# Patient Record
Sex: MALE | Race: WHITE | Employment: UNEMPLOYED | URBAN - METROPOLITAN AREA
[De-identification: names, ages, dates, MRNs, and addresses within clinical notes are randomized per-mention and may not be internally consistent; named-entity substitution may affect disease eponyms.]

---

## 2017-04-26 ENCOUNTER — APPOINTMENT (OUTPATIENT)
Dept: GENERAL RADIOLOGY | Age: 42
End: 2017-04-26
Payer: COMMERCIAL

## 2017-04-26 ENCOUNTER — APPOINTMENT (OUTPATIENT)
Dept: ULTRASOUND IMAGING | Age: 42
End: 2017-04-26
Payer: COMMERCIAL

## 2017-04-26 ENCOUNTER — ANESTHESIA (OUTPATIENT)
Dept: OPERATING ROOM | Age: 42
End: 2017-04-26
Payer: COMMERCIAL

## 2017-04-26 ENCOUNTER — HOSPITAL ENCOUNTER (OUTPATIENT)
Age: 42
Setting detail: OBSERVATION
Discharge: HOME | End: 2017-04-27
Attending: EMERGENCY MEDICINE | Admitting: SURGERY
Payer: COMMERCIAL

## 2017-04-26 ENCOUNTER — APPOINTMENT (OUTPATIENT)
Dept: CT IMAGING | Age: 42
End: 2017-04-26
Payer: COMMERCIAL

## 2017-04-26 ENCOUNTER — ANESTHESIA EVENT (OUTPATIENT)
Dept: OPERATING ROOM | Age: 42
End: 2017-04-26
Payer: COMMERCIAL

## 2017-04-26 VITALS — OXYGEN SATURATION: 97 % | TEMPERATURE: 98.2 F | DIASTOLIC BLOOD PRESSURE: 77 MMHG | SYSTOLIC BLOOD PRESSURE: 130 MMHG

## 2017-04-26 DIAGNOSIS — K35.80 ACUTE APPENDICITIS, UNSPECIFIED ACUTE APPENDICITIS TYPE: Primary | ICD-10-CM

## 2017-04-26 LAB
ABSOLUTE EOS #: 0.1 K/UL (ref 0–0.4)
ABSOLUTE LYMPH #: 2.3 K/UL (ref 1–4.8)
ABSOLUTE MONO #: 0.7 K/UL (ref 0.2–0.8)
ALBUMIN SERPL-MCNC: 4.3 G/DL (ref 3.5–5.2)
ALBUMIN/GLOBULIN RATIO: ABNORMAL (ref 1–2.5)
ALP BLD-CCNC: 36 U/L (ref 40–129)
ALT SERPL-CCNC: 24 U/L (ref 5–41)
AMYLASE: 68 U/L (ref 28–100)
ANION GAP SERPL CALCULATED.3IONS-SCNC: 14 MMOL/L (ref 9–17)
AST SERPL-CCNC: 18 U/L
BASOPHILS # BLD: 1 %
BASOPHILS ABSOLUTE: 0.1 K/UL (ref 0–0.2)
BILIRUB SERPL-MCNC: 0.45 MG/DL (ref 0.3–1.2)
BILIRUBIN DIRECT: 0.09 MG/DL
BILIRUBIN, INDIRECT: 0.36 MG/DL (ref 0–1)
BUN BLDV-MCNC: 15 MG/DL (ref 6–20)
BUN/CREAT BLD: 19 (ref 9–20)
CALCIUM SERPL-MCNC: 9.3 MG/DL (ref 8.6–10.4)
CHLORIDE BLD-SCNC: 99 MMOL/L (ref 98–107)
CO2: 23 MMOL/L (ref 20–31)
CREAT SERPL-MCNC: 0.79 MG/DL (ref 0.7–1.2)
DIFFERENTIAL TYPE: ABNORMAL
EOSINOPHILS RELATIVE PERCENT: 1 %
GFR AFRICAN AMERICAN: >60 ML/MIN
GFR NON-AFRICAN AMERICAN: >60 ML/MIN
GFR SERPL CREATININE-BSD FRML MDRD: NORMAL ML/MIN/{1.73_M2}
GFR SERPL CREATININE-BSD FRML MDRD: NORMAL ML/MIN/{1.73_M2}
GLOBULIN: ABNORMAL G/DL (ref 1.5–3.8)
GLUCOSE BLD-MCNC: 93 MG/DL (ref 70–99)
HCT VFR BLD CALC: 48.3 % (ref 41–53)
HEMOGLOBIN: 16.3 G/DL (ref 13.5–17.5)
LIPASE: 34 U/L (ref 13–60)
LYMPHOCYTES # BLD: 20 %
MCH RBC QN AUTO: 29 PG (ref 26–34)
MCHC RBC AUTO-ENTMCNC: 33.9 G/DL (ref 31–37)
MCV RBC AUTO: 85.7 FL (ref 80–100)
MONOCYTES # BLD: 6 %
PDW BLD-RTO: 14.1 % (ref 11.5–14.5)
PLATELET # BLD: 344 K/UL (ref 130–400)
PLATELET ESTIMATE: ABNORMAL
PMV BLD AUTO: 7.3 FL (ref 6–12)
POTASSIUM SERPL-SCNC: 4.3 MMOL/L (ref 3.7–5.3)
RBC # BLD: 5.63 M/UL (ref 4.5–5.9)
RBC # BLD: ABNORMAL 10*6/UL
SEG NEUTROPHILS: 72 %
SEGMENTED NEUTROPHILS ABSOLUTE COUNT: 8.1 K/UL (ref 1.8–7.7)
SODIUM BLD-SCNC: 136 MMOL/L (ref 135–144)
TOTAL PROTEIN: 7.5 G/DL (ref 6.4–8.3)
WBC # BLD: 11.3 K/UL (ref 3.5–11)
WBC # BLD: ABNORMAL 10*3/UL

## 2017-04-26 PROCEDURE — 71020 XR CHEST STANDARD TWO VW: CPT

## 2017-04-26 PROCEDURE — 2500000003 HC RX 250 WO HCPCS: Performed by: SURGERY

## 2017-04-26 PROCEDURE — 80048 BASIC METABOLIC PNL TOTAL CA: CPT

## 2017-04-26 PROCEDURE — 96366 THER/PROPH/DIAG IV INF ADDON: CPT

## 2017-04-26 PROCEDURE — 2580000003 HC RX 258: Performed by: ANESTHESIOLOGY

## 2017-04-26 PROCEDURE — 6360000002 HC RX W HCPCS: Performed by: ANESTHESIOLOGY

## 2017-04-26 PROCEDURE — 2580000003 HC RX 258: Performed by: EMERGENCY MEDICINE

## 2017-04-26 PROCEDURE — 3600000003 HC SURGERY LEVEL 3 BASE: Performed by: SURGERY

## 2017-04-26 PROCEDURE — 82150 ASSAY OF AMYLASE: CPT

## 2017-04-26 PROCEDURE — 85025 COMPLETE CBC W/AUTO DIFF WBC: CPT

## 2017-04-26 PROCEDURE — 6360000004 HC RX CONTRAST MEDICATION: Performed by: EMERGENCY MEDICINE

## 2017-04-26 PROCEDURE — 99285 EMERGENCY DEPT VISIT HI MDM: CPT

## 2017-04-26 PROCEDURE — 2500000003 HC RX 250 WO HCPCS: Performed by: ANESTHESIOLOGY

## 2017-04-26 PROCEDURE — 96376 TX/PRO/DX INJ SAME DRUG ADON: CPT

## 2017-04-26 PROCEDURE — 74177 CT ABD & PELVIS W/CONTRAST: CPT

## 2017-04-26 PROCEDURE — 96368 THER/DIAG CONCURRENT INF: CPT

## 2017-04-26 PROCEDURE — 81003 URINALYSIS AUTO W/O SCOPE: CPT

## 2017-04-26 PROCEDURE — 3600000013 HC SURGERY LEVEL 3 ADDTL 15MIN: Performed by: SURGERY

## 2017-04-26 PROCEDURE — 80076 HEPATIC FUNCTION PANEL: CPT

## 2017-04-26 PROCEDURE — 96365 THER/PROPH/DIAG IV INF INIT: CPT

## 2017-04-26 PROCEDURE — 6360000002 HC RX W HCPCS: Performed by: SURGERY

## 2017-04-26 PROCEDURE — 7100000001 HC PACU RECOVERY - ADDTL 15 MIN: Performed by: SURGERY

## 2017-04-26 PROCEDURE — G0378 HOSPITAL OBSERVATION PER HR: HCPCS

## 2017-04-26 PROCEDURE — 88304 TISSUE EXAM BY PATHOLOGIST: CPT

## 2017-04-26 PROCEDURE — 83690 ASSAY OF LIPASE: CPT

## 2017-04-26 PROCEDURE — S0028 INJECTION, FAMOTIDINE, 20 MG: HCPCS | Performed by: SURGERY

## 2017-04-26 PROCEDURE — 76705 ECHO EXAM OF ABDOMEN: CPT

## 2017-04-26 PROCEDURE — 3700000000 HC ANESTHESIA ATTENDED CARE: Performed by: SURGERY

## 2017-04-26 PROCEDURE — 3700000001 HC ADD 15 MINUTES (ANESTHESIA): Performed by: SURGERY

## 2017-04-26 PROCEDURE — 6360000002 HC RX W HCPCS: Performed by: EMERGENCY MEDICINE

## 2017-04-26 PROCEDURE — 2580000003 HC RX 258: Performed by: SURGERY

## 2017-04-26 PROCEDURE — 7100000000 HC PACU RECOVERY - FIRST 15 MIN: Performed by: SURGERY

## 2017-04-26 RX ORDER — CEFAZOLIN SODIUM 1 G/3ML
INJECTION, POWDER, FOR SOLUTION INTRAMUSCULAR; INTRAVENOUS PRN
Status: DISCONTINUED | OUTPATIENT
Start: 2017-04-26 | End: 2017-04-26 | Stop reason: SDUPTHER

## 2017-04-26 RX ORDER — ROCURONIUM BROMIDE 10 MG/ML
INJECTION, SOLUTION INTRAVENOUS PRN
Status: DISCONTINUED | OUTPATIENT
Start: 2017-04-26 | End: 2017-04-26 | Stop reason: SDUPTHER

## 2017-04-26 RX ORDER — MORPHINE SULFATE 4 MG/ML
4 INJECTION, SOLUTION INTRAMUSCULAR; INTRAVENOUS ONCE
Status: COMPLETED | OUTPATIENT
Start: 2017-04-26 | End: 2017-04-26

## 2017-04-26 RX ORDER — ONDANSETRON 2 MG/ML
4 INJECTION INTRAMUSCULAR; INTRAVENOUS
Status: COMPLETED | OUTPATIENT
Start: 2017-04-26 | End: 2017-04-26

## 2017-04-26 RX ORDER — HYDROMORPHONE HCL 110MG/55ML
0.5 PATIENT CONTROLLED ANALGESIA SYRINGE INTRAVENOUS EVERY 5 MIN PRN
Status: DISCONTINUED | OUTPATIENT
Start: 2017-04-26 | End: 2017-04-26 | Stop reason: HOSPADM

## 2017-04-26 RX ORDER — BUPIVACAINE HYDROCHLORIDE AND EPINEPHRINE 5; 5 MG/ML; UG/ML
INJECTION, SOLUTION EPIDURAL; INTRACAUDAL; PERINEURAL PRN
Status: DISCONTINUED | OUTPATIENT
Start: 2017-04-26 | End: 2017-04-26 | Stop reason: HOSPADM

## 2017-04-26 RX ORDER — ESCITALOPRAM OXALATE 10 MG/1
10 TABLET ORAL DAILY
COMMUNITY
End: 2018-04-10

## 2017-04-26 RX ORDER — LABETALOL HYDROCHLORIDE 5 MG/ML
5 INJECTION, SOLUTION INTRAVENOUS EVERY 10 MIN PRN
Status: DISCONTINUED | OUTPATIENT
Start: 2017-04-26 | End: 2017-04-26 | Stop reason: HOSPADM

## 2017-04-26 RX ORDER — HYDROMORPHONE HCL 110MG/55ML
1.5 PATIENT CONTROLLED ANALGESIA SYRINGE INTRAVENOUS
Status: DISCONTINUED | OUTPATIENT
Start: 2017-04-26 | End: 2017-04-27

## 2017-04-26 RX ORDER — ONDANSETRON 2 MG/ML
INJECTION INTRAMUSCULAR; INTRAVENOUS PRN
Status: DISCONTINUED | OUTPATIENT
Start: 2017-04-26 | End: 2017-04-26 | Stop reason: SDUPTHER

## 2017-04-26 RX ORDER — FENTANYL CITRATE 50 UG/ML
25 INJECTION, SOLUTION INTRAMUSCULAR; INTRAVENOUS EVERY 5 MIN PRN
Status: COMPLETED | OUTPATIENT
Start: 2017-04-26 | End: 2017-04-26

## 2017-04-26 RX ORDER — SODIUM CHLORIDE 0.9 % (FLUSH) 0.9 %
10 SYRINGE (ML) INJECTION PRN
Status: DISCONTINUED | OUTPATIENT
Start: 2017-04-26 | End: 2017-04-27 | Stop reason: HOSPADM

## 2017-04-26 RX ORDER — DEXTROSE, SODIUM CHLORIDE, AND POTASSIUM CHLORIDE 5; .45; .15 G/100ML; G/100ML; G/100ML
INJECTION INTRAVENOUS CONTINUOUS
Status: DISCONTINUED | OUTPATIENT
Start: 2017-04-26 | End: 2017-04-27 | Stop reason: HOSPADM

## 2017-04-26 RX ORDER — FENTANYL CITRATE 50 UG/ML
25 INJECTION, SOLUTION INTRAMUSCULAR; INTRAVENOUS EVERY 5 MIN PRN
Status: DISCONTINUED | OUTPATIENT
Start: 2017-04-26 | End: 2017-04-26 | Stop reason: HOSPADM

## 2017-04-26 RX ORDER — LIDOCAINE HYDROCHLORIDE 20 MG/ML
INJECTION, SOLUTION INFILTRATION; PERINEURAL PRN
Status: DISCONTINUED | OUTPATIENT
Start: 2017-04-26 | End: 2017-04-26 | Stop reason: SDUPTHER

## 2017-04-26 RX ORDER — DEXAMETHASONE SODIUM PHOSPHATE 10 MG/ML
INJECTION INTRAMUSCULAR; INTRAVENOUS PRN
Status: DISCONTINUED | OUTPATIENT
Start: 2017-04-26 | End: 2017-04-26 | Stop reason: SDUPTHER

## 2017-04-26 RX ORDER — ESCITALOPRAM OXALATE 10 MG/1
10 TABLET ORAL DAILY
Status: DISCONTINUED | OUTPATIENT
Start: 2017-04-27 | End: 2017-04-27 | Stop reason: HOSPADM

## 2017-04-26 RX ORDER — SODIUM CHLORIDE 0.9 % (FLUSH) 0.9 %
10 SYRINGE (ML) INJECTION EVERY 12 HOURS SCHEDULED
Status: DISCONTINUED | OUTPATIENT
Start: 2017-04-26 | End: 2017-04-27 | Stop reason: HOSPADM

## 2017-04-26 RX ORDER — FENTANYL CITRATE 50 UG/ML
INJECTION, SOLUTION INTRAMUSCULAR; INTRAVENOUS PRN
Status: DISCONTINUED | OUTPATIENT
Start: 2017-04-26 | End: 2017-04-26 | Stop reason: SDUPTHER

## 2017-04-26 RX ORDER — HYDRALAZINE HYDROCHLORIDE 20 MG/ML
5 INJECTION INTRAMUSCULAR; INTRAVENOUS EVERY 10 MIN PRN
Status: DISCONTINUED | OUTPATIENT
Start: 2017-04-26 | End: 2017-04-26 | Stop reason: HOSPADM

## 2017-04-26 RX ORDER — ONDANSETRON 2 MG/ML
4 INJECTION INTRAMUSCULAR; INTRAVENOUS EVERY 6 HOURS PRN
Status: DISCONTINUED | OUTPATIENT
Start: 2017-04-26 | End: 2017-04-27

## 2017-04-26 RX ORDER — PROPOFOL 10 MG/ML
INJECTION, EMULSION INTRAVENOUS PRN
Status: DISCONTINUED | OUTPATIENT
Start: 2017-04-26 | End: 2017-04-26 | Stop reason: SDUPTHER

## 2017-04-26 RX ORDER — PROMETHAZINE HYDROCHLORIDE 25 MG/ML
6.25 INJECTION, SOLUTION INTRAMUSCULAR; INTRAVENOUS
Status: DISCONTINUED | OUTPATIENT
Start: 2017-04-26 | End: 2017-04-26 | Stop reason: HOSPADM

## 2017-04-26 RX ORDER — ETODOLAC 400 MG/1
400 TABLET, FILM COATED ORAL 2 TIMES DAILY
Status: ON HOLD | COMMUNITY
End: 2017-04-27 | Stop reason: HOSPADM

## 2017-04-26 RX ORDER — SODIUM CHLORIDE, SODIUM LACTATE, POTASSIUM CHLORIDE, CALCIUM CHLORIDE 600; 310; 30; 20 MG/100ML; MG/100ML; MG/100ML; MG/100ML
INJECTION, SOLUTION INTRAVENOUS CONTINUOUS PRN
Status: DISCONTINUED | OUTPATIENT
Start: 2017-04-26 | End: 2017-04-26 | Stop reason: SDUPTHER

## 2017-04-26 RX ADMIN — SODIUM CHLORIDE 1000 MG: 900 INJECTION INTRAVENOUS at 15:22

## 2017-04-26 RX ADMIN — FENTANYL CITRATE 25 MCG: 50 INJECTION INTRAMUSCULAR; INTRAVENOUS at 20:34

## 2017-04-26 RX ADMIN — DEXTROSE MONOHYDRATE, SODIUM CHLORIDE, AND POTASSIUM CHLORIDE: 50; 4.5; 1.49 INJECTION, SOLUTION INTRAVENOUS at 21:16

## 2017-04-26 RX ADMIN — FENTANYL CITRATE 25 MCG: 50 INJECTION INTRAMUSCULAR; INTRAVENOUS at 20:14

## 2017-04-26 RX ADMIN — FENTANYL CITRATE 100 MCG: 50 INJECTION, SOLUTION INTRAMUSCULAR; INTRAVENOUS at 18:46

## 2017-04-26 RX ADMIN — DEXTROSE MONOHYDRATE, SODIUM CHLORIDE, AND POTASSIUM CHLORIDE 100 ML/HR: 50; 4.5; 1.49 INJECTION, SOLUTION INTRAVENOUS at 15:22

## 2017-04-26 RX ADMIN — ROCURONIUM BROMIDE 50 MG: 10 INJECTION, SOLUTION INTRAVENOUS at 18:21

## 2017-04-26 RX ADMIN — IOVERSOL 125 ML: 741 INJECTION INTRA-ARTERIAL; INTRAVENOUS at 12:24

## 2017-04-26 RX ADMIN — ONDANSETRON 4 MG: 2 INJECTION INTRAMUSCULAR; INTRAVENOUS at 20:27

## 2017-04-26 RX ADMIN — DEXAMETHASONE SODIUM PHOSPHATE 10 MG: 10 INJECTION INTRAMUSCULAR; INTRAVENOUS at 18:39

## 2017-04-26 RX ADMIN — FENTANYL CITRATE 25 MCG: 50 INJECTION INTRAMUSCULAR; INTRAVENOUS at 19:57

## 2017-04-26 RX ADMIN — ONDANSETRON 4 MG: 2 INJECTION, SOLUTION INTRAMUSCULAR; INTRAVENOUS at 18:47

## 2017-04-26 RX ADMIN — FAMOTIDINE 20 MG: 10 INJECTION, SOLUTION INTRAVENOUS at 21:28

## 2017-04-26 RX ADMIN — MORPHINE SULFATE 4 MG: 4 INJECTION, SOLUTION INTRAMUSCULAR; INTRAVENOUS at 16:04

## 2017-04-26 RX ADMIN — FENTANYL CITRATE 150 MCG: 50 INJECTION, SOLUTION INTRAMUSCULAR; INTRAVENOUS at 18:21

## 2017-04-26 RX ADMIN — SODIUM CHLORIDE, POTASSIUM CHLORIDE, SODIUM LACTATE AND CALCIUM CHLORIDE: 600; 310; 30; 20 INJECTION, SOLUTION INTRAVENOUS at 18:14

## 2017-04-26 RX ADMIN — MORPHINE SULFATE 4 MG: 4 INJECTION, SOLUTION INTRAMUSCULAR; INTRAVENOUS at 13:21

## 2017-04-26 RX ADMIN — HYDROMORPHONE HYDROCHLORIDE 1 MG: 1 INJECTION, SOLUTION INTRAMUSCULAR; INTRAVENOUS; SUBCUTANEOUS at 23:55

## 2017-04-26 RX ADMIN — HYDROMORPHONE HYDROCHLORIDE 1 MG: 1 INJECTION, SOLUTION INTRAMUSCULAR; INTRAVENOUS; SUBCUTANEOUS at 21:29

## 2017-04-26 RX ADMIN — SODIUM CHLORIDE, POTASSIUM CHLORIDE, SODIUM LACTATE AND CALCIUM CHLORIDE: 600; 310; 30; 20 INJECTION, SOLUTION INTRAVENOUS at 19:13

## 2017-04-26 RX ADMIN — CEFAZOLIN 2000 MG: 1 INJECTION, POWDER, FOR SOLUTION INTRAMUSCULAR; INTRAVENOUS at 18:34

## 2017-04-26 RX ADMIN — SUGAMMADEX 200 MG: 100 INJECTION, SOLUTION INTRAVENOUS at 19:35

## 2017-04-26 RX ADMIN — PROPOFOL 200 MG: 10 INJECTION, EMULSION INTRAVENOUS at 18:21

## 2017-04-26 RX ADMIN — LIDOCAINE HYDROCHLORIDE 80 MG: 20 INJECTION, SOLUTION INFILTRATION; PERINEURAL at 18:21

## 2017-04-26 RX ADMIN — Medication 10 ML: at 23:55

## 2017-04-26 RX ADMIN — FENTANYL CITRATE 25 MCG: 50 INJECTION INTRAMUSCULAR; INTRAVENOUS at 20:05

## 2017-04-26 ASSESSMENT — ENCOUNTER SYMPTOMS
COLOR CHANGE: 0
DIARRHEA: 0
EYE REDNESS: 0
ABDOMINAL PAIN: 1
CONSTIPATION: 0
FACIAL SWELLING: 0
EYE DISCHARGE: 0
VOMITING: 0
COUGH: 0
SHORTNESS OF BREATH: 1

## 2017-04-26 ASSESSMENT — PAIN DESCRIPTION - PAIN TYPE
TYPE: ACUTE PAIN
TYPE: ACUTE PAIN;SURGICAL PAIN
TYPE: ACUTE PAIN

## 2017-04-26 ASSESSMENT — PAIN SCALES - GENERAL
PAINLEVEL_OUTOF10: 4
PAINLEVEL_OUTOF10: 6
PAINLEVEL_OUTOF10: 6
PAINLEVEL_OUTOF10: 5
PAINLEVEL_OUTOF10: 5
PAINLEVEL_OUTOF10: 4
PAINLEVEL_OUTOF10: 6
PAINLEVEL_OUTOF10: 5
PAINLEVEL_OUTOF10: 6
PAINLEVEL_OUTOF10: 8
PAINLEVEL_OUTOF10: 5
PAINLEVEL_OUTOF10: 4
PAINLEVEL_OUTOF10: 5
PAINLEVEL_OUTOF10: 6

## 2017-04-26 ASSESSMENT — PAIN DESCRIPTION - LOCATION
LOCATION: ABDOMEN

## 2017-04-26 ASSESSMENT — PAIN DESCRIPTION - DESCRIPTORS
DESCRIPTORS: BURNING
DESCRIPTORS: ACHING

## 2017-04-27 VITALS
WEIGHT: 260 LBS | HEIGHT: 72 IN | RESPIRATION RATE: 20 BRPM | SYSTOLIC BLOOD PRESSURE: 139 MMHG | HEART RATE: 94 BPM | DIASTOLIC BLOOD PRESSURE: 79 MMHG | BODY MASS INDEX: 35.21 KG/M2 | TEMPERATURE: 98.4 F | OXYGEN SATURATION: 95 %

## 2017-04-27 LAB
ABSOLUTE EOS #: 0 K/UL (ref 0–0.4)
ABSOLUTE LYMPH #: 1.2 K/UL (ref 1–4.8)
ABSOLUTE MONO #: 0.4 K/UL (ref 0.2–0.8)
ANION GAP SERPL CALCULATED.3IONS-SCNC: 14 MMOL/L (ref 9–17)
BASOPHILS # BLD: 1 %
BASOPHILS ABSOLUTE: 0.1 K/UL (ref 0–0.2)
BUN BLDV-MCNC: 12 MG/DL (ref 6–20)
BUN/CREAT BLD: 15 (ref 9–20)
CALCIUM SERPL-MCNC: 9.3 MG/DL (ref 8.6–10.4)
CHLORIDE BLD-SCNC: 99 MMOL/L (ref 98–107)
CO2: 24 MMOL/L (ref 20–31)
CREAT SERPL-MCNC: 0.82 MG/DL (ref 0.7–1.2)
DIFFERENTIAL TYPE: ABNORMAL
EOSINOPHILS RELATIVE PERCENT: 0 %
GFR AFRICAN AMERICAN: >60 ML/MIN
GFR NON-AFRICAN AMERICAN: >60 ML/MIN
GFR SERPL CREATININE-BSD FRML MDRD: ABNORMAL ML/MIN/{1.73_M2}
GFR SERPL CREATININE-BSD FRML MDRD: ABNORMAL ML/MIN/{1.73_M2}
GLUCOSE BLD-MCNC: 160 MG/DL (ref 70–99)
HCT VFR BLD CALC: 45.5 % (ref 41–53)
HEMOGLOBIN: 15.4 G/DL (ref 13.5–17.5)
LYMPHOCYTES # BLD: 10 %
MAGNESIUM: 2.2 MG/DL (ref 1.6–2.6)
MCH RBC QN AUTO: 28.8 PG (ref 26–34)
MCHC RBC AUTO-ENTMCNC: 33.8 G/DL (ref 31–37)
MCV RBC AUTO: 85.2 FL (ref 80–100)
MONOCYTES # BLD: 4 %
PDW BLD-RTO: 14.3 % (ref 11.5–14.5)
PLATELET # BLD: 336 K/UL (ref 130–400)
PLATELET ESTIMATE: ABNORMAL
PMV BLD AUTO: 7.7 FL (ref 6–12)
POTASSIUM SERPL-SCNC: 5.1 MMOL/L (ref 3.7–5.3)
RBC # BLD: 5.34 M/UL (ref 4.5–5.9)
RBC # BLD: ABNORMAL 10*6/UL
SEG NEUTROPHILS: 85 %
SEGMENTED NEUTROPHILS ABSOLUTE COUNT: 10 K/UL (ref 1.8–7.7)
SODIUM BLD-SCNC: 137 MMOL/L (ref 135–144)
WBC # BLD: 11.8 K/UL (ref 3.5–11)
WBC # BLD: ABNORMAL 10*3/UL

## 2017-04-27 PROCEDURE — 2580000003 HC RX 258: Performed by: SURGERY

## 2017-04-27 PROCEDURE — 6370000000 HC RX 637 (ALT 250 FOR IP): Performed by: SURGERY

## 2017-04-27 PROCEDURE — 36415 COLL VENOUS BLD VENIPUNCTURE: CPT

## 2017-04-27 PROCEDURE — 85025 COMPLETE CBC W/AUTO DIFF WBC: CPT

## 2017-04-27 PROCEDURE — 2500000003 HC RX 250 WO HCPCS: Performed by: SURGERY

## 2017-04-27 PROCEDURE — 2580000003 HC RX 258: Performed by: EMERGENCY MEDICINE

## 2017-04-27 PROCEDURE — 83735 ASSAY OF MAGNESIUM: CPT

## 2017-04-27 PROCEDURE — S0028 INJECTION, FAMOTIDINE, 20 MG: HCPCS | Performed by: SURGERY

## 2017-04-27 PROCEDURE — 80048 BASIC METABOLIC PNL TOTAL CA: CPT

## 2017-04-27 PROCEDURE — G0378 HOSPITAL OBSERVATION PER HR: HCPCS

## 2017-04-27 PROCEDURE — 6360000002 HC RX W HCPCS: Performed by: SURGERY

## 2017-04-27 RX ORDER — OXYCODONE HYDROCHLORIDE AND ACETAMINOPHEN 5; 325 MG/1; MG/1
TABLET ORAL
Qty: 12 TABLET | Refills: 0 | Status: ON HOLD | OUTPATIENT
Start: 2017-04-27 | End: 2017-05-25 | Stop reason: HOSPADM

## 2017-04-27 RX ORDER — OXYCODONE HYDROCHLORIDE AND ACETAMINOPHEN 5; 325 MG/1; MG/1
1 TABLET ORAL EVERY 6 HOURS PRN
Status: DISCONTINUED | OUTPATIENT
Start: 2017-04-27 | End: 2017-04-27 | Stop reason: HOSPADM

## 2017-04-27 RX ORDER — OXYCODONE HYDROCHLORIDE AND ACETAMINOPHEN 5; 325 MG/1; MG/1
2 TABLET ORAL EVERY 6 HOURS PRN
Status: DISCONTINUED | OUTPATIENT
Start: 2017-04-27 | End: 2017-04-27 | Stop reason: HOSPADM

## 2017-04-27 RX ADMIN — ENOXAPARIN SODIUM 40 MG: 40 INJECTION SUBCUTANEOUS at 09:10

## 2017-04-27 RX ADMIN — DEXTROSE MONOHYDRATE, SODIUM CHLORIDE, AND POTASSIUM CHLORIDE: 50; 4.5; 1.49 INJECTION, SOLUTION INTRAVENOUS at 07:06

## 2017-04-27 RX ADMIN — HYDROMORPHONE HYDROCHLORIDE 0.5 MG: 1 INJECTION, SOLUTION INTRAMUSCULAR; INTRAVENOUS; SUBCUTANEOUS at 09:11

## 2017-04-27 RX ADMIN — OXYCODONE HYDROCHLORIDE AND ACETAMINOPHEN 1 TABLET: 5; 325 TABLET ORAL at 12:43

## 2017-04-27 RX ADMIN — ESCITALOPRAM OXALATE 10 MG: 10 TABLET ORAL at 09:23

## 2017-04-27 RX ADMIN — HYDROMORPHONE HYDROCHLORIDE 1 MG: 1 INJECTION, SOLUTION INTRAMUSCULAR; INTRAVENOUS; SUBCUTANEOUS at 05:42

## 2017-04-27 RX ADMIN — CEFOXITIN SODIUM 1 G: 1 POWDER, FOR SOLUTION INTRAVENOUS at 15:04

## 2017-04-27 RX ADMIN — Medication 10 ML: at 09:18

## 2017-04-27 RX ADMIN — FAMOTIDINE 20 MG: 10 INJECTION, SOLUTION INTRAVENOUS at 09:10

## 2017-04-27 ASSESSMENT — PAIN SCALES - GENERAL
PAINLEVEL_OUTOF10: 7
PAINLEVEL_OUTOF10: 5
PAINLEVEL_OUTOF10: 7

## 2017-04-28 LAB — SURGICAL PATHOLOGY REPORT: NORMAL

## 2017-05-19 ENCOUNTER — HOSPITAL ENCOUNTER (INPATIENT)
Age: 42
LOS: 6 days | Discharge: HOME | DRG: 392 | End: 2017-05-26
Attending: EMERGENCY MEDICINE | Admitting: FAMILY MEDICINE
Payer: COMMERCIAL

## 2017-05-19 ENCOUNTER — APPOINTMENT (OUTPATIENT)
Dept: CT IMAGING | Age: 42
DRG: 392 | End: 2017-05-19
Payer: COMMERCIAL

## 2017-05-19 DIAGNOSIS — K57.32 SIGMOID DIVERTICULITIS: Primary | ICD-10-CM

## 2017-05-19 DIAGNOSIS — M54.5 BILATERAL LOW BACK PAIN, UNSPECIFIED CHRONICITY, WITH SCIATICA PRESENCE UNSPECIFIED: ICD-10-CM

## 2017-05-19 DIAGNOSIS — D72.828 GRANULOCYTOSIS: ICD-10-CM

## 2017-05-19 LAB
ANION GAP SERPL CALCULATED.3IONS-SCNC: 17 MMOL/L (ref 9–17)
BUN BLDV-MCNC: 19 MG/DL (ref 6–20)
BUN/CREAT BLD: 20 (ref 9–20)
CALCIUM SERPL-MCNC: 8.6 MG/DL (ref 8.6–10.4)
CHLORIDE BLD-SCNC: 98 MMOL/L (ref 98–107)
CO2: 20 MMOL/L (ref 20–31)
CREAT SERPL-MCNC: 0.96 MG/DL (ref 0.7–1.2)
GFR AFRICAN AMERICAN: >60 ML/MIN
GFR NON-AFRICAN AMERICAN: >60 ML/MIN
GFR SERPL CREATININE-BSD FRML MDRD: ABNORMAL ML/MIN/{1.73_M2}
GFR SERPL CREATININE-BSD FRML MDRD: ABNORMAL ML/MIN/{1.73_M2}
GLUCOSE BLD-MCNC: 104 MG/DL (ref 70–99)
POTASSIUM SERPL-SCNC: 4.3 MMOL/L (ref 3.7–5.3)
SODIUM BLD-SCNC: 135 MMOL/L (ref 135–144)

## 2017-05-19 PROCEDURE — 96365 THER/PROPH/DIAG IV INF INIT: CPT

## 2017-05-19 PROCEDURE — 80048 BASIC METABOLIC PNL TOTAL CA: CPT

## 2017-05-19 PROCEDURE — 6360000002 HC RX W HCPCS: Performed by: EMERGENCY MEDICINE

## 2017-05-19 PROCEDURE — 72131 CT LUMBAR SPINE W/O DYE: CPT

## 2017-05-19 PROCEDURE — 85025 COMPLETE CBC W/AUTO DIFF WBC: CPT

## 2017-05-19 PROCEDURE — 96368 THER/DIAG CONCURRENT INF: CPT

## 2017-05-19 PROCEDURE — 99284 EMERGENCY DEPT VISIT MOD MDM: CPT

## 2017-05-19 PROCEDURE — 85651 RBC SED RATE NONAUTOMATED: CPT

## 2017-05-19 PROCEDURE — 2580000003 HC RX 258: Performed by: EMERGENCY MEDICINE

## 2017-05-19 PROCEDURE — 86140 C-REACTIVE PROTEIN: CPT

## 2017-05-19 PROCEDURE — 96375 TX/PRO/DX INJ NEW DRUG ADDON: CPT

## 2017-05-19 PROCEDURE — 96376 TX/PRO/DX INJ SAME DRUG ADON: CPT

## 2017-05-19 RX ORDER — SODIUM CHLORIDE 9 MG/ML
INJECTION, SOLUTION INTRAVENOUS CONTINUOUS
Status: DISCONTINUED | OUTPATIENT
Start: 2017-05-19 | End: 2017-05-20 | Stop reason: SDUPTHER

## 2017-05-19 RX ORDER — HYDROMORPHONE HCL 110MG/55ML
2 PATIENT CONTROLLED ANALGESIA SYRINGE INTRAVENOUS ONCE
Status: COMPLETED | OUTPATIENT
Start: 2017-05-19 | End: 2017-05-19

## 2017-05-19 RX ORDER — DIAZEPAM 5 MG/ML
2.5 INJECTION, SOLUTION INTRAMUSCULAR; INTRAVENOUS ONCE
Status: COMPLETED | OUTPATIENT
Start: 2017-05-19 | End: 2017-05-19

## 2017-05-19 RX ORDER — ONDANSETRON 2 MG/ML
4 INJECTION INTRAMUSCULAR; INTRAVENOUS ONCE
Status: COMPLETED | OUTPATIENT
Start: 2017-05-19 | End: 2017-05-19

## 2017-05-19 RX ADMIN — ONDANSETRON 4 MG: 2 INJECTION INTRAMUSCULAR; INTRAVENOUS at 22:41

## 2017-05-19 RX ADMIN — SODIUM CHLORIDE: 9 INJECTION, SOLUTION INTRAVENOUS at 22:43

## 2017-05-19 RX ADMIN — DIAZEPAM 2.5 MG: 5 INJECTION, SOLUTION INTRAMUSCULAR; INTRAVENOUS at 22:42

## 2017-05-19 RX ADMIN — HYDROMORPHONE HYDROCHLORIDE 2 MG: 2 INJECTION, SOLUTION INTRAMUSCULAR; INTRAVENOUS; SUBCUTANEOUS at 22:41

## 2017-05-19 ASSESSMENT — PAIN SCALES - GENERAL
PAINLEVEL_OUTOF10: 9
PAINLEVEL_OUTOF10: 9

## 2017-05-20 ENCOUNTER — APPOINTMENT (OUTPATIENT)
Dept: CT IMAGING | Age: 42
DRG: 392 | End: 2017-05-20
Payer: COMMERCIAL

## 2017-05-20 PROBLEM — K57.92 ACUTE DIVERTICULITIS OF INTESTINE: Status: ACTIVE | Noted: 2017-05-20

## 2017-05-20 PROBLEM — F32.9 MAJOR DEPRESSIVE DISORDER WITH CURRENT ACTIVE EPISODE: Status: ACTIVE | Noted: 2017-05-20

## 2017-05-20 PROBLEM — M54.50 ACUTE MIDLINE LOW BACK PAIN WITHOUT SCIATICA: Status: ACTIVE | Noted: 2017-05-20

## 2017-05-20 LAB
-: NORMAL
ABSOLUTE EOS #: 0 K/UL (ref 0–0.4)
ABSOLUTE LYMPH #: 0.85 K/UL (ref 1–4.8)
ABSOLUTE MONO #: 0.85 K/UL (ref 0.2–0.8)
AMORPHOUS: NORMAL
BACTERIA: NORMAL
BASOPHILS # BLD: 0 %
BASOPHILS ABSOLUTE: 0 K/UL (ref 0–0.2)
BILIRUBIN URINE: NEGATIVE
C-REACTIVE PROTEIN: 100.4 MG/L (ref 0–5)
CASTS UA: NORMAL /LPF
COLOR: YELLOW
COMMENT UA: ABNORMAL
CRYSTALS, UA: NORMAL /HPF
DIFFERENTIAL TYPE: ABNORMAL
EOSINOPHILS RELATIVE PERCENT: 0 %
EPITHELIAL CELLS UA: NORMAL /HPF
GLUCOSE URINE: NEGATIVE
HCT VFR BLD CALC: 45.3 % (ref 41–53)
HEMOGLOBIN: 15.3 G/DL (ref 13.5–17.5)
KETONES, URINE: NEGATIVE
LACTIC ACID: 0.8 MMOL/L (ref 0.5–2.2)
LEUKOCYTE ESTERASE, URINE: NEGATIVE
LYMPHOCYTES # BLD: 4 %
MCH RBC QN AUTO: 28.5 PG (ref 26–34)
MCHC RBC AUTO-ENTMCNC: 33.7 G/DL (ref 31–37)
MCV RBC AUTO: 84.6 FL (ref 80–100)
MONOCYTES # BLD: 4 %
MUCUS: NORMAL
NITRITE, URINE: NEGATIVE
NUCLEATED RED BLOOD CELLS: 1 PER 100 WBC
OTHER OBSERVATIONS UA: NORMAL
PDW BLD-RTO: 13.5 % (ref 11.5–14.5)
PH UA: 6.5 (ref 5–8)
PLATELET # BLD: 349 K/UL (ref 130–400)
PLATELET ESTIMATE: ABNORMAL
PMV BLD AUTO: ABNORMAL FL (ref 6–12)
PROTEIN UA: ABNORMAL
RBC # BLD: 5.35 M/UL (ref 4.5–5.9)
RBC # BLD: ABNORMAL 10*6/UL
RBC UA: NORMAL /HPF (ref 0–2)
RENAL EPITHELIAL, UA: NORMAL /HPF
SEDIMENTATION RATE, ERYTHROCYTE: 10 MM (ref 0–15)
SEG NEUTROPHILS: 92 %
SEGMENTED NEUTROPHILS ABSOLUTE COUNT: 19.5 K/UL (ref 1.8–7.7)
SPECIFIC GRAVITY UA: 1.01 (ref 1–1.03)
TRICHOMONAS: NORMAL
TURBIDITY: CLEAR
URINE HGB: ABNORMAL
UROBILINOGEN, URINE: NORMAL
WBC # BLD: 21.2 K/UL (ref 3.5–11)
WBC # BLD: ABNORMAL 10*3/UL
WBC UA: NORMAL /HPF (ref 0–5)
YEAST: NORMAL

## 2017-05-20 PROCEDURE — 87040 BLOOD CULTURE FOR BACTERIA: CPT

## 2017-05-20 PROCEDURE — 6360000002 HC RX W HCPCS: Performed by: EMERGENCY MEDICINE

## 2017-05-20 PROCEDURE — 6370000000 HC RX 637 (ALT 250 FOR IP): Performed by: FAMILY MEDICINE

## 2017-05-20 PROCEDURE — 2580000003 HC RX 258: Performed by: NURSE PRACTITIONER

## 2017-05-20 PROCEDURE — 74177 CT ABD & PELVIS W/CONTRAST: CPT

## 2017-05-20 PROCEDURE — 87086 URINE CULTURE/COLONY COUNT: CPT

## 2017-05-20 PROCEDURE — 6360000002 HC RX W HCPCS: Performed by: FAMILY MEDICINE

## 2017-05-20 PROCEDURE — 6360000002 HC RX W HCPCS: Performed by: SURGERY

## 2017-05-20 PROCEDURE — 2500000003 HC RX 250 WO HCPCS: Performed by: EMERGENCY MEDICINE

## 2017-05-20 PROCEDURE — 2580000003 HC RX 258: Performed by: SURGERY

## 2017-05-20 PROCEDURE — 99222 1ST HOSP IP/OBS MODERATE 55: CPT | Performed by: FAMILY MEDICINE

## 2017-05-20 PROCEDURE — 6360000002 HC RX W HCPCS: Performed by: NURSE PRACTITIONER

## 2017-05-20 PROCEDURE — 6370000000 HC RX 637 (ALT 250 FOR IP): Performed by: NURSE PRACTITIONER

## 2017-05-20 PROCEDURE — 81001 URINALYSIS AUTO W/SCOPE: CPT

## 2017-05-20 PROCEDURE — 1200000000 HC SEMI PRIVATE

## 2017-05-20 PROCEDURE — 6360000004 HC RX CONTRAST MEDICATION: Performed by: EMERGENCY MEDICINE

## 2017-05-20 PROCEDURE — 83605 ASSAY OF LACTIC ACID: CPT

## 2017-05-20 RX ORDER — POTASSIUM CHLORIDE 20 MEQ/1
40 TABLET, EXTENDED RELEASE ORAL PRN
Status: DISCONTINUED | OUTPATIENT
Start: 2017-05-20 | End: 2017-05-26 | Stop reason: HOSPADM

## 2017-05-20 RX ORDER — DEXTROSE, SODIUM CHLORIDE, AND POTASSIUM CHLORIDE 5; .45; .15 G/100ML; G/100ML; G/100ML
INJECTION INTRAVENOUS CONTINUOUS
Status: DISCONTINUED | OUTPATIENT
Start: 2017-05-20 | End: 2017-05-25

## 2017-05-20 RX ORDER — SODIUM CHLORIDE 9 MG/ML
INJECTION, SOLUTION INTRAVENOUS CONTINUOUS
Status: DISCONTINUED | OUTPATIENT
Start: 2017-05-20 | End: 2017-05-20

## 2017-05-20 RX ORDER — ACETAMINOPHEN 325 MG/1
650 TABLET ORAL EVERY 4 HOURS PRN
Status: DISCONTINUED | OUTPATIENT
Start: 2017-05-20 | End: 2017-05-26 | Stop reason: HOSPADM

## 2017-05-20 RX ORDER — ONDANSETRON 2 MG/ML
4 INJECTION INTRAMUSCULAR; INTRAVENOUS EVERY 6 HOURS PRN
Status: DISCONTINUED | OUTPATIENT
Start: 2017-05-20 | End: 2017-05-26 | Stop reason: HOSPADM

## 2017-05-20 RX ORDER — OXYCODONE HYDROCHLORIDE AND ACETAMINOPHEN 5; 325 MG/1; MG/1
1 TABLET ORAL EVERY 6 HOURS PRN
Status: DISCONTINUED | OUTPATIENT
Start: 2017-05-20 | End: 2017-05-20

## 2017-05-20 RX ORDER — POTASSIUM CHLORIDE 7.45 MG/ML
10 INJECTION INTRAVENOUS PRN
Status: DISCONTINUED | OUTPATIENT
Start: 2017-05-20 | End: 2017-05-26 | Stop reason: HOSPADM

## 2017-05-20 RX ORDER — MAGNESIUM SULFATE 1 G/100ML
1 INJECTION INTRAVENOUS PRN
Status: DISCONTINUED | OUTPATIENT
Start: 2017-05-20 | End: 2017-05-26 | Stop reason: HOSPADM

## 2017-05-20 RX ORDER — SODIUM CHLORIDE 0.9 % (FLUSH) 0.9 %
10 SYRINGE (ML) INJECTION EVERY 12 HOURS SCHEDULED
Status: DISCONTINUED | OUTPATIENT
Start: 2017-05-20 | End: 2017-05-26 | Stop reason: HOSPADM

## 2017-05-20 RX ORDER — POTASSIUM CHLORIDE 20MEQ/15ML
40 LIQUID (ML) ORAL PRN
Status: DISCONTINUED | OUTPATIENT
Start: 2017-05-20 | End: 2017-05-26 | Stop reason: HOSPADM

## 2017-05-20 RX ORDER — CYCLOBENZAPRINE HCL 10 MG
10 TABLET ORAL 3 TIMES DAILY PRN
Status: DISCONTINUED | OUTPATIENT
Start: 2017-05-20 | End: 2017-05-26 | Stop reason: HOSPADM

## 2017-05-20 RX ORDER — MORPHINE SULFATE 4 MG/ML
4 INJECTION, SOLUTION INTRAMUSCULAR; INTRAVENOUS
Status: DISCONTINUED | OUTPATIENT
Start: 2017-05-20 | End: 2017-05-20

## 2017-05-20 RX ORDER — BISACODYL 10 MG
10 SUPPOSITORY, RECTAL RECTAL DAILY PRN
Status: DISCONTINUED | OUTPATIENT
Start: 2017-05-20 | End: 2017-05-26 | Stop reason: HOSPADM

## 2017-05-20 RX ORDER — NICOTINE 21 MG/24HR
1 PATCH, TRANSDERMAL 24 HOURS TRANSDERMAL DAILY PRN
Status: DISCONTINUED | OUTPATIENT
Start: 2017-05-20 | End: 2017-05-26 | Stop reason: HOSPADM

## 2017-05-20 RX ORDER — MORPHINE SULFATE 2 MG/ML
2 INJECTION, SOLUTION INTRAMUSCULAR; INTRAVENOUS
Status: DISCONTINUED | OUTPATIENT
Start: 2017-05-20 | End: 2017-05-20

## 2017-05-20 RX ORDER — SODIUM CHLORIDE 0.9 % (FLUSH) 0.9 %
10 SYRINGE (ML) INJECTION PRN
Status: DISCONTINUED | OUTPATIENT
Start: 2017-05-20 | End: 2017-05-26 | Stop reason: HOSPADM

## 2017-05-20 RX ORDER — ESCITALOPRAM OXALATE 10 MG/1
10 TABLET ORAL DAILY
Status: DISCONTINUED | OUTPATIENT
Start: 2017-05-20 | End: 2017-05-26 | Stop reason: HOSPADM

## 2017-05-20 RX ADMIN — IOVERSOL 125 ML: 741 INJECTION INTRA-ARTERIAL; INTRAVENOUS at 00:13

## 2017-05-20 RX ADMIN — HYDROMORPHONE HYDROCHLORIDE 1 MG: 1 INJECTION, SOLUTION INTRAMUSCULAR; INTRAVENOUS; SUBCUTANEOUS at 08:02

## 2017-05-20 RX ADMIN — PIPERACILLIN SODIUM,TAZOBACTAM SODIUM 3.38 G: 3; .375 INJECTION, POWDER, FOR SOLUTION INTRAVENOUS at 22:39

## 2017-05-20 RX ADMIN — HYDROMORPHONE HYDROCHLORIDE 1 MG: 1 INJECTION, SOLUTION INTRAMUSCULAR; INTRAVENOUS; SUBCUTANEOUS at 19:53

## 2017-05-20 RX ADMIN — METRONIDAZOLE 500 MG: 500 INJECTION, SOLUTION INTRAVENOUS at 00:32

## 2017-05-20 RX ADMIN — HYDROMORPHONE HYDROCHLORIDE 1 MG: 1 INJECTION, SOLUTION INTRAMUSCULAR; INTRAVENOUS; SUBCUTANEOUS at 00:32

## 2017-05-20 RX ADMIN — HYDROMORPHONE HYDROCHLORIDE 1 MG: 1 INJECTION, SOLUTION INTRAMUSCULAR; INTRAVENOUS; SUBCUTANEOUS at 12:24

## 2017-05-20 RX ADMIN — ENOXAPARIN SODIUM 40 MG: 40 INJECTION SUBCUTANEOUS at 09:21

## 2017-05-20 RX ADMIN — POTASSIUM CHLORIDE, DEXTROSE MONOHYDRATE AND SODIUM CHLORIDE: 150; 5; 450 INJECTION, SOLUTION INTRAVENOUS at 11:46

## 2017-05-20 RX ADMIN — PIPERACILLIN AND TAZOBACTAM 3.38 G: 3; .375 INJECTION, POWDER, FOR SOLUTION INTRAVENOUS at 09:14

## 2017-05-20 RX ADMIN — ESCITALOPRAM OXALATE 10 MG: 10 TABLET ORAL at 09:23

## 2017-05-20 RX ADMIN — SODIUM CHLORIDE: 9 INJECTION, SOLUTION INTRAVENOUS at 07:32

## 2017-05-20 RX ADMIN — CYCLOBENZAPRINE HYDROCHLORIDE 10 MG: 10 TABLET, FILM COATED ORAL at 23:30

## 2017-05-20 RX ADMIN — CYCLOBENZAPRINE HYDROCHLORIDE 10 MG: 10 TABLET, FILM COATED ORAL at 14:45

## 2017-05-20 RX ADMIN — HYDROMORPHONE HYDROCHLORIDE 1 MG: 1 INJECTION, SOLUTION INTRAMUSCULAR; INTRAVENOUS; SUBCUTANEOUS at 10:31

## 2017-05-20 RX ADMIN — MORPHINE SULFATE 4 MG: 4 INJECTION, SOLUTION INTRAMUSCULAR; INTRAVENOUS at 03:38

## 2017-05-20 RX ADMIN — HYDROMORPHONE HYDROCHLORIDE 1 MG: 1 INJECTION, SOLUTION INTRAMUSCULAR; INTRAVENOUS; SUBCUTANEOUS at 14:45

## 2017-05-20 RX ADMIN — TAZOBACTAM SODIUM AND PIPERACILLIN SODIUM 3.38 G: 375; 3 INJECTION, SOLUTION INTRAVENOUS at 00:32

## 2017-05-20 RX ADMIN — PIPERACILLIN SODIUM,TAZOBACTAM SODIUM 3.38 G: 3; .375 INJECTION, POWDER, FOR SOLUTION INTRAVENOUS at 15:31

## 2017-05-20 RX ADMIN — HYDROMORPHONE HYDROCHLORIDE 1 MG: 1 INJECTION, SOLUTION INTRAMUSCULAR; INTRAVENOUS; SUBCUTANEOUS at 05:43

## 2017-05-20 RX ADMIN — HYDROMORPHONE HYDROCHLORIDE 1 MG: 1 INJECTION, SOLUTION INTRAMUSCULAR; INTRAVENOUS; SUBCUTANEOUS at 22:39

## 2017-05-20 RX ADMIN — HYDROMORPHONE HYDROCHLORIDE 1 MG: 1 INJECTION, SOLUTION INTRAMUSCULAR; INTRAVENOUS; SUBCUTANEOUS at 17:09

## 2017-05-20 ASSESSMENT — PAIN DESCRIPTION - DESCRIPTORS
DESCRIPTORS: ACHING;SHARP
DESCRIPTORS: PRESSURE;SHARP
DESCRIPTORS: ACHING;SHARP
DESCRIPTORS: PRESSURE;SHARP

## 2017-05-20 ASSESSMENT — ENCOUNTER SYMPTOMS
SHORTNESS OF BREATH: 0
SORE THROAT: 0
BACK PAIN: 1
WHEEZING: 0
NAUSEA: 0
VOMITING: 0
COUGH: 0
DIARRHEA: 0
VOICE CHANGE: 0
SINUS PRESSURE: 0
ABDOMINAL PAIN: 1
BLOOD IN STOOL: 0
CONSTIPATION: 0

## 2017-05-20 ASSESSMENT — PAIN DESCRIPTION - ORIENTATION
ORIENTATION: LOWER
ORIENTATION: LOWER;MID
ORIENTATION: LOWER
ORIENTATION: LOWER

## 2017-05-20 ASSESSMENT — PAIN SCALES - GENERAL
PAINLEVEL_OUTOF10: 8
PAINLEVEL_OUTOF10: 4
PAINLEVEL_OUTOF10: 5
PAINLEVEL_OUTOF10: 4
PAINLEVEL_OUTOF10: 7
PAINLEVEL_OUTOF10: 0
PAINLEVEL_OUTOF10: 6
PAINLEVEL_OUTOF10: 6
PAINLEVEL_OUTOF10: 0
PAINLEVEL_OUTOF10: 8
PAINLEVEL_OUTOF10: 7
PAINLEVEL_OUTOF10: 6
PAINLEVEL_OUTOF10: 7
PAINLEVEL_OUTOF10: 7
PAINLEVEL_OUTOF10: 9

## 2017-05-20 ASSESSMENT — PAIN DESCRIPTION - LOCATION
LOCATION: BACK
LOCATION: BACK
LOCATION: BACK;ABDOMEN
LOCATION: BACK;ABDOMEN

## 2017-05-20 ASSESSMENT — PAIN DESCRIPTION - PAIN TYPE
TYPE: ACUTE PAIN

## 2017-05-21 ENCOUNTER — APPOINTMENT (OUTPATIENT)
Dept: GENERAL RADIOLOGY | Age: 42
DRG: 392 | End: 2017-05-21
Payer: COMMERCIAL

## 2017-05-21 LAB
ABSOLUTE EOS #: 0.2 K/UL (ref 0–0.4)
ABSOLUTE LYMPH #: 1 K/UL (ref 1–4.8)
ABSOLUTE MONO #: 0.4 K/UL (ref 0.2–0.8)
ALBUMIN SERPL-MCNC: 3.3 G/DL (ref 3.5–5.2)
ALBUMIN/GLOBULIN RATIO: ABNORMAL (ref 1–2.5)
ALP BLD-CCNC: 36 U/L (ref 40–129)
ALT SERPL-CCNC: 36 U/L (ref 5–41)
ANION GAP SERPL CALCULATED.3IONS-SCNC: 11 MMOL/L (ref 9–17)
AST SERPL-CCNC: 31 U/L
BASOPHILS # BLD: 0 %
BASOPHILS ABSOLUTE: 0 K/UL (ref 0–0.2)
BILIRUB SERPL-MCNC: 0.49 MG/DL (ref 0.3–1.2)
BUN BLDV-MCNC: 14 MG/DL (ref 6–20)
BUN/CREAT BLD: 15 (ref 9–20)
CALCIUM SERPL-MCNC: 8.4 MG/DL (ref 8.6–10.4)
CHLORIDE BLD-SCNC: 101 MMOL/L (ref 98–107)
CO2: 25 MMOL/L (ref 20–31)
CREAT SERPL-MCNC: 0.94 MG/DL (ref 0.7–1.2)
CULTURE: NO GROWTH
CULTURE: NORMAL
DIFFERENTIAL TYPE: NORMAL
EOSINOPHILS RELATIVE PERCENT: 4 %
GFR AFRICAN AMERICAN: >60 ML/MIN
GFR NON-AFRICAN AMERICAN: >60 ML/MIN
GFR SERPL CREATININE-BSD FRML MDRD: ABNORMAL ML/MIN/{1.73_M2}
GFR SERPL CREATININE-BSD FRML MDRD: ABNORMAL ML/MIN/{1.73_M2}
GLUCOSE BLD-MCNC: 108 MG/DL (ref 70–99)
HCT VFR BLD CALC: 42.5 % (ref 41–53)
HEMOGLOBIN: 14 G/DL (ref 13.5–17.5)
LYMPHOCYTES # BLD: 18 %
Lab: NORMAL
MAGNESIUM: 2.2 MG/DL (ref 1.6–2.6)
MCH RBC QN AUTO: 28.2 PG (ref 26–34)
MCHC RBC AUTO-ENTMCNC: 32.9 G/DL (ref 31–37)
MCV RBC AUTO: 85.8 FL (ref 80–100)
MONOCYTES # BLD: 7 %
PDW BLD-RTO: 13.5 % (ref 11.5–14.5)
PLATELET # BLD: 253 K/UL (ref 130–400)
PLATELET ESTIMATE: NORMAL
PMV BLD AUTO: NORMAL FL (ref 6–12)
POTASSIUM SERPL-SCNC: 4.2 MMOL/L (ref 3.7–5.3)
RBC # BLD: 4.96 M/UL (ref 4.5–5.9)
RBC # BLD: NORMAL 10*6/UL
SEG NEUTROPHILS: 71 %
SEGMENTED NEUTROPHILS ABSOLUTE COUNT: 3.8 K/UL (ref 1.8–7.7)
SODIUM BLD-SCNC: 137 MMOL/L (ref 135–144)
SPECIMEN DESCRIPTION: NORMAL
SPECIMEN DESCRIPTION: NORMAL
STATUS: NORMAL
TOTAL PROTEIN: 6.1 G/DL (ref 6.4–8.3)
WBC # BLD: 5.4 K/UL (ref 3.5–11)
WBC # BLD: NORMAL 10*3/UL

## 2017-05-21 PROCEDURE — 6370000000 HC RX 637 (ALT 250 FOR IP): Performed by: NURSE PRACTITIONER

## 2017-05-21 PROCEDURE — 2580000003 HC RX 258: Performed by: SURGERY

## 2017-05-21 PROCEDURE — 6360000002 HC RX W HCPCS: Performed by: NURSE PRACTITIONER

## 2017-05-21 PROCEDURE — 74020 XR ABDOMEN STANDARD: CPT

## 2017-05-21 PROCEDURE — 2580000003 HC RX 258: Performed by: NURSE PRACTITIONER

## 2017-05-21 PROCEDURE — 6370000000 HC RX 637 (ALT 250 FOR IP): Performed by: FAMILY MEDICINE

## 2017-05-21 PROCEDURE — 1200000000 HC SEMI PRIVATE

## 2017-05-21 PROCEDURE — 83735 ASSAY OF MAGNESIUM: CPT

## 2017-05-21 PROCEDURE — 99232 SBSQ HOSP IP/OBS MODERATE 35: CPT | Performed by: FAMILY MEDICINE

## 2017-05-21 PROCEDURE — 36415 COLL VENOUS BLD VENIPUNCTURE: CPT

## 2017-05-21 PROCEDURE — 80053 COMPREHEN METABOLIC PANEL: CPT

## 2017-05-21 PROCEDURE — 85025 COMPLETE CBC W/AUTO DIFF WBC: CPT

## 2017-05-21 PROCEDURE — 6360000002 HC RX W HCPCS: Performed by: SURGERY

## 2017-05-21 RX ADMIN — PIPERACILLIN SODIUM,TAZOBACTAM SODIUM 3.38 G: 3; .375 INJECTION, POWDER, FOR SOLUTION INTRAVENOUS at 08:40

## 2017-05-21 RX ADMIN — HYDROMORPHONE HYDROCHLORIDE 1 MG: 1 INJECTION, SOLUTION INTRAMUSCULAR; INTRAVENOUS; SUBCUTANEOUS at 19:05

## 2017-05-21 RX ADMIN — HYDROMORPHONE HYDROCHLORIDE 1 MG: 1 INJECTION, SOLUTION INTRAMUSCULAR; INTRAVENOUS; SUBCUTANEOUS at 16:00

## 2017-05-21 RX ADMIN — POTASSIUM CHLORIDE, DEXTROSE MONOHYDRATE AND SODIUM CHLORIDE: 150; 5; 450 INJECTION, SOLUTION INTRAVENOUS at 05:14

## 2017-05-21 RX ADMIN — PIPERACILLIN SODIUM,TAZOBACTAM SODIUM 3.38 G: 3; .375 INJECTION, POWDER, FOR SOLUTION INTRAVENOUS at 05:14

## 2017-05-21 RX ADMIN — HYDROMORPHONE HYDROCHLORIDE 1 MG: 1 INJECTION, SOLUTION INTRAMUSCULAR; INTRAVENOUS; SUBCUTANEOUS at 11:30

## 2017-05-21 RX ADMIN — PIPERACILLIN SODIUM,TAZOBACTAM SODIUM 3.38 G: 3; .375 INJECTION, POWDER, FOR SOLUTION INTRAVENOUS at 15:54

## 2017-05-21 RX ADMIN — CYCLOBENZAPRINE HYDROCHLORIDE 10 MG: 10 TABLET, FILM COATED ORAL at 06:23

## 2017-05-21 RX ADMIN — HYDROMORPHONE HYDROCHLORIDE 1 MG: 1 INJECTION, SOLUTION INTRAMUSCULAR; INTRAVENOUS; SUBCUTANEOUS at 22:03

## 2017-05-21 RX ADMIN — PIPERACILLIN SODIUM,TAZOBACTAM SODIUM 3.38 G: 3; .375 INJECTION, POWDER, FOR SOLUTION INTRAVENOUS at 22:03

## 2017-05-21 RX ADMIN — ESCITALOPRAM OXALATE 10 MG: 10 TABLET ORAL at 10:09

## 2017-05-21 RX ADMIN — HYDROMORPHONE HYDROCHLORIDE 1 MG: 1 INJECTION, SOLUTION INTRAMUSCULAR; INTRAVENOUS; SUBCUTANEOUS at 05:13

## 2017-05-21 RX ADMIN — ENOXAPARIN SODIUM 40 MG: 40 INJECTION SUBCUTANEOUS at 10:09

## 2017-05-21 RX ADMIN — POTASSIUM CHLORIDE, DEXTROSE MONOHYDRATE AND SODIUM CHLORIDE: 150; 5; 450 INJECTION, SOLUTION INTRAVENOUS at 13:57

## 2017-05-21 RX ADMIN — CYCLOBENZAPRINE HYDROCHLORIDE 10 MG: 10 TABLET, FILM COATED ORAL at 16:00

## 2017-05-21 RX ADMIN — HYDROMORPHONE HYDROCHLORIDE 1 MG: 1 INJECTION, SOLUTION INTRAMUSCULAR; INTRAVENOUS; SUBCUTANEOUS at 08:40

## 2017-05-21 RX ADMIN — Medication 10 ML: at 22:02

## 2017-05-21 RX ADMIN — Medication 10 ML: at 11:31

## 2017-05-21 ASSESSMENT — PAIN DESCRIPTION - ORIENTATION
ORIENTATION: MID;LOWER
ORIENTATION: LOWER;MID
ORIENTATION: LOWER
ORIENTATION: LOWER;MID

## 2017-05-21 ASSESSMENT — PAIN DESCRIPTION - LOCATION
LOCATION: ABDOMEN;BACK
LOCATION: BACK;ABDOMEN
LOCATION: ABDOMEN;BACK

## 2017-05-21 ASSESSMENT — PAIN DESCRIPTION - PAIN TYPE
TYPE: ACUTE PAIN

## 2017-05-21 ASSESSMENT — PAIN DESCRIPTION - ONSET
ONSET: ON-GOING

## 2017-05-21 ASSESSMENT — ENCOUNTER SYMPTOMS
VOMITING: 0
SHORTNESS OF BREATH: 0
BLOOD IN STOOL: 0
CONSTIPATION: 0
ABDOMINAL PAIN: 1
DIARRHEA: 0
NAUSEA: 0
WHEEZING: 0
SINUS PRESSURE: 0
VOICE CHANGE: 0
SORE THROAT: 0
COUGH: 0

## 2017-05-21 ASSESSMENT — PAIN SCALES - GENERAL
PAINLEVEL_OUTOF10: 7
PAINLEVEL_OUTOF10: 5
PAINLEVEL_OUTOF10: 8
PAINLEVEL_OUTOF10: 4
PAINLEVEL_OUTOF10: 5
PAINLEVEL_OUTOF10: 8
PAINLEVEL_OUTOF10: 6
PAINLEVEL_OUTOF10: 7
PAINLEVEL_OUTOF10: 5
PAINLEVEL_OUTOF10: 8
PAINLEVEL_OUTOF10: 9

## 2017-05-21 ASSESSMENT — PAIN DESCRIPTION - PROGRESSION
CLINICAL_PROGRESSION: NOT CHANGED

## 2017-05-21 ASSESSMENT — PAIN DESCRIPTION - FREQUENCY
FREQUENCY: CONTINUOUS

## 2017-05-21 ASSESSMENT — PAIN DESCRIPTION - DESCRIPTORS
DESCRIPTORS: SHOOTING;SHARP
DESCRIPTORS: SHOOTING;SHARP
DESCRIPTORS: SHARP;SHOOTING

## 2017-05-22 LAB
-: NORMAL
ABSOLUTE EOS #: 0.4 K/UL (ref 0–0.4)
ABSOLUTE LYMPH #: 1.4 K/UL (ref 1–4.8)
ABSOLUTE MONO #: 0.7 K/UL (ref 0.2–0.8)
AMORPHOUS: NORMAL
AMYLASE: 47 U/L (ref 28–100)
ANION GAP SERPL CALCULATED.3IONS-SCNC: 12 MMOL/L (ref 9–17)
BACTERIA: NORMAL
BASOPHILS # BLD: 1 %
BASOPHILS ABSOLUTE: 0 K/UL (ref 0–0.2)
BILIRUBIN URINE: NEGATIVE
BUN BLDV-MCNC: 10 MG/DL (ref 6–20)
BUN/CREAT BLD: 13 (ref 9–20)
CALCIUM SERPL-MCNC: 8.6 MG/DL (ref 8.6–10.4)
CASTS UA: NORMAL /LPF
CHLORIDE BLD-SCNC: 101 MMOL/L (ref 98–107)
CO2: 24 MMOL/L (ref 20–31)
COLOR: YELLOW
COMMENT UA: ABNORMAL
CREAT SERPL-MCNC: 0.8 MG/DL (ref 0.7–1.2)
CRYSTALS, UA: NORMAL /HPF
DIFFERENTIAL TYPE: NORMAL
EOSINOPHILS RELATIVE PERCENT: 7 %
EPITHELIAL CELLS UA: NORMAL /HPF
GFR AFRICAN AMERICAN: >60 ML/MIN
GFR NON-AFRICAN AMERICAN: >60 ML/MIN
GFR SERPL CREATININE-BSD FRML MDRD: NORMAL ML/MIN/{1.73_M2}
GFR SERPL CREATININE-BSD FRML MDRD: NORMAL ML/MIN/{1.73_M2}
GLUCOSE BLD-MCNC: 93 MG/DL (ref 70–99)
GLUCOSE URINE: NEGATIVE
HCT VFR BLD CALC: 43.2 % (ref 41–53)
HEMOGLOBIN: 14.5 G/DL (ref 13.5–17.5)
KETONES, URINE: ABNORMAL
LEUKOCYTE ESTERASE, URINE: NEGATIVE
LYMPHOCYTES # BLD: 28 %
MAGNESIUM: 2.1 MG/DL (ref 1.6–2.6)
MCH RBC QN AUTO: 28.7 PG (ref 26–34)
MCHC RBC AUTO-ENTMCNC: 33.5 G/DL (ref 31–37)
MCV RBC AUTO: 85.6 FL (ref 80–100)
MONOCYTES # BLD: 14 %
MUCUS: NORMAL
NITRITE, URINE: NEGATIVE
OTHER OBSERVATIONS UA: NORMAL
PDW BLD-RTO: 13.9 % (ref 11.5–14.5)
PH UA: 5.5 (ref 5–8)
PLATELET # BLD: 209 K/UL (ref 130–400)
PLATELET ESTIMATE: NORMAL
PMV BLD AUTO: 7.5 FL (ref 6–12)
POTASSIUM SERPL-SCNC: 4.5 MMOL/L (ref 3.7–5.3)
PROTEIN UA: NEGATIVE
RBC # BLD: 5.05 M/UL (ref 4.5–5.9)
RBC # BLD: NORMAL 10*6/UL
RBC UA: NORMAL /HPF (ref 0–2)
RENAL EPITHELIAL, UA: NORMAL /HPF
SEG NEUTROPHILS: 50 %
SEGMENTED NEUTROPHILS ABSOLUTE COUNT: 2.6 K/UL (ref 1.8–7.7)
SODIUM BLD-SCNC: 137 MMOL/L (ref 135–144)
SPECIFIC GRAVITY UA: 1.01 (ref 1–1.03)
TRICHOMONAS: NORMAL
TURBIDITY: CLEAR
URINE HGB: ABNORMAL
UROBILINOGEN, URINE: NORMAL
WBC # BLD: 5.1 K/UL (ref 3.5–11)
WBC # BLD: NORMAL 10*3/UL
WBC UA: NORMAL /HPF (ref 0–5)
YEAST: NORMAL

## 2017-05-22 PROCEDURE — 36415 COLL VENOUS BLD VENIPUNCTURE: CPT

## 2017-05-22 PROCEDURE — 83735 ASSAY OF MAGNESIUM: CPT

## 2017-05-22 PROCEDURE — 2580000003 HC RX 258: Performed by: SURGERY

## 2017-05-22 PROCEDURE — 1200000000 HC SEMI PRIVATE

## 2017-05-22 PROCEDURE — 80048 BASIC METABOLIC PNL TOTAL CA: CPT

## 2017-05-22 PROCEDURE — 85025 COMPLETE CBC W/AUTO DIFF WBC: CPT

## 2017-05-22 PROCEDURE — 6360000002 HC RX W HCPCS: Performed by: NURSE PRACTITIONER

## 2017-05-22 PROCEDURE — 6370000000 HC RX 637 (ALT 250 FOR IP): Performed by: NURSE PRACTITIONER

## 2017-05-22 PROCEDURE — 81001 URINALYSIS AUTO W/SCOPE: CPT

## 2017-05-22 PROCEDURE — 82150 ASSAY OF AMYLASE: CPT

## 2017-05-22 PROCEDURE — 6370000000 HC RX 637 (ALT 250 FOR IP): Performed by: FAMILY MEDICINE

## 2017-05-22 PROCEDURE — 6360000002 HC RX W HCPCS: Performed by: SURGERY

## 2017-05-22 PROCEDURE — 99232 SBSQ HOSP IP/OBS MODERATE 35: CPT | Performed by: FAMILY MEDICINE

## 2017-05-22 RX ORDER — TAMSULOSIN HYDROCHLORIDE 0.4 MG/1
0.4 CAPSULE ORAL DAILY
Status: DISCONTINUED | OUTPATIENT
Start: 2017-05-22 | End: 2017-05-26 | Stop reason: HOSPADM

## 2017-05-22 RX ADMIN — HYDROMORPHONE HYDROCHLORIDE 1 MG: 1 INJECTION, SOLUTION INTRAMUSCULAR; INTRAVENOUS; SUBCUTANEOUS at 21:41

## 2017-05-22 RX ADMIN — HYDROMORPHONE HYDROCHLORIDE 1 MG: 1 INJECTION, SOLUTION INTRAMUSCULAR; INTRAVENOUS; SUBCUTANEOUS at 05:43

## 2017-05-22 RX ADMIN — PIPERACILLIN SODIUM,TAZOBACTAM SODIUM 3.38 G: 3; .375 INJECTION, POWDER, FOR SOLUTION INTRAVENOUS at 14:51

## 2017-05-22 RX ADMIN — PIPERACILLIN SODIUM,TAZOBACTAM SODIUM 3.38 G: 3; .375 INJECTION, POWDER, FOR SOLUTION INTRAVENOUS at 22:55

## 2017-05-22 RX ADMIN — CYCLOBENZAPRINE HYDROCHLORIDE 10 MG: 10 TABLET, FILM COATED ORAL at 09:03

## 2017-05-22 RX ADMIN — HYDROMORPHONE HYDROCHLORIDE 1 MG: 1 INJECTION, SOLUTION INTRAMUSCULAR; INTRAVENOUS; SUBCUTANEOUS at 00:17

## 2017-05-22 RX ADMIN — POTASSIUM CHLORIDE, DEXTROSE MONOHYDRATE AND SODIUM CHLORIDE: 150; 5; 450 INJECTION, SOLUTION INTRAVENOUS at 02:37

## 2017-05-22 RX ADMIN — HYDROMORPHONE HYDROCHLORIDE 1 MG: 1 INJECTION, SOLUTION INTRAMUSCULAR; INTRAVENOUS; SUBCUTANEOUS at 17:34

## 2017-05-22 RX ADMIN — ENOXAPARIN SODIUM 40 MG: 40 INJECTION SUBCUTANEOUS at 09:03

## 2017-05-22 RX ADMIN — HYDROMORPHONE HYDROCHLORIDE 1 MG: 1 INJECTION, SOLUTION INTRAMUSCULAR; INTRAVENOUS; SUBCUTANEOUS at 14:52

## 2017-05-22 RX ADMIN — PIPERACILLIN SODIUM,TAZOBACTAM SODIUM 3.38 G: 3; .375 INJECTION, POWDER, FOR SOLUTION INTRAVENOUS at 09:04

## 2017-05-22 RX ADMIN — HYDROMORPHONE HYDROCHLORIDE 1 MG: 1 INJECTION, SOLUTION INTRAMUSCULAR; INTRAVENOUS; SUBCUTANEOUS at 09:02

## 2017-05-22 RX ADMIN — CYCLOBENZAPRINE HYDROCHLORIDE 10 MG: 10 TABLET, FILM COATED ORAL at 00:17

## 2017-05-22 RX ADMIN — CYCLOBENZAPRINE HYDROCHLORIDE 10 MG: 10 TABLET, FILM COATED ORAL at 16:18

## 2017-05-22 RX ADMIN — POTASSIUM CHLORIDE, DEXTROSE MONOHYDRATE AND SODIUM CHLORIDE: 150; 5; 450 INJECTION, SOLUTION INTRAVENOUS at 16:18

## 2017-05-22 RX ADMIN — ESCITALOPRAM OXALATE 10 MG: 10 TABLET ORAL at 09:03

## 2017-05-22 RX ADMIN — PIPERACILLIN SODIUM,TAZOBACTAM SODIUM 3.38 G: 3; .375 INJECTION, POWDER, FOR SOLUTION INTRAVENOUS at 02:37

## 2017-05-22 RX ADMIN — TAMSULOSIN HYDROCHLORIDE 0.4 MG: 0.4 CAPSULE ORAL at 13:24

## 2017-05-22 ASSESSMENT — PAIN DESCRIPTION - PROGRESSION
CLINICAL_PROGRESSION: NOT CHANGED

## 2017-05-22 ASSESSMENT — ENCOUNTER SYMPTOMS
BLOOD IN STOOL: 0
SHORTNESS OF BREATH: 0
DIARRHEA: 0
VOICE CHANGE: 0
VOMITING: 0
WHEEZING: 0
SINUS PRESSURE: 0
NAUSEA: 0
CONSTIPATION: 0
SORE THROAT: 0
ABDOMINAL PAIN: 1
COUGH: 0

## 2017-05-22 ASSESSMENT — PAIN DESCRIPTION - DESCRIPTORS
DESCRIPTORS: SHARP;SORE
DESCRIPTORS: SHARP;SHOOTING
DESCRIPTORS: SORE;SHARP
DESCRIPTORS: SORE;SHARP
DESCRIPTORS: SHARP;SHOOTING
DESCRIPTORS: SHARP;SHOOTING
DESCRIPTORS: SORE;SHARP

## 2017-05-22 ASSESSMENT — PAIN SCALES - GENERAL
PAINLEVEL_OUTOF10: 7
PAINLEVEL_OUTOF10: 8
PAINLEVEL_OUTOF10: 5
PAINLEVEL_OUTOF10: 5
PAINLEVEL_OUTOF10: 7
PAINLEVEL_OUTOF10: 7
PAINLEVEL_OUTOF10: 5
PAINLEVEL_OUTOF10: 7
PAINLEVEL_OUTOF10: 6
PAINLEVEL_OUTOF10: 6
PAINLEVEL_OUTOF10: 5
PAINLEVEL_OUTOF10: 7

## 2017-05-22 ASSESSMENT — PAIN DESCRIPTION - ORIENTATION
ORIENTATION: LOWER;MID

## 2017-05-22 ASSESSMENT — PAIN DESCRIPTION - ONSET
ONSET: ON-GOING

## 2017-05-22 ASSESSMENT — PAIN DESCRIPTION - PAIN TYPE
TYPE: ACUTE PAIN
TYPE: ACUTE PAIN;CHRONIC PAIN
TYPE: ACUTE PAIN
TYPE: ACUTE PAIN

## 2017-05-22 ASSESSMENT — PAIN DESCRIPTION - FREQUENCY
FREQUENCY: CONTINUOUS

## 2017-05-22 ASSESSMENT — PAIN DESCRIPTION - LOCATION
LOCATION: BACK;ABDOMEN
LOCATION: ABDOMEN;BACK

## 2017-05-23 PROBLEM — E66.812 OBESITY, CLASS II, BMI 35-39.9: Chronic | Status: ACTIVE | Noted: 2017-05-23

## 2017-05-23 PROBLEM — E66.9 OBESITY, CLASS II, BMI 35-39.9: Chronic | Status: ACTIVE | Noted: 2017-05-23

## 2017-05-23 LAB
ABSOLUTE EOS #: 0.4 K/UL (ref 0–0.4)
ABSOLUTE LYMPH #: 1.8 K/UL (ref 1–4.8)
ABSOLUTE MONO #: 0.6 K/UL (ref 0.2–0.8)
ANION GAP SERPL CALCULATED.3IONS-SCNC: 11 MMOL/L (ref 9–17)
BASOPHILS # BLD: 0 %
BASOPHILS ABSOLUTE: 0 K/UL (ref 0–0.2)
BUN BLDV-MCNC: 10 MG/DL (ref 6–20)
BUN/CREAT BLD: 11 (ref 9–20)
CALCIUM SERPL-MCNC: 9 MG/DL (ref 8.6–10.4)
CHLORIDE BLD-SCNC: 98 MMOL/L (ref 98–107)
CO2: 28 MMOL/L (ref 20–31)
CREAT SERPL-MCNC: 0.91 MG/DL (ref 0.7–1.2)
DIFFERENTIAL TYPE: NORMAL
EOSINOPHILS RELATIVE PERCENT: 7 %
GFR AFRICAN AMERICAN: >60 ML/MIN
GFR NON-AFRICAN AMERICAN: >60 ML/MIN
GFR SERPL CREATININE-BSD FRML MDRD: NORMAL ML/MIN/{1.73_M2}
GFR SERPL CREATININE-BSD FRML MDRD: NORMAL ML/MIN/{1.73_M2}
GLUCOSE BLD-MCNC: 98 MG/DL (ref 70–99)
HCT VFR BLD CALC: 44.6 % (ref 41–53)
HEMOGLOBIN: 15 G/DL (ref 13.5–17.5)
LYMPHOCYTES # BLD: 34 %
MAGNESIUM: 2.2 MG/DL (ref 1.6–2.6)
MCH RBC QN AUTO: 28.6 PG (ref 26–34)
MCHC RBC AUTO-ENTMCNC: 33.6 G/DL (ref 31–37)
MCV RBC AUTO: 85.2 FL (ref 80–100)
MONOCYTES # BLD: 11 %
PDW BLD-RTO: 13.9 % (ref 11.5–14.5)
PLATELET # BLD: 269 K/UL (ref 130–400)
PLATELET ESTIMATE: NORMAL
PMV BLD AUTO: 7.6 FL (ref 6–12)
POTASSIUM SERPL-SCNC: 4 MMOL/L (ref 3.7–5.3)
RBC # BLD: 5.24 M/UL (ref 4.5–5.9)
RBC # BLD: NORMAL 10*6/UL
SEG NEUTROPHILS: 48 %
SEGMENTED NEUTROPHILS ABSOLUTE COUNT: 2.6 K/UL (ref 1.8–7.7)
SODIUM BLD-SCNC: 137 MMOL/L (ref 135–144)
WBC # BLD: 5.4 K/UL (ref 3.5–11)
WBC # BLD: NORMAL 10*3/UL

## 2017-05-23 PROCEDURE — 1200000000 HC SEMI PRIVATE

## 2017-05-23 PROCEDURE — 6360000002 HC RX W HCPCS: Performed by: NURSE PRACTITIONER

## 2017-05-23 PROCEDURE — 2580000003 HC RX 258: Performed by: SURGERY

## 2017-05-23 PROCEDURE — 6370000000 HC RX 637 (ALT 250 FOR IP): Performed by: NURSE PRACTITIONER

## 2017-05-23 PROCEDURE — 6370000000 HC RX 637 (ALT 250 FOR IP): Performed by: FAMILY MEDICINE

## 2017-05-23 PROCEDURE — 80048 BASIC METABOLIC PNL TOTAL CA: CPT

## 2017-05-23 PROCEDURE — 83735 ASSAY OF MAGNESIUM: CPT

## 2017-05-23 PROCEDURE — 2580000003 HC RX 258: Performed by: NURSE PRACTITIONER

## 2017-05-23 PROCEDURE — 6360000002 HC RX W HCPCS: Performed by: SURGERY

## 2017-05-23 PROCEDURE — 36415 COLL VENOUS BLD VENIPUNCTURE: CPT

## 2017-05-23 PROCEDURE — 99232 SBSQ HOSP IP/OBS MODERATE 35: CPT | Performed by: INTERNAL MEDICINE

## 2017-05-23 PROCEDURE — 85025 COMPLETE CBC W/AUTO DIFF WBC: CPT

## 2017-05-23 RX ADMIN — Medication 10 ML: at 21:11

## 2017-05-23 RX ADMIN — PIPERACILLIN SODIUM,TAZOBACTAM SODIUM 3.38 G: 3; .375 INJECTION, POWDER, FOR SOLUTION INTRAVENOUS at 14:43

## 2017-05-23 RX ADMIN — ESCITALOPRAM OXALATE 10 MG: 10 TABLET ORAL at 09:00

## 2017-05-23 RX ADMIN — POTASSIUM CHLORIDE, DEXTROSE MONOHYDRATE AND SODIUM CHLORIDE: 150; 5; 450 INJECTION, SOLUTION INTRAVENOUS at 23:19

## 2017-05-23 RX ADMIN — CYCLOBENZAPRINE HYDROCHLORIDE 10 MG: 10 TABLET, FILM COATED ORAL at 07:35

## 2017-05-23 RX ADMIN — PIPERACILLIN SODIUM,TAZOBACTAM SODIUM 3.38 G: 3; .375 INJECTION, POWDER, FOR SOLUTION INTRAVENOUS at 02:53

## 2017-05-23 RX ADMIN — HYDROMORPHONE HYDROCHLORIDE 1 MG: 1 INJECTION, SOLUTION INTRAMUSCULAR; INTRAVENOUS; SUBCUTANEOUS at 23:22

## 2017-05-23 RX ADMIN — CYCLOBENZAPRINE HYDROCHLORIDE 10 MG: 10 TABLET, FILM COATED ORAL at 15:13

## 2017-05-23 RX ADMIN — HYDROMORPHONE HYDROCHLORIDE 1 MG: 1 INJECTION, SOLUTION INTRAMUSCULAR; INTRAVENOUS; SUBCUTANEOUS at 12:29

## 2017-05-23 RX ADMIN — HYDROMORPHONE HYDROCHLORIDE 1 MG: 1 INJECTION, SOLUTION INTRAMUSCULAR; INTRAVENOUS; SUBCUTANEOUS at 21:08

## 2017-05-23 RX ADMIN — PIPERACILLIN SODIUM,TAZOBACTAM SODIUM 3.38 G: 3; .375 INJECTION, POWDER, FOR SOLUTION INTRAVENOUS at 09:01

## 2017-05-23 RX ADMIN — POTASSIUM CHLORIDE, DEXTROSE MONOHYDRATE AND SODIUM CHLORIDE: 150; 5; 450 INJECTION, SOLUTION INTRAVENOUS at 01:59

## 2017-05-23 RX ADMIN — CYCLOBENZAPRINE HYDROCHLORIDE 10 MG: 10 TABLET, FILM COATED ORAL at 00:15

## 2017-05-23 RX ADMIN — HYDROMORPHONE HYDROCHLORIDE 1 MG: 1 INJECTION, SOLUTION INTRAMUSCULAR; INTRAVENOUS; SUBCUTANEOUS at 18:14

## 2017-05-23 RX ADMIN — HYDROMORPHONE HYDROCHLORIDE 1 MG: 1 INJECTION, SOLUTION INTRAMUSCULAR; INTRAVENOUS; SUBCUTANEOUS at 07:35

## 2017-05-23 RX ADMIN — POTASSIUM CHLORIDE, DEXTROSE MONOHYDRATE AND SODIUM CHLORIDE: 150; 5; 450 INJECTION, SOLUTION INTRAVENOUS at 12:21

## 2017-05-23 RX ADMIN — PIPERACILLIN SODIUM,TAZOBACTAM SODIUM 3.38 G: 3; .375 INJECTION, POWDER, FOR SOLUTION INTRAVENOUS at 21:09

## 2017-05-23 RX ADMIN — HYDROMORPHONE HYDROCHLORIDE 1 MG: 1 INJECTION, SOLUTION INTRAMUSCULAR; INTRAVENOUS; SUBCUTANEOUS at 00:15

## 2017-05-23 RX ADMIN — ENOXAPARIN SODIUM 40 MG: 40 INJECTION SUBCUTANEOUS at 09:00

## 2017-05-23 RX ADMIN — TAMSULOSIN HYDROCHLORIDE 0.4 MG: 0.4 CAPSULE ORAL at 09:00

## 2017-05-23 RX ADMIN — Medication 10 ML: at 09:03

## 2017-05-23 RX ADMIN — HYDROMORPHONE HYDROCHLORIDE 1 MG: 1 INJECTION, SOLUTION INTRAMUSCULAR; INTRAVENOUS; SUBCUTANEOUS at 15:13

## 2017-05-23 ASSESSMENT — PAIN SCALES - GENERAL
PAINLEVEL_OUTOF10: 3
PAINLEVEL_OUTOF10: 7
PAINLEVEL_OUTOF10: 5
PAINLEVEL_OUTOF10: 8
PAINLEVEL_OUTOF10: 7
PAINLEVEL_OUTOF10: 4
PAINLEVEL_OUTOF10: 7
PAINLEVEL_OUTOF10: 5
PAINLEVEL_OUTOF10: 8

## 2017-05-23 ASSESSMENT — PAIN DESCRIPTION - ORIENTATION
ORIENTATION: LOWER;MID
ORIENTATION: LOWER
ORIENTATION: LOWER;MID
ORIENTATION: LOWER;MID

## 2017-05-23 ASSESSMENT — PAIN DESCRIPTION - ONSET
ONSET: ON-GOING

## 2017-05-23 ASSESSMENT — PAIN DESCRIPTION - DESCRIPTORS
DESCRIPTORS: SHARP;SHOOTING
DESCRIPTORS: ACHING;CONSTANT
DESCRIPTORS: ACHING;CONSTANT
DESCRIPTORS: ACHING;SHARP
DESCRIPTORS: SORE;SHARP
DESCRIPTORS: ACHING

## 2017-05-23 ASSESSMENT — PAIN DESCRIPTION - PAIN TYPE
TYPE: ACUTE PAIN
TYPE: ACUTE PAIN;CHRONIC PAIN
TYPE: ACUTE PAIN
TYPE: ACUTE PAIN;CHRONIC PAIN
TYPE: ACUTE PAIN

## 2017-05-23 ASSESSMENT — PAIN DESCRIPTION - LOCATION
LOCATION: ABDOMEN;BACK
LOCATION: BACK
LOCATION: ABDOMEN;BACK

## 2017-05-23 ASSESSMENT — PAIN DESCRIPTION - FREQUENCY
FREQUENCY: CONTINUOUS

## 2017-05-23 ASSESSMENT — PAIN DESCRIPTION - PROGRESSION
CLINICAL_PROGRESSION: NOT CHANGED

## 2017-05-24 LAB
ABSOLUTE EOS #: 0.5 K/UL (ref 0–0.4)
ABSOLUTE LYMPH #: 2.3 K/UL (ref 1–4.8)
ABSOLUTE MONO #: 0.7 K/UL (ref 0.2–0.8)
ANION GAP SERPL CALCULATED.3IONS-SCNC: 11 MMOL/L (ref 9–17)
BASOPHILS # BLD: 1 %
BASOPHILS ABSOLUTE: 0 K/UL (ref 0–0.2)
BUN BLDV-MCNC: 8 MG/DL (ref 6–20)
BUN/CREAT BLD: 8 (ref 9–20)
CALCIUM SERPL-MCNC: 9.3 MG/DL (ref 8.6–10.4)
CHLORIDE BLD-SCNC: 100 MMOL/L (ref 98–107)
CO2: 27 MMOL/L (ref 20–31)
CREAT SERPL-MCNC: 0.97 MG/DL (ref 0.7–1.2)
DIFFERENTIAL TYPE: ABNORMAL
EOSINOPHILS RELATIVE PERCENT: 7 %
GFR AFRICAN AMERICAN: >60 ML/MIN
GFR NON-AFRICAN AMERICAN: >60 ML/MIN
GFR SERPL CREATININE-BSD FRML MDRD: ABNORMAL ML/MIN/{1.73_M2}
GFR SERPL CREATININE-BSD FRML MDRD: ABNORMAL ML/MIN/{1.73_M2}
GLUCOSE BLD-MCNC: 105 MG/DL (ref 70–99)
HCT VFR BLD CALC: 45.9 % (ref 41–53)
HEMOGLOBIN: 15.3 G/DL (ref 13.5–17.5)
LYMPHOCYTES # BLD: 35 %
MAGNESIUM: 2.2 MG/DL (ref 1.6–2.6)
MCH RBC QN AUTO: 28.2 PG (ref 26–34)
MCHC RBC AUTO-ENTMCNC: 33.3 G/DL (ref 31–37)
MCV RBC AUTO: 84.8 FL (ref 80–100)
MONOCYTES # BLD: 11 %
PDW BLD-RTO: 13.9 % (ref 11.5–14.5)
PLATELET # BLD: 277 K/UL (ref 130–400)
PLATELET ESTIMATE: ABNORMAL
PMV BLD AUTO: 7.5 FL (ref 6–12)
POTASSIUM SERPL-SCNC: 4.2 MMOL/L (ref 3.7–5.3)
RBC # BLD: 5.41 M/UL (ref 4.5–5.9)
RBC # BLD: ABNORMAL 10*6/UL
SEG NEUTROPHILS: 46 %
SEGMENTED NEUTROPHILS ABSOLUTE COUNT: 3.1 K/UL (ref 1.8–7.7)
SODIUM BLD-SCNC: 138 MMOL/L (ref 135–144)
WBC # BLD: 6.6 K/UL (ref 3.5–11)
WBC # BLD: ABNORMAL 10*3/UL

## 2017-05-24 PROCEDURE — 6370000000 HC RX 637 (ALT 250 FOR IP): Performed by: NURSE PRACTITIONER

## 2017-05-24 PROCEDURE — 99232 SBSQ HOSP IP/OBS MODERATE 35: CPT | Performed by: INTERNAL MEDICINE

## 2017-05-24 PROCEDURE — 6360000002 HC RX W HCPCS: Performed by: NURSE PRACTITIONER

## 2017-05-24 PROCEDURE — 36415 COLL VENOUS BLD VENIPUNCTURE: CPT

## 2017-05-24 PROCEDURE — 85025 COMPLETE CBC W/AUTO DIFF WBC: CPT

## 2017-05-24 PROCEDURE — 2580000003 HC RX 258: Performed by: SURGERY

## 2017-05-24 PROCEDURE — 2580000003 HC RX 258: Performed by: INTERNAL MEDICINE

## 2017-05-24 PROCEDURE — 80048 BASIC METABOLIC PNL TOTAL CA: CPT

## 2017-05-24 PROCEDURE — 6370000000 HC RX 637 (ALT 250 FOR IP): Performed by: FAMILY MEDICINE

## 2017-05-24 PROCEDURE — 6360000002 HC RX W HCPCS: Performed by: SURGERY

## 2017-05-24 PROCEDURE — 83735 ASSAY OF MAGNESIUM: CPT

## 2017-05-24 PROCEDURE — 1200000000 HC SEMI PRIVATE

## 2017-05-24 RX ADMIN — ENOXAPARIN SODIUM 40 MG: 40 INJECTION SUBCUTANEOUS at 08:47

## 2017-05-24 RX ADMIN — PIPERACILLIN SODIUM,TAZOBACTAM SODIUM 3.38 G: 3; .375 INJECTION, POWDER, FOR SOLUTION INTRAVENOUS at 08:47

## 2017-05-24 RX ADMIN — HYDROMORPHONE HYDROCHLORIDE 1 MG: 1 INJECTION, SOLUTION INTRAMUSCULAR; INTRAVENOUS; SUBCUTANEOUS at 13:30

## 2017-05-24 RX ADMIN — CYCLOBENZAPRINE HYDROCHLORIDE 10 MG: 10 TABLET, FILM COATED ORAL at 15:04

## 2017-05-24 RX ADMIN — HYDROMORPHONE HYDROCHLORIDE 0.5 MG: 1 INJECTION, SOLUTION INTRAMUSCULAR; INTRAVENOUS; SUBCUTANEOUS at 20:35

## 2017-05-24 RX ADMIN — HYDROMORPHONE HYDROCHLORIDE 1 MG: 1 INJECTION, SOLUTION INTRAMUSCULAR; INTRAVENOUS; SUBCUTANEOUS at 17:00

## 2017-05-24 RX ADMIN — PIPERACILLIN SODIUM,TAZOBACTAM SODIUM 3.38 G: 3; .375 INJECTION, POWDER, FOR SOLUTION INTRAVENOUS at 14:58

## 2017-05-24 RX ADMIN — HYDROMORPHONE HYDROCHLORIDE 1 MG: 1 INJECTION, SOLUTION INTRAMUSCULAR; INTRAVENOUS; SUBCUTANEOUS at 11:13

## 2017-05-24 RX ADMIN — CYCLOBENZAPRINE HYDROCHLORIDE 10 MG: 10 TABLET, FILM COATED ORAL at 07:11

## 2017-05-24 RX ADMIN — POTASSIUM CHLORIDE, DEXTROSE MONOHYDRATE AND SODIUM CHLORIDE: 150; 5; 450 INJECTION, SOLUTION INTRAVENOUS at 12:29

## 2017-05-24 RX ADMIN — ESCITALOPRAM OXALATE 10 MG: 10 TABLET ORAL at 08:47

## 2017-05-24 RX ADMIN — PIPERACILLIN SODIUM,TAZOBACTAM SODIUM 3.38 G: 3; .375 INJECTION, POWDER, FOR SOLUTION INTRAVENOUS at 03:23

## 2017-05-24 RX ADMIN — HYDROMORPHONE HYDROCHLORIDE 1 MG: 1 INJECTION, SOLUTION INTRAMUSCULAR; INTRAVENOUS; SUBCUTANEOUS at 23:40

## 2017-05-24 RX ADMIN — PIPERACILLIN SODIUM,TAZOBACTAM SODIUM 3.38 G: 3; .375 INJECTION, POWDER, FOR SOLUTION INTRAVENOUS at 20:34

## 2017-05-24 RX ADMIN — TAMSULOSIN HYDROCHLORIDE 0.4 MG: 0.4 CAPSULE ORAL at 08:47

## 2017-05-24 RX ADMIN — CYCLOBENZAPRINE HYDROCHLORIDE 10 MG: 10 TABLET, FILM COATED ORAL at 00:06

## 2017-05-24 RX ADMIN — HYDROMORPHONE HYDROCHLORIDE 1 MG: 1 INJECTION, SOLUTION INTRAMUSCULAR; INTRAVENOUS; SUBCUTANEOUS at 07:27

## 2017-05-24 ASSESSMENT — PAIN DESCRIPTION - LOCATION
LOCATION: ABDOMEN;BACK
LOCATION: BACK
LOCATION: BACK;ABDOMEN
LOCATION: BACK

## 2017-05-24 ASSESSMENT — PAIN DESCRIPTION - DESCRIPTORS
DESCRIPTORS: ACHING

## 2017-05-24 ASSESSMENT — PAIN DESCRIPTION - PROGRESSION
CLINICAL_PROGRESSION: NOT CHANGED
CLINICAL_PROGRESSION: NOT CHANGED

## 2017-05-24 ASSESSMENT — PAIN SCALES - GENERAL
PAINLEVEL_OUTOF10: 0
PAINLEVEL_OUTOF10: 5
PAINLEVEL_OUTOF10: 4
PAINLEVEL_OUTOF10: 4
PAINLEVEL_OUTOF10: 2
PAINLEVEL_OUTOF10: 7
PAINLEVEL_OUTOF10: 4
PAINLEVEL_OUTOF10: 7
PAINLEVEL_OUTOF10: 6

## 2017-05-24 ASSESSMENT — PAIN DESCRIPTION - ORIENTATION
ORIENTATION: LOWER
ORIENTATION: LOWER

## 2017-05-24 ASSESSMENT — PAIN DESCRIPTION - PAIN TYPE
TYPE: ACUTE PAIN
TYPE: CHRONIC PAIN
TYPE: CHRONIC PAIN
TYPE: ACUTE PAIN
TYPE: ACUTE PAIN;CHRONIC PAIN
TYPE: ACUTE PAIN;CHRONIC PAIN

## 2017-05-24 ASSESSMENT — PAIN DESCRIPTION - ONSET
ONSET: ON-GOING
ONSET: ON-GOING

## 2017-05-24 ASSESSMENT — PAIN DESCRIPTION - FREQUENCY
FREQUENCY: CONTINUOUS
FREQUENCY: CONTINUOUS

## 2017-05-25 PROCEDURE — 6360000002 HC RX W HCPCS: Performed by: NURSE PRACTITIONER

## 2017-05-25 PROCEDURE — 2580000003 HC RX 258: Performed by: NURSE PRACTITIONER

## 2017-05-25 PROCEDURE — 1200000000 HC SEMI PRIVATE

## 2017-05-25 PROCEDURE — 2580000003 HC RX 258: Performed by: SURGERY

## 2017-05-25 PROCEDURE — 6360000002 HC RX W HCPCS: Performed by: SURGERY

## 2017-05-25 PROCEDURE — 99232 SBSQ HOSP IP/OBS MODERATE 35: CPT | Performed by: INTERNAL MEDICINE

## 2017-05-25 PROCEDURE — 6370000000 HC RX 637 (ALT 250 FOR IP): Performed by: NURSE PRACTITIONER

## 2017-05-25 PROCEDURE — 6370000000 HC RX 637 (ALT 250 FOR IP): Performed by: FAMILY MEDICINE

## 2017-05-25 PROCEDURE — 6370000000 HC RX 637 (ALT 250 FOR IP): Performed by: INTERNAL MEDICINE

## 2017-05-25 RX ORDER — HYDROCODONE BITARTRATE AND ACETAMINOPHEN 5; 325 MG/1; MG/1
2 TABLET ORAL EVERY 4 HOURS PRN
Status: DISCONTINUED | OUTPATIENT
Start: 2017-05-25 | End: 2017-05-26 | Stop reason: HOSPADM

## 2017-05-25 RX ORDER — CIPROFLOXACIN 500 MG/1
500 TABLET, FILM COATED ORAL 2 TIMES DAILY
Qty: 14 TABLET | Refills: 0 | Status: SHIPPED | OUTPATIENT
Start: 2017-05-25 | End: 2017-06-01

## 2017-05-25 RX ORDER — CYCLOBENZAPRINE HCL 10 MG
10 TABLET ORAL 3 TIMES DAILY PRN
Qty: 20 TABLET | Refills: 0 | Status: SHIPPED | OUTPATIENT
Start: 2017-05-25 | End: 2017-06-01

## 2017-05-25 RX ORDER — HYDROCODONE BITARTRATE AND ACETAMINOPHEN 5; 325 MG/1; MG/1
1 TABLET ORAL EVERY 4 HOURS PRN
Status: DISCONTINUED | OUTPATIENT
Start: 2017-05-25 | End: 2017-05-26 | Stop reason: HOSPADM

## 2017-05-25 RX ORDER — TAMSULOSIN HYDROCHLORIDE 0.4 MG/1
0.4 CAPSULE ORAL DAILY
Qty: 30 CAPSULE | Refills: 3 | Status: SHIPPED | OUTPATIENT
Start: 2017-05-25 | End: 2018-04-20 | Stop reason: ALTCHOICE

## 2017-05-25 RX ORDER — METRONIDAZOLE 500 MG/1
500 TABLET ORAL 3 TIMES DAILY
Qty: 21 TABLET | Refills: 0 | Status: SHIPPED | OUTPATIENT
Start: 2017-05-25 | End: 2017-06-01

## 2017-05-25 RX ORDER — HYDROCODONE BITARTRATE AND ACETAMINOPHEN 5; 325 MG/1; MG/1
1-2 TABLET ORAL EVERY 4 HOURS PRN
Qty: 40 TABLET | Refills: 0 | Status: SHIPPED | OUTPATIENT
Start: 2017-05-25 | End: 2017-06-01

## 2017-05-25 RX ADMIN — CYCLOBENZAPRINE HYDROCHLORIDE 10 MG: 10 TABLET, FILM COATED ORAL at 00:31

## 2017-05-25 RX ADMIN — PIPERACILLIN SODIUM,TAZOBACTAM SODIUM 3.38 G: 3; .375 INJECTION, POWDER, FOR SOLUTION INTRAVENOUS at 21:57

## 2017-05-25 RX ADMIN — CYCLOBENZAPRINE HYDROCHLORIDE 10 MG: 10 TABLET, FILM COATED ORAL at 16:40

## 2017-05-25 RX ADMIN — CYCLOBENZAPRINE HYDROCHLORIDE 10 MG: 10 TABLET, FILM COATED ORAL at 23:20

## 2017-05-25 RX ADMIN — PIPERACILLIN SODIUM,TAZOBACTAM SODIUM 3.38 G: 3; .375 INJECTION, POWDER, FOR SOLUTION INTRAVENOUS at 16:36

## 2017-05-25 RX ADMIN — HYDROCODONE BITARTRATE AND ACETAMINOPHEN 2 TABLET: 5; 325 TABLET ORAL at 17:14

## 2017-05-25 RX ADMIN — HYDROCODONE BITARTRATE AND ACETAMINOPHEN 2 TABLET: 5; 325 TABLET ORAL at 21:58

## 2017-05-25 RX ADMIN — PIPERACILLIN SODIUM,TAZOBACTAM SODIUM 3.38 G: 3; .375 INJECTION, POWDER, FOR SOLUTION INTRAVENOUS at 03:23

## 2017-05-25 RX ADMIN — TAMSULOSIN HYDROCHLORIDE 0.4 MG: 0.4 CAPSULE ORAL at 09:07

## 2017-05-25 RX ADMIN — Medication 10 ML: at 21:58

## 2017-05-25 RX ADMIN — HYDROMORPHONE HYDROCHLORIDE 1 MG: 1 INJECTION, SOLUTION INTRAMUSCULAR; INTRAVENOUS; SUBCUTANEOUS at 09:07

## 2017-05-25 RX ADMIN — ENOXAPARIN SODIUM 40 MG: 40 INJECTION SUBCUTANEOUS at 09:07

## 2017-05-25 RX ADMIN — CYCLOBENZAPRINE HYDROCHLORIDE 10 MG: 10 TABLET, FILM COATED ORAL at 09:07

## 2017-05-25 RX ADMIN — HYDROCODONE BITARTRATE AND ACETAMINOPHEN 2 TABLET: 5; 325 TABLET ORAL at 13:04

## 2017-05-25 RX ADMIN — ESCITALOPRAM OXALATE 10 MG: 10 TABLET ORAL at 09:07

## 2017-05-25 RX ADMIN — PIPERACILLIN SODIUM,TAZOBACTAM SODIUM 3.38 G: 3; .375 INJECTION, POWDER, FOR SOLUTION INTRAVENOUS at 09:16

## 2017-05-25 ASSESSMENT — PAIN DESCRIPTION - LOCATION
LOCATION: ABDOMEN;BACK

## 2017-05-25 ASSESSMENT — PAIN DESCRIPTION - PAIN TYPE
TYPE: ACUTE PAIN;CHRONIC PAIN
TYPE: ACUTE PAIN
TYPE: ACUTE PAIN

## 2017-05-25 ASSESSMENT — PAIN DESCRIPTION - PROGRESSION
CLINICAL_PROGRESSION: NOT CHANGED

## 2017-05-25 ASSESSMENT — PAIN SCALES - GENERAL
PAINLEVEL_OUTOF10: 2
PAINLEVEL_OUTOF10: 7
PAINLEVEL_OUTOF10: 7
PAINLEVEL_OUTOF10: 3
PAINLEVEL_OUTOF10: 8
PAINLEVEL_OUTOF10: 5
PAINLEVEL_OUTOF10: 7

## 2017-05-25 ASSESSMENT — PAIN DESCRIPTION - FREQUENCY
FREQUENCY: CONTINUOUS

## 2017-05-25 ASSESSMENT — PAIN DESCRIPTION - DESCRIPTORS
DESCRIPTORS: ACHING

## 2017-05-25 ASSESSMENT — PAIN DESCRIPTION - ONSET
ONSET: ON-GOING

## 2017-05-25 ASSESSMENT — PAIN DESCRIPTION - ORIENTATION
ORIENTATION: LOWER

## 2017-05-26 VITALS
HEART RATE: 85 BPM | BODY MASS INDEX: 34.81 KG/M2 | HEIGHT: 72 IN | OXYGEN SATURATION: 98 % | TEMPERATURE: 98 F | SYSTOLIC BLOOD PRESSURE: 121 MMHG | DIASTOLIC BLOOD PRESSURE: 85 MMHG | WEIGHT: 257 LBS | RESPIRATION RATE: 16 BRPM

## 2017-05-26 LAB
ABSOLUTE EOS #: 0.5 K/UL (ref 0–0.4)
ABSOLUTE LYMPH #: 3.8 K/UL (ref 1–4.8)
ABSOLUTE MONO #: 0.8 K/UL (ref 0.2–0.8)
BASOPHILS # BLD: 1 %
BASOPHILS ABSOLUTE: 0.1 K/UL (ref 0–0.2)
CULTURE: NORMAL
DIFFERENTIAL TYPE: ABNORMAL
EOSINOPHILS RELATIVE PERCENT: 5 %
HCT VFR BLD CALC: 46.8 % (ref 41–53)
HEMOGLOBIN: 15.6 G/DL (ref 13.5–17.5)
LYMPHOCYTES # BLD: 38 %
Lab: NORMAL
Lab: NORMAL
MCH RBC QN AUTO: 28.4 PG (ref 26–34)
MCHC RBC AUTO-ENTMCNC: 33.2 G/DL (ref 31–37)
MCV RBC AUTO: 85.4 FL (ref 80–100)
MONOCYTES # BLD: 8 %
PDW BLD-RTO: 13.5 % (ref 11.5–14.5)
PLATELET # BLD: 303 K/UL (ref 130–400)
PLATELET ESTIMATE: ABNORMAL
PMV BLD AUTO: 7.7 FL (ref 6–12)
RBC # BLD: 5.48 M/UL (ref 4.5–5.9)
RBC # BLD: ABNORMAL 10*6/UL
SEG NEUTROPHILS: 48 %
SEGMENTED NEUTROPHILS ABSOLUTE COUNT: 4.9 K/UL (ref 1.8–7.7)
SPECIMEN DESCRIPTION: NORMAL
STATUS: NORMAL
STATUS: NORMAL
WBC # BLD: 9.9 K/UL (ref 3.5–11)
WBC # BLD: ABNORMAL 10*3/UL

## 2017-05-26 PROCEDURE — 6370000000 HC RX 637 (ALT 250 FOR IP): Performed by: FAMILY MEDICINE

## 2017-05-26 PROCEDURE — 6360000002 HC RX W HCPCS: Performed by: SURGERY

## 2017-05-26 PROCEDURE — 6370000000 HC RX 637 (ALT 250 FOR IP): Performed by: NURSE PRACTITIONER

## 2017-05-26 PROCEDURE — 6360000002 HC RX W HCPCS: Performed by: NURSE PRACTITIONER

## 2017-05-26 PROCEDURE — 6370000000 HC RX 637 (ALT 250 FOR IP): Performed by: INTERNAL MEDICINE

## 2017-05-26 PROCEDURE — 36415 COLL VENOUS BLD VENIPUNCTURE: CPT

## 2017-05-26 PROCEDURE — 85025 COMPLETE CBC W/AUTO DIFF WBC: CPT

## 2017-05-26 PROCEDURE — 2580000003 HC RX 258: Performed by: SURGERY

## 2017-05-26 PROCEDURE — 99232 SBSQ HOSP IP/OBS MODERATE 35: CPT | Performed by: INTERNAL MEDICINE

## 2017-05-26 RX ADMIN — TAMSULOSIN HYDROCHLORIDE 0.4 MG: 0.4 CAPSULE ORAL at 08:50

## 2017-05-26 RX ADMIN — ENOXAPARIN SODIUM 40 MG: 40 INJECTION SUBCUTANEOUS at 08:50

## 2017-05-26 RX ADMIN — HYDROCODONE BITARTRATE AND ACETAMINOPHEN 2 TABLET: 5; 325 TABLET ORAL at 08:58

## 2017-05-26 RX ADMIN — PIPERACILLIN SODIUM,TAZOBACTAM SODIUM 3.38 G: 3; .375 INJECTION, POWDER, FOR SOLUTION INTRAVENOUS at 03:49

## 2017-05-26 RX ADMIN — HYDROCODONE BITARTRATE AND ACETAMINOPHEN 2 TABLET: 5; 325 TABLET ORAL at 02:13

## 2017-05-26 RX ADMIN — ESCITALOPRAM OXALATE 10 MG: 10 TABLET ORAL at 08:50

## 2017-05-26 ASSESSMENT — PAIN SCALES - GENERAL
PAINLEVEL_OUTOF10: 7
PAINLEVEL_OUTOF10: 7
PAINLEVEL_OUTOF10: 5

## 2017-05-26 ASSESSMENT — PAIN DESCRIPTION - LOCATION: LOCATION: ABDOMEN;BACK

## 2017-05-26 ASSESSMENT — PAIN DESCRIPTION - FREQUENCY: FREQUENCY: CONTINUOUS

## 2017-05-26 ASSESSMENT — PAIN DESCRIPTION - DESCRIPTORS: DESCRIPTORS: ACHING

## 2017-05-26 ASSESSMENT — PAIN DESCRIPTION - ONSET: ONSET: ON-GOING

## 2017-05-26 ASSESSMENT — PAIN DESCRIPTION - PROGRESSION: CLINICAL_PROGRESSION: NOT CHANGED

## 2017-05-26 ASSESSMENT — PAIN DESCRIPTION - ORIENTATION: ORIENTATION: LOWER

## 2017-05-26 ASSESSMENT — PAIN DESCRIPTION - PAIN TYPE: TYPE: ACUTE PAIN;CHRONIC PAIN

## 2018-04-10 ENCOUNTER — APPOINTMENT (OUTPATIENT)
Dept: CT IMAGING | Age: 43
End: 2018-04-10
Payer: MEDICAID

## 2018-04-10 ENCOUNTER — HOSPITAL ENCOUNTER (EMERGENCY)
Age: 43
Discharge: HOME | End: 2018-04-11
Attending: EMERGENCY MEDICINE
Payer: MEDICAID

## 2018-04-10 VITALS
HEIGHT: 72 IN | RESPIRATION RATE: 16 BRPM | HEART RATE: 76 BPM | BODY MASS INDEX: 34.95 KG/M2 | DIASTOLIC BLOOD PRESSURE: 94 MMHG | SYSTOLIC BLOOD PRESSURE: 165 MMHG | OXYGEN SATURATION: 100 % | TEMPERATURE: 98.4 F | WEIGHT: 258 LBS

## 2018-04-10 DIAGNOSIS — K57.32 DIVERTICULITIS OF COLON: Primary | ICD-10-CM

## 2018-04-10 LAB
ABSOLUTE EOS #: 0.3 K/UL (ref 0–0.4)
ABSOLUTE IMMATURE GRANULOCYTE: NORMAL K/UL (ref 0–0.3)
ABSOLUTE LYMPH #: 3.3 K/UL (ref 1–4.8)
ABSOLUTE MONO #: 0.7 K/UL (ref 0.2–0.8)
ALBUMIN SERPL-MCNC: 4.1 G/DL (ref 3.5–5.2)
ALBUMIN/GLOBULIN RATIO: ABNORMAL (ref 1–2.5)
ALP BLD-CCNC: 35 U/L (ref 40–129)
ALT SERPL-CCNC: 19 U/L (ref 5–41)
ANION GAP SERPL CALCULATED.3IONS-SCNC: 13 MMOL/L (ref 9–17)
AST SERPL-CCNC: 17 U/L
BASOPHILS # BLD: 0 % (ref 0–2)
BASOPHILS ABSOLUTE: 0 K/UL (ref 0–0.2)
BILIRUB SERPL-MCNC: 0.15 MG/DL (ref 0.3–1.2)
BILIRUBIN DIRECT: <0.08 MG/DL
BILIRUBIN, INDIRECT: ABNORMAL MG/DL (ref 0–1)
BUN BLDV-MCNC: 18 MG/DL (ref 6–20)
BUN/CREAT BLD: 21 (ref 9–20)
CALCIUM SERPL-MCNC: 9.2 MG/DL (ref 8.6–10.4)
CHLORIDE BLD-SCNC: 103 MMOL/L (ref 98–107)
CO2: 24 MMOL/L (ref 20–31)
CREAT SERPL-MCNC: 0.84 MG/DL (ref 0.7–1.2)
DIFFERENTIAL TYPE: NORMAL
EOSINOPHILS RELATIVE PERCENT: 3 % (ref 1–4)
GFR AFRICAN AMERICAN: >60 ML/MIN
GFR NON-AFRICAN AMERICAN: >60 ML/MIN
GFR SERPL CREATININE-BSD FRML MDRD: ABNORMAL ML/MIN/{1.73_M2}
GFR SERPL CREATININE-BSD FRML MDRD: ABNORMAL ML/MIN/{1.73_M2}
GLOBULIN: ABNORMAL G/DL (ref 1.5–3.8)
GLUCOSE BLD-MCNC: 98 MG/DL (ref 70–99)
HCT VFR BLD CALC: 47.3 % (ref 41–53)
HEMOGLOBIN: 15.5 G/DL (ref 13.5–17.5)
IMMATURE GRANULOCYTES: NORMAL %
LACTIC ACID: 0.7 MMOL/L (ref 0.5–2.2)
LIPASE: 45 U/L (ref 13–60)
LYMPHOCYTES # BLD: 34 % (ref 24–44)
MCH RBC QN AUTO: 28.6 PG (ref 26–34)
MCHC RBC AUTO-ENTMCNC: 32.7 G/DL (ref 31–37)
MCV RBC AUTO: 87.3 FL (ref 80–100)
MONOCYTES # BLD: 7 % (ref 1–7)
NRBC AUTOMATED: NORMAL PER 100 WBC
PDW BLD-RTO: 13.5 % (ref 11.5–14.5)
PLATELET # BLD: 364 K/UL (ref 130–400)
PLATELET ESTIMATE: NORMAL
PMV BLD AUTO: 7.1 FL (ref 6–12)
POTASSIUM SERPL-SCNC: 4.1 MMOL/L (ref 3.7–5.3)
RBC # BLD: 5.42 M/UL (ref 4.5–5.9)
RBC # BLD: NORMAL 10*6/UL
SEG NEUTROPHILS: 56 % (ref 36–66)
SEGMENTED NEUTROPHILS ABSOLUTE COUNT: 5.5 K/UL (ref 1.8–7.7)
SODIUM BLD-SCNC: 140 MMOL/L (ref 135–144)
TOTAL PROTEIN: 7.2 G/DL (ref 6.4–8.3)
WBC # BLD: 9.8 K/UL (ref 3.5–11)
WBC # BLD: NORMAL 10*3/UL

## 2018-04-10 PROCEDURE — 6360000002 HC RX W HCPCS: Performed by: NURSE PRACTITIONER

## 2018-04-10 PROCEDURE — 83690 ASSAY OF LIPASE: CPT

## 2018-04-10 PROCEDURE — 96374 THER/PROPH/DIAG INJ IV PUSH: CPT

## 2018-04-10 PROCEDURE — 74177 CT ABD & PELVIS W/CONTRAST: CPT

## 2018-04-10 PROCEDURE — 80048 BASIC METABOLIC PNL TOTAL CA: CPT

## 2018-04-10 PROCEDURE — 6370000000 HC RX 637 (ALT 250 FOR IP): Performed by: NURSE PRACTITIONER

## 2018-04-10 PROCEDURE — 85025 COMPLETE CBC W/AUTO DIFF WBC: CPT

## 2018-04-10 PROCEDURE — 83605 ASSAY OF LACTIC ACID: CPT

## 2018-04-10 PROCEDURE — 80076 HEPATIC FUNCTION PANEL: CPT

## 2018-04-10 PROCEDURE — 96375 TX/PRO/DX INJ NEW DRUG ADDON: CPT

## 2018-04-10 PROCEDURE — 6360000004 HC RX CONTRAST MEDICATION: Performed by: NURSE PRACTITIONER

## 2018-04-10 PROCEDURE — 99284 EMERGENCY DEPT VISIT MOD MDM: CPT

## 2018-04-10 PROCEDURE — 2580000003 HC RX 258: Performed by: NURSE PRACTITIONER

## 2018-04-10 PROCEDURE — 96376 TX/PRO/DX INJ SAME DRUG ADON: CPT

## 2018-04-10 RX ORDER — METRONIDAZOLE 500 MG/1
500 TABLET ORAL 2 TIMES DAILY
Qty: 20 TABLET | Refills: 0 | Status: SHIPPED | OUTPATIENT
Start: 2018-04-10 | End: 2018-04-20 | Stop reason: ALTCHOICE

## 2018-04-10 RX ORDER — OXYCODONE HYDROCHLORIDE AND ACETAMINOPHEN 5; 325 MG/1; MG/1
1 TABLET ORAL EVERY 6 HOURS PRN
Qty: 20 TABLET | Refills: 0 | Status: SHIPPED | OUTPATIENT
Start: 2018-04-10 | End: 2018-04-16

## 2018-04-10 RX ORDER — SODIUM CHLORIDE 0.9 % (FLUSH) 0.9 %
10 SYRINGE (ML) INJECTION PRN
Status: DISCONTINUED | OUTPATIENT
Start: 2018-04-10 | End: 2018-04-11 | Stop reason: HOSPADM

## 2018-04-10 RX ORDER — MORPHINE SULFATE 2 MG/ML
2 INJECTION, SOLUTION INTRAMUSCULAR; INTRAVENOUS ONCE
Status: COMPLETED | OUTPATIENT
Start: 2018-04-10 | End: 2018-04-10

## 2018-04-10 RX ORDER — OXYCODONE HYDROCHLORIDE AND ACETAMINOPHEN 5; 325 MG/1; MG/1
1 TABLET ORAL ONCE
Status: COMPLETED | OUTPATIENT
Start: 2018-04-10 | End: 2018-04-10

## 2018-04-10 RX ORDER — ONDANSETRON 4 MG/1
4 TABLET, ORALLY DISINTEGRATING ORAL EVERY 8 HOURS PRN
Qty: 20 TABLET | Refills: 0 | Status: SHIPPED | OUTPATIENT
Start: 2018-04-10 | End: 2018-04-20 | Stop reason: ALTCHOICE

## 2018-04-10 RX ORDER — ONDANSETRON 2 MG/ML
4 INJECTION INTRAMUSCULAR; INTRAVENOUS ONCE
Status: COMPLETED | OUTPATIENT
Start: 2018-04-10 | End: 2018-04-10

## 2018-04-10 RX ORDER — CIPROFLOXACIN 500 MG/1
500 TABLET, FILM COATED ORAL 2 TIMES DAILY
Qty: 20 TABLET | Refills: 0 | Status: SHIPPED | OUTPATIENT
Start: 2018-04-10 | End: 2018-04-20 | Stop reason: ALTCHOICE

## 2018-04-10 RX ORDER — 0.9 % SODIUM CHLORIDE 0.9 %
50 INTRAVENOUS SOLUTION INTRAVENOUS ONCE
Status: COMPLETED | OUTPATIENT
Start: 2018-04-10 | End: 2018-04-10

## 2018-04-10 RX ADMIN — MORPHINE SULFATE 2 MG: 2 INJECTION, SOLUTION INTRAMUSCULAR; INTRAVENOUS at 23:49

## 2018-04-10 RX ADMIN — ONDANSETRON 4 MG: 2 INJECTION INTRAMUSCULAR; INTRAVENOUS at 22:17

## 2018-04-10 RX ADMIN — IOPAMIDOL 125 ML: 755 INJECTION, SOLUTION INTRAVENOUS at 22:07

## 2018-04-10 RX ADMIN — MORPHINE SULFATE 2 MG: 2 INJECTION, SOLUTION INTRAMUSCULAR; INTRAVENOUS at 22:17

## 2018-04-10 RX ADMIN — SODIUM CHLORIDE 50 ML: 9 INJECTION, SOLUTION INTRAVENOUS at 22:07

## 2018-04-10 RX ADMIN — OXYCODONE HYDROCHLORIDE AND ACETAMINOPHEN 1 TABLET: 5; 325 TABLET ORAL at 23:49

## 2018-04-10 RX ADMIN — Medication 10 ML: at 22:07

## 2018-04-10 ASSESSMENT — PAIN SCALES - GENERAL
PAINLEVEL_OUTOF10: 7
PAINLEVEL_OUTOF10: 7

## 2018-04-10 ASSESSMENT — ENCOUNTER SYMPTOMS
ABDOMINAL PAIN: 1
CHEST TIGHTNESS: 0
SHORTNESS OF BREATH: 0
NAUSEA: 1
BACK PAIN: 0
DIARRHEA: 1
BLOOD IN STOOL: 0
CONSTIPATION: 0
VOMITING: 0

## 2018-04-10 ASSESSMENT — PAIN DESCRIPTION - LOCATION
LOCATION: ABDOMEN
LOCATION: ABDOMEN

## 2018-04-10 ASSESSMENT — PAIN DESCRIPTION - DESCRIPTORS: DESCRIPTORS: ACHING;DISCOMFORT

## 2018-04-10 ASSESSMENT — PAIN DESCRIPTION - FREQUENCY: FREQUENCY: CONTINUOUS

## 2018-04-10 ASSESSMENT — PAIN DESCRIPTION - PAIN TYPE
TYPE: ACUTE PAIN
TYPE: ACUTE PAIN

## 2018-04-10 ASSESSMENT — PAIN DESCRIPTION - ORIENTATION: ORIENTATION: MID

## 2018-04-12 ENCOUNTER — OFFICE VISIT (OUTPATIENT)
Dept: FAMILY MEDICINE CLINIC | Age: 43
End: 2018-04-12
Payer: MEDICAID

## 2018-04-12 VITALS
DIASTOLIC BLOOD PRESSURE: 84 MMHG | HEART RATE: 86 BPM | HEIGHT: 72 IN | RESPIRATION RATE: 18 BRPM | SYSTOLIC BLOOD PRESSURE: 138 MMHG | OXYGEN SATURATION: 96 % | BODY MASS INDEX: 34.81 KG/M2 | WEIGHT: 257 LBS | TEMPERATURE: 98.4 F

## 2018-04-12 DIAGNOSIS — J30.89 ENVIRONMENTAL AND SEASONAL ALLERGIES: ICD-10-CM

## 2018-04-12 DIAGNOSIS — K42.9 UMBILICAL HERNIA WITHOUT OBSTRUCTION AND WITHOUT GANGRENE: ICD-10-CM

## 2018-04-12 DIAGNOSIS — K76.0 FATTY LIVER: ICD-10-CM

## 2018-04-12 DIAGNOSIS — K57.92 DIVERTICULITIS OF INTESTINE WITHOUT PERFORATION OR ABSCESS WITHOUT BLEEDING, UNSPECIFIED PART OF INTESTINAL TRACT: Primary | ICD-10-CM

## 2018-04-12 PROCEDURE — 1036F TOBACCO NON-USER: CPT | Performed by: INTERNAL MEDICINE

## 2018-04-12 PROCEDURE — 99214 OFFICE O/P EST MOD 30 MIN: CPT | Performed by: INTERNAL MEDICINE

## 2018-04-12 PROCEDURE — G8427 DOCREV CUR MEDS BY ELIG CLIN: HCPCS | Performed by: INTERNAL MEDICINE

## 2018-04-12 PROCEDURE — G8417 CALC BMI ABV UP PARAM F/U: HCPCS | Performed by: INTERNAL MEDICINE

## 2018-04-12 RX ORDER — FLUTICASONE PROPIONATE 50 MCG
1 SPRAY, SUSPENSION (ML) NASAL DAILY
Qty: 1 BOTTLE | Refills: 3 | Status: ON HOLD | OUTPATIENT
Start: 2018-04-12 | End: 2018-09-04 | Stop reason: ALTCHOICE

## 2018-04-12 ASSESSMENT — ENCOUNTER SYMPTOMS
COUGH: 0
WHEEZING: 0
SINUS PRESSURE: 1
RHINORRHEA: 0
HEMATOCHEZIA: 0
TROUBLE SWALLOWING: 0
NAUSEA: 1
FLATUS: 0
RECTAL PAIN: 0
CONSTIPATION: 0
BLOOD IN STOOL: 0
DIARRHEA: 1
SINUS PAIN: 1
ABDOMINAL PAIN: 1
BELCHING: 0
STRIDOR: 0
SHORTNESS OF BREATH: 0
SORE THROAT: 0
CHEST TIGHTNESS: 0
VOICE CHANGE: 0
BACK PAIN: 1
VOMITING: 0

## 2018-04-12 ASSESSMENT — PATIENT HEALTH QUESTIONNAIRE - PHQ9
2. FEELING DOWN, DEPRESSED OR HOPELESS: 0
1. LITTLE INTEREST OR PLEASURE IN DOING THINGS: 0
SUM OF ALL RESPONSES TO PHQ9 QUESTIONS 1 & 2: 0
SUM OF ALL RESPONSES TO PHQ QUESTIONS 1-9: 0

## 2018-04-12 ASSESSMENT — CROHNS DISEASE ACTIVITY INDEX (CDAI): CDAI SCORE: 0

## 2018-04-20 ENCOUNTER — HOSPITAL ENCOUNTER (OUTPATIENT)
Facility: CLINIC | Age: 43
Discharge: HOME | End: 2018-04-20
Payer: MEDICAID

## 2018-04-20 ENCOUNTER — HOSPITAL ENCOUNTER (OUTPATIENT)
Dept: CT IMAGING | Facility: CLINIC | Age: 43
Discharge: HOME | End: 2018-04-22
Payer: MEDICAID

## 2018-04-20 ENCOUNTER — OFFICE VISIT (OUTPATIENT)
Dept: FAMILY MEDICINE CLINIC | Age: 43
End: 2018-04-20
Payer: MEDICAID

## 2018-04-20 VITALS
SYSTOLIC BLOOD PRESSURE: 134 MMHG | BODY MASS INDEX: 34.82 KG/M2 | HEART RATE: 78 BPM | WEIGHT: 257.06 LBS | OXYGEN SATURATION: 99 % | HEIGHT: 72 IN | DIASTOLIC BLOOD PRESSURE: 78 MMHG | TEMPERATURE: 98.2 F | RESPIRATION RATE: 17 BRPM

## 2018-04-20 DIAGNOSIS — K58.0 IRRITABLE BOWEL SYNDROME WITH DIARRHEA: ICD-10-CM

## 2018-04-20 DIAGNOSIS — K57.92 ACUTE DIVERTICULITIS OF INTESTINE: ICD-10-CM

## 2018-04-20 DIAGNOSIS — K57.92 ACUTE DIVERTICULITIS OF INTESTINE: Primary | ICD-10-CM

## 2018-04-20 LAB
ABSOLUTE EOS #: 0.13 K/UL (ref 0–0.44)
ABSOLUTE IMMATURE GRANULOCYTE: <0.03 K/UL (ref 0–0.3)
ABSOLUTE LYMPH #: 2.23 K/UL (ref 1.1–3.7)
ABSOLUTE MONO #: 0.57 K/UL (ref 0.1–1.2)
ALBUMIN SERPL-MCNC: 4.4 G/DL (ref 3.5–5.2)
ALBUMIN/GLOBULIN RATIO: 1.3 (ref 1–2.5)
ALP BLD-CCNC: 34 U/L (ref 40–129)
ALT SERPL-CCNC: 32 U/L (ref 5–41)
ANION GAP SERPL CALCULATED.3IONS-SCNC: 14 MMOL/L (ref 9–17)
AST SERPL-CCNC: 22 U/L
BASOPHILS # BLD: 1 % (ref 0–2)
BASOPHILS ABSOLUTE: 0.06 K/UL (ref 0–0.2)
BILIRUB SERPL-MCNC: 0.26 MG/DL (ref 0.3–1.2)
BUN BLDV-MCNC: 12 MG/DL (ref 6–20)
BUN/CREAT BLD: ABNORMAL (ref 9–20)
CALCIUM SERPL-MCNC: 9.3 MG/DL (ref 8.6–10.4)
CHLORIDE BLD-SCNC: 100 MMOL/L (ref 98–107)
CO2: 26 MMOL/L (ref 20–31)
CREAT SERPL-MCNC: 0.91 MG/DL (ref 0.7–1.2)
DIFFERENTIAL TYPE: NORMAL
EOSINOPHILS RELATIVE PERCENT: 2 % (ref 1–4)
GFR AFRICAN AMERICAN: >60 ML/MIN
GFR NON-AFRICAN AMERICAN: >60 ML/MIN
GFR SERPL CREATININE-BSD FRML MDRD: ABNORMAL ML/MIN/{1.73_M2}
GFR SERPL CREATININE-BSD FRML MDRD: ABNORMAL ML/MIN/{1.73_M2}
GLUCOSE BLD-MCNC: 90 MG/DL (ref 70–99)
HCT VFR BLD CALC: 49.9 % (ref 40.7–50.3)
HEMOGLOBIN: 16.3 G/DL (ref 13–17)
IMMATURE GRANULOCYTES: 0 %
LYMPHOCYTES # BLD: 25 % (ref 24–43)
MCH RBC QN AUTO: 28.6 PG (ref 25.2–33.5)
MCHC RBC AUTO-ENTMCNC: 32.7 G/DL (ref 28.4–34.8)
MCV RBC AUTO: 87.7 FL (ref 82.6–102.9)
MONOCYTES # BLD: 7 % (ref 3–12)
NRBC AUTOMATED: 0 PER 100 WBC
PDW BLD-RTO: 12.9 % (ref 11.8–14.4)
PLATELET # BLD: 369 K/UL (ref 138–453)
PLATELET ESTIMATE: NORMAL
PMV BLD AUTO: 9.5 FL (ref 8.1–13.5)
POTASSIUM SERPL-SCNC: 4.5 MMOL/L (ref 3.7–5.3)
RBC # BLD: 5.69 M/UL (ref 4.21–5.77)
RBC # BLD: NORMAL 10*6/UL
SEG NEUTROPHILS: 65 % (ref 36–65)
SEGMENTED NEUTROPHILS ABSOLUTE COUNT: 5.81 K/UL (ref 1.5–8.1)
SODIUM BLD-SCNC: 140 MMOL/L (ref 135–144)
TOTAL PROTEIN: 7.7 G/DL (ref 6.4–8.3)
WBC # BLD: 8.8 K/UL (ref 3.5–11.3)
WBC # BLD: NORMAL 10*3/UL

## 2018-04-20 PROCEDURE — G8417 CALC BMI ABV UP PARAM F/U: HCPCS | Performed by: INTERNAL MEDICINE

## 2018-04-20 PROCEDURE — 1036F TOBACCO NON-USER: CPT | Performed by: INTERNAL MEDICINE

## 2018-04-20 PROCEDURE — 36415 COLL VENOUS BLD VENIPUNCTURE: CPT

## 2018-04-20 PROCEDURE — 80053 COMPREHEN METABOLIC PANEL: CPT

## 2018-04-20 PROCEDURE — 2580000003 HC RX 258: Performed by: INTERNAL MEDICINE

## 2018-04-20 PROCEDURE — 6360000004 HC RX CONTRAST MEDICATION: Performed by: INTERNAL MEDICINE

## 2018-04-20 PROCEDURE — 74177 CT ABD & PELVIS W/CONTRAST: CPT

## 2018-04-20 PROCEDURE — 85025 COMPLETE CBC W/AUTO DIFF WBC: CPT

## 2018-04-20 PROCEDURE — 99214 OFFICE O/P EST MOD 30 MIN: CPT | Performed by: INTERNAL MEDICINE

## 2018-04-20 PROCEDURE — G8427 DOCREV CUR MEDS BY ELIG CLIN: HCPCS | Performed by: INTERNAL MEDICINE

## 2018-04-20 RX ORDER — SODIUM CHLORIDE 0.9 % (FLUSH) 0.9 %
10 SYRINGE (ML) INJECTION PRN
Status: DISCONTINUED | OUTPATIENT
Start: 2018-04-20 | End: 2018-04-23 | Stop reason: HOSPADM

## 2018-04-20 RX ORDER — DICYCLOMINE HCL 20 MG
20 TABLET ORAL EVERY 6 HOURS
Qty: 120 TABLET | Refills: 1 | Status: SHIPPED | OUTPATIENT
Start: 2018-04-20 | End: 2018-08-28 | Stop reason: ALTCHOICE

## 2018-04-20 RX ORDER — DIPHENOXYLATE HYDROCHLORIDE AND ATROPINE SULFATE 2.5; .025 MG/1; MG/1
1 TABLET ORAL 4 TIMES DAILY PRN
Qty: 20 TABLET | Refills: 0 | Status: SHIPPED | OUTPATIENT
Start: 2018-04-20 | End: 2018-05-20

## 2018-04-20 RX ORDER — 0.9 % SODIUM CHLORIDE 0.9 %
70 INTRAVENOUS SOLUTION INTRAVENOUS ONCE
Status: COMPLETED | OUTPATIENT
Start: 2018-04-20 | End: 2018-04-20

## 2018-04-20 RX ADMIN — IOHEXOL 50 ML: 240 INJECTION, SOLUTION INTRATHECAL; INTRAVASCULAR; INTRAVENOUS; ORAL at 11:40

## 2018-04-20 RX ADMIN — IOPAMIDOL 75 ML: 755 INJECTION, SOLUTION INTRAVENOUS at 12:42

## 2018-04-20 RX ADMIN — Medication 10 ML: at 12:40

## 2018-04-20 RX ADMIN — SODIUM CHLORIDE 70 ML: 9 INJECTION, SOLUTION INTRAVENOUS at 12:41

## 2018-04-22 ASSESSMENT — ENCOUNTER SYMPTOMS
APNEA: 0
ABDOMINAL PAIN: 1
SHORTNESS OF BREATH: 0
ANAL BLEEDING: 0
BELCHING: 1
VOMITING: 0
NAUSEA: 1
ABDOMINAL DISTENTION: 1
DIARRHEA: 1
COUGH: 0
BLOATING: 1
CONSTIPATION: 0
WHEEZING: 0
BLOOD IN STOOL: 0
RECTAL PAIN: 0
CHOKING: 0
CHEST TIGHTNESS: 0

## 2018-08-24 ENCOUNTER — HOSPITAL ENCOUNTER (EMERGENCY)
Age: 43
Discharge: HOME | End: 2018-08-24
Attending: EMERGENCY MEDICINE
Payer: MEDICAID

## 2018-08-24 VITALS
OXYGEN SATURATION: 98 % | DIASTOLIC BLOOD PRESSURE: 92 MMHG | SYSTOLIC BLOOD PRESSURE: 171 MMHG | HEIGHT: 72 IN | RESPIRATION RATE: 18 BRPM | WEIGHT: 238 LBS | HEART RATE: 86 BPM | BODY MASS INDEX: 32.23 KG/M2 | TEMPERATURE: 98 F

## 2018-08-24 DIAGNOSIS — M79.605 LEFT LEG PAIN: Primary | ICD-10-CM

## 2018-08-24 PROCEDURE — 29345 APPLICATION OF LONG LEG CAST: CPT

## 2018-08-24 PROCEDURE — 99283 EMERGENCY DEPT VISIT LOW MDM: CPT

## 2018-08-24 ASSESSMENT — PAIN DESCRIPTION - PAIN TYPE: TYPE: ACUTE PAIN

## 2018-08-24 ASSESSMENT — PAIN DESCRIPTION - ORIENTATION: ORIENTATION: LEFT;LOWER;POSTERIOR

## 2018-08-24 ASSESSMENT — PAIN SCALES - GENERAL: PAINLEVEL_OUTOF10: 10

## 2018-08-24 ASSESSMENT — PAIN DESCRIPTION - DESCRIPTORS: DESCRIPTORS: CRAMPING;THROBBING

## 2018-08-24 ASSESSMENT — PAIN DESCRIPTION - LOCATION: LOCATION: LEG

## 2018-08-26 ASSESSMENT — ENCOUNTER SYMPTOMS
CONSTIPATION: 0
EYE PAIN: 0
VOMITING: 0
ABDOMINAL PAIN: 0
NAUSEA: 0
RHINORRHEA: 0
BLOOD IN STOOL: 0
SHORTNESS OF BREATH: 0
WHEEZING: 0
BACK PAIN: 0
COLOR CHANGE: 0
COUGH: 0
CHEST TIGHTNESS: 0
SORE THROAT: 0
EYE DISCHARGE: 0
DIARRHEA: 0

## 2018-08-26 NOTE — ED PROVIDER NOTES
[facility] ED  eMERGENCY dEPARTMENT eNCOUnter      Pt Name: Chantell Cummins  MRN: [id number]  [place] 1975  Date of evaluation: 8/24/2018  Provider: Raoul Adams MD    CHIEF COMPLAINT       Chief Complaint   Patient presents with    Leg Pain     lt calf pain since pushing a car up a hill this am         HISTORY OF PRESENT ILLNESS   (Location/Symptom, Timing/Onset, Context/Setting, Quality, Duration, Modifying Factors, Severity)  Note limiting factors. Chantell Cummins is a 39 y.o. male who presents to the emergency department Complaining of left leg pain status post punching a car. Patient denies any loss of motor or sensation. States he was making a car and heard a popping sound and since then had leg pain. Denies any other acute complaints. HPI    Nursing Notes were reviewed. REVIEW OF SYSTEMS    (2-9 systems for level 4, 10 or more for level 5)     Review of Systems   Constitutional: Negative for chills, diaphoresis, fatigue and fever. HENT: Negative for ear pain, nosebleeds, rhinorrhea, sneezing and sore throat. Eyes: Negative for pain, discharge and visual disturbance. Respiratory: Negative for cough, chest tightness, shortness of breath and wheezing. Cardiovascular: Negative for chest pain and palpitations. Gastrointestinal: Negative for abdominal pain, blood in stool, constipation, diarrhea, nausea and vomiting. Endocrine: Negative for heat intolerance and polydipsia. Genitourinary: Negative for dysuria, flank pain, frequency, hematuria and urgency. Musculoskeletal: Negative for arthralgias, back pain, neck pain and neck stiffness. Left leg pain   Skin: Negative for color change, rash and wound. Allergic/Immunologic: Negative for environmental allergies and immunocompromised state. Neurological: Negative for dizziness, syncope, weakness, numbness and headaches. Hematological: Does not bruise/bleed easily.    Psychiatric/Behavioral: Negative for behavioral problems, self-injury and suicidal ideas. Except as noted above the remainder of the review of systems was reviewed and negative. PAST MEDICAL HISTORY     Past Medical History:   Diagnosis Date    Anxiety     Diverticulitis          SURGICAL HISTORY       Past Surgical History:   Procedure Laterality Date    ANKLE SURGERY Left     APPENDECTOMY      BACK SURGERY      with hardware    LAPAROSCOPIC APPENDECTOMY  04/26/2017    LAPAROSCOPIC APPENDECTOMY N/A 4/26/2017    APPENDECTOMY LAPAROSCOPIC performed by Viridiana Hancock MD at [address]  Bilateral          [address]       Discharge Medication List as of 8/24/2018 12:00 PM      CONTINUE these medications which have NOT CHANGED    Details   dicyclomine (BENTYL) 20 MG tablet Take 1 tablet by mouth every 6 hours, Disp-120 tablet, R-1Normal      fluticasone (FLONASE) 50 MCG/ACT nasal spray 1 spray by Nasal route daily, Disp-1 Bottle, R-3Normal             ALLERGIES     Patient has no known allergies. FAMILY HISTORY       Family History   Problem Relation Age of Onset    No Known Problems Mother     No Known Problems Father           SOCIAL HISTORY       Social History     Social History    Marital status:      Spouse name: N/A    Number of children: N/A    Years of education: N/A     Social History Main Topics    Smoking status: Never Smoker    Smokeless tobacco: Never Used    Alcohol use Yes      Comment: social    Drug use: No    Sexual activity: Not Currently     Other Topics Concern    None     Social History Narrative    None       SCREENINGS             PHYSICAL EXAM    (up to 7 for level 4, 8 or more for level 5)     ED Triage Vitals [08/24/18 1010]   BP Temp Temp Source Pulse Resp SpO2 Height Weight   (!) 171/92 98 °F (36.7 °C) Oral 86 18 98 % 6' (1.829 m) 238 lb (108 kg)       Physical Exam   Constitutional: He is oriented to person, place, and time. He appears well-developed.  No distress. HENT:   Head: Normocephalic and atraumatic. Eyes: EOM are normal. Right eye exhibits no discharge. Left eye exhibits no discharge. Neck: Normal range of motion. No tracheal deviation present. Cardiovascular: Normal rate and regular rhythm. Exam reveals no friction rub. No murmur heard. Pulmonary/Chest: Effort normal. No stridor. No respiratory distress. He has no wheezes. He has no rales. He exhibits no tenderness. Abdominal: Soft. He exhibits no distension and no mass. There is no tenderness. There is no rebound and no guarding. Musculoskeletal: Normal range of motion. He exhibits no edema, tenderness or deformity. Left lower extremity neurovascularly intact. Tender palpation up to region. No posterior calf tenderness no posterior calf swelling noted. Compartment soft. Neurological: He is alert and oriented to person, place, and time. Skin: Skin is warm. No rash noted. He is not diaphoretic. No erythema. Psychiatric: He has a normal mood and affect. His behavior is normal.       DIAGNOSTIC RESULTS     EKG: All EKG's are interpreted by the Emergency Department Physician who either signs or Co-signs this chart in the absence of a cardiologist.    RADIOLOGY:   Non-plain film images such as CT, Ultrasound and MRI are read by the radiologist. Plain radiographic images are visualized and preliminarily interpreted by the emergency physician with the below findings:        Interpretation per the Radiologist below, if available at the time of this note:    No orders to display         ED BEDSIDE ULTRASOUND:   Performed by ED Physician - none    LABS:  Labs Reviewed - No data to display    All other labs were within normal range or not returned as of this dictation.     EMERGENCY DEPARTMENT COURSE and DIFFERENTIAL DIAGNOSIS/MDM:   Vitals:    Vitals:    08/24/18 1010   BP: (!) 171/92   Pulse: 86   Resp: 18   Temp: 98 °F (36.7 °C)   TempSrc: Oral   SpO2: 98%   Weight: 238 lb (108 kg)

## 2018-08-28 ENCOUNTER — OFFICE VISIT (OUTPATIENT)
Dept: ORTHOPEDIC SURGERY | Age: 43
End: 2018-08-28
Payer: MEDICAID

## 2018-08-28 VITALS — WEIGHT: 238 LBS | HEIGHT: 72 IN | BODY MASS INDEX: 32.23 KG/M2

## 2018-08-28 DIAGNOSIS — S86.112A RUPTURE OF LEFT GASTROCNEMIUS TENDON, INITIAL ENCOUNTER: Primary | ICD-10-CM

## 2018-08-28 PROCEDURE — 99203 OFFICE O/P NEW LOW 30 MIN: CPT | Performed by: ORTHOPAEDIC SURGERY

## 2018-08-28 PROCEDURE — G8427 DOCREV CUR MEDS BY ELIG CLIN: HCPCS | Performed by: ORTHOPAEDIC SURGERY

## 2018-08-28 PROCEDURE — 1036F TOBACCO NON-USER: CPT | Performed by: ORTHOPAEDIC SURGERY

## 2018-08-28 PROCEDURE — G8417 CALC BMI ABV UP PARAM F/U: HCPCS | Performed by: ORTHOPAEDIC SURGERY

## 2018-08-28 NOTE — LETTER
[address] [place] [address] [address]  [address] [address]  Phone: [phone]  Fax: [phone]    Arleen Trujillo MD        August 28, 2018     Patient: Radha Rausch   YOB: 1975   Date of Visit: 8/28/2018       To Whom It May Concern: It is my medical opinion that Radha Rausch is unable to attend work on 08/28/2018 thru 09/09/2018. He will return to work on 09/10/2018. If you have any questions or concerns, please don't hesitate to call.     Sincerely,        Arleen Trujillo MD

## 2018-09-04 ENCOUNTER — OFFICE VISIT (OUTPATIENT)
Dept: ORTHOPEDIC SURGERY | Age: 43
End: 2018-09-04
Payer: MEDICAID

## 2018-09-04 ENCOUNTER — HOSPITAL ENCOUNTER (INPATIENT)
Age: 43
LOS: 1 days | Discharge: HOME | DRG: 197 | End: 2018-09-05
Attending: EMERGENCY MEDICINE | Admitting: INTERNAL MEDICINE
Payer: MEDICAID

## 2018-09-04 ENCOUNTER — HOSPITAL ENCOUNTER (OUTPATIENT)
Dept: VASCULAR LAB | Age: 43
Discharge: HOME | DRG: 197 | End: 2018-09-04
Payer: MEDICAID

## 2018-09-04 VITALS — WEIGHT: 242 LBS | BODY MASS INDEX: 32.78 KG/M2 | HEIGHT: 72 IN

## 2018-09-04 DIAGNOSIS — M79.89 SWELLING OF CALF: Primary | ICD-10-CM

## 2018-09-04 DIAGNOSIS — M79.89 SWELLING OF CALF: ICD-10-CM

## 2018-09-04 DIAGNOSIS — M79.662 PAIN OF LEFT CALF: Primary | ICD-10-CM

## 2018-09-04 DIAGNOSIS — M79.662 PAIN OF LEFT CALF: ICD-10-CM

## 2018-09-04 DIAGNOSIS — I82.4Y2 ACUTE DEEP VEIN THROMBOSIS (DVT) OF PROXIMAL VEIN OF LEFT LOWER EXTREMITY (HCC): Primary | ICD-10-CM

## 2018-09-04 PROBLEM — M54.50 ACUTE MIDLINE LOW BACK PAIN WITHOUT SCIATICA: Status: RESOLVED | Noted: 2017-05-20 | Resolved: 2018-09-04

## 2018-09-04 PROBLEM — E66.9 OBESITY (BMI 30-39.9): Status: ACTIVE | Noted: 2018-09-04

## 2018-09-04 PROBLEM — K57.92 ACUTE DIVERTICULITIS OF INTESTINE: Status: RESOLVED | Noted: 2017-05-20 | Resolved: 2018-09-04

## 2018-09-04 PROBLEM — I82.412 ACUTE DEEP VEIN THROMBOSIS (DVT) OF FEMORAL VEIN OF LEFT LOWER EXTREMITY (HCC): Status: ACTIVE | Noted: 2018-09-04

## 2018-09-04 PROBLEM — K57.30 SIGMOID DIVERTICULOSIS: Status: ACTIVE | Noted: 2018-09-04

## 2018-09-04 LAB
ABSOLUTE EOS #: 0.2 K/UL (ref 0–0.4)
ABSOLUTE IMMATURE GRANULOCYTE: ABNORMAL K/UL (ref 0–0.3)
ABSOLUTE LYMPH #: 2.1 K/UL (ref 1–4.8)
ABSOLUTE MONO #: 0.7 K/UL (ref 0.2–0.8)
ANION GAP SERPL CALCULATED.3IONS-SCNC: 13 MMOL/L (ref 9–17)
BASOPHILS # BLD: 0 % (ref 0–2)
BASOPHILS ABSOLUTE: 0 K/UL (ref 0–0.2)
BUN BLDV-MCNC: 15 MG/DL (ref 6–20)
BUN/CREAT BLD: 19 (ref 9–20)
CALCIUM SERPL-MCNC: 9.6 MG/DL (ref 8.6–10.4)
CHLORIDE BLD-SCNC: 103 MMOL/L (ref 98–107)
CO2: 23 MMOL/L (ref 20–31)
CREAT SERPL-MCNC: 0.79 MG/DL (ref 0.7–1.2)
DIFFERENTIAL TYPE: ABNORMAL
EOSINOPHILS RELATIVE PERCENT: 2 % (ref 1–4)
GFR AFRICAN AMERICAN: >60 ML/MIN
GFR NON-AFRICAN AMERICAN: >60 ML/MIN
GFR SERPL CREATININE-BSD FRML MDRD: NORMAL ML/MIN/{1.73_M2}
GFR SERPL CREATININE-BSD FRML MDRD: NORMAL ML/MIN/{1.73_M2}
GLUCOSE BLD-MCNC: 86 MG/DL (ref 70–99)
HCT VFR BLD CALC: 46.8 % (ref 41–53)
HEMOGLOBIN: 15.6 G/DL (ref 13.5–17.5)
HOMOCYSTEINE: 9.6 UMOL/L
IMMATURE GRANULOCYTES: ABNORMAL %
LYMPHOCYTES # BLD: 21 % (ref 24–44)
MCH RBC QN AUTO: 29.1 PG (ref 26–34)
MCHC RBC AUTO-ENTMCNC: 33.3 G/DL (ref 31–37)
MCV RBC AUTO: 87.4 FL (ref 80–100)
MONOCYTES # BLD: 7 % (ref 1–7)
NRBC AUTOMATED: ABNORMAL PER 100 WBC
PARTIAL THROMBOPLASTIN TIME: 38.9 SEC (ref 23–31)
PARTIAL THROMBOPLASTIN TIME: >120 SEC (ref 23–31)
PDW BLD-RTO: 13.3 % (ref 11.5–14.5)
PLATELET # BLD: 343 K/UL (ref 130–400)
PLATELET ESTIMATE: ABNORMAL
PMV BLD AUTO: 7.2 FL (ref 6–12)
POTASSIUM SERPL-SCNC: 4.3 MMOL/L (ref 3.7–5.3)
RBC # BLD: 5.36 M/UL (ref 4.5–5.9)
RBC # BLD: ABNORMAL 10*6/UL
SEG NEUTROPHILS: 70 % (ref 36–66)
SEGMENTED NEUTROPHILS ABSOLUTE COUNT: 7 K/UL (ref 1.8–7.7)
SODIUM BLD-SCNC: 139 MMOL/L (ref 135–144)
WBC # BLD: 10.1 K/UL (ref 3.5–11)
WBC # BLD: ABNORMAL 10*3/UL

## 2018-09-04 PROCEDURE — 96375 TX/PRO/DX INJ NEW DRUG ADDON: CPT

## 2018-09-04 PROCEDURE — 81291 MTHFR GENE: CPT

## 2018-09-04 PROCEDURE — G8427 DOCREV CUR MEDS BY ELIG CLIN: HCPCS | Performed by: ORTHOPAEDIC SURGERY

## 2018-09-04 PROCEDURE — 6360000002 HC RX W HCPCS: Performed by: INTERNAL MEDICINE

## 2018-09-04 PROCEDURE — 2580000003 HC RX 258: Performed by: INTERNAL MEDICINE

## 2018-09-04 PROCEDURE — 80048 BASIC METABOLIC PNL TOTAL CA: CPT

## 2018-09-04 PROCEDURE — 96366 THER/PROPH/DIAG IV INF ADDON: CPT

## 2018-09-04 PROCEDURE — 96365 THER/PROPH/DIAG IV INF INIT: CPT

## 2018-09-04 PROCEDURE — 85303 CLOT INHIBIT PROT C ACTIVITY: CPT

## 2018-09-04 PROCEDURE — 85306 CLOT INHIBIT PROT S FREE: CPT

## 2018-09-04 PROCEDURE — 99213 OFFICE O/P EST LOW 20 MIN: CPT | Performed by: ORTHOPAEDIC SURGERY

## 2018-09-04 PROCEDURE — 85730 THROMBOPLASTIN TIME PARTIAL: CPT

## 2018-09-04 PROCEDURE — 85025 COMPLETE CBC W/AUTO DIFF WBC: CPT

## 2018-09-04 PROCEDURE — 85610 PROTHROMBIN TIME: CPT

## 2018-09-04 PROCEDURE — 83090 ASSAY OF HOMOCYSTEINE: CPT

## 2018-09-04 PROCEDURE — 2060000000 HC ICU INTERMEDIATE R&B

## 2018-09-04 PROCEDURE — 85613 RUSSELL VIPER VENOM DILUTED: CPT

## 2018-09-04 PROCEDURE — 36415 COLL VENOUS BLD VENIPUNCTURE: CPT

## 2018-09-04 PROCEDURE — 6370000000 HC RX 637 (ALT 250 FOR IP): Performed by: INTERNAL MEDICINE

## 2018-09-04 PROCEDURE — G0378 HOSPITAL OBSERVATION PER HR: HCPCS

## 2018-09-04 PROCEDURE — 93971 EXTREMITY STUDY: CPT

## 2018-09-04 PROCEDURE — 86147 CARDIOLIPIN ANTIBODY EA IG: CPT

## 2018-09-04 PROCEDURE — 86038 ANTINUCLEAR ANTIBODIES: CPT

## 2018-09-04 PROCEDURE — G8417 CALC BMI ABV UP PARAM F/U: HCPCS | Performed by: ORTHOPAEDIC SURGERY

## 2018-09-04 PROCEDURE — 81240 F2 GENE: CPT

## 2018-09-04 PROCEDURE — 85300 ANTITHROMBIN III ACTIVITY: CPT

## 2018-09-04 PROCEDURE — 1036F TOBACCO NON-USER: CPT | Performed by: ORTHOPAEDIC SURGERY

## 2018-09-04 PROCEDURE — 99222 1ST HOSP IP/OBS MODERATE 55: CPT | Performed by: INTERNAL MEDICINE

## 2018-09-04 PROCEDURE — 99284 EMERGENCY DEPT VISIT MOD MDM: CPT

## 2018-09-04 PROCEDURE — 6360000002 HC RX W HCPCS: Performed by: NURSE PRACTITIONER

## 2018-09-04 RX ORDER — HYDROCODONE BITARTRATE AND ACETAMINOPHEN 5; 325 MG/1; MG/1
1 TABLET ORAL EVERY 4 HOURS PRN
Status: DISCONTINUED | OUTPATIENT
Start: 2018-09-04 | End: 2018-09-05

## 2018-09-04 RX ORDER — SODIUM CHLORIDE 0.9 % (FLUSH) 0.9 %
10 SYRINGE (ML) INJECTION EVERY 12 HOURS SCHEDULED
Status: DISCONTINUED | OUTPATIENT
Start: 2018-09-04 | End: 2018-09-05 | Stop reason: HOSPADM

## 2018-09-04 RX ORDER — HYDROCODONE BITARTRATE AND ACETAMINOPHEN 5; 325 MG/1; MG/1
2 TABLET ORAL EVERY 4 HOURS PRN
Status: DISCONTINUED | OUTPATIENT
Start: 2018-09-04 | End: 2018-09-05

## 2018-09-04 RX ORDER — ZOLPIDEM TARTRATE 5 MG/1
5 TABLET ORAL NIGHTLY PRN
Status: DISCONTINUED | OUTPATIENT
Start: 2018-09-04 | End: 2018-09-05 | Stop reason: HOSPADM

## 2018-09-04 RX ORDER — MORPHINE SULFATE 4 MG/ML
4 INJECTION, SOLUTION INTRAMUSCULAR; INTRAVENOUS ONCE
Status: COMPLETED | OUTPATIENT
Start: 2018-09-04 | End: 2018-09-04

## 2018-09-04 RX ORDER — HEPARIN SODIUM 1000 [USP'U]/ML
80 INJECTION, SOLUTION INTRAVENOUS; SUBCUTANEOUS ONCE
Status: COMPLETED | OUTPATIENT
Start: 2018-09-04 | End: 2018-09-04

## 2018-09-04 RX ORDER — SODIUM CHLORIDE 0.9 % (FLUSH) 0.9 %
10 SYRINGE (ML) INJECTION PRN
Status: DISCONTINUED | OUTPATIENT
Start: 2018-09-04 | End: 2018-09-05 | Stop reason: HOSPADM

## 2018-09-04 RX ORDER — ONDANSETRON 2 MG/ML
4 INJECTION INTRAMUSCULAR; INTRAVENOUS EVERY 6 HOURS PRN
Status: DISCONTINUED | OUTPATIENT
Start: 2018-09-04 | End: 2018-09-04

## 2018-09-04 RX ORDER — HEPARIN SODIUM 1000 [USP'U]/ML
80 INJECTION, SOLUTION INTRAVENOUS; SUBCUTANEOUS ONCE
Status: DISCONTINUED | OUTPATIENT
Start: 2018-09-04 | End: 2018-09-04 | Stop reason: SDUPTHER

## 2018-09-04 RX ORDER — FLUTICASONE PROPIONATE 50 MCG
1 SPRAY, SUSPENSION (ML) NASAL DAILY
Status: DISCONTINUED | OUTPATIENT
Start: 2018-09-04 | End: 2018-09-04

## 2018-09-04 RX ORDER — HEPARIN SODIUM 1000 [USP'U]/ML
80 INJECTION, SOLUTION INTRAVENOUS; SUBCUTANEOUS PRN
Status: DISCONTINUED | OUTPATIENT
Start: 2018-09-04 | End: 2018-09-05

## 2018-09-04 RX ORDER — HEPARIN SODIUM 1000 [USP'U]/ML
40 INJECTION, SOLUTION INTRAVENOUS; SUBCUTANEOUS PRN
Status: DISCONTINUED | OUTPATIENT
Start: 2018-09-04 | End: 2018-09-04 | Stop reason: SDUPTHER

## 2018-09-04 RX ORDER — FLUTICASONE PROPIONATE 50 MCG
1 SPRAY, SUSPENSION (ML) NASAL DAILY PRN
Status: DISCONTINUED | OUTPATIENT
Start: 2018-09-05 | End: 2018-09-05 | Stop reason: HOSPADM

## 2018-09-04 RX ORDER — HEPARIN SODIUM 10000 [USP'U]/100ML
18 INJECTION, SOLUTION INTRAVENOUS CONTINUOUS
Status: DISCONTINUED | OUTPATIENT
Start: 2018-09-04 | End: 2018-09-05

## 2018-09-04 RX ORDER — HEPARIN SODIUM 1000 [USP'U]/ML
80 INJECTION, SOLUTION INTRAVENOUS; SUBCUTANEOUS PRN
Status: DISCONTINUED | OUTPATIENT
Start: 2018-09-04 | End: 2018-09-04 | Stop reason: SDUPTHER

## 2018-09-04 RX ORDER — ONDANSETRON 2 MG/ML
4 INJECTION INTRAMUSCULAR; INTRAVENOUS EVERY 6 HOURS PRN
Status: DISCONTINUED | OUTPATIENT
Start: 2018-09-04 | End: 2018-09-05 | Stop reason: HOSPADM

## 2018-09-04 RX ORDER — HEPARIN SODIUM 1000 [USP'U]/ML
40 INJECTION, SOLUTION INTRAVENOUS; SUBCUTANEOUS PRN
Status: DISCONTINUED | OUTPATIENT
Start: 2018-09-04 | End: 2018-09-05

## 2018-09-04 RX ORDER — ONDANSETRON 4 MG/1
4 TABLET, ORALLY DISINTEGRATING ORAL EVERY 6 HOURS PRN
Status: DISCONTINUED | OUTPATIENT
Start: 2018-09-04 | End: 2018-09-05 | Stop reason: HOSPADM

## 2018-09-04 RX ORDER — HEPARIN SODIUM 10000 [USP'U]/100ML
18 INJECTION, SOLUTION INTRAVENOUS CONTINUOUS
Status: DISCONTINUED | OUTPATIENT
Start: 2018-09-04 | End: 2018-09-04 | Stop reason: SDUPTHER

## 2018-09-04 RX ADMIN — HEPARIN SODIUM 4390 UNITS: 1000 INJECTION INTRAVENOUS; SUBCUTANEOUS at 23:05

## 2018-09-04 RX ADMIN — MORPHINE SULFATE 4 MG: 4 INJECTION, SOLUTION INTRAMUSCULAR; INTRAVENOUS at 12:22

## 2018-09-04 RX ADMIN — HEPARIN SODIUM AND DEXTROSE 18 UNITS/KG/HR: 10000; 5 INJECTION INTRAVENOUS at 12:38

## 2018-09-04 RX ADMIN — HEPARIN SODIUM 8780 UNITS: 1000 INJECTION, SOLUTION INTRAVENOUS; SUBCUTANEOUS at 12:28

## 2018-09-04 RX ADMIN — SODIUM CHLORIDE, PRESERVATIVE FREE 10 ML: 5 INJECTION INTRAVENOUS at 23:04

## 2018-09-04 RX ADMIN — HYDROCODONE BITARTRATE AND ACETAMINOPHEN 2 TABLET: 5; 325 TABLET ORAL at 23:04

## 2018-09-04 RX ADMIN — ZOLPIDEM TARTRATE 5 MG: 5 TABLET ORAL at 23:04

## 2018-09-04 RX ADMIN — HYDROCODONE BITARTRATE AND ACETAMINOPHEN 2 TABLET: 5; 325 TABLET ORAL at 15:38

## 2018-09-04 ASSESSMENT — ENCOUNTER SYMPTOMS
VOMITING: 0
CONSTIPATION: 0
SINUS PRESSURE: 0
COUGH: 0
SHORTNESS OF BREATH: 0
DIARRHEA: 0
WHEEZING: 0
ABDOMINAL PAIN: 0
COLOR CHANGE: 0
NAUSEA: 0
RHINORRHEA: 0
SORE THROAT: 0

## 2018-09-04 ASSESSMENT — PAIN SCALES - GENERAL
PAINLEVEL_OUTOF10: 7
PAINLEVEL_OUTOF10: 8
PAINLEVEL_OUTOF10: 7
PAINLEVEL_OUTOF10: 7
PAINLEVEL_OUTOF10: 6

## 2018-09-04 ASSESSMENT — PAIN DESCRIPTION - PROGRESSION: CLINICAL_PROGRESSION: NOT CHANGED

## 2018-09-04 ASSESSMENT — PAIN DESCRIPTION - ORIENTATION: ORIENTATION: LEFT

## 2018-09-04 ASSESSMENT — PAIN DESCRIPTION - DESCRIPTORS: DESCRIPTORS: PRESSURE

## 2018-09-04 ASSESSMENT — PAIN DESCRIPTION - LOCATION: LOCATION: LEG

## 2018-09-04 ASSESSMENT — PAIN DESCRIPTION - PAIN TYPE: TYPE: ACUTE PAIN

## 2018-09-04 ASSESSMENT — PAIN DESCRIPTION - FREQUENCY: FREQUENCY: CONTINUOUS

## 2018-09-04 NOTE — ED PROVIDER NOTES
The patient was seen and examined by me in conjunction with the mid-level provider. I agree with his/her assessment and treatment plan. The patient is found have DVT and is being admitted for IV heparin. CRITICAL CARE TIME     Due to the high probability of sudden and clinically significant deterioration in the patient's condition he required highest level of my preparedness to intervene urgently. I provided critical care time including documentation time, medication orders and management, reevaluation, vital sign assessment, ordering and reviewing of of lab tests ordering and reviewing of x-ray studies, and admission orders. Aggregate critical care time is between 30 minutes including only time during which I was engaged in work directly related to his care and did not include time spent treating other patients simultaneously.             Jeff Aparicio MD  09/04/18 3105
Status   Relation Status    Mother Alive    Father Alive        SOCIAL HISTORY      reports that he has never smoked. He has never used smokeless tobacco. He reports that he drinks alcohol. He reports that he does not use drugs. REVIEW OF SYSTEMS    (2-9 systems for level 4, 10 or more for level 5)     Review of Systems   Constitutional: Negative for chills, fever and unexpected weight change. HENT: Negative for congestion, rhinorrhea, sinus pressure and sore throat. Respiratory: Negative for cough, shortness of breath and wheezing. Cardiovascular: Negative for chest pain and palpitations. Gastrointestinal: Negative for abdominal pain, constipation, diarrhea, nausea and vomiting. Genitourinary: Negative for dysuria and hematuria. Musculoskeletal: Negative for arthralgias and myalgias. Skin: Negative for color change and rash. Neurological: Negative for dizziness, weakness and headaches. Hematological: Negative for adenopathy. Except as noted above the remainder of the review of systems was reviewed and negative. PHYSICAL EXAM    (up to 7 for level 4, 8 or more for level 5)     ED Triage Vitals [09/04/18 1145]   BP Temp Temp Source Pulse Resp SpO2 Height Weight   138/78 98.4 °F (36.9 °C) Oral 71 12 97 % 6' (1.829 m) 242 lb (109.8 kg)       Physical Exam   Constitutional: He is oriented to person, place, and time. He appears well-developed and well-nourished. HENT:   Head: Normocephalic and atraumatic. Mouth/Throat: Oropharynx is clear and moist.   Eyes: Pupils are equal, round, and reactive to light. Conjunctivae are normal.   Neck: Normal range of motion. Neck supple. Cardiovascular: Normal rate and regular rhythm. Pulmonary/Chest: Effort normal and breath sounds normal. No stridor. No respiratory distress. Abdominal: Soft. Bowel sounds are normal.   Musculoskeletal: Normal range of motion. Lymphadenopathy:     He has no cervical adenopathy.    Neurological: He is

## 2018-09-04 NOTE — PROGRESS NOTES
Transitions of Care Pharmacy Service   Medication Review    The patient's list of current home medications has been reviewed. Source(s) of information: Patient    Based on information provided by the above source(s), I have updated the patient's home med list as described below. Please review the ACTION REQUESTED BY PHYSICIAN section of this note below for any discrepancies on current hospital orders. I changed or updated the following medications on the patient's home medication list:  Discontinued · Flonase 1 spray nasal daily - pt denies using     Added · One-a-day Mens vitamin - 1 daily     Adjusted   · none   Other Notes · none         PHYSICIAN ACTION REQUESTED  Discrepancies on current hospital orders that need to be addressed by a physician:    Medication Action Requested        none         Please feel free to call me with any questions about this encounter. Thank you.     Nicolette Kenny PharmD  Transitions of Care Pharmacy Service  Phone:  [phone]  Fax: [phone]      Electronically signed by Nicolette Kenny, [address] [place] [address] on 9/4/2018 at 4:46 PM         Prescriptions Prior to Admission: Multiple Vitamins-Minerals (MENS ONE DAILY PO), Take 1 tablet by mouth daily

## 2018-09-04 NOTE — PROGRESS NOTES
This patient who was diagnosed with avulsion of the lateral head of the left gastrocnemius muscle is seen in follow-up. Patient says that his pain is actually getting worse. It is affecting the whole of the calf muscle and he says that his calf and the ankle and the foot swell up considerably. Examination: He is definitely tender in the calf muscles. His Homans sign is strongly positive. Diagnosis clinically different thrombosis. Treatment: I'm arranging for him to have a Doppler studies done as an emergency. On follow-up it was found that the patient had significant deepened thrombosis starting from mid femoral vein. He was therefore asked to go to the emergency room at [facility]. IC from the dorsal the patient was started on heparin drip and admitted.

## 2018-09-04 NOTE — H&P
anxiety    Physical Exam:   /78   Pulse 87   Temp 97.9 °F (36.6 °C) (Oral)   Resp 18   Ht 6' (1.829 m)   Wt 242 lb (109.8 kg)   SpO2 96%   BMI 32.82 kg/m²   Temp (24hrs), Av.3 °F (36.8 °C), Min:97.9 °F (36.6 °C), Max:98.6 °F (37 °C)    No results for input(s): POCGLU in the last 72 hours. No intake or output data in the 24 hours ending 18 1647    General Appearance:  alert, well appearing, and in no acute distress  Mental status: oriented to person, place, and time with normal affect  Head:  normocephalic, atraumatic. Eye: no icterus, redness, pupils equal and reactive, extraocular eye movements intact, conjunctiva clear  Ear: normal external ear, no discharge, hearing intact  Nose:  no drainage noted  Mouth: mucous membranes moist  Neck: supple, no carotid bruits, thyroid not palpable  Lungs: Bilateral equal air entry, clear to ausculation, no wheezing, rales or rhonchi, normal effort  Cardiovascular: normal rate, regular rhythm, no murmur, gallop, rub.   Abdomen: Soft, nontender, nondistended, normal bowel sounds, no hepatomegaly or splenomegaly  Neurologic: There are no new focal motor or sensory deficits, normal muscle tone and bulk, no abnormal sensation, normal speech, cranial nerves II through XII grossly intact  Skin: No gross lesions, rashes, bruising or bleeding on exposed skin area  Extremities:  peripheral pulses palpable, no pedal edema, positive left calf pain with palpation  Psych: normal affect     Investigations:      Laboratory Testing:  Recent Results (from the past 24 hour(s))   CBC Auto Differential    Collection Time: 18 11:55 AM   Result Value Ref Range    WBC 10.1 3.5 - 11.0 k/uL    RBC 5.36 4.5 - 5.9 m/uL    Hemoglobin 15.6 13.5 - 17.5 g/dL    Hematocrit 46.8 41 - 53 %    MCV 87.4 80 - 100 fL    MCH 29.1 26 - 34 pg    MCHC 33.3 31 - 37 g/dL    RDW 13.3 11.5 - 14.5 %    Platelets 268 189 - 553 k/uL    MPV 7.2 6.0 - 12.0 fL    NRBC Automated NOT REPORTED per 100 WBC    Differential Type NOT REPORTED     Seg Neutrophils 70 (H) 36 - 66 %    Lymphocytes 21 (L) 24 - 44 %    Monocytes 7 1 - 7 %    Eosinophils % 2 1 - 4 %    Basophils 0 0 - 2 %    Immature Granulocytes NOT REPORTED 0 %    Segs Absolute 7.00 1.8 - 7.7 k/uL    Absolute Lymph # 2.10 1.0 - 4.8 k/uL    Absolute Mono # 0.70 0.2 - 0.8 k/uL    Absolute Eos # 0.20 0.0 - 0.4 k/uL    Basophils # 0.00 0.0 - 0.2 k/uL    Absolute Immature Granulocyte NOT REPORTED 0.00 - 0.30 k/uL    WBC Morphology NOT REPORTED     RBC Morphology NOT REPORTED     Platelet Estimate NOT REPORTED    Basic Metabolic Panel    Collection Time: 09/04/18 11:55 AM   Result Value Ref Range    Glucose 86 70 - 99 mg/dL    BUN 15 6 - 20 mg/dL    CREATININE 0.79 0.70 - 1.20 mg/dL    Bun/Cre Ratio 19 9 - 20    Calcium 9.6 8.6 - 10.4 mg/dL    Sodium 139 135 - 144 mmol/L    Potassium 4.3 3.7 - 5.3 mmol/L    Chloride 103 98 - 107 mmol/L    CO2 23 20 - 31 mmol/L    Anion Gap 13 9 - 17 mmol/L    GFR Non-African American >60 >60 mL/min    GFR African American >60 >60 mL/min    GFR Comment          GFR Staging NOT REPORTED    Homocysteine, Serum    Collection Time: 09/04/18 11:55 AM   Result Value Ref Range    Homocysteine 9.6 <15.0 umol/L   Lupus Anticoagulant    Collection Time: 09/04/18 11:55 AM   Result Value Ref Range    Lupus Anticoag Pending     Protime 10.7 9.7 - 11.6 sec    INR 1.0     PTT 25.4 23 - 31 sec    Anticardiolipin IgG Pending GPU    Cardiolipin Ab IgM Pending MPU    Anticardiolipin IgA Pending APU    Dilute Viper Venom Time Pending    APTT    Collection Time: 09/04/18  1:55 PM   Result Value Ref Range    PTT >120.0 (HH) 23 - 31 sec       Imaging/Diagnostics:    Left leg scan  LEFT:    Mid and distal femoral, popliteal,posterior tibial and peroneal acute deep    vein thrombosis,    No evidence of superficial venous thrombosis in the lower extremity.        Assessment :      Primary Problem  Acute deep vein thrombosis (DVT) of femoral vein of left lower extremity [facility])    Active Hospital Problems    Diagnosis Date Noted    Acute deep vein thrombosis (DVT) of femoral vein of left lower extremity (HCC) [I82.412] 09/04/2018    Obesity (BMI 30-39. 9) [E66.9] 09/04/2018    Sigmoid diverticulosis [K57.30] 09/04/2018       Plan:     Patient status Admit as inpatient in the  Progressive Unit/Step down    1. Admit inpatient  2. Heparin drip  3. Follow PTT  4. Investigate Eliquis Xarelto coverage  5. Thigh-high stockings to start in the morning  6. Bed rest with bathroom privileges  7. GI prophylaxis  8. Discussed with family  9. Recommend anticoagulation for 12 months unless hypercoagulation workup positive. With history of Injury is most likely a mechanical clot from venous intimal disruption  10. See orders for details    Consultations:   IP CONSULT TO HOSPITALIST     Patient is admitted as inpatient status because of co-morbidities listed above, severity of signs and symptoms as outlined, requirement for current medical therapies and most importantly because of direct risk to patient if care not provided in a hospital setting.     Jazmyne Miller DO  9/4/2018  4:47 PM    Copy sent to Dr. Andres Granados DO

## 2018-09-04 NOTE — ED NOTES
Heparin bolus and heparin drip infusing. Phone report given to progressive area. Patient being prepared for transfer to progressive unit by vanda.       Georgi Saha RN  09/04/18 5350

## 2018-09-05 ENCOUNTER — TELEPHONE (OUTPATIENT)
Dept: ORTHOPEDIC SURGERY | Age: 43
End: 2018-09-05

## 2018-09-05 VITALS
BODY MASS INDEX: 32.78 KG/M2 | HEART RATE: 74 BPM | RESPIRATION RATE: 18 BRPM | SYSTOLIC BLOOD PRESSURE: 125 MMHG | OXYGEN SATURATION: 98 % | HEIGHT: 72 IN | DIASTOLIC BLOOD PRESSURE: 97 MMHG | WEIGHT: 242 LBS | TEMPERATURE: 97.9 F

## 2018-09-05 LAB
ABSOLUTE EOS #: 0.5 K/UL (ref 0–0.4)
ABSOLUTE IMMATURE GRANULOCYTE: ABNORMAL K/UL (ref 0–0.3)
ABSOLUTE LYMPH #: 3.6 K/UL (ref 1–4.8)
ABSOLUTE MONO #: 0.7 K/UL (ref 0.2–0.8)
ANION GAP SERPL CALCULATED.3IONS-SCNC: 10 MMOL/L (ref 9–17)
ANTI-NUCLEAR ANTIBODY (ANA): ABNORMAL
BASOPHILS # BLD: 1 % (ref 0–2)
BASOPHILS ABSOLUTE: 0.1 K/UL (ref 0–0.2)
BUN BLDV-MCNC: 14 MG/DL (ref 6–20)
BUN/CREAT BLD: 16 (ref 9–20)
CALCIUM SERPL-MCNC: 8.9 MG/DL (ref 8.6–10.4)
CHLORIDE BLD-SCNC: 104 MMOL/L (ref 98–107)
CO2: 25 MMOL/L (ref 20–31)
CREAT SERPL-MCNC: 0.9 MG/DL (ref 0.7–1.2)
DIFFERENTIAL TYPE: ABNORMAL
EOSINOPHILS RELATIVE PERCENT: 6 % (ref 1–4)
GFR AFRICAN AMERICAN: >60 ML/MIN
GFR NON-AFRICAN AMERICAN: >60 ML/MIN
GFR SERPL CREATININE-BSD FRML MDRD: ABNORMAL ML/MIN/{1.73_M2}
GFR SERPL CREATININE-BSD FRML MDRD: ABNORMAL ML/MIN/{1.73_M2}
GLUCOSE BLD-MCNC: 109 MG/DL (ref 70–99)
HCT VFR BLD CALC: 45 % (ref 41–53)
HEMOGLOBIN: 14.9 G/DL (ref 13.5–17.5)
IMMATURE GRANULOCYTES: ABNORMAL %
LYMPHOCYTES # BLD: 41 % (ref 24–44)
MCH RBC QN AUTO: 28.8 PG (ref 26–34)
MCHC RBC AUTO-ENTMCNC: 33 G/DL (ref 31–37)
MCV RBC AUTO: 87.2 FL (ref 80–100)
MONOCYTES # BLD: 8 % (ref 1–7)
NRBC AUTOMATED: ABNORMAL PER 100 WBC
PARTIAL THROMBOPLASTIN TIME: 55.8 SEC (ref 23–31)
PARTIAL THROMBOPLASTIN TIME: 72.4 SEC (ref 23–31)
PDW BLD-RTO: 13.5 % (ref 11.5–14.5)
PLATELET # BLD: 324 K/UL (ref 130–400)
PLATELET ESTIMATE: ABNORMAL
PMV BLD AUTO: 7.5 FL (ref 6–12)
POTASSIUM SERPL-SCNC: 4.5 MMOL/L (ref 3.7–5.3)
RBC # BLD: 5.17 M/UL (ref 4.5–5.9)
RBC # BLD: ABNORMAL 10*6/UL
SEG NEUTROPHILS: 44 % (ref 36–66)
SEGMENTED NEUTROPHILS ABSOLUTE COUNT: 4 K/UL (ref 1.8–7.7)
SODIUM BLD-SCNC: 139 MMOL/L (ref 135–144)
WBC # BLD: 8.9 K/UL (ref 3.5–11)
WBC # BLD: ABNORMAL 10*3/UL

## 2018-09-05 PROCEDURE — 36415 COLL VENOUS BLD VENIPUNCTURE: CPT

## 2018-09-05 PROCEDURE — 6370000000 HC RX 637 (ALT 250 FOR IP): Performed by: NURSE PRACTITIONER

## 2018-09-05 PROCEDURE — 6360000002 HC RX W HCPCS: Performed by: INTERNAL MEDICINE

## 2018-09-05 PROCEDURE — 96366 THER/PROPH/DIAG IV INF ADDON: CPT

## 2018-09-05 PROCEDURE — 99232 SBSQ HOSP IP/OBS MODERATE 35: CPT | Performed by: INTERNAL MEDICINE

## 2018-09-05 PROCEDURE — 80048 BASIC METABOLIC PNL TOTAL CA: CPT

## 2018-09-05 PROCEDURE — 85025 COMPLETE CBC W/AUTO DIFF WBC: CPT

## 2018-09-05 PROCEDURE — 85730 THROMBOPLASTIN TIME PARTIAL: CPT

## 2018-09-05 PROCEDURE — 6370000000 HC RX 637 (ALT 250 FOR IP): Performed by: INTERNAL MEDICINE

## 2018-09-05 PROCEDURE — G0378 HOSPITAL OBSERVATION PER HR: HCPCS

## 2018-09-05 RX ORDER — OXYCODONE HYDROCHLORIDE AND ACETAMINOPHEN 5; 325 MG/1; MG/1
1 TABLET ORAL EVERY 6 HOURS PRN
Qty: 28 TABLET | Refills: 0 | Status: SHIPPED | OUTPATIENT
Start: 2018-09-05 | End: 2018-09-20 | Stop reason: SDUPTHER

## 2018-09-05 RX ORDER — OXYCODONE HYDROCHLORIDE AND ACETAMINOPHEN 5; 325 MG/1; MG/1
2 TABLET ORAL EVERY 4 HOURS PRN
Status: DISCONTINUED | OUTPATIENT
Start: 2018-09-05 | End: 2018-09-05 | Stop reason: HOSPADM

## 2018-09-05 RX ORDER — OXYCODONE HYDROCHLORIDE AND ACETAMINOPHEN 5; 325 MG/1; MG/1
1 TABLET ORAL EVERY 4 HOURS PRN
Status: DISCONTINUED | OUTPATIENT
Start: 2018-09-05 | End: 2018-09-05 | Stop reason: HOSPADM

## 2018-09-05 RX ADMIN — OXYCODONE AND ACETAMINOPHEN 2 TABLET: 5; 325 TABLET ORAL at 10:06

## 2018-09-05 RX ADMIN — HEPARIN SODIUM AND DEXTROSE 16 UNITS/KG/HR: 10000; 5 INJECTION INTRAVENOUS at 02:18

## 2018-09-05 RX ADMIN — APIXABAN 10 MG: 5 TABLET, FILM COATED ORAL at 15:22

## 2018-09-05 RX ADMIN — OXYCODONE AND ACETAMINOPHEN 2 TABLET: 5; 325 TABLET ORAL at 05:51

## 2018-09-05 RX ADMIN — OXYCODONE AND ACETAMINOPHEN 2 TABLET: 5; 325 TABLET ORAL at 15:30

## 2018-09-05 RX ADMIN — HEPARIN SODIUM 4390 UNITS: 1000 INJECTION INTRAVENOUS; SUBCUTANEOUS at 05:50

## 2018-09-05 ASSESSMENT — PAIN SCALES - GENERAL
PAINLEVEL_OUTOF10: 7
PAINLEVEL_OUTOF10: 7
PAINLEVEL_OUTOF10: 5

## 2018-09-05 NOTE — DISCHARGE INSTR - DIET

## 2018-09-05 NOTE — PROGRESS NOTES
[facility]    Progress Note    9/5/2018    9:47 AM    Name:   Luis M Plaza  MRN:     [id number]     Acct:      [de-identified]   Room:   [address] [place] [address] Day:  1  Admit Date:  9/4/2018 11:33 AM    PCP:   Rory Oconnor DO  Code Status:  Full Code    Subjective:     C/C: Left leg pain    Interval History Status: not changed. Patient had continued pain with difficulty sleeping overnight. Denies any chest pain, shortness of breath, pleuritic pain or other issues. Brief History: This is a 76-year-old white male who injured his left calf approximately 1 week ago pushing a car. He felt a pop in the calf area and had immediate onset of pain at that time. Follow up with orthopedics and was placed on light duty and with follow-up in 2 weeks. However after approximately one week he had worsening pain and follow-up with the surgeon and had Doppler studies completed. He is found have a deep venous thrombosis of the left lower extremity and sent to the emergency room. Review of Systems:     Constitutional:  negative for chills, fevers, sweats  Respiratory:  negative for cough, dyspnea on exertion, shortness of breath, wheezing  Cardiovascular:  negative for chest pain, chest pressure/discomfort, lower extremity edema, palpitations  Gastrointestinal:  negative for abdominal pain, constipation, diarrhea, nausea, vomiting  Neurological:  negative for dizziness, headache    Medications:      Allergies:  No Known Allergies    Current Meds:   Scheduled Meds:    sodium chloride flush  10 mL Intravenous 2 times per day     Continuous Infusions:    heparin (porcine) 18 Units/kg/hr (09/05/18 0549)     PRN Meds: oxyCODONE-acetaminophen **OR** oxyCODONE-acetaminophen, sodium chloride flush, magnesium hydroxide, heparin (porcine), heparin (porcine), ondansetron **OR** ondansetron, zolpidem, fluticasone    Data:     Past Medical History:   has a past medical

## 2018-09-05 NOTE — PROGRESS NOTES
Discharge instructions and medication reviewed with the pt. RN picked up RX from outpatient pharmacy. Education reviewed on Eliquis and side effects, wearing compression stockings, following up with PCP and anticoag clinic as OP. Pt signed and verbalized  Understanding. Pt awaiting transportation

## 2018-09-05 NOTE — DISCHARGE SUMMARY
[facility]    Discharge Summary     Patient ID: Anne Esquivel  :  1975   MRN: [id number]     ACCOUNT:  [de-identified]   Patient's PCP: Anais Rondon DO  Admit Date: 2018   Discharge Date: 2018     Length of Stay: 1  Code Status:  Full Code  Admitting Physician: Traci Denny DO  Discharge Physician: Traci Denny DO     Active Discharge Diagnoses:     Hospital Problem Lists:  Principal Problem:    Acute deep vein thrombosis (DVT) of femoral vein of left lower extremity ([address]  Active Problems:    Obesity (BMI 30-39. 9)    Sigmoid diverticulosis  Resolved Problems:    * No resolved hospital problems. *      Admission Condition:  fair     Discharged Condition: stable    Hospital Stay:     Hospital Course:  Anne Esquivel is a 39 y.o. male who was admitted for the management of   Acute deep vein thrombosis (DVT) of femoral vein of left lower extremity ([address] , presented to ER with Left leg pain    This is a 41-year-old white male who injured his left calf approximately 1 week ago pushing a car. He felt a pop in the calf area and had immediate onset of pain at that time. Follow up with orthopedics and was placed on light duty and with follow-up in 2 weeks. However after approximately one week he had worsening pain and follow-up with the surgeon and had Doppler studies completed. He is found have a deep venous thrombosis of the left lower extremity and sent to the emergency room. He was admitted to the hospital on a heparin drip and had hypercoagulable workup completed. Eliquis and Xarelto coverage was investigated and was able to get Eliquis at a $0 co-pay. He was transitioned Eliquis prior to discharge.   He had thigh-high compression stockings applied the left lower extremity which is recommended to continue use for a minimum of 8 weeks    Significant therapeutic interventions: As above    Significant Diagnostic Studies:   Labs / ! +---------------------------+------+------+--------------------------------+ ! Prox Femoral               !Phasic!      !                                ! +---------------------------+------+------+--------------------------------+ ! Popliteal                  !Absent!      !                                ! +---------------------------+------+------+--------------------------------+        Consultations:    Consults:     Final Specialist Recommendations/Findings:   IP CONSULT TO HOSPITALIST      The patient was seen and examined on day of discharge and this discharge summary is in conjunction with any daily progress note from day of discharge. Discharge plan:     Disposition: Home    Physician Follow Up:   DO Kelly Verduzco [address] [place] [address]  [phone]    In 1 week         Requiring Further Evaluation/Follow Up POST HOSPITALIZATION/Incidental Findings: Follow-up with PCP for hypercoagulable workup results    Diet: regular diet    Activity: As tolerated    Instructions to Patient: Take Eliquis as prescribed    Discharge Medications:      Medication List      START taking these medications    apixaban 5 MG Tabs tablet  Commonly known as:  ELIQUIS  2 tablets twice daily for 7 days then 1 tablet twice daily     oxyCODONE-acetaminophen 5-325 MG per tablet  Commonly known as:  PERCOCET  Take 1 tablet by mouth every 6 hours as needed for Pain for up to 7 days. Intended supply: 7 days. Take lowest dose possible to manage pain.         CONTINUE taking these medications    MENS ONE DAILY PO        STOP taking these medications    fluticasone 50 MCG/ACT nasal spray  Commonly known as:  FLONASE           Where to Get Your Medications      These medications were sent to [address] [place] [address] - [address] [place] [address],[address]  [address] [place] [address], [address] [place] [address]    Phone:  [phone]   · oxyCODONE-acetaminophen 5-325 MG per tablet     Information about where to

## 2018-09-07 ENCOUNTER — TELEPHONE (OUTPATIENT)
Dept: PHARMACY | Age: 43
End: 2018-09-07

## 2018-09-07 LAB
ANTICARDIOLIPIN IGA ANTIBODY: 6.7 APU
ANTICARDIOLIPIN IGG ANTIBODY: 9.1 GPU
AT-III ACTIVITY: 91 % (ref 83–122)
CARDIOLIPIN AB IGM: 41.7 MPU
DILUTE RUSSELL VIPER VENOM TIME: ABNORMAL
INR BLD: 1
LUPUS ANTICOAG: ABNORMAL
MTHFR MUTATION 677T/A1298C: NORMAL
PARTIAL THROMBOPLASTIN TIME: 25.4 SEC (ref 23–31)
PROTEIN C ACTIVITY: 96 %
PROTEIN S ACTIVITY: 123 % (ref 77–116)
PROTHROMBIN G20210A MUTATION: NORMAL
PROTHROMBIN TIME: 10.7 SEC (ref 9.7–11.6)

## 2018-09-12 ENCOUNTER — OFFICE VISIT (OUTPATIENT)
Dept: FAMILY MEDICINE CLINIC | Age: 43
End: 2018-09-12
Payer: MEDICAID

## 2018-09-12 VITALS
BODY MASS INDEX: 33.46 KG/M2 | HEIGHT: 72 IN | OXYGEN SATURATION: 98 % | RESPIRATION RATE: 18 BRPM | SYSTOLIC BLOOD PRESSURE: 136 MMHG | DIASTOLIC BLOOD PRESSURE: 82 MMHG | HEART RATE: 87 BPM | TEMPERATURE: 98.2 F | WEIGHT: 247 LBS

## 2018-09-12 DIAGNOSIS — I82.412 ACUTE DEEP VEIN THROMBOSIS (DVT) OF FEMORAL VEIN OF LEFT LOWER EXTREMITY (HCC): Primary | ICD-10-CM

## 2018-09-12 DIAGNOSIS — Z15.89 MTHFR MUTATION: ICD-10-CM

## 2018-09-12 DIAGNOSIS — Z09 HOSPITAL DISCHARGE FOLLOW-UP: ICD-10-CM

## 2018-09-12 PROCEDURE — 1036F TOBACCO NON-USER: CPT | Performed by: INTERNAL MEDICINE

## 2018-09-12 PROCEDURE — 1111F DSCHRG MED/CURRENT MED MERGE: CPT | Performed by: INTERNAL MEDICINE

## 2018-09-12 PROCEDURE — G8417 CALC BMI ABV UP PARAM F/U: HCPCS | Performed by: INTERNAL MEDICINE

## 2018-09-12 PROCEDURE — G8427 DOCREV CUR MEDS BY ELIG CLIN: HCPCS | Performed by: INTERNAL MEDICINE

## 2018-09-12 PROCEDURE — 99214 OFFICE O/P EST MOD 30 MIN: CPT | Performed by: INTERNAL MEDICINE

## 2018-09-12 RX ORDER — ETODOLAC 400 MG/1
TABLET, FILM COATED ORAL
COMMUNITY
Start: 2017-02-08 | End: 2018-09-12 | Stop reason: ALTCHOICE

## 2018-09-12 RX ORDER — ESCITALOPRAM OXALATE 10 MG/1
TABLET ORAL
COMMUNITY
Start: 2016-10-25 | End: 2018-09-12 | Stop reason: ALTCHOICE

## 2018-09-12 ASSESSMENT — PATIENT HEALTH QUESTIONNAIRE - PHQ9
SUM OF ALL RESPONSES TO PHQ QUESTIONS 1-9: 0
SUM OF ALL RESPONSES TO PHQ9 QUESTIONS 1 & 2: 0
SUM OF ALL RESPONSES TO PHQ QUESTIONS 1-9: 0
2. FEELING DOWN, DEPRESSED OR HOPELESS: 0
1. LITTLE INTEREST OR PLEASURE IN DOING THINGS: 0

## 2018-09-12 NOTE — LETTER
[address] [place] [address]  Phone: [phone]  Fax: [phone]    Anais Rondon DO        September 12, 2018     Patient: Anne Esquivel   YOB: 1975   Date of Visit: 9/12/2018       To Whom It May Concern: It is my medical opinion that Anne Esquivel may return to work on 9/16/2018 with the following restrictions: needs to be a shorter shift specifically 6 hours or less . Siomara Diggs If you have any questions or concerns, please don't hesitate to call.     Sincerely,        Anais Rondon DO

## 2018-09-14 NOTE — PROGRESS NOTES
[facility] [address]  Dept: [phone]  Dept Fax: [phone]    Alissa Rm is a 39 y.o. male who presents today for his medical conditions/complaints as noted below. Alissa Rm is c/o of   Chief Complaint   Patient presents with    Leg Pain     Left leg damage was seen in ER, blot clot in left leg ongoing    Other     FMLA paperwork due to injury          HPI:     Was at [facility] due to blood clot in lower ext   Second blood clot/DVT in his life   After leg injury   Had hypercoagulable w/u . Josselin Members was positive as well as elevated protein c/s   Is taking the eliquis daily. Has appt with ortho for leg injury , is off work currently     Had rupture of his gastro muscle at the time as well   Does not know why he had the first blood clot in his legs, did lovenox then   No family hx of clotting issues/disorders. No hx of pe   No cp or sob   Has appt with dr. Enrico Goldberg for follow up next week   Wondering about going back to work , just started a new job   Is very active and does 10 hours shifts   Would like restrictions or protocl for going back and needs fmal filled out   Otherwise no concerns or isssues  Reviewed labs from hospital stay and d/c med rec       Leg Pain    The incident occurred more than 1 week ago. The incident occurred at home. The injury mechanism was a twisting injury. Other   Associated symptoms include joint swelling. Pertinent negatives include no abdominal pain, arthralgias, chest pain, chills, coughing, diaphoresis, fatigue, fever, headaches, myalgias, nausea, neck pain or rash.        No results found for: LABA1C          ( goal A1C is < 7)   No results found for: LABMICR  No results found for: LDLCHOLESTEROL, LDLCALC    (goal LDL is <100)   AST (U/L)   Date Value   04/20/2018 22     ALT (U/L)   Date Value   04/20/2018 32     BUN (mg/dL)   Date Value   09/05/2018 14     BP Readings from Last 3 Encounters:   09/12/18 136/82   09/05/18 (!) 125/97   08/24/18 (!) 171/92          (goal 120/80)    Past Medical History:   Diagnosis Date    Anxiety     Diverticulitis     Dvt femoral (deep venous thrombosis) (HCC)       Past Surgical History:   Procedure Laterality Date    ANKLE SURGERY Left     APPENDECTOMY      BACK SURGERY      with hardware    LAPAROSCOPIC APPENDECTOMY  04/26/2017    LAPAROSCOPIC APPENDECTOMY N/A 4/26/2017    APPENDECTOMY LAPAROSCOPIC performed by Grover Ramos MD at [address] [place] [address] Bilateral        Family History   Problem Relation Age of Onset    Heart Disease Mother         Valvular    Bipolar Disorder Mother     No Known Problems Father        Social History   Substance Use Topics    Smoking status: Former Smoker    Smokeless tobacco: Never Used    Alcohol use Yes      Comment: social      Current Outpatient Prescriptions   Medication Sig Dispense Refill    apixaban (ELIQUIS) 5 MG TABS tablet 2 tablets twice daily for 7 days then 1 tablet twice daily 60 tablet 5    Multiple Vitamins-Minerals (MENS ONE DAILY PO) Take 1 tablet by mouth daily       No current facility-administered medications for this visit. No Known Allergies    Health Maintenance   Topic Date Due    HIV screen  08/11/1989    DTaP/Tdap/Td vaccine (1 - Tdap) 08/11/1993    Lipid screen  08/11/2014    Diabetes screen  08/11/2014    Flu vaccine (1) 09/01/2018       Subjective:      Review of Systems   Constitutional: Positive for activity change. Negative for appetite change, chills, diaphoresis, fatigue, fever and unexpected weight change. Eyes: Negative for visual disturbance. Respiratory: Negative for cough, chest tightness, shortness of breath, wheezing and stridor. Cardiovascular: Negative for chest pain, palpitations and leg swelling. Gastrointestinal: Negative for abdominal pain, constipation, diarrhea and nausea.    Musculoskeletal: Positive for gait problem and joint swelling. Negative for arthralgias, back pain, myalgias, neck pain and neck stiffness. Skin: Positive for wound. Negative for rash. Neurological: Negative for dizziness and headaches. Hematological: Negative for adenopathy. Does not bruise/bleed easily. Psychiatric/Behavioral: Negative for sleep disturbance. Objective:     Physical Exam   Constitutional: He is oriented to person, place, and time. He appears well-developed and well-nourished. He is active. Non-toxic appearance. He does not have a sickly appearance. He does not appear ill. No distress. Obese    HENT:   Right Ear: External ear normal.   Left Ear: External ear normal.   Nose: Nose normal.   Eyes: Pupils are equal, round, and reactive to light. EOM are normal.   Cardiovascular: Normal rate, regular rhythm, S1 normal, S2 normal and normal heart sounds. Pulmonary/Chest: Effort normal and breath sounds normal.   Abdominal: There is no splenomegaly or hepatomegaly. Musculoskeletal:        Right lower leg: Normal.        Left lower leg: He exhibits tenderness and deformity. He exhibits no bony tenderness, no swelling, no edema and no laceration. Lymphadenopathy:     He has no cervical adenopathy. Neurological: He is alert and oriented to person, place, and time. He displays no atrophy and no tremor. No cranial nerve deficit or sensory deficit. He exhibits abnormal muscle tone. He displays no seizure activity. Gait abnormal.   Skin: Skin is warm and dry. No rash noted. Psychiatric: He has a normal mood and affect. Nursing note and vitals reviewed. /82 (Site: Left Upper Arm, Position: Sitting, Cuff Size: Large Adult)   Pulse 87   Temp 98.2 °F (36.8 °C) (Tympanic)   Resp 18   Ht 6' 0.01\" (1.829 m)   Wt 247 lb (112 kg)   SpO2 98%   BMI 33.49 kg/m²     Assessment:       Diagnosis Orders   1. Acute deep vein thrombosis (DVT) of femoral vein of left lower extremity (HCC)     2.  Hospital discharge follow-up     3. MTHFR mutation ([facility] [address]               Plan:      Return if symptoms worsen or fail to improve. No orders of the defined types were placed in this encounter. No orders of the defined types were placed in this encounter. Follow up with Dr. Corey Muhammad as scheduled. Patient states he needs to go back to work so adjusted his note to have him return in 5 days with restrictions though for frequent sitting and no longer shifts than 6 hours to start until he sees Dr. Sabrina Jimenezr daily. Given mthfr and hx of know two DVTs may need life long anticoagulation but probably should have the labs retested. Advised patient in 6 months fo call or follow up and I can place a referral to hematology to further discuss/get their opinion as well   Call with questions or concerns. Reviewed hospital discharge course and discharge med list with patient in office   Spent more than 25 minutes with face to face counseling with patient    Patient given educational materials - see patient instructions. Discussed use, benefit, and side effects of prescribed medications. All patient questions answered. Pt voiced understanding. Reviewed health maintenance. Instructed to continue current medications, diet and exercise. Patient agreed with treatment plan. Follow up as directed.      Electronically signed by Keisha Herrera DO on 9/14/2018 at 10:06 AM

## 2018-09-20 ENCOUNTER — HOSPITAL ENCOUNTER (OUTPATIENT)
Dept: PHARMACY | Age: 43
Setting detail: THERAPIES SERIES
Discharge: HOME | End: 2018-09-20
Payer: MEDICAID

## 2018-09-20 ENCOUNTER — OFFICE VISIT (OUTPATIENT)
Dept: ORTHOPEDIC SURGERY | Age: 43
End: 2018-09-20
Payer: MEDICAID

## 2018-09-20 VITALS — WEIGHT: 250 LBS | HEIGHT: 72 IN | BODY MASS INDEX: 33.86 KG/M2

## 2018-09-20 DIAGNOSIS — I82.412 ACUTE DEEP VEIN THROMBOSIS (DVT) OF FEMORAL VEIN OF LEFT LOWER EXTREMITY (HCC): ICD-10-CM

## 2018-09-20 DIAGNOSIS — I82.4Y2 ACUTE DEEP VEIN THROMBOSIS (DVT) OF PROXIMAL VEIN OF LEFT LOWER EXTREMITY (HCC): ICD-10-CM

## 2018-09-20 DIAGNOSIS — M79.662 PAIN OF LEFT CALF: Primary | ICD-10-CM

## 2018-09-20 PROCEDURE — 99213 OFFICE O/P EST LOW 20 MIN: CPT | Performed by: ORTHOPAEDIC SURGERY

## 2018-09-20 PROCEDURE — 1111F DSCHRG MED/CURRENT MED MERGE: CPT | Performed by: ORTHOPAEDIC SURGERY

## 2018-09-20 PROCEDURE — 99212 OFFICE O/P EST SF 10 MIN: CPT

## 2018-09-20 PROCEDURE — G8427 DOCREV CUR MEDS BY ELIG CLIN: HCPCS | Performed by: ORTHOPAEDIC SURGERY

## 2018-09-20 PROCEDURE — G8417 CALC BMI ABV UP PARAM F/U: HCPCS | Performed by: ORTHOPAEDIC SURGERY

## 2018-09-20 PROCEDURE — 1036F TOBACCO NON-USER: CPT | Performed by: ORTHOPAEDIC SURGERY

## 2018-09-20 RX ORDER — OXYCODONE HYDROCHLORIDE AND ACETAMINOPHEN 5; 325 MG/1; MG/1
1 TABLET ORAL EVERY 6 HOURS PRN
Qty: 28 TABLET | Refills: 0 | Status: SHIPPED | OUTPATIENT
Start: 2018-09-20 | End: 2018-10-02 | Stop reason: SDUPTHER

## 2018-09-20 ASSESSMENT — ENCOUNTER SYMPTOMS
CONSTIPATION: 0
CHEST TIGHTNESS: 0
ABDOMINAL PAIN: 0
WHEEZING: 0
DIARRHEA: 0
NAUSEA: 0
SHORTNESS OF BREATH: 0
STRIDOR: 0
COUGH: 0
BACK PAIN: 0

## 2018-09-20 NOTE — LETTER
[address] [place] [address] Rd  [address] [address]  Phone: [phone]  Fax: [phone]    John Echevarria MD        September 20, 2018     Patient: Roxana Pineda   YOB: 1975   Date of Visit: 9/20/2018       To Whom It May Concern: It is my medical opinion that Roxana Pineda patient will need to continue the 6 hour work day using the stool until cleared by physician. If you have any questions or concerns, please don't hesitate to call.     Sincerely,        John Echevarria MD

## 2018-09-20 NOTE — PROGRESS NOTES
Outpatient Anticoagulation Service -  Apixaban (Eliquis) Management    Initial Outpatient Visit  Started Eliquis therapy: 18  Estimated duration of therapy: unspecified at this time - 6 months minimum. Indication for therapy: Acute DVT. Provoked after trauma to calf (Patient also had previous clot while actively smoking - he is currently a non-smoker)    Prescribed Eliquis Dose: Eliquis Starter Pack - 2 tablets by mouth twice daily for 1 week then 1 tablet twice daily thereafter    SCr: 0.9 mg/dl on 18  Estimated CrCl (Cockcroft-Gault): 167 ml/min  Dose Appropriate: Yes. Hb: 14.9 g/dL on 18    Current Meds Reviewed: Yes. Drug Interactions Identified: None  Patient taking medications as prescribed:  Yes. Issues identified: None     Counseling points included:  1. Indication for Apixaban:  Viewed \"Living with Venous Thromboembolism (VTE) & Reducing the risk of deadly clots\" Video. 2. Explanation of Apixaban including purpose and mechanism of action  3. Importance of compliance  3. Dose and directions, including missed doses and timing of medication  4. Side effects - increase risk of bleeding & bruising, nausea  5. Potential interactions   A. Avoid concomitant use with strong CY inhibitors (Ketoconazole, Ritonavir, Clarithromycin, Diltiazem, Erythromycin, Fluconazole, Verapamil) or strong CY inducers (Carbamazepine, Phenytoin, Rifampin, Mirtha's Wort)   B. Grapefruit juice may increase levels/effects of Eliquis - use caution  6. Signs and symptoms of VTE and stroke reviewed  7. Contact prescriber when:   A. Changes made to other medications   B. Signs or symptoms of VTE or stroke   C. Uncontrolled bleeding or unusual bruising    Answered all medication-related questions and patient verbalized understanding. Material provided to patient include: apixaban patient information & follow up appointment.     Patient has has received first 30 days of medication free from . Patient's cost for subsequent fills Eliquis is anticipated to be $0 through [place]. Next appointment scheduled for 6 months  Labs to be obtained at next appointment include: Scr, Hb    Progress note routed to referring physicians office. Patient acknowledges working in consult agreement with pharmacist as referred by his/her physician.

## 2018-10-02 DIAGNOSIS — I82.4Y2 ACUTE DEEP VEIN THROMBOSIS (DVT) OF PROXIMAL VEIN OF LEFT LOWER EXTREMITY (HCC): ICD-10-CM

## 2018-10-02 RX ORDER — OXYCODONE HYDROCHLORIDE AND ACETAMINOPHEN 5; 325 MG/1; MG/1
1 TABLET ORAL EVERY 6 HOURS PRN
Qty: 28 TABLET | Refills: 0 | Status: CANCELLED | OUTPATIENT
Start: 2018-10-02 | End: 2018-10-09

## 2018-10-02 RX ORDER — OXYCODONE HYDROCHLORIDE AND ACETAMINOPHEN 5; 325 MG/1; MG/1
1 TABLET ORAL EVERY 8 HOURS PRN
Qty: 21 TABLET | Refills: 0 | Status: SHIPPED | OUTPATIENT
Start: 2018-10-02 | End: 2018-10-09

## 2018-10-17 ENCOUNTER — OFFICE VISIT (OUTPATIENT)
Dept: FAMILY MEDICINE CLINIC | Age: 43
End: 2018-10-17
Payer: MEDICAID

## 2018-10-17 VITALS
OXYGEN SATURATION: 98 % | SYSTOLIC BLOOD PRESSURE: 136 MMHG | HEIGHT: 72 IN | DIASTOLIC BLOOD PRESSURE: 84 MMHG | WEIGHT: 251 LBS | BODY MASS INDEX: 34 KG/M2 | RESPIRATION RATE: 16 BRPM | TEMPERATURE: 97.8 F | HEART RATE: 75 BPM

## 2018-10-17 DIAGNOSIS — R40.0 SOMNOLENCE: ICD-10-CM

## 2018-10-17 DIAGNOSIS — R19.7 DIARRHEA OF PRESUMED INFECTIOUS ORIGIN: ICD-10-CM

## 2018-10-17 DIAGNOSIS — Z23 NEEDS FLU SHOT: Primary | ICD-10-CM

## 2018-10-17 DIAGNOSIS — R53.83 OTHER FATIGUE: ICD-10-CM

## 2018-10-17 DIAGNOSIS — R10.9 ABDOMINAL CRAMPING: ICD-10-CM

## 2018-10-17 DIAGNOSIS — R19.7 DIARRHEA, UNSPECIFIED TYPE: ICD-10-CM

## 2018-10-17 DIAGNOSIS — R06.83 SNORING: ICD-10-CM

## 2018-10-17 DIAGNOSIS — E66.9 OBESITY (BMI 30-39.9): ICD-10-CM

## 2018-10-17 DIAGNOSIS — F33.1 MODERATE EPISODE OF RECURRENT MAJOR DEPRESSIVE DISORDER (HCC): ICD-10-CM

## 2018-10-17 PROCEDURE — 90688 IIV4 VACCINE SPLT 0.5 ML IM: CPT | Performed by: INTERNAL MEDICINE

## 2018-10-17 PROCEDURE — G8428 CUR MEDS NOT DOCUMENT: HCPCS | Performed by: INTERNAL MEDICINE

## 2018-10-17 PROCEDURE — 99213 OFFICE O/P EST LOW 20 MIN: CPT | Performed by: INTERNAL MEDICINE

## 2018-10-17 PROCEDURE — 90472 IMMUNIZATION ADMIN EACH ADD: CPT | Performed by: INTERNAL MEDICINE

## 2018-10-17 PROCEDURE — 1036F TOBACCO NON-USER: CPT | Performed by: INTERNAL MEDICINE

## 2018-10-17 PROCEDURE — G8417 CALC BMI ABV UP PARAM F/U: HCPCS | Performed by: INTERNAL MEDICINE

## 2018-10-17 PROCEDURE — 90471 IMMUNIZATION ADMIN: CPT | Performed by: INTERNAL MEDICINE

## 2018-10-17 PROCEDURE — G8482 FLU IMMUNIZE ORDER/ADMIN: HCPCS | Performed by: INTERNAL MEDICINE

## 2018-10-17 RX ORDER — CIPROFLOXACIN 500 MG/1
500 TABLET, FILM COATED ORAL 2 TIMES DAILY
Qty: 20 TABLET | Refills: 0 | Status: SHIPPED | OUTPATIENT
Start: 2018-10-17 | End: 2018-10-27

## 2018-10-17 ASSESSMENT — ENCOUNTER SYMPTOMS
CHOKING: 0
VOMITING: 0
CONSTIPATION: 0
VISUAL CHANGE: 0
WHEEZING: 0
CHANGE IN BOWEL HABIT: 0
BELCHING: 0
BLOOD IN STOOL: 0
BACK PAIN: 0
NAUSEA: 1
APNEA: 0
CHEST TIGHTNESS: 0
FLATUS: 0
HEMATOCHEZIA: 0
SHORTNESS OF BREATH: 0
COUGH: 0
STRIDOR: 0
ABDOMINAL PAIN: 1
DIARRHEA: 1

## 2018-10-17 NOTE — PROGRESS NOTES
Visit Information    Have you changed or started any medications since your last visit including any over-the-counter medicines, vitamins, or herbal medicines? no   Are you having any side effects from any of your medications? -  no  Have you stopped taking any of your medications? Is so, why? -  no    Have you seen any other physician or provider since your last visit? Yes - Records Obtained  Have you had any other diagnostic tests since your last visit? No  Have you been seen in the emergency room and/or had an admission to a hospital since we last saw you? No  Have you had your routine dental cleaning in the past 6 months? no    Have you activated your [employer] account? If not, what are your barriers?  Yes     Patient Care Team:  Joel Khan DO as PCP - General (Family Medicine)    Medical History Review  Past Medical, Family, and Social History reviewed and does contribute to the patient presenting condition    Health Maintenance   Topic Date Due    HIV screen  08/11/1989    DTaP/Tdap/Td vaccine (1 - Tdap) 08/11/1993    Lipid screen  08/11/2014    Diabetes screen  08/11/2014    Flu vaccine (1) 09/01/2018

## 2018-10-18 ENCOUNTER — OFFICE VISIT (OUTPATIENT)
Dept: ORTHOPEDIC SURGERY | Age: 43
End: 2018-10-18
Payer: MEDICAID

## 2018-10-18 ENCOUNTER — HOSPITAL ENCOUNTER (OUTPATIENT)
Age: 43
Discharge: HOME | End: 2018-10-18
Payer: MEDICAID

## 2018-10-18 VITALS — BODY MASS INDEX: 33.86 KG/M2 | HEIGHT: 72 IN | WEIGHT: 250 LBS

## 2018-10-18 DIAGNOSIS — S86.112A RUPTURE OF LEFT GASTROCNEMIUS TENDON, INITIAL ENCOUNTER: Primary | ICD-10-CM

## 2018-10-18 DIAGNOSIS — I82.412 ACUTE DEEP VEIN THROMBOSIS (DVT) OF FEMORAL VEIN OF LEFT LOWER EXTREMITY (HCC): ICD-10-CM

## 2018-10-18 DIAGNOSIS — R19.7 DIARRHEA OF PRESUMED INFECTIOUS ORIGIN: ICD-10-CM

## 2018-10-18 DIAGNOSIS — E66.9 OBESITY (BMI 30-39.9): ICD-10-CM

## 2018-10-18 DIAGNOSIS — R40.0 SOMNOLENCE: ICD-10-CM

## 2018-10-18 DIAGNOSIS — R10.9 ABDOMINAL CRAMPING: ICD-10-CM

## 2018-10-18 DIAGNOSIS — R53.83 OTHER FATIGUE: ICD-10-CM

## 2018-10-18 DIAGNOSIS — R19.7 DIARRHEA, UNSPECIFIED TYPE: ICD-10-CM

## 2018-10-18 DIAGNOSIS — R06.83 SNORING: ICD-10-CM

## 2018-10-18 LAB
ABSOLUTE EOS #: 0.19 K/UL (ref 0–0.44)
ABSOLUTE IMMATURE GRANULOCYTE: <0.03 K/UL (ref 0–0.3)
ABSOLUTE LYMPH #: 1.28 K/UL (ref 1.1–3.7)
ABSOLUTE MONO #: 0.5 K/UL (ref 0.1–1.2)
ALBUMIN SERPL-MCNC: 4.2 G/DL (ref 3.5–5.2)
ALBUMIN/GLOBULIN RATIO: 1.4 (ref 1–2.5)
ALP BLD-CCNC: 39 U/L (ref 40–129)
ALT SERPL-CCNC: 17 U/L (ref 5–41)
ANION GAP SERPL CALCULATED.3IONS-SCNC: 11 MMOL/L (ref 9–17)
AST SERPL-CCNC: 18 U/L
BASOPHILS # BLD: 1 % (ref 0–2)
BASOPHILS ABSOLUTE: 0.07 K/UL (ref 0–0.2)
BILIRUB SERPL-MCNC: 0.42 MG/DL (ref 0.3–1.2)
BUN BLDV-MCNC: 16 MG/DL (ref 6–20)
BUN/CREAT BLD: ABNORMAL (ref 9–20)
CALCIUM SERPL-MCNC: 9.5 MG/DL (ref 8.6–10.4)
CHLORIDE BLD-SCNC: 104 MMOL/L (ref 98–107)
CO2: 27 MMOL/L (ref 20–31)
CREAT SERPL-MCNC: 0.88 MG/DL (ref 0.7–1.2)
DIFFERENTIAL TYPE: ABNORMAL
EOSINOPHILS RELATIVE PERCENT: 3 % (ref 1–4)
FOLATE: 17.4 NG/ML
GFR AFRICAN AMERICAN: >60 ML/MIN
GFR NON-AFRICAN AMERICAN: >60 ML/MIN
GFR SERPL CREATININE-BSD FRML MDRD: ABNORMAL ML/MIN/{1.73_M2}
GFR SERPL CREATININE-BSD FRML MDRD: ABNORMAL ML/MIN/{1.73_M2}
GLUCOSE BLD-MCNC: 112 MG/DL (ref 70–99)
HCT VFR BLD CALC: 45.5 % (ref 40.7–50.3)
HEMOGLOBIN: 14.9 G/DL (ref 13–17)
IMMATURE GRANULOCYTES: 0 %
LYMPHOCYTES # BLD: 20 % (ref 24–43)
MCH RBC QN AUTO: 28.7 PG (ref 25.2–33.5)
MCHC RBC AUTO-ENTMCNC: 32.7 G/DL (ref 28.4–34.8)
MCV RBC AUTO: 87.7 FL (ref 82.6–102.9)
MONOCYTES # BLD: 8 % (ref 3–12)
NRBC AUTOMATED: 0 PER 100 WBC
PDW BLD-RTO: 13.3 % (ref 11.8–14.4)
PLATELET # BLD: 305 K/UL (ref 138–453)
PLATELET ESTIMATE: ABNORMAL
PMV BLD AUTO: 9.4 FL (ref 8.1–13.5)
POTASSIUM SERPL-SCNC: 4.4 MMOL/L (ref 3.7–5.3)
RBC # BLD: 5.19 M/UL (ref 4.21–5.77)
RBC # BLD: ABNORMAL 10*6/UL
SEG NEUTROPHILS: 68 % (ref 36–65)
SEGMENTED NEUTROPHILS ABSOLUTE COUNT: 4.39 K/UL (ref 1.5–8.1)
SODIUM BLD-SCNC: 142 MMOL/L (ref 135–144)
TOTAL PROTEIN: 7.2 G/DL (ref 6.4–8.3)
TSH SERPL DL<=0.05 MIU/L-ACNC: 1.23 MIU/L (ref 0.3–5)
VITAMIN B-12: 367 PG/ML (ref 232–1245)
VITAMIN D 25-HYDROXY: 20.1 NG/ML (ref 30–100)
WBC # BLD: 6.5 K/UL (ref 3.5–11.3)
WBC # BLD: ABNORMAL 10*3/UL

## 2018-10-18 PROCEDURE — 86671 FUNGUS NES ANTIBODY: CPT

## 2018-10-18 PROCEDURE — 85025 COMPLETE CBC W/AUTO DIFF WBC: CPT

## 2018-10-18 PROCEDURE — G8427 DOCREV CUR MEDS BY ELIG CLIN: HCPCS | Performed by: ORTHOPAEDIC SURGERY

## 2018-10-18 PROCEDURE — 99212 OFFICE O/P EST SF 10 MIN: CPT | Performed by: ORTHOPAEDIC SURGERY

## 2018-10-18 PROCEDURE — 1036F TOBACCO NON-USER: CPT | Performed by: ORTHOPAEDIC SURGERY

## 2018-10-18 PROCEDURE — 82306 VITAMIN D 25 HYDROXY: CPT

## 2018-10-18 PROCEDURE — 86038 ANTINUCLEAR ANTIBODIES: CPT

## 2018-10-18 PROCEDURE — 83516 IMMUNOASSAY NONANTIBODY: CPT

## 2018-10-18 PROCEDURE — 82784 ASSAY IGA/IGD/IGG/IGM EACH: CPT

## 2018-10-18 PROCEDURE — 36415 COLL VENOUS BLD VENIPUNCTURE: CPT

## 2018-10-18 PROCEDURE — 82746 ASSAY OF FOLIC ACID SERUM: CPT

## 2018-10-18 PROCEDURE — G8482 FLU IMMUNIZE ORDER/ADMIN: HCPCS | Performed by: ORTHOPAEDIC SURGERY

## 2018-10-18 PROCEDURE — 84443 ASSAY THYROID STIM HORMONE: CPT

## 2018-10-18 PROCEDURE — 80053 COMPREHEN METABOLIC PANEL: CPT

## 2018-10-18 PROCEDURE — G8417 CALC BMI ABV UP PARAM F/U: HCPCS | Performed by: ORTHOPAEDIC SURGERY

## 2018-10-18 PROCEDURE — 82607 VITAMIN B-12: CPT

## 2018-10-18 NOTE — PROGRESS NOTES
Fatigue   This is a recurrent problem. The current episode started more than 1 month ago. The problem occurs daily. The problem has been gradually worsening. Associated symptoms include abdominal pain, arthralgias, fatigue and nausea. Pertinent negatives include no anorexia, change in bowel habit, chest pain, chills, congestion, coughing, diaphoresis, fever, headaches, joint swelling, myalgias, neck pain, numbness, rash, urinary symptoms, vertigo, visual change, vomiting or weakness. The symptoms are aggravated by exertion and stress. He has tried drinking, acetaminophen, lying down, position changes, rest and sleep for the symptoms. The treatment provided mild relief.        No results found for: LABA1C      ( goal A1C is < 7)   No results found for: LABMICR  No results found for: LDLCHOLESTEROL, LDLCALC    (goal LDL is <100)   AST (U/L)   Date Value   04/20/2018 22     ALT (U/L)   Date Value   04/20/2018 32     BUN (mg/dL)   Date Value   09/05/2018 14     BP Readings from Last 3 Encounters:   10/17/18 136/84   09/12/18 136/82   09/05/18 (!) 125/97          (goal 120/80)    Past Medical History:   Diagnosis Date    Anxiety     Diverticulitis     Dvt femoral (deep venous thrombosis) (HCC)       Past Surgical History:   Procedure Laterality Date    ANKLE SURGERY Left     APPENDECTOMY      BACK SURGERY      with hardware    LAPAROSCOPIC APPENDECTOMY  04/26/2017    LAPAROSCOPIC APPENDECTOMY N/A 4/26/2017    APPENDECTOMY LAPAROSCOPIC performed by Gayatri Armas MD at [address] [place] [address] Bilateral        Family History   Problem Relation Age of Onset    Heart Disease Mother         Valvular    Bipolar Disorder Mother     No Known Problems Father        Social History   Substance Use Topics    Smoking status: Former Smoker    Smokeless tobacco: Never Used    Alcohol use Yes      Comment: social      Current Outpatient Prescriptions   Medication Sig Dispense Refill    ciprofloxacin (CIPRO) 500 MG tablet Take 1 tablet by mouth 2 times daily for 10 days 20 tablet 0    apixaban (ELIQUIS) 5 MG TABS tablet 2 tablets twice daily for 7 days then 1 tablet twice daily 60 tablet 5    Multiple Vitamins-Minerals (MENS ONE DAILY PO) Take 1 tablet by mouth daily       No current facility-administered medications for this visit. No Known Allergies    Health Maintenance   Topic Date Due    HIV screen  08/11/1989    DTaP/Tdap/Td vaccine (1 - Tdap) 08/11/1993    Lipid screen  08/11/2014    Diabetes screen  08/11/2014    Flu vaccine  Completed       Subjective:     Review of Systems   Constitutional: Positive for activity change and fatigue. Negative for appetite change, chills, diaphoresis, fever, unexpected weight change and weight loss. HENT: Negative for congestion. Eyes: Negative for visual disturbance. Respiratory: Negative for apnea, cough, choking, chest tightness, shortness of breath, wheezing and stridor. Cardiovascular: Negative for chest pain, palpitations and leg swelling. Gastrointestinal: Positive for abdominal pain, diarrhea and nausea. Negative for anorexia, blood in stool, change in bowel habit, constipation, flatus, hematochezia, melena and vomiting. Endocrine: Negative for cold intolerance and heat intolerance. Genitourinary: Negative for difficulty urinating, dysuria, frequency and hematuria. Musculoskeletal: Positive for arthralgias. Negative for back pain, gait problem, joint swelling, myalgias and neck pain. Skin: Negative for rash. Neurological: Negative for dizziness, vertigo, weakness, light-headedness, numbness and headaches. Hematological: Negative for adenopathy. Does not bruise/bleed easily. Psychiatric/Behavioral: Positive for sleep disturbance. Negative for confusion, decreased concentration and dysphoric mood. Objective:     Physical Exam   Constitutional: He is oriented to person, place, and time.  He appears well-developed and presumed infectious origin  Saccharomyces Cerevisiae Antibodies, IgG and IgA    Celiac Disease Panel    CBC Auto Differential    Comprehensive Metabolic Panel    David Allen MD, Gastroenterology [address]   9. Abdominal cramping  Saccharomyces Cerevisiae Antibodies, IgG and IgA    Celiac Disease Panel    CBC Auto Differential    Comprehensive Metabolic Panel             Plan:      Return if symptoms worsen or fail to improve.     Orders Placed This Encounter   Procedures    INFLUENZA, QUADV, 3 YRS AND OLDER, IM, MDV, 0.5ML (Buck Slain)    TSH with Reflex     Standing Status:   Future     Standing Expiration Date:   10/17/2019    Vitamin B12 & Folate     Standing Status:   Future     Standing Expiration Date:   10/17/2019    Vitamin D 25 Hydroxy     Standing Status:   Future     Standing Expiration Date:   10/17/2019    ZAINAB Screen with Reflex     Standing Status:   Future     Standing Expiration Date:   10/17/2019    Saccharomyces Cerevisiae Antibodies, IgG and IgA     Standing Status:   Future     Standing Expiration Date:   10/17/2019    Celiac Disease Panel     Standing Status:   Future     Standing Expiration Date:   10/17/2019    CBC Auto Differential     Standing Status:   Future     Standing Expiration Date:   10/17/2019    Comprehensive Metabolic Panel     Standing Status:   Future     Standing Expiration Date:   10/17/2019   Arben Singh MD, Gastroenterology [address]     Referral Priority:   Routine     Referral Type:   Consult for Advice and Opinion     Referral Reason:   Specialty Services Required     Referred to Provider:   Alysia Lo MD     Requested Specialty:   Gastroenterology     Number of Visits Requested:   1    Baseline Diagnostic Sleep Study     Standing Status:   Future     Standing Expiration Date:   10/17/2019     Order Specific Question:   Adult or Pediatric     Answer:   Adult Study (>7 Years)     Order Specific Question:   Location For Sleep Study     Answer: Brayan Vega Specific Question:   Select Sleep Lab Location     Answer:   [address]    Sleep Study with PAP Titration     Standing Status:   Future     Standing Expiration Date:   10/17/2019     Order Specific Question:   Sleep Study Titration Type     Answer:   CPAP     Order Specific Question:   Location For Sleep Study     Answer: Brayan Vega Specific Question:   Select Sleep Lab Location     Answer:   [address]     Orders Placed This Encounter   Medications    ciprofloxacin (CIPRO) 500 MG tablet     Sig: Take 1 tablet by mouth 2 times daily for 10 days     Dispense:  20 tablet     Refill:  0     Treat acutely with cipro for 10 days given history   Will refer though to GI for further w/u and in near future will need colonoscopy for complete workup   Trial bentyl for cramping as well. Will also order labs in regards to abdominal pin and fatigue. Will also order sleep study as well. Follow up after testing complete or if sxs worsen or persist.      Patientgiven educational materials - see patient instructions. Discussed use, benefit,and side effects of prescribed medications. All patient questions answered. Ptvoiced understanding. Reviewed health maintenance. Instructed to continue currentmedications, diet and exercise. Patient agreed with treatment plan. Follow up asdirected.      Electronically signed by Delroy Lux DO on 10/17/2018 at 10:16 PM

## 2018-10-19 LAB
ANTI-NUCLEAR ANTIBODY (ANA): ABNORMAL
GLIADIN DEAMINIDATED PEPTIDE AB IGA: 17 U/ML
GLIADIN DEAMINIDATED PEPTIDE AB IGG: 6.2 U/ML
IGA: 234 MG/DL (ref 70–400)
TISSUE TRANSGLUTAMINASE IGA: 0.5 U/ML

## 2018-10-21 LAB
SACCHAROMYCES CEREVISIAE ANTIBODY IGA: 10.8 UNITS (ref 0–24.9)
SACCHAROMYCES CEREVISIAE ANTIBODY IGG: 15 UNITS (ref 0–24.9)

## 2018-10-22 RX ORDER — ERGOCALCIFEROL 1.25 MG/1
50000 CAPSULE ORAL WEEKLY
Qty: 12 CAPSULE | Refills: 0 | Status: SHIPPED | OUTPATIENT
Start: 2018-10-22 | End: 2019-03-13 | Stop reason: SDUPTHER

## 2018-11-07 ENCOUNTER — APPOINTMENT (OUTPATIENT)
Dept: CT IMAGING | Age: 43
End: 2018-11-07
Payer: MEDICAID

## 2018-11-07 ENCOUNTER — HOSPITAL ENCOUNTER (EMERGENCY)
Age: 43
Discharge: HOME | End: 2018-11-08
Attending: EMERGENCY MEDICINE
Payer: MEDICAID

## 2018-11-07 DIAGNOSIS — R10.32 LEFT LOWER QUADRANT PAIN: Primary | ICD-10-CM

## 2018-11-07 DIAGNOSIS — R19.7 DIARRHEA, UNSPECIFIED TYPE: ICD-10-CM

## 2018-11-07 DIAGNOSIS — R11.2 NAUSEA AND VOMITING, INTRACTABILITY OF VOMITING NOT SPECIFIED, UNSPECIFIED VOMITING TYPE: ICD-10-CM

## 2018-11-07 LAB
ABSOLUTE EOS #: 0.2 K/UL (ref 0–0.4)
ABSOLUTE IMMATURE GRANULOCYTE: NORMAL K/UL (ref 0–0.3)
ABSOLUTE LYMPH #: 3.5 K/UL (ref 1–4.8)
ABSOLUTE MONO #: 0.6 K/UL (ref 0.2–0.8)
ALBUMIN SERPL-MCNC: 4.4 G/DL (ref 3.5–5.2)
ALBUMIN/GLOBULIN RATIO: ABNORMAL (ref 1–2.5)
ALP BLD-CCNC: 34 U/L (ref 40–129)
ALT SERPL-CCNC: 16 U/L (ref 5–41)
ANION GAP SERPL CALCULATED.3IONS-SCNC: 13 MMOL/L (ref 9–17)
AST SERPL-CCNC: 15 U/L
BASOPHILS # BLD: 0 % (ref 0–2)
BASOPHILS ABSOLUTE: 0 K/UL (ref 0–0.2)
BILIRUB SERPL-MCNC: 0.15 MG/DL (ref 0.3–1.2)
BILIRUBIN DIRECT: <0.08 MG/DL
BILIRUBIN, INDIRECT: ABNORMAL MG/DL (ref 0–1)
BUN BLDV-MCNC: 17 MG/DL (ref 6–20)
BUN/CREAT BLD: 22 (ref 9–20)
CALCIUM SERPL-MCNC: 9.6 MG/DL (ref 8.6–10.4)
CHLORIDE BLD-SCNC: 103 MMOL/L (ref 98–107)
CO2: 23 MMOL/L (ref 20–31)
CREAT SERPL-MCNC: 0.78 MG/DL (ref 0.7–1.2)
DIFFERENTIAL TYPE: NORMAL
EOSINOPHILS RELATIVE PERCENT: 2 % (ref 1–4)
GFR AFRICAN AMERICAN: >60 ML/MIN
GFR NON-AFRICAN AMERICAN: >60 ML/MIN
GFR SERPL CREATININE-BSD FRML MDRD: ABNORMAL ML/MIN/{1.73_M2}
GFR SERPL CREATININE-BSD FRML MDRD: ABNORMAL ML/MIN/{1.73_M2}
GLOBULIN: ABNORMAL G/DL (ref 1.5–3.8)
GLUCOSE BLD-MCNC: 84 MG/DL (ref 70–99)
HCT VFR BLD CALC: 45.1 % (ref 41–53)
HEMOGLOBIN: 15.1 G/DL (ref 13.5–17.5)
IMMATURE GRANULOCYTES: NORMAL %
LACTIC ACID: 1 MMOL/L (ref 0.5–2.2)
LIPASE: 44 U/L (ref 13–60)
LYMPHOCYTES # BLD: 34 % (ref 24–44)
MAGNESIUM: 2.2 MG/DL (ref 1.6–2.6)
MCH RBC QN AUTO: 29.3 PG (ref 26–34)
MCHC RBC AUTO-ENTMCNC: 33.6 G/DL (ref 31–37)
MCV RBC AUTO: 87.3 FL (ref 80–100)
MONOCYTES # BLD: 6 % (ref 1–7)
NRBC AUTOMATED: NORMAL PER 100 WBC
PDW BLD-RTO: 14 % (ref 11.5–14.5)
PLATELET # BLD: 358 K/UL (ref 130–400)
PLATELET ESTIMATE: NORMAL
PMV BLD AUTO: 7.8 FL (ref 6–12)
POTASSIUM SERPL-SCNC: 4.2 MMOL/L (ref 3.7–5.3)
RBC # BLD: 5.17 M/UL (ref 4.5–5.9)
RBC # BLD: NORMAL 10*6/UL
SEG NEUTROPHILS: 58 % (ref 36–66)
SEGMENTED NEUTROPHILS ABSOLUTE COUNT: 6 K/UL (ref 1.8–7.7)
SODIUM BLD-SCNC: 139 MMOL/L (ref 135–144)
TOTAL PROTEIN: 7.1 G/DL (ref 6.4–8.3)
WBC # BLD: 10.4 K/UL (ref 3.5–11)
WBC # BLD: NORMAL 10*3/UL

## 2018-11-07 PROCEDURE — 96375 TX/PRO/DX INJ NEW DRUG ADDON: CPT

## 2018-11-07 PROCEDURE — 87493 C DIFF AMPLIFIED PROBE: CPT

## 2018-11-07 PROCEDURE — 6360000004 HC RX CONTRAST MEDICATION: Performed by: NURSE PRACTITIONER

## 2018-11-07 PROCEDURE — 2580000003 HC RX 258: Performed by: NURSE PRACTITIONER

## 2018-11-07 PROCEDURE — 87505 NFCT AGENT DETECTION GI: CPT

## 2018-11-07 PROCEDURE — C9113 INJ PANTOPRAZOLE SODIUM, VIA: HCPCS | Performed by: NURSE PRACTITIONER

## 2018-11-07 PROCEDURE — 83605 ASSAY OF LACTIC ACID: CPT

## 2018-11-07 PROCEDURE — 81003 URINALYSIS AUTO W/O SCOPE: CPT

## 2018-11-07 PROCEDURE — 85025 COMPLETE CBC W/AUTO DIFF WBC: CPT

## 2018-11-07 PROCEDURE — 80048 BASIC METABOLIC PNL TOTAL CA: CPT

## 2018-11-07 PROCEDURE — 6360000002 HC RX W HCPCS: Performed by: NURSE PRACTITIONER

## 2018-11-07 PROCEDURE — 99284 EMERGENCY DEPT VISIT MOD MDM: CPT

## 2018-11-07 PROCEDURE — 80076 HEPATIC FUNCTION PANEL: CPT

## 2018-11-07 PROCEDURE — 74177 CT ABD & PELVIS W/CONTRAST: CPT

## 2018-11-07 PROCEDURE — 82272 OCCULT BLD FECES 1-3 TESTS: CPT

## 2018-11-07 PROCEDURE — 83690 ASSAY OF LIPASE: CPT

## 2018-11-07 PROCEDURE — 83735 ASSAY OF MAGNESIUM: CPT

## 2018-11-07 PROCEDURE — 96374 THER/PROPH/DIAG INJ IV PUSH: CPT

## 2018-11-07 RX ORDER — 0.9 % SODIUM CHLORIDE 0.9 %
10 VIAL (ML) INJECTION ONCE
Status: COMPLETED | OUTPATIENT
Start: 2018-11-07 | End: 2018-11-07

## 2018-11-07 RX ORDER — ONDANSETRON 2 MG/ML
4 INJECTION INTRAMUSCULAR; INTRAVENOUS ONCE
Status: COMPLETED | OUTPATIENT
Start: 2018-11-07 | End: 2018-11-07

## 2018-11-07 RX ORDER — FENTANYL CITRATE 50 UG/ML
50 INJECTION, SOLUTION INTRAMUSCULAR; INTRAVENOUS ONCE
Status: COMPLETED | OUTPATIENT
Start: 2018-11-07 | End: 2018-11-07

## 2018-11-07 RX ORDER — 0.9 % SODIUM CHLORIDE 0.9 %
80 INTRAVENOUS SOLUTION INTRAVENOUS ONCE
Status: COMPLETED | OUTPATIENT
Start: 2018-11-08 | End: 2018-11-07

## 2018-11-07 RX ORDER — SODIUM CHLORIDE 0.9 % (FLUSH) 0.9 %
10 SYRINGE (ML) INJECTION PRN
Status: DISCONTINUED | OUTPATIENT
Start: 2018-11-07 | End: 2018-11-08 | Stop reason: HOSPADM

## 2018-11-07 RX ORDER — PANTOPRAZOLE SODIUM 40 MG/10ML
40 INJECTION, POWDER, LYOPHILIZED, FOR SOLUTION INTRAVENOUS ONCE
Status: COMPLETED | OUTPATIENT
Start: 2018-11-07 | End: 2018-11-07

## 2018-11-07 RX ORDER — 0.9 % SODIUM CHLORIDE 0.9 %
1000 INTRAVENOUS SOLUTION INTRAVENOUS ONCE
Status: COMPLETED | OUTPATIENT
Start: 2018-11-07 | End: 2018-11-07

## 2018-11-07 RX ADMIN — SODIUM CHLORIDE 80 ML: 9 INJECTION, SOLUTION INTRAVENOUS at 23:54

## 2018-11-07 RX ADMIN — SODIUM CHLORIDE 1000 ML: 9 INJECTION, SOLUTION INTRAVENOUS at 22:53

## 2018-11-07 RX ADMIN — Medication 10 ML: at 22:53

## 2018-11-07 RX ADMIN — FENTANYL CITRATE 50 MCG: 50 INJECTION, SOLUTION INTRAMUSCULAR; INTRAVENOUS at 22:53

## 2018-11-07 RX ADMIN — PANTOPRAZOLE SODIUM 40 MG: 40 INJECTION, POWDER, FOR SOLUTION INTRAVENOUS at 22:53

## 2018-11-07 RX ADMIN — ONDANSETRON 4 MG: 2 INJECTION, SOLUTION INTRAMUSCULAR; INTRAVENOUS at 22:53

## 2018-11-07 RX ADMIN — IOPAMIDOL 75 ML: 755 INJECTION, SOLUTION INTRAVENOUS at 23:54

## 2018-11-07 RX ADMIN — Medication 10 ML: at 23:54

## 2018-11-07 ASSESSMENT — PAIN SCALES - GENERAL
PAINLEVEL_OUTOF10: 6
PAINLEVEL_OUTOF10: 5

## 2018-11-07 ASSESSMENT — PAIN DESCRIPTION - LOCATION: LOCATION: ABDOMEN

## 2018-11-07 ASSESSMENT — ENCOUNTER SYMPTOMS
BLOOD IN STOOL: 1
BACK PAIN: 0
VOMITING: 1
ABDOMINAL PAIN: 1
NAUSEA: 1
SHORTNESS OF BREATH: 0
COUGH: 0
DIARRHEA: 1
COLOR CHANGE: 0

## 2018-11-07 ASSESSMENT — PAIN DESCRIPTION - PAIN TYPE: TYPE: ACUTE PAIN

## 2018-11-08 VITALS
WEIGHT: 245 LBS | DIASTOLIC BLOOD PRESSURE: 87 MMHG | SYSTOLIC BLOOD PRESSURE: 128 MMHG | BODY MASS INDEX: 33.18 KG/M2 | OXYGEN SATURATION: 98 % | HEIGHT: 72 IN | TEMPERATURE: 98.1 F | RESPIRATION RATE: 16 BRPM | HEART RATE: 84 BPM

## 2018-11-08 LAB
C DIFFICILE TOXINS, PCR: NORMAL
CAMPYLOBACTER PCR: NORMAL
DATE, STOOL #1: NORMAL
DATE, STOOL #2: NORMAL
DATE, STOOL #3: NORMAL
HEMOCCULT SP1 STL QL: NEGATIVE
HEMOCCULT SP2 STL QL: NORMAL
HEMOCCULT SP3 STL QL: NORMAL
SALMONELLA PCR: NORMAL
SHIGATOXIN GENE PCR: NORMAL
SHIGELLA SP PCR: NORMAL
SPECIMEN DESCRIPTION: NORMAL
SPECIMEN: NORMAL
TIME, STOOL #1: 2257
TIME, STOOL #2: NORMAL
TIME, STOOL #3: NORMAL

## 2018-11-08 RX ORDER — PANTOPRAZOLE SODIUM 20 MG/1
20 TABLET, DELAYED RELEASE ORAL DAILY
Qty: 14 TABLET | Refills: 0 | Status: SHIPPED | OUTPATIENT
Start: 2018-11-08 | End: 2019-04-09

## 2018-11-08 RX ORDER — ONDANSETRON 4 MG/1
4 TABLET, ORALLY DISINTEGRATING ORAL EVERY 8 HOURS PRN
Qty: 20 TABLET | Refills: 0 | Status: SHIPPED | OUTPATIENT
Start: 2018-11-08 | End: 2019-06-21 | Stop reason: SDUPTHER

## 2018-11-08 RX ORDER — DICYCLOMINE HYDROCHLORIDE 10 MG/1
10 CAPSULE ORAL
Qty: 20 CAPSULE | Refills: 0 | Status: SHIPPED | OUTPATIENT
Start: 2018-11-08 | End: 2019-04-09

## 2018-11-08 NOTE — ED PROVIDER NOTES
Intravenous Stopped 11/7/18 2357)   iopamidol (ISOVUE-370) 76 % injection 100 mL (75 mLs Intravenous Given 11/7/18 2354)       CLINICAL DECISION MAKING:  The patient presented alert with a nontoxic appearance and was seen in conjunction with Dr. Schultz. Laboratory studies were unremarkable; stool studies are pending. CT scan was negative for acute findings. Prescriptions were written for Zofran, Protonix, and bentyl. Follow up with pcp and GI for further evaluation and treatment, return to ED if condition worsens. FINAL IMPRESSION      1. Left lower quadrant pain    2. Diarrhea, unspecified type    3. Nausea and vomiting, intractability of vomiting not specified, unspecified vomiting type            Problem List  Patient Active Problem List   Diagnosis Code    Unspecified acute appendicitis K35.80    Major depressive disorder with current active episode F32.9    Obesity (BMI 30-39. 9) E66.9    Acute deep vein thrombosis (DVT) of femoral vein of left lower extremity ([facility] [address] I82.412    Sigmoid diverticulosis K57.30         DISPOSITION/PLAN   DISPOSITION  DISCHARGE      PATIENT REFERRED TO:   DO Kelly Milner [address] [place] [address] [address] [place] [address], S.    Schedule an appointment as soon as possible for a visit       Avni Valles MD  [address] [place] [address]  [phone]    Schedule an appointment as soon as possible for a visit       [facility] ED  [address] [facility]  [phone]    If symptoms worsen, As needed      DISCHARGE MEDICATIONS:     New Prescriptions    DICYCLOMINE (BENTYL) 10 MG CAPSULE    Take 1 capsule by mouth 4 times daily (before meals and nightly)    ONDANSETRON (ZOFRAN ODT) 4 MG DISINTEGRATING TABLET    Take 1 tablet by mouth every 8 hours as needed for Nausea or Vomiting    PANTOPRAZOLE (PROTONIX) 20 MG TABLET    Take 1 tablet by mouth daily           (Please note that portions of this note were completed with a voice recognition

## 2018-11-13 ENCOUNTER — HOSPITAL ENCOUNTER (OUTPATIENT)
Dept: SLEEP CENTER | Age: 43
Discharge: HOME | End: 2018-11-15
Payer: MEDICAID

## 2018-11-13 VITALS — RESPIRATION RATE: 22 BRPM | HEIGHT: 72 IN | WEIGHT: 251 LBS | BODY MASS INDEX: 34 KG/M2 | HEART RATE: 79 BPM

## 2018-11-13 DIAGNOSIS — R53.83 OTHER FATIGUE: ICD-10-CM

## 2018-11-13 DIAGNOSIS — R40.0 SOMNOLENCE: ICD-10-CM

## 2018-11-13 DIAGNOSIS — R06.83 SNORING: ICD-10-CM

## 2018-11-13 DIAGNOSIS — E66.9 OBESITY (BMI 30-39.9): ICD-10-CM

## 2018-11-13 PROCEDURE — 95810 POLYSOM 6/> YRS 4/> PARAM: CPT

## 2018-11-13 ASSESSMENT — SLEEP AND FATIGUE QUESTIONNAIRES
HOW LIKELY ARE YOU TO NOD OFF OR FALL ASLEEP WHILE LYING DOWN TO REST IN THE AFTERNOON WHEN CIRCUMSTANCES PERMIT: 3
HOW LIKELY ARE YOU TO NOD OFF OR FALL ASLEEP WHILE SITTING AND TALKING TO SOMEONE: 0
HOW LIKELY ARE YOU TO NOD OFF OR FALL ASLEEP WHILE SITTING QUIETLY AFTER LUNCH WITHOUT ALCOHOL: 1
HOW LIKELY ARE YOU TO NOD OFF OR FALL ASLEEP WHILE SITTING AND READING: 3
HOW LIKELY ARE YOU TO NOD OFF OR FALL ASLEEP WHILE WATCHING TV: 2
ESS TOTAL SCORE: 10
HOW LIKELY ARE YOU TO NOD OFF OR FALL ASLEEP IN A CAR, WHILE STOPPED FOR A FEW MINUTES IN TRAFFIC: 0
HOW LIKELY ARE YOU TO NOD OFF OR FALL ASLEEP WHEN YOU ARE A PASSENGER IN A CAR FOR AN HOUR WITHOUT A BREAK: 1
HOW LIKELY ARE YOU TO NOD OFF OR FALL ASLEEP WHILE SITTING INACTIVE IN A PUBLIC PLACE: 0

## 2018-12-04 ENCOUNTER — HOSPITAL ENCOUNTER (OUTPATIENT)
Dept: SLEEP CENTER | Age: 43
Discharge: HOME | End: 2018-12-06
Payer: MEDICAID

## 2018-12-04 DIAGNOSIS — R40.0 SOMNOLENCE: ICD-10-CM

## 2018-12-04 DIAGNOSIS — R06.83 SNORING: ICD-10-CM

## 2018-12-04 DIAGNOSIS — E66.9 OBESITY (BMI 30-39.9): ICD-10-CM

## 2018-12-04 PROCEDURE — 95811 POLYSOM 6/>YRS CPAP 4/> PARM: CPT

## 2018-12-14 LAB
STATUS: NORMAL
STATUS: NORMAL

## 2019-01-21 ENCOUNTER — APPOINTMENT (OUTPATIENT)
Dept: GENERAL RADIOLOGY | Age: 44
End: 2019-01-21
Payer: MEDICAID

## 2019-01-21 ENCOUNTER — HOSPITAL ENCOUNTER (EMERGENCY)
Age: 44
Discharge: HOME | End: 2019-01-21
Attending: EMERGENCY MEDICINE
Payer: MEDICAID

## 2019-01-21 VITALS
WEIGHT: 256 LBS | TEMPERATURE: 98.3 F | HEIGHT: 72 IN | HEART RATE: 88 BPM | SYSTOLIC BLOOD PRESSURE: 145 MMHG | BODY MASS INDEX: 34.67 KG/M2 | RESPIRATION RATE: 16 BRPM | OXYGEN SATURATION: 98 % | DIASTOLIC BLOOD PRESSURE: 86 MMHG
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
## 2019-01-21 LAB
ABSOLUTE EOS #: 0.2 K/UL (ref 0–0.4)
ABSOLUTE IMMATURE GRANULOCYTE: ABNORMAL K/UL (ref 0–0.3)
ABSOLUTE LYMPH #: 2.3 K/UL (ref 1–4.8)
ABSOLUTE MONO #: 0.7 K/UL (ref 0.2–0.8)
BASOPHILS # BLD: 1 % (ref 0–2)
BASOPHILS ABSOLUTE: 0.1 K/UL (ref 0–0.2)
DIFFERENTIAL TYPE: ABNORMAL
EOSINOPHILS RELATIVE PERCENT: 2 % (ref 1–4)
HCT VFR BLD CALC: 47.1 % (ref 41–53)
HEMOGLOBIN: 15.9 G/DL (ref 13.5–17.5)
IMMATURE GRANULOCYTES: ABNORMAL %
LYMPHOCYTES # BLD: 21 % (ref 24–44)
MCH RBC QN AUTO: 28.8 PG (ref 26–34)
MCHC RBC AUTO-ENTMCNC: 33.7 G/DL (ref 31–37)
MCV RBC AUTO: 85.5 FL (ref 80–100)
MONOCYTES # BLD: 6 % (ref 1–7)
NRBC AUTOMATED: ABNORMAL PER 100 WBC
PDW BLD-RTO: 13.7 % (ref 11.5–14.5)
PLATELET # BLD: 364 K/UL (ref 130–400)
PLATELET ESTIMATE: ABNORMAL
PMV BLD AUTO: 6.9 FL (ref 6–12)
RBC # BLD: 5.51 M/UL (ref 4.5–5.9)
RBC # BLD: ABNORMAL 10*6/UL
SEG NEUTROPHILS: 70 % (ref 36–66)
SEGMENTED NEUTROPHILS ABSOLUTE COUNT: 7.9 K/UL (ref 1.8–7.7)
WBC # BLD: 11.2 K/UL (ref 3.5–11)
WBC # BLD: ABNORMAL 10*3/UL

## 2019-01-21 PROCEDURE — 90715 TDAP VACCINE 7 YRS/> IM: CPT | Performed by: EMERGENCY MEDICINE

## 2019-01-21 PROCEDURE — 85025 COMPLETE CBC W/AUTO DIFF WBC: CPT

## 2019-01-21 PROCEDURE — 96375 TX/PRO/DX INJ NEW DRUG ADDON: CPT

## 2019-01-21 PROCEDURE — 73120 X-RAY EXAM OF HAND: CPT

## 2019-01-21 PROCEDURE — 90471 IMMUNIZATION ADMIN: CPT | Performed by: EMERGENCY MEDICINE

## 2019-01-21 PROCEDURE — 6360000002 HC RX W HCPCS: Performed by: EMERGENCY MEDICINE

## 2019-01-21 PROCEDURE — 96365 THER/PROPH/DIAG IV INF INIT: CPT

## 2019-01-21 PROCEDURE — 99283 EMERGENCY DEPT VISIT LOW MDM: CPT

## 2019-01-21 PROCEDURE — 2580000003 HC RX 258: Performed by: EMERGENCY MEDICINE

## 2019-01-21 RX ORDER — MORPHINE SULFATE 2 MG/ML
2 INJECTION, SOLUTION INTRAMUSCULAR; INTRAVENOUS ONCE
Status: COMPLETED | OUTPATIENT
Start: 2019-01-21 | End: 2019-01-21

## 2019-01-21 RX ORDER — OXYCODONE HYDROCHLORIDE AND ACETAMINOPHEN 5; 325 MG/1; MG/1
1 TABLET ORAL EVERY 6 HOURS PRN
Qty: 12 TABLET | Refills: 0 | Status: SHIPPED | OUTPATIENT
Start: 2019-01-21 | End: 2019-01-24

## 2019-01-21 RX ORDER — IBUPROFEN 800 MG/1
800 TABLET ORAL 2 TIMES DAILY PRN
Qty: 30 TABLET | Refills: 1 | Status: SHIPPED | OUTPATIENT
Start: 2019-01-21 | End: 2019-04-09

## 2019-01-21 RX ORDER — ONDANSETRON 2 MG/ML
4 INJECTION INTRAMUSCULAR; INTRAVENOUS ONCE
Status: COMPLETED | OUTPATIENT
Start: 2019-01-21 | End: 2019-01-21

## 2019-01-21 RX ORDER — AMOXICILLIN AND CLAVULANATE POTASSIUM 875; 125 MG/1; MG/1
1 TABLET, FILM COATED ORAL 2 TIMES DAILY
Qty: 20 TABLET | Refills: 0 | Status: SHIPPED | OUTPATIENT
Start: 2019-01-21 | End: 2019-01-24 | Stop reason: SINTOL

## 2019-01-21 RX ADMIN — MORPHINE SULFATE 2 MG: 2 INJECTION, SOLUTION INTRAMUSCULAR; INTRAVENOUS at 13:17

## 2019-01-21 RX ADMIN — TETANUS TOXOID, REDUCED DIPHTHERIA TOXOID AND ACELLULAR PERTUSSIS VACCINE, ADSORBED 0.5 ML: 5; 2.5; 8; 8; 2.5 SUSPENSION INTRAMUSCULAR at 12:36

## 2019-01-21 RX ADMIN — SODIUM CHLORIDE 1.5 G: 9 INJECTION, SOLUTION INTRAVENOUS at 12:34

## 2019-01-21 RX ADMIN — ONDANSETRON 4 MG: 2 INJECTION INTRAMUSCULAR; INTRAVENOUS at 13:17

## 2019-01-21 ASSESSMENT — ENCOUNTER SYMPTOMS
ABDOMINAL PAIN: 0
BACK PAIN: 0
EYE PAIN: 0
CHEST TIGHTNESS: 0
ABDOMINAL DISTENTION: 0
EYE DISCHARGE: 0
SHORTNESS OF BREATH: 0
FACIAL SWELLING: 0

## 2019-01-21 ASSESSMENT — PAIN DESCRIPTION - DESCRIPTORS: DESCRIPTORS: CONSTANT;PRESSURE;THROBBING

## 2019-01-21 ASSESSMENT — PAIN DESCRIPTION - ORIENTATION: ORIENTATION: RIGHT

## 2019-01-21 ASSESSMENT — PAIN DESCRIPTION - PROGRESSION: CLINICAL_PROGRESSION: GRADUALLY WORSENING

## 2019-01-21 ASSESSMENT — PAIN DESCRIPTION - LOCATION: LOCATION: HAND;FINGER (COMMENT WHICH ONE)

## 2019-01-21 ASSESSMENT — PAIN SCALES - GENERAL
PAINLEVEL_OUTOF10: 9
PAINLEVEL_OUTOF10: 9

## 2019-01-21 ASSESSMENT — PAIN DESCRIPTION - PAIN TYPE: TYPE: ACUTE PAIN

## 2019-01-21 ASSESSMENT — PAIN DESCRIPTION - FREQUENCY: FREQUENCY: CONTINUOUS

## 2019-01-24 ENCOUNTER — OFFICE VISIT (OUTPATIENT)
Dept: FAMILY MEDICINE CLINIC | Age: 44
End: 2019-01-24
Payer: MEDICAID

## 2019-01-24 VITALS
WEIGHT: 250 LBS | HEIGHT: 72 IN | HEART RATE: 83 BPM | BODY MASS INDEX: 33.86 KG/M2 | RESPIRATION RATE: 18 BRPM | SYSTOLIC BLOOD PRESSURE: 126 MMHG | TEMPERATURE: 98.2 F | OXYGEN SATURATION: 98 % | DIASTOLIC BLOOD PRESSURE: 88 MMHG
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
## 2019-01-24 DIAGNOSIS — T78.40XA ALLERGIC REACTION, INITIAL ENCOUNTER: Primary | ICD-10-CM

## 2019-01-24 PROCEDURE — G8427 DOCREV CUR MEDS BY ELIG CLIN: HCPCS | Performed by: NURSE PRACTITIONER

## 2019-01-24 PROCEDURE — 96372 THER/PROPH/DIAG INJ SC/IM: CPT | Performed by: NURSE PRACTITIONER

## 2019-01-24 PROCEDURE — G8417 CALC BMI ABV UP PARAM F/U: HCPCS | Performed by: NURSE PRACTITIONER

## 2019-01-24 PROCEDURE — 99214 OFFICE O/P EST MOD 30 MIN: CPT | Performed by: NURSE PRACTITIONER

## 2019-01-24 PROCEDURE — 1036F TOBACCO NON-USER: CPT | Performed by: NURSE PRACTITIONER

## 2019-01-24 PROCEDURE — G8482 FLU IMMUNIZE ORDER/ADMIN: HCPCS | Performed by: NURSE PRACTITIONER

## 2019-01-24 PROCEDURE — 96160 PT-FOCUSED HLTH RISK ASSMT: CPT | Performed by: NURSE PRACTITIONER

## 2019-01-24 RX ORDER — DOXYCYCLINE HYCLATE 100 MG/1
100 CAPSULE ORAL 2 TIMES DAILY
Qty: 20 CAPSULE | Refills: 0 | Status: SHIPPED | OUTPATIENT
Start: 2019-01-24 | End: 2019-01-25

## 2019-01-24 RX ORDER — METHYLPREDNISOLONE SODIUM SUCCINATE 125 MG/2ML
125 INJECTION, POWDER, LYOPHILIZED, FOR SOLUTION INTRAMUSCULAR; INTRAVENOUS ONCE
Status: COMPLETED | OUTPATIENT
Start: 2019-01-24 | End: 2019-01-24

## 2019-01-24 RX ORDER — PREDNISONE 20 MG/1
TABLET ORAL
Qty: 13 TABLET | Refills: 0 | Status: SHIPPED | OUTPATIENT
Start: 2019-01-24 | End: 2019-04-09

## 2019-01-24 RX ADMIN — METHYLPREDNISOLONE SODIUM SUCCINATE 125 MG: 125 INJECTION, POWDER, LYOPHILIZED, FOR SOLUTION INTRAMUSCULAR; INTRAVENOUS at 18:19

## 2019-01-24 ASSESSMENT — PATIENT HEALTH QUESTIONNAIRE - PHQ9
9. THOUGHTS THAT YOU WOULD BE BETTER OFF DEAD, OR OF HURTING YOURSELF: 0
2. FEELING DOWN, DEPRESSED OR HOPELESS: 2
7. TROUBLE CONCENTRATING ON THINGS, SUCH AS READING THE NEWSPAPER OR WATCHING TELEVISION: 0
8. MOVING OR SPEAKING SO SLOWLY THAT OTHER PEOPLE COULD HAVE NOTICED. OR THE OPPOSITE, BEING SO FIGETY OR RESTLESS THAT YOU HAVE BEEN MOVING AROUND A LOT MORE THAN USUAL: 0
5. POOR APPETITE OR OVEREATING: 1
SUM OF ALL RESPONSES TO PHQ QUESTIONS 1-9: 12
10. IF YOU CHECKED OFF ANY PROBLEMS, HOW DIFFICULT HAVE THESE PROBLEMS MADE IT FOR YOU TO DO YOUR WORK, TAKE CARE OF THINGS AT HOME, OR GET ALONG WITH OTHER PEOPLE: 0
SUM OF ALL RESPONSES TO PHQ QUESTIONS 1-9: 12
SUM OF ALL RESPONSES TO PHQ9 QUESTIONS 1 & 2: 4
3. TROUBLE FALLING OR STAYING ASLEEP: 3
4. FEELING TIRED OR HAVING LITTLE ENERGY: 3
6. FEELING BAD ABOUT YOURSELF - OR THAT YOU ARE A FAILURE OR HAVE LET YOURSELF OR YOUR FAMILY DOWN: 1
1. LITTLE INTEREST OR PLEASURE IN DOING THINGS: 2

## 2019-01-24 ASSESSMENT — ENCOUNTER SYMPTOMS: ALLERGIC REACTION: 1

## 2019-01-25 RX ORDER — DOXYCYCLINE HYCLATE 100 MG
100 TABLET ORAL 2 TIMES DAILY
Qty: 20 TABLET | Refills: 0 | Status: SHIPPED | OUTPATIENT
Start: 2019-01-25 | End: 2019-02-04

## 2019-03-13 ENCOUNTER — OFFICE VISIT (OUTPATIENT)
Dept: FAMILY MEDICINE CLINIC | Age: 44
End: 2019-03-13
Payer: MEDICAID

## 2019-03-13 VITALS
SYSTOLIC BLOOD PRESSURE: 126 MMHG | WEIGHT: 256 LBS | RESPIRATION RATE: 16 BRPM | TEMPERATURE: 97.7 F | OXYGEN SATURATION: 98 % | DIASTOLIC BLOOD PRESSURE: 82 MMHG | BODY MASS INDEX: 34.72 KG/M2 | HEART RATE: 87 BPM

## 2019-03-13 DIAGNOSIS — Z13.220 SCREENING FOR HYPERLIPIDEMIA: ICD-10-CM

## 2019-03-13 DIAGNOSIS — K62.89 PROCTITIS: Primary | ICD-10-CM

## 2019-03-13 DIAGNOSIS — S66.509A INJURY OF TENDON OF INTRINSIC MUSCLE OF FINGER: ICD-10-CM

## 2019-03-13 DIAGNOSIS — M62.838 MUSCLE SPASM: ICD-10-CM

## 2019-03-13 DIAGNOSIS — M48.07 SPINAL STENOSIS OF LUMBOSACRAL REGION: ICD-10-CM

## 2019-03-13 DIAGNOSIS — K52.9 COLITIS: ICD-10-CM

## 2019-03-13 DIAGNOSIS — S89.91XS INJURY OF RIGHT LOWER EXTREMITY, SEQUELA: ICD-10-CM

## 2019-03-13 DIAGNOSIS — K64.9 BLEEDING HEMORRHOID: ICD-10-CM

## 2019-03-13 DIAGNOSIS — S61.219S FINGER LACERATION INVOLVING TENDON, SEQUELA: ICD-10-CM

## 2019-03-13 PROCEDURE — G8482 FLU IMMUNIZE ORDER/ADMIN: HCPCS | Performed by: INTERNAL MEDICINE

## 2019-03-13 PROCEDURE — 1036F TOBACCO NON-USER: CPT | Performed by: INTERNAL MEDICINE

## 2019-03-13 PROCEDURE — 99214 OFFICE O/P EST MOD 30 MIN: CPT | Performed by: INTERNAL MEDICINE

## 2019-03-13 PROCEDURE — G8427 DOCREV CUR MEDS BY ELIG CLIN: HCPCS | Performed by: INTERNAL MEDICINE

## 2019-03-13 PROCEDURE — G8417 CALC BMI ABV UP PARAM F/U: HCPCS | Performed by: INTERNAL MEDICINE

## 2019-03-13 RX ORDER — ERGOCALCIFEROL 1.25 MG/1
50000 CAPSULE ORAL WEEKLY
Qty: 12 CAPSULE | Refills: 0 | Status: SHIPPED | OUTPATIENT
Start: 2019-03-13 | End: 2020-04-20

## 2019-03-13 RX ORDER — DULOXETIN HYDROCHLORIDE 30 MG/1
30 CAPSULE, DELAYED RELEASE ORAL DAILY
Qty: 30 CAPSULE | Refills: 3 | Status: SHIPPED | OUTPATIENT
Start: 2019-03-13 | End: 2019-04-09 | Stop reason: SDUPTHER

## 2019-03-13 RX ORDER — METHOCARBAMOL 500 MG/1
500 TABLET, FILM COATED ORAL 4 TIMES DAILY PRN
Qty: 60 TABLET | Refills: 3 | Status: SHIPPED | OUTPATIENT
Start: 2019-03-13 | End: 2019-04-12

## 2019-03-15 ASSESSMENT — ENCOUNTER SYMPTOMS
DIARRHEA: 1
VOMITING: 0
DIFFICULTY BREATHING: 0
VOICE CHANGE: 0
SHORTNESS OF BREATH: 0
TROUBLE SWALLOWING: 0
ABDOMINAL PAIN: 1
BOWEL INCONTINENCE: 0
COUGH: 0
CHOKING: 0
CHEST TIGHTNESS: 0
ANAL BLEEDING: 1
WHEEZING: 0
HEMATOCHEZIA: 1
CONSTIPATION: 0
BLOOD IN STOOL: 1
BACK PAIN: 1
RECTAL PAIN: 1
HEMATEMESIS: 0
NAUSEA: 0
ABDOMINAL DISTENTION: 0

## 2019-03-18 ENCOUNTER — TELEPHONE (OUTPATIENT)
Dept: FAMILY MEDICINE CLINIC | Age: 44
End: 2019-03-18

## 2019-03-20 ENCOUNTER — HOSPITAL ENCOUNTER (OUTPATIENT)
Dept: MRI IMAGING | Age: 44
Discharge: HOME | End: 2019-03-22
Payer: MEDICAID

## 2019-03-20 ENCOUNTER — TELEPHONE (OUTPATIENT)
Dept: PHARMACY | Age: 44
End: 2019-03-20

## 2019-03-20 DIAGNOSIS — I82.412 ACUTE DEEP VEIN THROMBOSIS (DVT) OF FEMORAL VEIN OF LEFT LOWER EXTREMITY (HCC): ICD-10-CM

## 2019-03-20 DIAGNOSIS — M62.838 MUSCLE SPASM: ICD-10-CM

## 2019-03-20 DIAGNOSIS — M48.07 SPINAL STENOSIS OF LUMBOSACRAL REGION: ICD-10-CM

## 2019-03-20 PROCEDURE — 72148 MRI LUMBAR SPINE W/O DYE: CPT

## 2019-03-20 NOTE — TELEPHONE ENCOUNTER
Patient was a no call/no show for appointment today. Attempted to call patient, rang twice, went to , but mailbox was full. I was unable to leave a message for the patient.

## 2019-03-22 DIAGNOSIS — G89.29 CHRONIC BILATERAL LOW BACK PAIN WITH BILATERAL SCIATICA: ICD-10-CM

## 2019-03-22 DIAGNOSIS — Z98.1 HISTORY OF LUMBAR FUSION: Primary | ICD-10-CM

## 2019-03-22 DIAGNOSIS — M54.42 CHRONIC BILATERAL LOW BACK PAIN WITH BILATERAL SCIATICA: ICD-10-CM

## 2019-03-22 DIAGNOSIS — M54.41 CHRONIC BILATERAL LOW BACK PAIN WITH BILATERAL SCIATICA: ICD-10-CM

## 2019-03-22 DIAGNOSIS — M51.36 L4-L5 DISC BULGE: ICD-10-CM

## 2019-03-26 DIAGNOSIS — M79.641 RIGHT HAND PAIN: Primary | ICD-10-CM

## 2019-03-27 ENCOUNTER — OFFICE VISIT (OUTPATIENT)
Dept: ORTHOPEDIC SURGERY | Age: 44
End: 2019-03-27
Payer: MEDICAID

## 2019-03-27 VITALS — WEIGHT: 255.95 LBS | BODY MASS INDEX: 34.67 KG/M2 | HEIGHT: 72 IN

## 2019-03-27 DIAGNOSIS — M25.641 FINGER STIFFNESS, RIGHT: Primary | ICD-10-CM

## 2019-03-27 PROCEDURE — 99213 OFFICE O/P EST LOW 20 MIN: CPT | Performed by: ORTHOPAEDIC SURGERY

## 2019-03-27 ASSESSMENT — ENCOUNTER SYMPTOMS
NAUSEA: 0
DIARRHEA: 0
SHORTNESS OF BREATH: 0
ABDOMINAL PAIN: 0
CHEST TIGHTNESS: 0

## 2019-03-28 ENCOUNTER — TELEPHONE (OUTPATIENT)
Dept: FAMILY MEDICINE CLINIC | Age: 44
End: 2019-03-28

## 2019-03-28 DIAGNOSIS — G89.29 CHRONIC BILATERAL LOW BACK PAIN WITH BILATERAL SCIATICA: ICD-10-CM

## 2019-03-28 DIAGNOSIS — M54.41 CHRONIC BILATERAL LOW BACK PAIN WITH BILATERAL SCIATICA: ICD-10-CM

## 2019-03-28 DIAGNOSIS — M51.36 L4-L5 DISC BULGE: Primary | ICD-10-CM

## 2019-03-28 DIAGNOSIS — M54.42 CHRONIC BILATERAL LOW BACK PAIN WITH BILATERAL SCIATICA: ICD-10-CM

## 2019-03-28 NOTE — TELEPHONE ENCOUNTER
patient does not see pain management until april 8 the and he is wondering if you will fill for some pain meds until then. The cymbalta does not help with that  Will you rx for pain med. his back is killing him. He has the bulging disc.     [phone]

## 2019-03-29 RX ORDER — TRAMADOL HYDROCHLORIDE 50 MG/1
50 TABLET ORAL EVERY 8 HOURS PRN
Qty: 21 TABLET | Refills: 0 | Status: SHIPPED | OUTPATIENT
Start: 2019-03-29 | End: 2019-04-09 | Stop reason: SDUPTHER

## 2019-03-29 NOTE — TELEPHONE ENCOUNTER
Patient is calling again, requesting something for pain. He had to leave work last night because he couldn't stand all night.

## 2019-04-09 ENCOUNTER — HOSPITAL ENCOUNTER (OUTPATIENT)
Dept: PAIN MANAGEMENT | Age: 44
Discharge: HOME | End: 2019-04-09
Payer: MEDICAID

## 2019-04-09 ENCOUNTER — OFFICE VISIT (OUTPATIENT)
Dept: FAMILY MEDICINE CLINIC | Age: 44
End: 2019-04-09
Payer: MEDICAID

## 2019-04-09 VITALS
SYSTOLIC BLOOD PRESSURE: 123 MMHG | HEART RATE: 80 BPM | DIASTOLIC BLOOD PRESSURE: 89 MMHG | TEMPERATURE: 98.4 F | OXYGEN SATURATION: 98 % | BODY MASS INDEX: 33.86 KG/M2 | WEIGHT: 250 LBS | HEIGHT: 72 IN | RESPIRATION RATE: 16 BRPM

## 2019-04-09 VITALS
HEART RATE: 86 BPM | TEMPERATURE: 97.5 F | WEIGHT: 250.4 LBS | BODY MASS INDEX: 33.92 KG/M2 | OXYGEN SATURATION: 95 % | RESPIRATION RATE: 16 BRPM | SYSTOLIC BLOOD PRESSURE: 134 MMHG | DIASTOLIC BLOOD PRESSURE: 88 MMHG | HEIGHT: 72 IN

## 2019-04-09 DIAGNOSIS — M47.817 LUMBOSACRAL SPONDYLOSIS WITHOUT MYELOPATHY: Primary | ICD-10-CM

## 2019-04-09 DIAGNOSIS — M54.16 LUMBAR RADICULOPATHY: ICD-10-CM

## 2019-04-09 DIAGNOSIS — Z98.1 S/P LUMBAR FUSION: ICD-10-CM

## 2019-04-09 DIAGNOSIS — M51.36 L4-L5 DISC BULGE: ICD-10-CM

## 2019-04-09 DIAGNOSIS — M54.42 CHRONIC BILATERAL LOW BACK PAIN WITH BILATERAL SCIATICA: ICD-10-CM

## 2019-04-09 DIAGNOSIS — M51.36 DDD (DEGENERATIVE DISC DISEASE), LUMBAR: ICD-10-CM

## 2019-04-09 DIAGNOSIS — M54.41 CHRONIC BILATERAL LOW BACK PAIN WITH BILATERAL SCIATICA: ICD-10-CM

## 2019-04-09 DIAGNOSIS — G89.29 CHRONIC BILATERAL LOW BACK PAIN WITH BILATERAL SCIATICA: ICD-10-CM

## 2019-04-09 PROCEDURE — 80307 DRUG TEST PRSMV CHEM ANLYZR: CPT

## 2019-04-09 PROCEDURE — 99244 OFF/OP CNSLTJ NEW/EST MOD 40: CPT | Performed by: PAIN MEDICINE

## 2019-04-09 PROCEDURE — 1036F TOBACCO NON-USER: CPT | Performed by: INTERNAL MEDICINE

## 2019-04-09 PROCEDURE — 99213 OFFICE O/P EST LOW 20 MIN: CPT | Performed by: INTERNAL MEDICINE

## 2019-04-09 PROCEDURE — G0463 HOSPITAL OUTPT CLINIC VISIT: HCPCS

## 2019-04-09 PROCEDURE — G8417 CALC BMI ABV UP PARAM F/U: HCPCS | Performed by: INTERNAL MEDICINE

## 2019-04-09 PROCEDURE — G8427 DOCREV CUR MEDS BY ELIG CLIN: HCPCS | Performed by: INTERNAL MEDICINE

## 2019-04-09 PROCEDURE — 99203 OFFICE O/P NEW LOW 30 MIN: CPT

## 2019-04-09 RX ORDER — TRAMADOL HYDROCHLORIDE 50 MG/1
50 TABLET ORAL EVERY 8 HOURS PRN
Qty: 21 TABLET | Refills: 0 | Status: SHIPPED | OUTPATIENT
Start: 2019-04-09 | End: 2019-04-16

## 2019-04-09 RX ORDER — DULOXETIN HYDROCHLORIDE 60 MG/1
60 CAPSULE, DELAYED RELEASE ORAL DAILY
Qty: 30 CAPSULE | Refills: 3 | Status: SHIPPED | OUTPATIENT
Start: 2019-04-09 | End: 2019-06-05

## 2019-04-09 ASSESSMENT — ENCOUNTER SYMPTOMS
COLOR CHANGE: 0
ABDOMINAL PAIN: 0
BACK PAIN: 1
RESPIRATORY NEGATIVE: 1
DIARRHEA: 1
SHORTNESS OF BREATH: 0
SINUS PRESSURE: 0
CHEST TIGHTNESS: 0
NAUSEA: 0
EYES NEGATIVE: 1
APNEA: 0
BLOOD IN STOOL: 0
ALLERGIC/IMMUNOLOGIC NEGATIVE: 1
COUGH: 0
CONSTIPATION: 0

## 2019-04-09 ASSESSMENT — PAIN DESCRIPTION - PROGRESSION: CLINICAL_PROGRESSION: GRADUALLY WORSENING

## 2019-04-09 ASSESSMENT — PAIN DESCRIPTION - ORIENTATION: ORIENTATION: RIGHT;LEFT;LOWER

## 2019-04-09 ASSESSMENT — PAIN DESCRIPTION - LOCATION: LOCATION: BACK;LEG

## 2019-04-09 ASSESSMENT — PAIN DESCRIPTION - ONSET: ONSET: ON-GOING

## 2019-04-09 ASSESSMENT — PAIN DESCRIPTION - DIRECTION: RADIATING_TOWARDS: DOWN BOTH LEGS

## 2019-04-09 ASSESSMENT — PAIN SCALES - GENERAL: PAINLEVEL_OUTOF10: 4

## 2019-04-09 ASSESSMENT — PAIN DESCRIPTION - FREQUENCY: FREQUENCY: CONTINUOUS

## 2019-04-09 ASSESSMENT — PAIN DESCRIPTION - PAIN TYPE: TYPE: CHRONIC PAIN

## 2019-04-09 NOTE — PROGRESS NOTES
Visit Information    Have you changed or started any medications since your last visit including any over-the-counter medicines, vitamins, or herbal medicines? no   Are you having any side effects from any of your medications? -  no  Have you stopped taking any of your medications? Is so, why? -  no    Have you seen any other physician or provider since your last visit? No  Have you had any other diagnostic tests since your last visit? No  Have you been seen in the emergency room and/or had an admission to a hospital since we last saw you? No  Have you had your routine dental cleaning in the past 6 months? no    Have you activated your [employer] account? If not, what are your barriers?  Yes     Patient Care Team:  Serg Galicia DO as PCP - General (Family Medicine)    Medical History Review  Past Medical, Family, and Social History reviewed and does contribute to the patient presenting condition    Health Maintenance   Topic Date Due    HIV screen  08/11/1989    Lipid screen  08/11/2014    DTaP/Tdap/Td vaccine (2 - Td) 01/21/2029    Flu vaccine  Completed    Pneumococcal 0-64 years Vaccine  Aged Out

## 2019-04-09 NOTE — PROGRESS NOTES
[facility]  Patient Pain Assessment  Consultation - Dr. Horace Harris    Primary Care Physician: Edward Nina DO    Chief complaint:   Chief Complaint   Patient presents with    Lower Back Pain   . HISTORY OF PRESENT ILLNESS:  Mallika Meza is 39 y.o. male referred to the pain clinic in consultation for low back pain by Kajal Harris DO    Patient is a 48-year-old male who is referred to the pain clinic with a history of low back pain since 2003. Patient reports he fell off a ladder in 2003 and hit a post with his back. Since then he had back pain. He reports initially didn't want surgery. He was seen by Dr. Aiyana Miller and had some injections and RFA. According the patient he was being seen by Dr. Aiyana Miller for a long time and had a change in the insurance and hence he went to [facility] clinic where he was in pain management and apparently had some injections and RFA. He also reports he is changing insurance and he was seen by Dr. Kezia Davis in [place] 2-3 times. He reports they were discussing pain pump but he reports his he lost his insurance and stopped going there. He reports he got insurance now and his referred to this pain clinic by his physician. Patient also reports he had a lumbar fusion surgery in 2015 at L5-S1 but continued to have pain. Patient's pain is decreased a certain extent with the use of heat, ice pack and rest.  Patient medical history is also significant for DVT and is on Eliquis  Patient also relates he uses THC on a daily basis for pain and sleep-he also has a history of sleep apnea and uses CPAP at night    Back Pain   This is a chronic problem. The current episode started more than 1 year ago. The problem occurs constantly. The problem has been gradually worsening since onset. The pain is present in the lumbar spine and sacro-iliac (worse on rt. side). The quality of the pain is described as aching (pressure, throbbing).  The pain radiates to the right thigh and Multiple Vitamins-Minerals (MENS ONE DAILY PO) Take 1 tablet by mouth daily       No current facility-administered medications for this encounter. Allergies  Augmentin [amoxicillin-pot clavulanate]    Family History  family history includes Bipolar Disorder in his mother; Heart Disease in his mother; No Known Problems in his father. Social History  Social History     Socioeconomic History    Marital status:      Spouse name: None    Number of children: None    Years of education: None    Highest education level: None   Occupational History    None   Social Needs    Financial resource strain: None    Food insecurity:     Worry: None     Inability: None    Transportation needs:     Medical: None     Non-medical: None   Tobacco Use    Smoking status: Former Smoker    Smokeless tobacco: Never Used   Substance and Sexual Activity    Alcohol use: Yes     Comment: social    Drug use: Yes     Types: Marijuana     Comment: last night    Sexual activity: None   Lifestyle    Physical activity:     Days per week: None     Minutes per session: None    Stress: None   Relationships    Social connections:     Talks on phone: None     Gets together: None     Attends Scientologist service: None     Active member of club or organization: None     Attends meetings of clubs or organizations: None     Relationship status: None    Intimate partner violence:     Fear of current or ex partner: None     Emotionally abused: None     Physically abused: None     Forced sexual activity: None   Other Topics Concern    None   Social History Narrative    None      reports that he has current or past drug history. Drug: Marijuana. REVIEW OF SYSTEMS:  Review of Systems   Constitutional: Negative. Negative for activity change, chills, fatigue, fever and unexpected weight change. HENT: Negative. Negative for congestion, ear pain, hearing loss, sinus pressure and tinnitus. Eyes: Negative.     Respiratory: Negative. Negative for apnea, cough, chest tightness and shortness of breath. Cardiovascular: Negative. DVT x2    Gastrointestinal: Positive for diarrhea. Negative for abdominal pain, blood in stool, constipation and nausea. H/ Ventral hernia   Endocrine: Negative. Negative for heat intolerance and polyphagia. Genitourinary: Positive for frequency. Negative for dysuria and hematuria. Musculoskeletal: Positive for back pain. Negative for arthralgias and myalgias. Skin: Negative. Negative for color change and pallor. Allergic/Immunologic: Negative. Neurological: Positive for weakness and numbness. Negative for headaches. Both legs   Hematological: Bruises/bleeds easily. Eliquis for DVT in leg   Psychiatric/Behavioral: Positive for sleep disturbance. The patient is not nervous/anxious. H/ Depression            GENERAL PHYSICAL EXAM:  Vitals: /89   Pulse 80   Temp 98.4 °F (36.9 °C) (Oral)   Resp 16   Ht 6' (1.829 m)   Wt 250 lb (113.4 kg)   SpO2 98%   BMI 33.91 kg/m² , Body mass index is 33.91 kg/m². Physical Exam   Constitutional: He is oriented to person, place, and time. He appears well-developed and well-nourished. HENT:   Head: Normocephalic and atraumatic. Eyes: Pupils are equal, round, and reactive to light. Conjunctivae and EOM are normal.   Neck: Normal range of motion. Neck supple. No tracheal deviation present. No thyromegaly present. Cardiovascular: Normal rate and regular rhythm. Pulmonary/Chest: Effort normal and breath sounds normal.   Abdominal: Soft. Bowel sounds are normal. He exhibits no distension. There is no tenderness. Musculoskeletal: He exhibits no edema. Neurological: He is alert and oriented to person, place, and time. He has normal reflexes. He displays no atrophy and no tremor. No cranial nerve deficit or sensory deficit. He exhibits normal muscle tone.  Coordination normal.   Reflex Scores:       Patellar reflexes are 2+ on the right side and 2+ on the left side. Achilles reflexes are 2+ on the right side and 2+ on the left side. Skin: Skin is warm and dry. Psychiatric: He has a normal mood and affect. His speech is normal and behavior is normal. Judgment and thought content normal. Cognition and memory are normal.    Right Ankle Exam     Muscle Strength   The patient has normal right ankle strength. Left Ankle Exam     Muscle Strength   The patient has normal left ankle strength. Right Knee Exam     Muscle Strength   The patient has normal right knee strength. Left Knee Exam     Muscle Strength   The patient has normal left knee strength. Right Hip Exam     Muscle Strength   The patient has normal right hip strength. Left Hip Exam     Muscle Strength   The patient has normal left hip strength. Back Exam     Tenderness   The patient is experiencing tenderness in the lumbar and sacroiliac. Range of Motion   Back extension: Limited and painful. Back flexion: Limited and painful. Back lateral bend right: Limited and painful. Back lateral bend left: Limited and painful. Back rotation right: Limited and painful. Back rotation left: Limited and painful. Tests   Straight leg raise right: positiveRight straight leg raise test: Mostly pain in the hamstrings. Straight leg raise left: positiveLeft straight leg raise test: Mostly pain in the hamstrings.     Other   Sensation: normal  Gait: antalgic   Erythema: no back redness    Comments:  Skin --normal appearance  Surgical Scar --Presentanterior  kyphosis absent and scoliosis absent  Alignment of her shoulders, scapulae and iliac crests--symmetric  Paraspinal tenderness:Present  Lumbar lordosis-----------Normal  Movements of the lumbar spine indicated above are diminished range with pain   Facet loading---  : Right side--    Pain-Moderate                                   Left side----   Pain  Moderate  Deyvi's test -------------- Right side---negative                                           Left side-----negative            Nurses Notes and Vital Signs reviewed. DATA  Labs:   1/21/2019 12:36 PM - [place] Incoming Lab Results From [facility]     Component Value Ref Range & Units Status Collected Lab   WBC 11. 2High   3.5 - 11.0 k/uL Final 01/21/2019 12:33  [facility]   RBC 5.51  4.5 - 5.9 m/uL Final 01/21/2019 12:33  [facility]   Hemoglobin 15.9  13.5 - 17.5 g/dL Final 01/21/2019 12:33  [facility]   Hematocrit 47.1  41 - 53 % Final 01/21/2019 12:33 PM [facility] DR [facility]ODEN[facility]   MCV 85.5  80 - 100 fL Final 01/21/2019 12:33  [facility]   [facility] 28.8  26 - 34 pg Final 01/21/2019 12:33  [facility]   MCHC 33.7  31 - 37 g/dL Final 01/21/2019 12:33  [facility]   RDW 13.7  11.5 - 14.5 % Final 01/21/2019 12:33  [facility]   Platelets 961  120 - 400 k/uL Final 01/21/2019 12:33  [facility]   MPV 6.9  6.0 - 12.0 fL Final 01/21/2019 12:33  [facility]   [facility] Automated NOT REPORTED  per 100 WBC Final 01/21/2019 12:33  [facility]   Differential Type NOT REPORTED   Final 01/21/2019 12:33  [facility]   Seg Neutrophils 70High   36 - 66 % Final 01/21/2019 12:33  [facility]   Lymphocytes 21Low   24 - 44 % Final 01/21/2019 12:33  [facility]   Monocytes 6  1 - 7 % Final 01/21/2019 12:33  [facility]   Eosinophils % 2  1 - 4 % Final 01/21/2019 12:33  [facility]   Basophils 1  0 - 2 % Final 01/21/2019 12:33  [facility]   Immature Granulocytes NOT REPORTED  0 % Final 01/21/2019 12:33 PM [place] Absolute 7. 90High   1.8 - 7.7 k/uL Final 01/21/2019 12:33  [facility]   Absolute Lymph # 2.30  1.0 - 4.8 k/uL Final 01/21/2019 12:33 PM [address] [facility]   Absolute Mono # 0.70  0.2 - 0.8 k/uL Final 01/21/2019 12:33  [facility]   Absolute Eos # 0.20  0.0 - 0.4 k/uL Final 01/21/2019 12:33  [facility]   Basophils # 0.10  0.0 - 0.2 k/uL Final 01/21/2019 12:33  [facility]   Absolute Immature Granulocyte NOT REPORTED  0.00 - 0.30 k/uL Final 01/21/2019 12:33  [facility]   WBC Morphology NOT REPORTED   Final 01/21/2019 12:33  [facility]   [facility] Morphology NOT REPORTED   Final 01/21/2019 12:33  [facility]   Platelet Estimate NOT REPORTED   Final 01/21/2019 12:33  [facility]   Testing Performed By         Imaging:  Radiology Images and Reports reviewed where indicated and necessary  FINDINGS:   BONES/ALIGNMENT: There is a normal lumbar lordosis. Assuming that the level   of intervertebral body fusion represents L5-S1, the conus medullaris ends at   L1 and is within normal limits. There is disc desiccation with mild loss of   disc height at L4-5. No fracture or traumatic subluxation is identified. SPINAL CORD: The conus terminates normally. SOFT TISSUES: No paraspinal mass identified. L1-L2: There is no significant disc herniation, spinal canal stenosis or   neural foraminal narrowing. L2-L3: There is no significant disc herniation, spinal canal stenosis or   neural foraminal narrowing. L3-L4: There is no significant disc herniation, spinal canal stenosis or   neural foraminal narrowing. L4-L5: Disc bulge with a superimposed shallow 2-3 mm central disc protrusion   resulting in mild narrowing of the thecal sac. There is minimal narrowing of   both the neural foramen. L5-S1: There is no significant disc herniation, spinal canal stenosis or   neural foraminal narrowing. Impression   Mild degenerative canal and neural foraminal stenosis at L4-5. Status post L5-S1 fusion.         CT of the abdomen 11/7/2018:  No acute process within the abdomen or pelvis.       Mild thickening of the distal esophagus likely represent changes related to   reflux disease.       Small fat containing periumbilical hernia.       Mild sigmoid colon diverticulosis without evidence of acute diverticulitis.       Anterior lumbar fusion at L5-S1. Patient Active Problem List   Diagnosis    Unspecified acute appendicitis    Major depressive disorder with current active episode    Obesity (BMI 30-39. 9)    Acute deep vein thrombosis (DVT) of femoral vein of left lower extremity ([address]    Sigmoid diverticulosis        ASSESSMENT    Aria Moise is a 39 y.o. male with     1. Lumbosacral spondylosis without myelopathy    2. DDD (degenerative disc disease), lumbar    3. Lumbar radiculopathy    4. S/P lumbar fusion           PLAN  Patient's   [] x-ray    [x] CT scan    [x] MRI  Were/was  Reviewed. These findings are consistent with the patient's symptoms and physical examination. [] Patient's findings on the x-ray were explained to the patient using a bone modal.    Other reports reviewed include    [] Bone scan   [] EMG and nerve conduction studies   [x] Referral reports-  I also discussed with him the following treatment options Including advantages and disadvantages of each:    [x] Physical therapy     Interventional pain treatment    [x][] Medication management    [] Surgical options    Patient's OARRS were reviewed. It is acceptable and appears patient is not receiving prescriptions from multiple prescribers. Patient is  forthcoming regarding prescriptions for pain medication in the past  Controlled Substances Monitoring: Attestation: The Prescription Monitoring Report for this patient was reviewed today. (Margoth Grullon MD)  Chronic Pain Routine Monitoring: Random urine drug screen sent today. (Margoth Grullon MD)    The following screens were also reviewed  SOAPP- the score is 5.  (Values:cates patient is  <4minimal potential  4-7 Moderate potential  >7 High potential  for drug addiction  Counselling/Preventive measures for pain  Control:    [x]  Spine strengthening exercises are discussed with patient in detail. Patient is counseled on importance of exercise and,core strengthening. Some  specific exercises to strengthen the abdominal muscles and low back muscles Were discussed. Also aquatic (water) physical therapy and benefits were explained to patient. including \" Water supports the body and minimizes the effect of gravity, making it easier for patients to start an exercise program.\"   The following important principles were discussed with patient:  1. Limit Bed Rest--Studies show that people with short-term low-back pain who rest feel more pain and have a harder time with daily tasks than those who stay active. 2. Keep Exercising-patient is advised to stay away from strenuous activities like gardening and avoid whatever motion caused the pain in the first place.   3. Maintain Good Posture-Exercises  to maintain good posture were shown to patient. 4. To do exercises learned in PT regularly. [x]Information (handout) on exercise was  given to patient. Orders Placed This Encounter   Procedures    DRUG SCREEN, PAIN     Standing Status:   Standing     Number of Occurrences:   1     It appears patient had been to several pain clinics in the past. We need to review the patient's charts from these clinics. I also did a urine screen on him today to check for drug compliance. Patient gives history of using THC on a regular basis. Once I get this patient's charts and reviewed them and also review the urine screen and we decided whether we can accept him as a patient are not. This was discussed with the patient. We will call the patient's after reviewing the charts regarding the decision and follow-up.   Decision Making Process : Patient's health history and referral records thoroughly reviewed before focused physical examination and discussion with patient. Over 50% of today's visit is spent on examining the patient and counseling. Level of complexity of date to be reviewed is Moderate. The chart date reviewed include the following: Imaging Reports. Summary of Care. Time spent reviewing with patient the below reports:   Medication safety, Treatment options. Level of diagnosis and management options of this case is multiple: involving the following management options: Interventions as needed, medication management as appropriate, future visits, activity modification, heat/ice as needed, Urine drug screen as required. [x]The patient's questions were answered to the best of my abilities. This note was created using voice recognition software. There may be inaccuracies of transcription  that are inadvertently overlooked prior to the signature. There is any questions about the transcription please contact me.     Electronically signed by Saturnino Silva MD on 4/10/2019 at 4:38 AM

## 2019-04-10 ENCOUNTER — TELEPHONE (OUTPATIENT)
Dept: FAMILY MEDICINE CLINIC | Age: 44
End: 2019-04-10

## 2019-04-10 ASSESSMENT — ENCOUNTER SYMPTOMS
BOWEL INCONTINENCE: 0
BACK PAIN: 1

## 2019-04-10 NOTE — PROGRESS NOTES
[address] [facility] [address]  Dept: [phone]  Dept Fax: [phone]    Nilda Patino a 39 y.o. male who presents today for his medical conditions/complaints as notedbelowBoo Yeager Itzel is c/o of   Chief Complaint   Patient presents with    Hand Injury     pt did see ortho and has physical therapy set up     Back Pain     pt will see pain management today        HPI:     Here for follow up   Seen by ortho for hand , recommended PT/OT . Has referral ( which I believe I game him as well originally for OT 4 weeks ago) has appt scheduled/has not started   Tried the cymbalta for back /pain states he did not really notice a difference but only on th 30 mg   We sent tramadol when he called in stating nothing was working , [facility] said they never received It even though in med list it states receipt confirmed by tez 3/29     Was never contacted by GI   Med I sent to help with this was denied by insurance e  Still has rectal bleeding daily       Hand Injury    The incident occurred more than 1 week ago. The incident occurred at home. The pain is present in the right hand and right fingers. The quality of the pain is described as aching and cramping. The pain does not radiate. The pain is moderate. Associated symptoms include muscle weakness and tingling. Pertinent negatives include no chest pain. He has tried acetaminophen, NSAIDs and rest for the symptoms. The treatment provided mild relief. Back Pain   This is a chronic problem. The current episode started more than 1 year ago. The problem occurs constantly. The problem has been gradually worsening since onset. The pain is present in the lumbar spine and sacro-iliac. The quality of the pain is described as aching, cramping and shooting. The pain does not radiate. The pain is at a severity of 9/10. The symptoms are aggravated by lying down and position.  Stiffness is present in the morning. Associated symptoms include leg pain, paresthesias and tingling. Pertinent negatives include no bladder incontinence, bowel incontinence, chest pain, dysuria, fever, pelvic pain or perianal numbness. No results found for: LABA1C      ( goal A1C is < 7)   No results found for: LABMICR  No results found for: LDLCHOLESTEROL, LDLCALC    (goal LDL is <100)   AST (U/L)   Date Value   11/07/2018 15     ALT (U/L)   Date Value   11/07/2018 16     BUN (mg/dL)   Date Value   11/07/2018 17     BP Readings from Last 3 Encounters:   04/09/19 123/89   04/09/19 134/88   03/13/19 126/82          (goal 120/80)    Past Medical History:   Diagnosis Date    Anxiety     Diverticulitis     Dvt femoral (deep venous thrombosis) (HCC)       Past Surgical History:   Procedure Laterality Date    ANKLE SURGERY Left     APPENDECTOMY      BACK SURGERY      with hardware    LAPAROSCOPIC APPENDECTOMY  04/26/2017    LAPAROSCOPIC APPENDECTOMY N/A 4/26/2017    APPENDECTOMY LAPAROSCOPIC performed by Carlos Ruth MD at [address] [place] [address] Bilateral        Family History   Problem Relation Age of Onset    Heart Disease Mother         Valvular    Bipolar Disorder Mother     No Known Problems Father        Social History     Tobacco Use    Smoking status: Former Smoker    Smokeless tobacco: Never Used   Substance Use Topics    Alcohol use: Yes     Comment: social      Current Outpatient Medications   Medication Sig Dispense Refill    hydrocortisone (ANUSOL-HC) 2.5 % rectal cream Place rectally 2 times daily. 1 Tube 1    DULoxetine (CYMBALTA) 60 MG extended release capsule Take 1 capsule by mouth daily 30 capsule 3    traMADol (ULTRAM) 50 MG tablet Take 1 tablet by mouth every 8 hours as needed for Pain for up to 7 days.  21 tablet 0    vitamin D (ERGOCALCIFEROL) 09584 units CAPS capsule Take 1 capsule by mouth once a week 12 capsule 0    methocarbamol (ROBAXIN) 500 MG tablet Take 1 tablet by mouth 4 times daily as needed (muscle spasm) 60 tablet 3    ondansetron (ZOFRAN ODT) 4 MG disintegrating tablet Take 1 tablet by mouth every 8 hours as needed for Nausea or Vomiting 20 tablet 0    apixaban (ELIQUIS) 5 MG TABS tablet 2 tablets twice daily for 7 days then 1 tablet twice daily 60 tablet 5    Multiple Vitamins-Minerals (MENS ONE DAILY PO) Take 1 tablet by mouth daily       No current facility-administered medications for this visit. Allergies   Allergen Reactions    Augmentin [Amoxicillin-Pot Clavulanate] Rash          Health Maintenance   Topic Date Due    HIV screen  08/11/1989    Lipid screen  08/11/2014    DTaP/Tdap/Td vaccine (2 - Td) 01/21/2029    Flu vaccine  Completed    Pneumococcal 0-64 years Vaccine  Aged Out       Subjective:     Review of Systems   Constitutional: Negative for fever. Cardiovascular: Negative for chest pain. Gastrointestinal: Negative for bowel incontinence. Genitourinary: Negative for bladder incontinence, dysuria and pelvic pain. Musculoskeletal: Positive for back pain. Neurological: Positive for tingling and paresthesias. Objective:      Physical Exam  /88 (Site: Right Upper Arm, Position: Sitting, Cuff Size: Medium Adult)   Pulse 86   Temp 97.5 °F (36.4 °C) (Tympanic)   Resp 16   Ht 6' (1.829 m)   Wt 250 lb 6.4 oz (113.6 kg)   SpO2 95%   BMI 33.96 kg/m²     Assessment:       Diagnosis Orders   1. L4-L5 disc bulge  traMADol (ULTRAM) 50 MG tablet   2. Chronic bilateral low back pain with bilateral sciatica  traMADol (ULTRAM) 50 MG tablet             Plan:       Return if symptoms worsen or fail to improve. No orders of the defined types were placed in this encounter. Orders Placed This Encounter   Medications    hydrocortisone (ANUSOL-HC) 2.5 % rectal cream     Sig: Place rectally 2 times daily.      Dispense:  1 Tube     Refill:  1    DULoxetine (CYMBALTA) 60 MG extended release capsule     Sig: Take 1 capsule by mouth daily     Dispense:  30 capsule     Refill:  3    traMADol (ULTRAM) 50 MG tablet     Sig: Take 1 tablet by mouth every 8 hours as needed for Pain for up to 7 days. Dispense:  21 tablet     Refill:  0     Reduce doses taken as pain becomes manageable    Controlled substances monitoring: possible medication side effects, risk of tolerance and/or dependence, and alternative treatments discussed, no signs of potential drug abuse or diversion identified and OARRS report reviewed today- activity consistent with treatment plan. Re-send tramadol , only given one week though as reviewed we do not do these medications chornically  Continue muscle relaxant at bedtime. Sees pain management today   Follow up with other specialists as scheduled and begin OT. Will try to send in different rectal cream  Given phone number for GI as well , will re-fax referral as well . Call with questions or concerns   Increase cymbalta to 60 mg but if no help with sxs in 4 weeks can stop. Patientgiven educational materials - see patient instructions. Discussed use, benefit,and side effects of prescribed medications. All patient questions answered. Ptvoiced understanding. Reviewed health maintenance. Instructed to continue currentmedications, diet and exercise. Patient agreed with treatment plan. Follow up asdirected.      Electronically signed by Myra Gould DO on 4/10/2019 at 1:10 PM

## 2019-04-11 ENCOUNTER — TELEPHONE (OUTPATIENT)
Dept: GASTROENTEROLOGY | Age: 44
End: 2019-04-11

## 2019-04-11 NOTE — TELEPHONE ENCOUNTER
Patient informed that we need referral faxed over to our office. Patient verbalized understanding and will call PCP.

## 2019-04-13 LAB
6-ACETYLMORPHINE, UR: NOT DETECTED
7-AMINOCLONAZEPAM, URINE: NOT DETECTED
ALPHA-OH-ALPRAZ, URINE: NOT DETECTED
ALPRAZOLAM, URINE: NOT DETECTED
AMPHETAMINES, URINE: NOT DETECTED
BARBITURATES, URINE: NOT DETECTED
BENZOYLECGONINE, UR: NOT DETECTED
BUPRENORPHINE URINE: NOT DETECTED
CARISOPRODOL, UR: NOT DETECTED
CLONAZEPAM, URINE: NOT DETECTED
CODEINE, URINE: NOT DETECTED
CREATININE URINE: 124 MG/DL (ref 20–400)
DIAZEPAM, URINE: NOT DETECTED
DRUGS EXPECTED, UR: NORMAL
EER HI RES INTERP UR: NORMAL
ETHYL GLUCURONIDE UR: NOT DETECTED
FENTANYL URINE: NOT DETECTED
HYDROCODONE, URINE: NOT DETECTED
HYDROMORPHONE, URINE: NOT DETECTED
LORAZEPAM, URINE: NOT DETECTED
MARIJUANA METAB, UR: PRESENT
MDA, UR: NOT DETECTED
MDEA, EVE, UR: NOT DETECTED
MDMA URINE: NOT DETECTED
MEPERIDINE METAB, UR: NOT DETECTED
METHADONE, URINE: NOT DETECTED
METHAMPHETAMINE, URINE: NOT DETECTED
METHYLPHENIDATE: NOT DETECTED
MIDAZOLAM, URINE: NOT DETECTED
MORPHINE URINE: NOT DETECTED
NORBUPRENORPHINE, URINE: NOT DETECTED
NORDIAZEPAM, URINE: NOT DETECTED
NORFENTANYL, URINE: NOT DETECTED
NORHYDROCODONE, URINE: NOT DETECTED
NOROXYCODONE, URINE: NOT DETECTED
NOROXYMORPHONE, URINE: NOT DETECTED
OXAZEPAM, URINE: NOT DETECTED
OXYCODONE URINE: NOT DETECTED
OXYMORPHONE, URINE: NOT DETECTED
PAIN MANAGEMENT DRUG PANEL INTERP, URINE: NORMAL
PAIN MGT DRUG PANEL, HI RES, UR: NORMAL
PCP,URINE: NOT DETECTED
PHENTERMINE, UR: NOT DETECTED
PROPOXYPHENE, URINE: NOT DETECTED
TAPENTADOL, URINE: NOT DETECTED
TAPENTADOL-O-SULFATE, URINE: NOT DETECTED
TEMAZEPAM, URINE: NOT DETECTED
TRAMADOL, URINE: NOT DETECTED
ZOLPIDEM, URINE: NOT DETECTED

## 2019-04-23 NOTE — TELEPHONE ENCOUNTER
Tried calling pt to schedule Eliquis follow up appt and verify whether pt is still taking this med (started in Sept 2018 for provoked clot). Phone rang but couldn't leave message.

## 2019-05-09 ENCOUNTER — OFFICE VISIT (OUTPATIENT)
Dept: FAMILY MEDICINE CLINIC | Age: 44
End: 2019-05-09
Payer: MEDICAID

## 2019-05-09 VITALS
WEIGHT: 252 LBS | HEART RATE: 78 BPM | DIASTOLIC BLOOD PRESSURE: 78 MMHG | BODY MASS INDEX: 34.18 KG/M2 | TEMPERATURE: 98.4 F | RESPIRATION RATE: 20 BRPM | SYSTOLIC BLOOD PRESSURE: 116 MMHG | OXYGEN SATURATION: 97 %

## 2019-05-09 DIAGNOSIS — J40 BRONCHITIS: Primary | ICD-10-CM

## 2019-05-09 PROCEDURE — 99214 OFFICE O/P EST MOD 30 MIN: CPT | Performed by: NURSE PRACTITIONER

## 2019-05-09 PROCEDURE — G8417 CALC BMI ABV UP PARAM F/U: HCPCS | Performed by: NURSE PRACTITIONER

## 2019-05-09 PROCEDURE — 1036F TOBACCO NON-USER: CPT | Performed by: NURSE PRACTITIONER

## 2019-05-09 PROCEDURE — G8427 DOCREV CUR MEDS BY ELIG CLIN: HCPCS | Performed by: NURSE PRACTITIONER

## 2019-05-09 RX ORDER — AZITHROMYCIN 250 MG/1
TABLET, FILM COATED ORAL
Qty: 1 PACKET | Refills: 0 | Status: SHIPPED | OUTPATIENT
Start: 2019-05-09 | End: 2019-06-05

## 2019-05-09 RX ORDER — ALBUTEROL SULFATE 90 UG/1
2 AEROSOL, METERED RESPIRATORY (INHALATION) EVERY 4 HOURS PRN
Qty: 1 INHALER | Refills: 0 | Status: SHIPPED | OUTPATIENT
Start: 2019-05-09 | End: 2020-04-20

## 2019-05-09 RX ORDER — PREDNISONE 20 MG/1
40 TABLET ORAL DAILY
Qty: 10 TABLET | Refills: 0 | Status: SHIPPED | OUTPATIENT
Start: 2019-05-09 | End: 2019-05-14

## 2019-05-09 ASSESSMENT — ENCOUNTER SYMPTOMS
VOICE CHANGE: 0
COUGH: 1
SHORTNESS OF BREATH: 1
EYE REDNESS: 0
SORE THROAT: 0
WHEEZING: 1
SINUS PRESSURE: 0
EYE DISCHARGE: 0
CHEST TIGHTNESS: 0

## 2019-05-09 NOTE — PATIENT INSTRUCTIONS
Patient Education        Bronchitis: Care Instructions  Your Care Instructions    Bronchitis is inflammation of the bronchial tubes, which carry air to the lungs. The tubes swell and produce mucus, or phlegm. The mucus and inflamed bronchial tubes make you cough. You may have trouble breathing. Most cases of bronchitis are caused by viruses like those that cause colds. Antibiotics usually do not help and they may be harmful. Bronchitis usually develops rapidly and lasts about 2 to 3 weeks in otherwise healthy people. Follow-up care is a key part of your treatment and safety. Be sure to make and go to all appointments, and call your doctor if you are having problems. It's also a good idea to know your test results and keep a list of the medicines you take. How can you care for yourself at home? · Take all medicines exactly as prescribed. Call your doctor if you think you are having a problem with your medicine. · Get some extra rest.  · Take an over-the-counter pain medicine, such as acetaminophen (Tylenol), ibuprofen (Advil, Motrin), or naproxen (Aleve) to reduce fever and relieve body aches. Read and follow all instructions on the label. · Do not take two or more pain medicines at the same time unless the doctor told you to. Many pain medicines have acetaminophen, which is Tylenol. Too much acetaminophen (Tylenol) can be harmful. · Take an over-the-counter cough medicine that contains dextromethorphan to help quiet a dry, hacking cough so that you can sleep. Avoid cough medicines that have more than one active ingredient. Read and follow all instructions on the label. · Breathe moist air from a humidifier, hot shower, or sink filled with hot water. The heat and moisture will thin mucus so you can cough it out. · Do not smoke. Smoking can make bronchitis worse. If you need help quitting, talk to your doctor about stop-smoking programs and medicines.  These can increase your chances of quitting for good.  When should you call for help? Call 911 anytime you think you may need emergency care. For example, call if:    · You have severe trouble breathing.    Call your doctor now or seek immediate medical care if:    · You have new or worse trouble breathing.     · You cough up dark brown or bloody mucus (sputum).     · You have a new or higher fever.     · You have a new rash.    Watch closely for changes in your health, and be sure to contact your doctor if:    · You cough more deeply or more often, especially if you notice more mucus or a change in the color of your mucus.     · You are not getting better as expected. Where can you learn more? Go to https://[employer]. org and sign in to your [employer] account. Enter H333 in the [employer] box to learn more about \"Bronchitis: Care Instructions. \"     If you do not have an account, please click on the \"Sign Up Now\" link. Current as of: September 5, 2018  Content Version: 12.0  © 7927-0973 Healthwise, Incorporated. Care instructions adapted under license by [facility] ([facility]). If you have questions about a medical condition or this instruction, always ask your healthcare professional. [place] [address] any warranty or liability for your use of this information.

## 2019-05-09 NOTE — LETTER
[address]  [place] [address]  Phone: [phone]  Fax: [phone]    ESHA Best CNP        May 9, 2019     Patient: Chris Burns   YOB: 1975   Date of Visit: 5/9/2019       To Whom it May Concern:    Chris Burns was seen in my clinic on 5/9/2019. He may return to work on 5/12/19. Please excuse his absence on 5/10/19 and 5/11/19. If you have any questions or concerns, please don't hesitate to call.     Sincerely,         ESHA Best CNP

## 2019-05-09 NOTE — PROGRESS NOTES
disintegrating tablet Take 1 tablet by mouth every 8 hours as needed for Nausea or Vomiting 20 tablet 0    Multiple Vitamins-Minerals (MENS ONE DAILY PO) Take 1 tablet by mouth daily       No current facility-administered medications for this visit. Allergies   Allergen Reactions    Augmentin [Amoxicillin-Pot Clavulanate] Rash     Reviewed PMH, SH, and  with the patient and updated. Subjective:      Review of Systems   Constitutional: Negative for chills, fatigue and fever. HENT: Positive for ear pain (muffled hearing). Negative for congestion, ear discharge, postnasal drip, sinus pressure, sneezing, sore throat and voice change. Eyes: Negative for discharge and redness. Respiratory: Positive for cough, shortness of breath and wheezing. Negative for chest tightness. Cardiovascular: Negative. Negative for chest pain. Musculoskeletal: Negative for myalgias. Skin: Negative for rash. Neurological: Positive for headaches. Negative for dizziness, weakness and light-headedness. Hematological: Negative for adenopathy. All other systems reviewed and are negative. Objective:      Physical Exam   Constitutional: He appears well-developed and well-nourished. No distress. HENT:   Head: Normocephalic. Right Ear: Tympanic membrane and external ear normal.   Left Ear: Tympanic membrane and external ear normal.   Nose: Nose normal. Right sinus exhibits no maxillary sinus tenderness and no frontal sinus tenderness. Left sinus exhibits no maxillary sinus tenderness and no frontal sinus tenderness. Mouth/Throat: Oropharyngeal exudate (PND) present. No posterior oropharyngeal erythema. Eyes: Right eye exhibits no discharge. Left eye exhibits no discharge. Cardiovascular: Normal rate, regular rhythm and normal heart sounds. No murmur heard. Pulmonary/Chest: Effort normal. No respiratory distress. He has wheezes (scattered expiratory wheezes). He has no rales.    Lymphadenopathy:     He has

## 2019-06-05 ENCOUNTER — HOSPITAL ENCOUNTER (EMERGENCY)
Age: 44
Discharge: HOME | End: 2019-06-05
Attending: EMERGENCY MEDICINE
Payer: MEDICAID

## 2019-06-05 ENCOUNTER — APPOINTMENT (OUTPATIENT)
Dept: CT IMAGING | Age: 44
End: 2019-06-05
Payer: MEDICAID

## 2019-06-05 ENCOUNTER — APPOINTMENT (OUTPATIENT)
Dept: GENERAL RADIOLOGY | Age: 44
End: 2019-06-05
Payer: MEDICAID

## 2019-06-05 VITALS
WEIGHT: 255.13 LBS | TEMPERATURE: 98.2 F | OXYGEN SATURATION: 96 % | HEIGHT: 72 IN | RESPIRATION RATE: 18 BRPM | HEART RATE: 76 BPM | BODY MASS INDEX: 34.55 KG/M2 | SYSTOLIC BLOOD PRESSURE: 130 MMHG | DIASTOLIC BLOOD PRESSURE: 73 MMHG

## 2019-06-05 DIAGNOSIS — R42 LIGHTHEADEDNESS: Primary | ICD-10-CM

## 2019-06-05 DIAGNOSIS — R07.9 CHEST PAIN, UNSPECIFIED TYPE: ICD-10-CM

## 2019-06-05 LAB
ABSOLUTE EOS #: 0.21 K/UL (ref 0–0.44)
ABSOLUTE IMMATURE GRANULOCYTE: 0.03 K/UL (ref 0–0.3)
ABSOLUTE LYMPH #: 2.25 K/UL (ref 1.1–3.7)
ABSOLUTE MONO #: 0.61 K/UL (ref 0.1–1.2)
ANION GAP SERPL CALCULATED.3IONS-SCNC: 11 MMOL/L (ref 9–17)
BASOPHILS # BLD: 1 % (ref 0–2)
BASOPHILS ABSOLUTE: 0.05 K/UL (ref 0–0.2)
BUN BLDV-MCNC: 23 MG/DL (ref 6–20)
BUN/CREAT BLD: 27 (ref 9–20)
CALCIUM SERPL-MCNC: 9.3 MG/DL (ref 8.6–10.4)
CHLORIDE BLD-SCNC: 107 MMOL/L (ref 98–107)
CO2: 22 MMOL/L (ref 20–31)
CREAT SERPL-MCNC: 0.84 MG/DL (ref 0.7–1.2)
DIFFERENTIAL TYPE: NORMAL
EOSINOPHILS RELATIVE PERCENT: 3 % (ref 1–4)
GFR AFRICAN AMERICAN: >60 ML/MIN
GFR NON-AFRICAN AMERICAN: >60 ML/MIN
GFR SERPL CREATININE-BSD FRML MDRD: ABNORMAL ML/MIN/{1.73_M2}
GFR SERPL CREATININE-BSD FRML MDRD: ABNORMAL ML/MIN/{1.73_M2}
GLUCOSE BLD-MCNC: 89 MG/DL (ref 70–99)
HCT VFR BLD CALC: 47.6 % (ref 40.7–50.3)
HEMOGLOBIN: 15.6 G/DL (ref 13–17)
IMMATURE GRANULOCYTES: 0 %
INR BLD: 1.1
LYMPHOCYTES # BLD: 28 % (ref 24–43)
MCH RBC QN AUTO: 28.7 PG (ref 25.2–33.5)
MCHC RBC AUTO-ENTMCNC: 32.8 G/DL (ref 28.4–34.8)
MCV RBC AUTO: 87.7 FL (ref 82.6–102.9)
MONOCYTES # BLD: 7 % (ref 3–12)
MYOGLOBIN: 23 NG/ML (ref 28–72)
NRBC AUTOMATED: 0 PER 100 WBC
PDW BLD-RTO: 13.4 % (ref 11.8–14.4)
PLATELET # BLD: 338 K/UL (ref 138–453)
PLATELET ESTIMATE: NORMAL
PMV BLD AUTO: 8.9 FL (ref 8.1–13.5)
POTASSIUM SERPL-SCNC: 4.2 MMOL/L (ref 3.7–5.3)
PROTHROMBIN TIME: 10.9 SEC (ref 9.7–11.6)
RBC # BLD: 5.43 M/UL (ref 4.21–5.77)
RBC # BLD: NORMAL 10*6/UL
SEG NEUTROPHILS: 61 % (ref 36–65)
SEGMENTED NEUTROPHILS ABSOLUTE COUNT: 5.04 K/UL (ref 1.5–8.1)
SODIUM BLD-SCNC: 140 MMOL/L (ref 135–144)
TROPONIN INTERP: ABNORMAL
TROPONIN T: ABNORMAL NG/ML
TROPONIN, HIGH SENSITIVITY: 11 NG/L (ref 0–22)
WBC # BLD: 8.2 K/UL (ref 3.5–11.3)
WBC # BLD: NORMAL 10*3/UL

## 2019-06-05 PROCEDURE — 85610 PROTHROMBIN TIME: CPT

## 2019-06-05 PROCEDURE — 99285 EMERGENCY DEPT VISIT HI MDM: CPT

## 2019-06-05 PROCEDURE — 71045 X-RAY EXAM CHEST 1 VIEW: CPT

## 2019-06-05 PROCEDURE — 36415 COLL VENOUS BLD VENIPUNCTURE: CPT

## 2019-06-05 PROCEDURE — 70450 CT HEAD/BRAIN W/O DYE: CPT

## 2019-06-05 PROCEDURE — 96374 THER/PROPH/DIAG INJ IV PUSH: CPT

## 2019-06-05 PROCEDURE — 93005 ELECTROCARDIOGRAM TRACING: CPT | Performed by: NURSE PRACTITIONER

## 2019-06-05 PROCEDURE — 84484 ASSAY OF TROPONIN QUANT: CPT

## 2019-06-05 PROCEDURE — 6360000002 HC RX W HCPCS: Performed by: NURSE PRACTITIONER

## 2019-06-05 PROCEDURE — 80048 BASIC METABOLIC PNL TOTAL CA: CPT

## 2019-06-05 PROCEDURE — 2580000003 HC RX 258: Performed by: NURSE PRACTITIONER

## 2019-06-05 PROCEDURE — 83874 ASSAY OF MYOGLOBIN: CPT

## 2019-06-05 PROCEDURE — 85025 COMPLETE CBC W/AUTO DIFF WBC: CPT

## 2019-06-05 RX ORDER — 0.9 % SODIUM CHLORIDE 0.9 %
1000 INTRAVENOUS SOLUTION INTRAVENOUS ONCE
Status: COMPLETED | OUTPATIENT
Start: 2019-06-05 | End: 2019-06-05

## 2019-06-05 RX ORDER — KETOROLAC TROMETHAMINE 30 MG/ML
30 INJECTION, SOLUTION INTRAMUSCULAR; INTRAVENOUS ONCE
Status: COMPLETED | OUTPATIENT
Start: 2019-06-05 | End: 2019-06-05

## 2019-06-05 RX ADMIN — SODIUM CHLORIDE 1000 ML: 9 INJECTION, SOLUTION INTRAVENOUS at 16:24

## 2019-06-05 RX ADMIN — KETOROLAC TROMETHAMINE 30 MG: 30 INJECTION, SOLUTION INTRAMUSCULAR at 18:13

## 2019-06-05 ASSESSMENT — ENCOUNTER SYMPTOMS
SINUS PRESSURE: 0
WHEEZING: 0
RHINORRHEA: 0
DIARRHEA: 0
VOMITING: 0
SORE THROAT: 0
COLOR CHANGE: 0
CONSTIPATION: 0
SHORTNESS OF BREATH: 0
ABDOMINAL PAIN: 0
COUGH: 0
NAUSEA: 0

## 2019-06-05 ASSESSMENT — PAIN SCALES - GENERAL: PAINLEVEL_OUTOF10: 5

## 2019-06-05 NOTE — PROGRESS NOTES
Transitions of Care Pharmacy Service   Medication Review    The patient's list of current home medications was reviewed and updated in the ED earlier today. Source(s) of information: patient, Surescripts    Please feel free to call with any questions about this encounter. Thank you.     Gilford Medal, [facility]  Transitions of Care Pharmacy Service  Phone:  [phone]  Fax: [phone]            Prior to Admission medications    Medication Sig       apixaban (ELIQUIS) 5 MG TABS tablet Take 5 mg by mouth 2 times daily       albuterol sulfate  (90 Base) MCG/ACT inhaler Inhale 2 puffs into the lungs every 4 hours as needed for Wheezing or Shortness of Breath       vitamin D (ERGOCALCIFEROL) 42245 units CAPS capsule Take 1 capsule by mouth once a week (Thursday)       Multiple Vitamins-Minerals (MENS ONE DAILY PO) Take 1 tablet by mouth daily                               ondansetron (ZOFRAN ODT) 4 MG disintegrating tablet Take 1 tablet by mouth every 8 hours as needed for Nausea or Vomiting

## 2019-06-05 NOTE — ED PROVIDER NOTES
eMERGENCY dEPARTMENT eNCOUnter   Attending Attestation     Pt Name: Scott Wheeler  MRN: [id number]  [place] 1975  Date of evaluation: 6/5/19   Scott Wheeler is a 39 y.o. male with CC: Dizziness; Headache; and Palpitations    MDM:            CRITICAL CARE:       EKG: All EKG's are interpreted by the Emergency Department Physician who either signs or Co-signs this chart in the absence of a cardiologist.    Normal sinus rhythm ventricular rate of 83, normal axis unremarkable ST segment    RADIOLOGY:All plain film, CT, MRI, and formal ultrasound images (except ED bedside ultrasound) are read by the radiologist, see reports below, unless otherwise noted in MDM or here. CT Head WO Contrast   Final Result   No acute intracranial abnormality. XR CHEST PORTABLE   Final Result   No acute cardiopulmonary findings. LABS: All lab results were reviewed by myself, and all abnormals are listed below. Labs Reviewed   BASIC METABOLIC PANEL - Abnormal; Notable for the following components:       Result Value    BUN 23 (*)     Bun/Cre Ratio 27 (*)     All other components within normal limits   TROP/MYOGLOBIN - Abnormal; Notable for the following components:    Myoglobin 23 (*)     All other components within normal limits   CBC WITH AUTO DIFFERENTIAL   PROTIME-INR           I personally evaluated and examined the patient in conjunction with the APC and agree with the assessment, treatment plan, and disposition of the patient as recorded by the APC.    Iwona Sy MD  Attending Emergency Physician          Jefe Stapleton MD  06/05/19 5023

## 2019-06-06 LAB
EKG ATRIAL RATE: 93 BPM
EKG P AXIS: 59 DEGREES
EKG P-R INTERVAL: 122 MS
EKG Q-T INTERVAL: 328 MS
EKG QRS DURATION: 82 MS
EKG QTC CALCULATION (BAZETT): 407 MS
EKG R AXIS: -25 DEGREES
EKG T AXIS: 58 DEGREES
EKG VENTRICULAR RATE: 93 BPM

## 2019-06-06 PROCEDURE — 93010 ELECTROCARDIOGRAM REPORT: CPT | Performed by: INTERNAL MEDICINE

## 2019-06-06 NOTE — ED PROVIDER NOTES
[address] [facility] ED  eMERGENCY dEPARTMENT eNCOUnter      Pt Name: Moriah Nevarez  MRN: [id number]  [place] 1975  Date of evaluation: 6/5/2019  Provider: Richie Patel NP, ESHA - [id number]       Chief Complaint   Patient presents with    Dizziness    Headache    Palpitations         HISTORY OF PRESENT ILLNESS  (Location/Symptom, Timing/Onset, Context/Setting, Quality, Duration, Modifying Factors, Severity.)   Moriah Nevarez is a 39 y.o. male who presents to the emergency department by private vehicle for evaluation of some chest pain and lightheadedness. Patient states that he has had some chest pain yesterday that has resolved. He states that today he had some dizziness and lightheadedness. He states he was driving and he felt like he was going to pass out. He also has a generalized throbbing headache. He denies of the worst headache of his life. He denies any fevers or chills. Nursing Notes were reviewed.     ALLERGIES     Augmentin [amoxicillin-pot clavulanate]    CURRENT MEDICATIONS       Discharge Medication List as of 6/5/2019  6:25 PM      CONTINUE these medications which have NOT CHANGED    Details   apixaban (ELIQUIS) 5 MG TABS tablet Take 5 mg by mouth 2 times dailyHistorical Med      albuterol sulfate  (90 Base) MCG/ACT inhaler Inhale 2 puffs into the lungs every 4 hours as needed for Wheezing or Shortness of Breath, Disp-1 Inhaler, R-0Normal      vitamin D (ERGOCALCIFEROL) 35308 units CAPS capsule Take 1 capsule by mouth once a week, Disp-12 capsule, R-0Normal      Multiple Vitamins-Minerals (MENS ONE DAILY PO) Take 1 tablet by mouth dailyHistorical Med      ondansetron (ZOFRAN ODT) 4 MG disintegrating tablet Take 1 tablet by mouth every 8 hours as needed for Nausea or Vomiting, Disp-20 tablet, R-0Print             PAST MEDICAL HISTORY         Diagnosis Date    Anxiety     Diverticulitis     Dvt femoral (deep venous thrombosis) (HCC)        SURGICAL HISTORY Procedure Laterality Date    ANKLE SURGERY Left     APPENDECTOMY      BACK SURGERY      with hardware    LAPAROSCOPIC APPENDECTOMY  04/26/2017    LAPAROSCOPIC APPENDECTOMY N/A 4/26/2017    APPENDECTOMY LAPAROSCOPIC performed by Amarjit Dalton MD at [address] [place] [address] Bilateral          FAMILY HISTORY           Problem Relation Age of Onset    Heart Disease Mother         Valvular    Bipolar Disorder Mother     No Known Problems Father      Family Status   Relation Name Status    Mother  Alive    Father  Alive        SOCIAL HISTORY      reports that he has quit smoking. He has never used smokeless tobacco. He reports that he drinks alcohol. He reports that he has current or past drug history. Drug: Marijuana. REVIEW OF SYSTEMS    (2-9 systems for level 4, 10 or more for level 5)     Review of Systems   Constitutional: Negative for chills, fever and unexpected weight change. HENT: Negative for congestion, rhinorrhea, sinus pressure and sore throat. Respiratory: Negative for cough, shortness of breath and wheezing. Cardiovascular: Negative for chest pain and palpitations. Gastrointestinal: Negative for abdominal pain, constipation, diarrhea, nausea and vomiting. Genitourinary: Negative for dysuria and hematuria. Musculoskeletal: Negative for arthralgias and myalgias. Skin: Negative for color change and rash. Neurological: Positive for light-headedness. Negative for dizziness, weakness and headaches. Hematological: Negative for adenopathy. Except as noted above the remainder of the review of systems was reviewed and negative. PHYSICAL EXAM    (up to 7 for level 4, 8 or more for level 5)     ED Triage Vitals [06/05/19 1553]   BP Temp Temp Source Pulse Resp SpO2 Height Weight   (!) 247/94 98.2 °F (36.8 °C) Oral 95 18 99 % 6' (1.829 m) 255 lb 2 oz (115.7 kg)       Physical Exam   Constitutional: He is oriented to person, place, and time.  He appears well-developed and well-nourished. HENT:   Head: Normocephalic and atraumatic. Mouth/Throat: Oropharynx is clear and moist.   Eyes: Pupils are equal, round, and reactive to light. Conjunctivae are normal.   Neck: Normal range of motion. Neck supple. Cardiovascular: Normal rate and regular rhythm. Pulmonary/Chest: Effort normal and breath sounds normal. No stridor. No respiratory distress. Abdominal: Soft. Bowel sounds are normal.   Musculoskeletal: Normal range of motion. Lymphadenopathy:     He has no cervical adenopathy. Neurological: He is alert and oriented to person, place, and time. Skin: Skin is warm and dry. No rash noted. Psychiatric: He has a normal mood and affect. Vitals reviewed. DIAGNOSTIC RESULTS     EKG: All EKG's are interpreted by the Emergency Department Physician who either signs or Co-signs this chart in the absence of a cardiologist.    Sinus rhythm no ST elevation or ischemic changes    RADIOLOGY:   Non-plain film images such as CT, Ultrasound and MRI are read by the radiologist. [facility] radiographic images are visualized and preliminarily interpreted by the emergency physician with the below findings:    Ct Head Wo Contrast    Result Date: 6/5/2019  EXAMINATION: CT OF THE HEAD WITHOUT CONTRAST  6/5/2019 4:52 pm TECHNIQUE: CT of the head was performed without the administration of intravenous contrast. Dose modulation, iterative reconstruction, and/or weight based adjustment of the mA/kV was utilized to reduce the radiation dose to as low as reasonably achievable. COMPARISON: None. HISTORY: ORDERING SYSTEM PROVIDED HISTORY: lightheaded and headache TECHNOLOGIST PROVIDED HISTORY: FINDINGS: BRAIN/VENTRICLES: There is no acute intracranial hemorrhage, mass effect or midline shift. No abnormal extra-axial fluid collection. The gray-white differentiation is maintained without evidence of an acute infarct. There is no evidence of hydrocephalus.  ORBITS: The visualized portion of the orbits demonstrate no acute abnormality. SINUSES: Small amount of secretions within the right maxillary sinus. The visualized paranasal sinuses and mastoid air cells otherwise demonstrate no acute abnormality. SOFT TISSUES/SKULL:  No acute abnormality of the visualized skull or soft tissues. No acute intracranial abnormality. Xr Chest Portable    Result Date: 6/5/2019  EXAMINATION: ONE XRAY VIEW OF THE CHEST 6/5/2019 4:29 pm COMPARISON: 26 April 2017 HISTORY: ORDERING SYSTEM PROVIDED HISTORY: Chest Pain TECHNOLOGIST PROVIDED HISTORY: Chest Pain Ordering Physician Provided Reason for Exam: near syncope Acuity: Acute Type of Exam: Initial FINDINGS: AP portable view of the chest time stamped at 1623 hours demonstrates overlying cardiac monitoring electrodes. Heart size is normal.  No vascular congestion, focal consolidation, effusion, or pneumothorax is noted. Osseous and mediastinal structures are age-appropriate. No acute cardiopulmonary findings. Interpretation per the Radiologist below, if available at the time of this note:    CT Head WO Contrast   Final Result   No acute intracranial abnormality. XR CHEST PORTABLE   Final Result   No acute cardiopulmonary findings. LABS:  Labs Reviewed   BASIC METABOLIC PANEL - Abnormal; Notable for the following components:       Result Value    BUN 23 (*)     Bun/Cre Ratio 27 (*)     All other components within normal limits   TROP/MYOGLOBIN - Abnormal; Notable for the following components:    Myoglobin 23 (*)     All other components within normal limits   CBC WITH AUTO DIFFERENTIAL   PROTIME-INR       All other labs were within normal range or not returned as of this dictation.     EMERGENCY DEPARTMENT COURSE and DIFFERENTIAL DIAGNOSIS/MDM:   Vitals:    Vitals:    06/05/19 1802 06/05/19 1820 06/05/19 1822 06/05/19 1832   BP: (!) 146/92 (!) 131/90 (!) 131/90 130/73   Pulse: 72 73  76   Resp:       Temp:       TempSrc: SpO2: 95% 95%  96%   Weight:       Height:           Medical Decision Making: Was given some IV fluids. His imaging and labs are unremarkable. he is able to be  discharged home. Follow up with his primary care physician. Return for worsening symptoms or concerns  FINAL IMPRESSION      1. Lightheadedness    2.  Chest pain, unspecified type          DISPOSITION/PLAN   DISPOSITION Decision To Discharge 06/05/2019 06:24:43 PM      PATIENT REFERRED TO:   DO Kelly Corona [address] [place] [address]  [phone]    Call in 2 days      [facility] ED  [address] [facility]  [phone]    If symptoms worsen      DISCHARGE MEDICATIONS:     Discharge Medication List as of 6/5/2019  6:25 PM              (Please note that portions of this note were completed with a voice recognition program.  Efforts were made to edit the dictations but occasionally words are mis-transcribed.)    Taryn Higgins NP, APRN - [facility], [place], operated by [facility]  Certified Nurse Practitioner          Adela Wells, ESHA - CNP  06/05/19 6724       Shayne Giles MD  06/08/19 9263

## 2019-06-14 ENCOUNTER — OFFICE VISIT (OUTPATIENT)
Dept: FAMILY MEDICINE CLINIC | Age: 44
End: 2019-06-14
Payer: MEDICAID

## 2019-06-14 ENCOUNTER — HOSPITAL ENCOUNTER (OUTPATIENT)
Dept: VASCULAR LAB | Age: 44
Discharge: HOME | End: 2019-06-14
Payer: MEDICAID

## 2019-06-14 VITALS
HEART RATE: 85 BPM | WEIGHT: 251.6 LBS | SYSTOLIC BLOOD PRESSURE: 160 MMHG | BODY MASS INDEX: 34.08 KG/M2 | RESPIRATION RATE: 16 BRPM | TEMPERATURE: 96.7 F | DIASTOLIC BLOOD PRESSURE: 110 MMHG | OXYGEN SATURATION: 97 % | HEIGHT: 72 IN

## 2019-06-14 DIAGNOSIS — M79.605 LEFT LEG PAIN: ICD-10-CM

## 2019-06-14 DIAGNOSIS — M79.89 LEG SWELLING: ICD-10-CM

## 2019-06-14 DIAGNOSIS — I80.9 THROMBOPHLEBITIS: Primary | ICD-10-CM

## 2019-06-14 DIAGNOSIS — E86.0 DEHYDRATION: ICD-10-CM

## 2019-06-14 DIAGNOSIS — M79.605 LEFT LEG PAIN: Primary | ICD-10-CM

## 2019-06-14 DIAGNOSIS — R42 DIZZINESS: ICD-10-CM

## 2019-06-14 PROCEDURE — 93971 EXTREMITY STUDY: CPT

## 2019-06-14 PROCEDURE — 99213 OFFICE O/P EST LOW 20 MIN: CPT | Performed by: INTERNAL MEDICINE

## 2019-06-14 PROCEDURE — 1036F TOBACCO NON-USER: CPT | Performed by: INTERNAL MEDICINE

## 2019-06-14 PROCEDURE — G8427 DOCREV CUR MEDS BY ELIG CLIN: HCPCS | Performed by: INTERNAL MEDICINE

## 2019-06-14 PROCEDURE — G8417 CALC BMI ABV UP PARAM F/U: HCPCS | Performed by: INTERNAL MEDICINE

## 2019-06-14 RX ORDER — SULFAMETHOXAZOLE AND TRIMETHOPRIM 800; 160 MG/1; MG/1
1 TABLET ORAL 2 TIMES DAILY
Qty: 20 TABLET | Refills: 0 | Status: SHIPPED | OUTPATIENT
Start: 2019-06-14 | End: 2019-06-24

## 2019-06-15 ASSESSMENT — ENCOUNTER SYMPTOMS
COUGH: 0
RHINORRHEA: 0
CHANGE IN BOWEL HABIT: 0
BACK PAIN: 0
COLOR CHANGE: 1
VOICE CHANGE: 0
SINUS PRESSURE: 1
ABDOMINAL PAIN: 0
VOMITING: 0
CONSTIPATION: 0
SINUS PAIN: 0
SORE THROAT: 0
SWOLLEN GLANDS: 0
CHOKING: 0
RECTAL PAIN: 0
NAUSEA: 0
BLOOD IN STOOL: 0
CHEST TIGHTNESS: 0
DIARRHEA: 0
TROUBLE SWALLOWING: 0
SHORTNESS OF BREATH: 0

## 2019-06-15 NOTE — PROGRESS NOTES
 Smokeless tobacco: Never Used   Substance Use Topics    Alcohol use: Yes     Comment: social      Current Outpatient Medications   Medication Sig Dispense Refill    sulfamethoxazole-trimethoprim (BACTRIM DS;SEPTRA DS) 800-160 MG per tablet Take 1 tablet by mouth 2 times daily for 10 days 20 tablet 0    apixaban (ELIQUIS) 5 MG TABS tablet Take 5 mg by mouth 2 times daily      albuterol sulfate  (90 Base) MCG/ACT inhaler Inhale 2 puffs into the lungs every 4 hours as needed for Wheezing or Shortness of Breath 1 Inhaler 0    vitamin D (ERGOCALCIFEROL) 84247 units CAPS capsule Take 1 capsule by mouth once a week 12 capsule 0    ondansetron (ZOFRAN ODT) 4 MG disintegrating tablet Take 1 tablet by mouth every 8 hours as needed for Nausea or Vomiting 20 tablet 0    Multiple Vitamins-Minerals (MENS ONE DAILY PO) Take 1 tablet by mouth daily       No current facility-administered medications for this visit. Allergies   Allergen Reactions    Augmentin [Amoxicillin-Pot Clavulanate] Rash          Health Maintenance   Topic Date Due    HIV screen  08/11/1989    Lipid screen  08/11/2014    DTaP/Tdap/Td vaccine (2 - Td) 01/21/2029    Flu vaccine  Completed    Pneumococcal 0-64 years Vaccine  Aged Out       Subjective:     Review of Systems   Constitutional: Positive for activity change and fatigue. Negative for chills, diaphoresis, fever and unexpected weight change. HENT: Positive for congestion and sinus pressure. Negative for mouth sores, nosebleeds, postnasal drip, rhinorrhea, sinus pain, sneezing, sore throat, tinnitus, trouble swallowing and voice change. Eyes: Negative for visual disturbance. Respiratory: Negative for cough, choking, chest tightness and shortness of breath. Cardiovascular: Positive for palpitations. Negative for chest pain and leg swelling.    Gastrointestinal: Negative for abdominal pain, anorexia, blood in stool, change in bowel habit, constipation, diarrhea, nausea, rectal pain and vomiting. Genitourinary: Negative for difficulty urinating. Musculoskeletal: Positive for arthralgias and gait problem. Negative for back pain, joint swelling, myalgias, neck pain and neck stiffness. Skin: Positive for color change, rash and wound. Negative for pallor. Neurological: Positive for dizziness, weakness and light-headedness. Negative for vertigo, tingling, tremors, seizures, syncope, speech difficulty, numbness and headaches. Hematological: Negative for adenopathy. Bruises/bleeds easily. Psychiatric/Behavioral: Negative for sleep disturbance. The patient is not nervous/anxious. Objective:      Physical Exam   Constitutional: He is oriented to person, place, and time. He appears well-developed and well-nourished. He is active. Non-toxic appearance. He does not have a sickly appearance. He does not appear ill. No distress. HENT:   Head: Normocephalic and atraumatic. Right Ear: Hearing, tympanic membrane, external ear and ear canal normal.   Left Ear: Hearing, external ear and ear canal normal. A middle ear effusion is present. Nose: Mucosal edema and rhinorrhea present. No sinus tenderness or nasal deformity. Right sinus exhibits no maxillary sinus tenderness and no frontal sinus tenderness. Left sinus exhibits maxillary sinus tenderness. Left sinus exhibits no frontal sinus tenderness. Mouth/Throat: Uvula is midline and oropharynx is clear and moist. Mucous membranes are pale. No tonsillar exudate. Eyes: Pupils are equal, round, and reactive to light. EOM are normal.   Cardiovascular: Normal rate, regular rhythm, S1 normal, S2 normal and normal heart sounds. No extrasystoles are present. Exam reveals no gallop. No murmur heard. Negative homans    1 + pitting edema to left anterior leg   Moderate erythema  Slight ecymosisi   Moderate varicosities    Pulmonary/Chest: Effort normal and breath sounds normal. No stridor. No apnea and no tachypnea.  No respiratory distress. He has no decreased breath sounds. Abdominal: Soft. Bowel sounds are normal. There is no splenomegaly or hepatomegaly. There is no tenderness. Lymphadenopathy:     He has no cervical adenopathy. Neurological: He is alert and oriented to person, place, and time. No cranial nerve deficit. Skin: Skin is warm, dry and intact. Capillary refill takes 2 to 3 seconds. Ecchymosis noted. No rash noted. There is erythema. No pallor. Psychiatric: He has a normal mood and affect. Nursing note and vitals reviewed. BP (!) 160/110 (Site: Right Upper Arm, Position: Sitting, Cuff Size: Medium Adult)   Pulse 85   Temp 96.7 °F (35.9 °C) (Tympanic)   Resp 16   Ht 6' (1.829 m)   Wt 251 lb 9.6 oz (114.1 kg)   SpO2 97%   BMI 34.12 kg/m²     Assessment:       Diagnosis Orders   1. Thrombophlebitis     2. Leg swelling  US DUP LOWER EXTREMITY LEFT OLIVIA   3. Left leg pain  US DUP LOWER EXTREMITY LEFT OLIVIA   4. Dehydration     5. Dizziness               Plan:       Return if symptoms worsen or fail to improve. Orders Placed This Encounter   Procedures    US DUP LOWER EXTREMITY LEFT OLIVIA     Standing Status:   Future     Number of Occurrences:   1     Standing Expiration Date:   6/14/2020     Order Specific Question:   Reason for exam:     Answer:   left leg redness , lwft leg swelling , hx of dvt     Orders Placed This Encounter   Medications    sulfamethoxazole-trimethoprim (BACTRIM DS;SEPTRA DS) 800-160 MG per tablet     Sig: Take 1 tablet by mouth 2 times daily for 10 days     Dispense:  20 tablet     Refill:  0    Given hx , althoguh less likely suspion based on hx and exam , will check dvt ultrasound r/o. If neg continue with plan most likely thrombophlebitis  bactrim x 10 days   WARM COMPRESSES   Can continue motrin scheduled every 8 hours for next week   elevate leg   Follow up in 1 week for wound /cellulitis check   Call with q/c.     Patientgiven educational materials - see patient instructions. Discussed use, benefit,and side effects of prescribed medications. All patient questions answered. Ptvoiced understanding. Reviewed health maintenance. Instructed to continue currentmedications, diet and exercise. Patient agreed with treatment plan. Follow up asdirected.      Electronically signed by Bita Norwood DO on 6/15/2019 at 3:25 PM

## 2019-06-20 NOTE — TELEPHONE ENCOUNTER
Patient called requesting medication refill patient has upcoming  appointment on 6/25 at 1:30 Please call with any questions or concerns

## 2019-06-21 RX ORDER — ONDANSETRON 4 MG/1
4 TABLET, ORALLY DISINTEGRATING ORAL EVERY 8 HOURS PRN
Qty: 20 TABLET | Refills: 0 | Status: SHIPPED | OUTPATIENT
Start: 2019-06-21 | End: 2019-06-25 | Stop reason: ALTCHOICE

## 2019-06-25 ENCOUNTER — OFFICE VISIT (OUTPATIENT)
Dept: FAMILY MEDICINE CLINIC | Age: 44
End: 2019-06-25
Payer: MEDICAID

## 2019-06-25 VITALS
HEIGHT: 72 IN | BODY MASS INDEX: 34.13 KG/M2 | HEART RATE: 74 BPM | SYSTOLIC BLOOD PRESSURE: 130 MMHG | WEIGHT: 252 LBS | OXYGEN SATURATION: 97 % | RESPIRATION RATE: 16 BRPM | TEMPERATURE: 98.5 F | DIASTOLIC BLOOD PRESSURE: 80 MMHG

## 2019-06-25 DIAGNOSIS — K62.89 PROCTITIS: ICD-10-CM

## 2019-06-25 DIAGNOSIS — F41.0 PANIC ATTACKS: ICD-10-CM

## 2019-06-25 DIAGNOSIS — K62.5 RECTAL BLEEDING: Primary | ICD-10-CM

## 2019-06-25 DIAGNOSIS — K64.9 BLEEDING HEMORRHOID: ICD-10-CM

## 2019-06-25 DIAGNOSIS — F41.9 ANXIETY: ICD-10-CM

## 2019-06-25 DIAGNOSIS — K52.9 COLITIS: ICD-10-CM

## 2019-06-25 PROCEDURE — 1036F TOBACCO NON-USER: CPT | Performed by: INTERNAL MEDICINE

## 2019-06-25 PROCEDURE — G8417 CALC BMI ABV UP PARAM F/U: HCPCS | Performed by: INTERNAL MEDICINE

## 2019-06-25 PROCEDURE — 99214 OFFICE O/P EST MOD 30 MIN: CPT | Performed by: INTERNAL MEDICINE

## 2019-06-25 PROCEDURE — G8427 DOCREV CUR MEDS BY ELIG CLIN: HCPCS | Performed by: INTERNAL MEDICINE

## 2019-06-25 RX ORDER — PAROXETINE HYDROCHLORIDE 20 MG/1
20 TABLET, FILM COATED ORAL DAILY
Qty: 30 TABLET | Refills: 3 | Status: SHIPPED | OUTPATIENT
Start: 2019-06-25 | End: 2020-04-20

## 2019-06-25 NOTE — PATIENT INSTRUCTIONS
milk products. · Read food labels carefully and look for hidden gluten, such as gluten in medicine and some food additives. If a label says \"modified food starch,\" the product may contain gluten. · Plan your diet around:  ? Eggs. ? Dairy products, if you can eat them. Cheese, yogurt, and other dairy products can be an important part of the diet. ? Flours and foods made with amaranth, arrowroot, beans, buckwheat, corn, cornmeal, flax, millet, potatoes, gluten-free nut and oat bran, quinoa, rice, sorghum, soybeans, tapioca, or teff. ? Fresh, frozen, and canned meats, fruits, and vegetables. Watch for added gluten. · Talk to your doctor or contact your local hospital or dietitian for information about support groups in your area. You may find a support group helpful for discovering ways to help you deal with celiac disease. Celiac disease support groups often share recipes and good food sources. · Look for gluten-free foods. Many food stores, especially health food stores, offer specially marked gluten-free food. When should you call for help? Watch closely for changes in your health, and be sure to contact your doctor if:    · Your bloating, gas, and diarrhea get worse.     · You have bloating, gas, and diarrhea after not having them for a while. Where can you learn more? Go to https://Deal Decorpepiceweb.Profitect. org and sign in to your [employer] account. Enter [id number] in the [facility] box to learn more about \"Celiac Disease: Care Instructions. \"     If you do not have an account, please click on the \"Sign Up Now\" link. Current as of: November 7, 2018  Content Version: 12.0  © 7311-7082 Healthwise, [employer]. Care instructions adapted under license by [facility] ([facility]). If you have questions about a medical condition or this instruction, always ask your healthcare professional. [address] any warranty or liability for your use of this information.          Patient Education Gluten-Free Diet: Care Instructions  Your Care Instructions    To help your symptoms, your doctor has recommended a gluten-free diet. This means not eating foods that have gluten in them. Gluten is a kind of protein. It's found in wheat, barley, and rye. If you eat a gluten-free diet, you can help manage your symptoms and prevent long-term problems. You can also get all the nutrition you need. Follow-up care is a key part of your treatment and safety. Be sure to make and go to all appointments, and call your doctor if you are having problems. It's also a good idea to know your test results and keep a list of the medicines you take. How can you care for yourself at home? · Don't eat any foods that have gluten in them. These include bagels, bread, crackers, and some cereals. They also include pasta and pizza. · Carefully read food labels. Look for wheat or wheat products in ice cream and candy. You may also find them in salad dressing, canned and frozen soups and vegetables, and other processed foods. · Avoid all beer products unless the label says they are gluten-free. Beers with and without alcohol have gluten unless the labels say they are gluten-free. This includes lagers, ales, and stouts. · Avoid oats, at least at first. Oats may cause symptoms in some people, perhaps as a result of contamination with wheat, barley, or rye during processing. But many people who have celiac disease can eat moderate amounts of oats without having symptoms. Health professionals vary in their long-term recommendations regarding eating foods with oats. But most agree it is safe to eat oats labeled as gluten-free. · When you eat out, look for restaurants that serve gluten-free food. You can also ask if the  is familiar with gluten-free cooking. · Try to learn more about gluten-free options. Find grocery stores that sell gluten-free pizza and other foods.  If you have access to the Internet, look online for gluten-free foods and recipes. · On a gluten-free eating plan, it's okay to have:  ? Eggs and dairy products. (But some dairy products may make your symptoms worse. Ask your doctor if you have questions about dairy products. Read ingredient labels carefully. Some processed cheeses contain gluten.)  ? Flours and foods made with amaranth, arrowroot, beans, buckwheat, corn, cornmeal, flax, millet, potatoes, gluten-free nut and oat bran, quinoa, rice, sorghum, soybeans, tapioca, or teff. ? Fresh, frozen, or canned unprocessed meats. But avoid processed meats. Some examples of processed meats to avoid are hot dogs, salami, and deli meat. Read labels for additives that may contain gluten. ? Fresh, frozen, dried, or canned fruits and vegetables, if they do not have thickeners or other additives that contain gluten. ? Some alcohol drinks. These include wine, liqueurs, and ciders. They also include liquor like whiskey and pushpa. When should you call for help? Watch closely for changes in your health, and be sure to contact your doctor if:    · You have unexplained weight loss.     · You have diarrhea that lasts longer than 1 to 2 weeks.     · You have unusual fatigue or mood changes, especially if these last more than a week and are not related to any other illness, such as the flu.     · Your symptoms come back again.     · Your stomach pain gets worse. Where can you learn more? Go to https://[email or web address] [email or web address] org and sign in to your [employer] account. Enter [phone] in the [facility] box to learn more about \"Gluten-Free Diet: Care Instructions. \"     If you do not have an account, please click on the \"Sign Up Now\" link. Current as of: November 7, 2018  Content Version: 12.0  © 7844-7064 Healthwise, Incorporated. Care instructions adapted under license by [facility] ([facility]).  If you have questions about a medical condition or this instruction, always ask your healthcare professional. Daniela Nguyen

## 2019-06-29 ASSESSMENT — ENCOUNTER SYMPTOMS
STRIDOR: 0
ABDOMINAL PAIN: 1
VOMITING: 0
CHOKING: 0
NAUSEA: 1
CONSTIPATION: 0
CHEST TIGHTNESS: 1
DIFFICULTY BREATHING: 0
WHEEZING: 0
BELCHING: 0
BACK PAIN: 0
ANAL BLEEDING: 0
RECTAL PAIN: 1
HEMATOCHEZIA: 1
COUGH: 0
BLOOD IN STOOL: 0
DIARRHEA: 1
FLATUS: 1
SHORTNESS OF BREATH: 1

## 2019-06-29 NOTE — PROGRESS NOTES
has tried elevation, heat, NSAIDs, rest, non-weight bearing and acetaminophen for the symptoms. The treatment provided mild relief.        No results found for: LABA1C      ( goal A1C is < 7)   No results found for: LABMICR  No results found for: LDLCHOLESTEROL, LDLCALC    (goal LDL is <100)   AST (U/L)   Date Value   11/07/2018 15     ALT (U/L)   Date Value   11/07/2018 16     BUN (mg/dL)   Date Value   06/05/2019 23 (H)     BP Readings from Last 3 Encounters:   06/25/19 130/80   06/14/19 (!) 160/110   06/05/19 130/73          (goal 120/80)    Past Medical History:   Diagnosis Date    Anxiety     Diverticulitis     Dvt femoral (deep venous thrombosis) (HCC)       Past Surgical History:   Procedure Laterality Date    ANKLE SURGERY Left     APPENDECTOMY      BACK SURGERY      with hardware    LAPAROSCOPIC APPENDECTOMY  04/26/2017    LAPAROSCOPIC APPENDECTOMY N/A 4/26/2017    APPENDECTOMY LAPAROSCOPIC performed by Jayleen Allen MD at [address] [place] [address] Bilateral        Family History   Problem Relation Age of Onset    Heart Disease Mother         Valvular    Bipolar Disorder Mother     No Known Problems Father        Social History     Tobacco Use    Smoking status: Former Smoker    Smokeless tobacco: Never Used   Substance Use Topics    Alcohol use: Yes     Comment: social      Current Outpatient Medications   Medication Sig Dispense Refill    PARoxetine (PAXIL) 20 MG tablet Take 1 tablet by mouth daily 30 tablet 3    apixaban (ELIQUIS) 5 MG TABS tablet Take 5 mg by mouth 2 times daily      albuterol sulfate  (90 Base) MCG/ACT inhaler Inhale 2 puffs into the lungs every 4 hours as needed for Wheezing or Shortness of Breath 1 Inhaler 0    vitamin D (ERGOCALCIFEROL) 00176 units CAPS capsule Take 1 capsule by mouth once a week 12 capsule 0    Multiple Vitamins-Minerals (MENS ONE DAILY PO) Take 1 tablet by mouth daily       No current facility-administered medications for and reactive to light. EOM are normal.   Neck: Phonation normal. No JVD present. Carotid bruit is not present. No thyromegaly present. Cardiovascular: Normal rate, regular rhythm, S1 normal, S2 normal, normal heart sounds and intact distal pulses. Occasional extrasystoles are present. Exam reveals no gallop and no S3. No murmur heard. Pulmonary/Chest: Effort normal and breath sounds normal. No accessory muscle usage or stridor. No tachypnea and no bradypnea. No respiratory distress. He has no decreased breath sounds. He has no wheezes. He exhibits no tenderness, no crepitus and no edema. Abdominal: Soft. Bowel sounds are normal. He exhibits no distension, no fluid wave, no abdominal bruit and no mass. There is no splenomegaly or hepatomegaly. There is no tenderness. There is no rigidity and no guarding. Lymphadenopathy:     He has no cervical adenopathy. Neurological: He is alert and oriented to person, place, and time. No cranial nerve deficit. Skin: Skin is warm, dry and intact. Capillary refill takes less than 2 seconds. Rash noted. Rash is maculopapular. There is erythema. No pallor. Psychiatric: His behavior is normal. His mood appears anxious. His speech is rapid and/or pressured. Thought content is not delusional. Cognition and memory are normal. He expresses no suicidal plans and no homicidal plans. Nursing note and vitals reviewed. /80 (Site: Right Upper Arm, Position: Sitting, Cuff Size: Medium Adult)   Pulse 74   Temp 98.5 °F (36.9 °C) (Tympanic)   Resp 16   Ht 6' (1.829 m)   Wt 252 lb (114.3 kg)   SpO2 97%   BMI 34.18 kg/m²     Assessment:       Diagnosis Orders   1. Rectal bleeding  Barbi Dong MD, Gastroenterology, [address]   2. Bleeding hemorrhoid     3. Colitis     4. Proctitis     5. Anxiety  PARoxetine (PAXIL) 20 MG tablet   6.  Panic attacks  PARoxetine (PAXIL) 20 MG tablet             Plan:       Return in about 1 month (around 7/23/2019), or if symptoms worsen or fail to improve, for anxiety med check . Orders Placed This Encounter   Procedures   Brown Brown MD, Gastroenterology, [address]     Referral Priority:   Routine     Referral Type:   Eval and Treat     Referral Reason:   Specialty Services Required     Referred to Provider:   Debbie Cadena MD     Requested Specialty:   Gastroenterology     Number of Visits Requested:   1     Orders Placed This Encounter   Medications    PARoxetine (PAXIL) 20 MG tablet     Sig: Take 1 tablet by mouth daily     Dispense:  30 tablet     Refill:  3    Start paxil 20 mg daily, likely at bedtime as may couse drowsiness . Reviewed other SE. Spent over 40 minutes in room with patient today going over conditions, labs and multiple health problems and concerns  Also spent time myself contacting multiple GI offices and staff and was able to get paitnet in with Angel Mckeon on 6/28/2019 for new patient appt given worsening rectal bleeding . Placed new referral   Follow up in 4 weeks with us for med check   Call with q/c   Consider counseling also        Patientgiven educational materials - see patient instructions. Discussed use, benefit,and side effects of prescribed medications. All patient questions answered. Ptvoiced understanding. Reviewed health maintenance. Instructed to continue currentmedications, diet and exercise. Patient agreed with treatment plan. Follow up asdirected.      Electronically signed by Saroj Avendano DO on 6/29/2019 at 3:30 PM

## 2019-07-11 ENCOUNTER — OFFICE VISIT (OUTPATIENT)
Dept: GASTROENTEROLOGY | Age: 44
End: 2019-07-11
Payer: MEDICAID

## 2019-07-11 VITALS
HEART RATE: 78 BPM | DIASTOLIC BLOOD PRESSURE: 89 MMHG | SYSTOLIC BLOOD PRESSURE: 132 MMHG | BODY MASS INDEX: 33.77 KG/M2 | WEIGHT: 249 LBS

## 2019-07-11 DIAGNOSIS — K57.30 SIGMOID DIVERTICULOSIS: ICD-10-CM

## 2019-07-11 DIAGNOSIS — K57.32 DIVERTICULITIS OF LARGE INTESTINE WITHOUT PERFORATION OR ABSCESS WITHOUT BLEEDING: ICD-10-CM

## 2019-07-11 DIAGNOSIS — K35.80 ACUTE APPENDICITIS, UNSPECIFIED ACUTE APPENDICITIS TYPE: Primary | ICD-10-CM

## 2019-07-11 PROCEDURE — 1036F TOBACCO NON-USER: CPT | Performed by: INTERNAL MEDICINE

## 2019-07-11 PROCEDURE — G8427 DOCREV CUR MEDS BY ELIG CLIN: HCPCS | Performed by: INTERNAL MEDICINE

## 2019-07-11 PROCEDURE — G8417 CALC BMI ABV UP PARAM F/U: HCPCS | Performed by: INTERNAL MEDICINE

## 2019-07-11 PROCEDURE — 99204 OFFICE O/P NEW MOD 45 MIN: CPT | Performed by: INTERNAL MEDICINE

## 2019-07-11 RX ORDER — POLYETHYLENE GLYCOL 3350 17 G/17G
POWDER, FOR SOLUTION ORAL
Qty: 255 G | Refills: 0 | Status: SHIPPED | OUTPATIENT
Start: 2019-07-11 | End: 2019-08-10

## 2019-07-11 NOTE — PROGRESS NOTES
INITIAL NOTE    HISTORY OF PRESENT ILLNESS: Mr. George Ding is a 39 y.o. male referred for evaluation of diverticulitis. He has had issues with diverticulitis since 2017. He has had several documented episodes noted on CT scan. He has chronic left lower quadrant abdominal pain. He also reports chronic intermittent rectal bleeding. No weight loss. No prior colonoscopy. He is on Eliquis for DVT x2. Past Medical, Family, and Social History reviewed and does not contribute to the patient presenting condition. Patient's PMH/PSH,SH,PSYCH Hx, MEDs, ALLERGIES, and ROS were all reviewed and updated in the appropriate sections.     PAST MEDICAL HISTORY:  Past Medical History:   Diagnosis Date    Anxiety     Diverticulitis     Dvt femoral (deep venous thrombosis) (HCC)        Past Surgical History:   Procedure Laterality Date    ANKLE SURGERY Left     APPENDECTOMY      BACK SURGERY      with hardware    LAPAROSCOPIC APPENDECTOMY  04/26/2017    LAPAROSCOPIC APPENDECTOMY N/A 4/26/2017    APPENDECTOMY LAPAROSCOPIC performed by Elaine Moss MD at [address] [place] [address]        CURRENT MEDICATIONS:    Current Outpatient Medications:     PARoxetine (PAXIL) 20 MG tablet, Take 1 tablet by mouth daily, Disp: 30 tablet, Rfl: 3    apixaban (ELIQUIS) 5 MG TABS tablet, Take 5 mg by mouth 2 times daily, Disp: , Rfl:     albuterol sulfate  (90 Base) MCG/ACT inhaler, Inhale 2 puffs into the lungs every 4 hours as needed for Wheezing or Shortness of Breath, Disp: 1 Inhaler, Rfl: 0    Multiple Vitamins-Minerals (MENS ONE DAILY PO), Take 1 tablet by mouth daily, Disp: , Rfl:     vitamin D (ERGOCALCIFEROL) 92212 units CAPS capsule, Take 1 capsule by mouth once a week, Disp: 12 capsule, Rfl: 0    ALLERGIES:   Allergies   Allergen Reactions    Augmentin [Amoxicillin-Pot Clavulanate] Rash       FAMILY HISTORY:       Problem Relation Age of Onset    Heart Disease Mother Valvular    Bipolar Disorder Mother     No Known Problems Father      Mother with diverticulitis. SOCIAL HISTORY:   Social History     Socioeconomic History    Marital status:      Spouse name: Not on file    Number of children: Not on file    Years of education: Not on file    Highest education level: Not on file   Occupational History    Not on file   Social Needs    Financial resource strain: Not on file    Food insecurity:     Worry: Not on file     Inability: Not on file    Transportation needs:     Medical: Not on file     Non-medical: Not on file   Tobacco Use    Smoking status: Former Smoker    Smokeless tobacco: Never Used   Substance and Sexual Activity    Alcohol use: Yes     Comment: social    Drug use: Yes     Types: Marijuana     Comment: last night    Sexual activity: Not on file   Lifestyle    Physical activity:     Days per week: Not on file     Minutes per session: Not on file    Stress: Not on file   Relationships    Social connections:     Talks on phone: Not on file     Gets together: Not on file     Attends Mandaeism service: Not on file     Active member of club or organization: Not on file     Attends meetings of clubs or organizations: Not on file     Relationship status: Not on file    Intimate partner violence:     Fear of current or ex partner: Not on file     Emotionally abused: Not on file     Physically abused: Not on file     Forced sexual activity: Not on file   Other Topics Concern    Not on file   Social History Narrative    Not on file       REVIEW OF SYSTEMS: A 12-point review of systems was obtained and pertinent positives and negatives were enumerated above in the history of present illness. All other reviewed systems / symptoms were negative. Review of Systems    PHYSICAL EXAMINATION: Vital signs reviewed per the nursing documentation. /89   Pulse 78   Wt 249 lb (112.9 kg)   BMI 33.77 kg/m²   Body mass index is 33.77 kg/m².      I 1975   Gender                    Male   Age              39 year(s)   Race                         Room Number   Corporate ID #   [id number]   Patient Acct #   [de-identified]   MR #             [id number]      Sonographer               Lita Meehan   Accession #      [id number]    Interpreting Physician    Darrell Goode   Referring Nurse               Referring Physician       Alok Lozano DO  Practitioner  Procedure Type of Study:   Veins: Lower Extremities DVT Study, Venous Scan Lower Left. Indications for Study:Pain and swelling. Patient Status:Out Patient. Technical Quality:Good visualization. Conclusions   Summary   No evidence of superficial or deep venous thrombosis in the left lower  extremity. Edema noted. Signature   ----------------------------------------------------------------  Electronically signed by Lita Meehan(Sonographer) on  06/14/2019 04:30 PM  ----------------------------------------------------------------   ----------------------------------------------------------------  Electronically signed by Jackie Tello(Interpreting  physician) on 06/14/2019 05:13 PM  ----------------------------------------------------------------    Left Impression:   The common femoral, femoral, popliteal, tibials and saphenous veins are   compressible with normal doppler responses. Velocities are measured in cm/s ; Diameters are measured in cm Left Lower Extremities DVT Study Measurements Left 2D Measurements +------------------------------------+----------+---------------+----------+ ! Location                            ! Visualized! Compressibility! Thrombosis! +------------------------------------+----------+---------------+----------+ ! Common Femoral                      !Yes       ! Yes            ! None      ! +------------------------------------+----------+---------------+----------+ ! Prox Femoral                        !Yes       ! Yes            ! None      !

## 2019-08-09 ENCOUNTER — OFFICE VISIT (OUTPATIENT)
Dept: FAMILY MEDICINE CLINIC | Age: 44
End: 2019-08-09
Payer: MEDICAID

## 2019-08-09 ENCOUNTER — HOSPITAL ENCOUNTER (OUTPATIENT)
Age: 44
Setting detail: SPECIMEN
Discharge: HOME | End: 2019-08-09
Payer: MEDICAID

## 2019-08-09 VITALS
SYSTOLIC BLOOD PRESSURE: 128 MMHG | HEART RATE: 75 BPM | RESPIRATION RATE: 18 BRPM | OXYGEN SATURATION: 98 % | HEIGHT: 72 IN | WEIGHT: 247 LBS | TEMPERATURE: 98.3 F | BODY MASS INDEX: 33.46 KG/M2 | DIASTOLIC BLOOD PRESSURE: 84 MMHG

## 2019-08-09 DIAGNOSIS — R21 RASH: ICD-10-CM

## 2019-08-09 DIAGNOSIS — R51.9 ACUTE INTRACTABLE HEADACHE, UNSPECIFIED HEADACHE TYPE: ICD-10-CM

## 2019-08-09 DIAGNOSIS — R21 RASH: Primary | ICD-10-CM

## 2019-08-09 PROCEDURE — 99213 OFFICE O/P EST LOW 20 MIN: CPT | Performed by: NURSE PRACTITIONER

## 2019-08-09 PROCEDURE — G8427 DOCREV CUR MEDS BY ELIG CLIN: HCPCS | Performed by: NURSE PRACTITIONER

## 2019-08-09 PROCEDURE — 1036F TOBACCO NON-USER: CPT | Performed by: NURSE PRACTITIONER

## 2019-08-09 PROCEDURE — G8417 CALC BMI ABV UP PARAM F/U: HCPCS | Performed by: NURSE PRACTITIONER

## 2019-08-09 PROCEDURE — 96372 THER/PROPH/DIAG INJ SC/IM: CPT | Performed by: NURSE PRACTITIONER

## 2019-08-09 RX ORDER — DOXYCYCLINE HYCLATE 100 MG/1
100 CAPSULE ORAL 2 TIMES DAILY
Qty: 20 CAPSULE | Refills: 0 | Status: SHIPPED | OUTPATIENT
Start: 2019-08-09 | End: 2019-08-19

## 2019-08-09 RX ORDER — KETOROLAC TROMETHAMINE 30 MG/ML
60 INJECTION, SOLUTION INTRAMUSCULAR; INTRAVENOUS ONCE
Status: COMPLETED | OUTPATIENT
Start: 2019-08-09 | End: 2019-08-09

## 2019-08-09 RX ORDER — VALACYCLOVIR HYDROCHLORIDE 1 G/1
1000 TABLET, FILM COATED ORAL DAILY
Qty: 7 TABLET | Refills: 0 | Status: SHIPPED | OUTPATIENT
Start: 2019-08-09 | End: 2019-08-16

## 2019-08-09 RX ADMIN — KETOROLAC TROMETHAMINE 60 MG: 30 INJECTION, SOLUTION INTRAMUSCULAR; INTRAVENOUS at 17:18

## 2019-08-09 ASSESSMENT — PATIENT HEALTH QUESTIONNAIRE - PHQ9
SUM OF ALL RESPONSES TO PHQ QUESTIONS 1-9: 0
2. FEELING DOWN, DEPRESSED OR HOPELESS: 0
SUM OF ALL RESPONSES TO PHQ9 QUESTIONS 1 & 2: 0
1. LITTLE INTEREST OR PLEASURE IN DOING THINGS: 0
SUM OF ALL RESPONSES TO PHQ QUESTIONS 1-9: 0

## 2019-08-09 ASSESSMENT — ENCOUNTER SYMPTOMS
COUGH: 0
SHORTNESS OF BREATH: 0
PHOTOPHOBIA: 0
EYE PAIN: 1
CHEST TIGHTNESS: 0
RESPIRATORY NEGATIVE: 1
WHEEZING: 0
EYE ITCHING: 1
EYE REDNESS: 0
EYE DISCHARGE: 0

## 2019-08-09 NOTE — PATIENT INSTRUCTIONS
changes. ? Sudden trouble speaking. ? Sudden confusion or trouble understanding simple statements. ? Sudden problems with walking or balance. ? A sudden, severe headache that is different from past headaches.    Call your doctor now or seek immediate medical care if:    · You have a new or worse headache.     · Your headache gets much worse. Where can you learn more? Go to [email or web address]://[email or web address] and sign in to your [employer] account. Enter [id number] in the [facility] box to learn more about \"Headache: Care Instructions. \"     If you do not have an account, please click on the \"Sign Up Now\" link. Current as of: March 28, 2019  Content Version: 12.1  © 3893-2445 Healthwise, [employer]. Care instructions adapted under license by [facility] ([facility]). If you have questions about a medical condition or this instruction, always ask your healthcare professional. [place] [address] any warranty or liability for your use of this information. Patient Education        Rash: Care Instructions  Your Care Instructions  A rash is any irritation or inflammation of the skin. Rashes have many possible causes, including allergy, infection, illness, heat, and emotional stress. Follow-up care is a key part of your treatment and safety. Be sure to make and go to all appointments, and call your doctor if you are having problems. It's also a good idea to know your test results and keep a list of the medicines you take. How can you care for yourself at home? · Wash the area with water only. Soap can make dryness and itching worse. Pat dry. · Put cold, wet cloths on the rash to reduce itching. · Keep cool, and stay out of the sun. · Leave the rash open to the air as much of the time as possible. · Sometimes petroleum jelly (Vaseline) can help relieve the discomfort caused by a rash. A moisturizing lotion, such as Cetaphil, also may help.  Calamine lotion may help for rashes caused by

## 2019-08-10 LAB
CULTURE: ABNORMAL
Lab: ABNORMAL
SPECIMEN DESCRIPTION: ABNORMAL

## 2019-08-20 ENCOUNTER — TELEPHONE (OUTPATIENT)
Dept: GASTROENTEROLOGY | Age: 44
End: 2019-08-20

## 2019-08-23 NOTE — TELEPHONE ENCOUNTER
Voicemail full. If patient returns call, please allow confirm procedure and bowel prep instructions.

## 2019-08-26 ENCOUNTER — ANESTHESIA EVENT (OUTPATIENT)
Dept: OPERATING ROOM | Age: 44
End: 2019-08-26
Payer: MEDICAID

## 2019-08-27 ENCOUNTER — ANESTHESIA (OUTPATIENT)
Dept: OPERATING ROOM | Age: 44
End: 2019-08-27
Payer: MEDICAID

## 2019-08-27 ENCOUNTER — HOSPITAL ENCOUNTER (OUTPATIENT)
Age: 44
Setting detail: OUTPATIENT SURGERY
Discharge: HOME | End: 2019-08-27
Attending: INTERNAL MEDICINE | Admitting: INTERNAL MEDICINE
Payer: MEDICAID

## 2019-08-27 VITALS
WEIGHT: 243.4 LBS | SYSTOLIC BLOOD PRESSURE: 131 MMHG | HEART RATE: 71 BPM | DIASTOLIC BLOOD PRESSURE: 85 MMHG | HEIGHT: 72 IN | RESPIRATION RATE: 17 BRPM | OXYGEN SATURATION: 98 % | BODY MASS INDEX: 32.97 KG/M2 | TEMPERATURE: 97.5 F

## 2019-08-27 VITALS
OXYGEN SATURATION: 98 % | RESPIRATION RATE: 14 BRPM | DIASTOLIC BLOOD PRESSURE: 80 MMHG | SYSTOLIC BLOOD PRESSURE: 132 MMHG

## 2019-08-27 PROCEDURE — 3609010600 HC COLONOSCOPY POLYPECTOMY SNARE/COLD BIOPSY: Performed by: INTERNAL MEDICINE

## 2019-08-27 PROCEDURE — 3700000001 HC ADD 15 MINUTES (ANESTHESIA): Performed by: INTERNAL MEDICINE

## 2019-08-27 PROCEDURE — 2500000003 HC RX 250 WO HCPCS: Performed by: NURSE ANESTHETIST, CERTIFIED REGISTERED

## 2019-08-27 PROCEDURE — 88305 TISSUE EXAM BY PATHOLOGIST: CPT

## 2019-08-27 PROCEDURE — 3700000000 HC ANESTHESIA ATTENDED CARE: Performed by: INTERNAL MEDICINE

## 2019-08-27 PROCEDURE — 2580000003 HC RX 258: Performed by: ANESTHESIOLOGY

## 2019-08-27 PROCEDURE — 7100000011 HC PHASE II RECOVERY - ADDTL 15 MIN: Performed by: INTERNAL MEDICINE

## 2019-08-27 PROCEDURE — 45380 COLONOSCOPY AND BIOPSY: CPT | Performed by: INTERNAL MEDICINE

## 2019-08-27 PROCEDURE — 7100000010 HC PHASE II RECOVERY - FIRST 15 MIN: Performed by: INTERNAL MEDICINE

## 2019-08-27 PROCEDURE — 2709999900 HC NON-CHARGEABLE SUPPLY: Performed by: INTERNAL MEDICINE

## 2019-08-27 PROCEDURE — 6360000002 HC RX W HCPCS: Performed by: NURSE ANESTHETIST, CERTIFIED REGISTERED

## 2019-08-27 RX ORDER — SODIUM CHLORIDE, SODIUM LACTATE, POTASSIUM CHLORIDE, CALCIUM CHLORIDE 600; 310; 30; 20 MG/100ML; MG/100ML; MG/100ML; MG/100ML
INJECTION, SOLUTION INTRAVENOUS CONTINUOUS
Status: DISCONTINUED | OUTPATIENT
Start: 2019-08-28 | End: 2019-08-27 | Stop reason: HOSPADM

## 2019-08-27 RX ORDER — PROPOFOL 10 MG/ML
INJECTION, EMULSION INTRAVENOUS PRN
Status: DISCONTINUED | OUTPATIENT
Start: 2019-08-27 | End: 2019-08-27 | Stop reason: SDUPTHER

## 2019-08-27 RX ORDER — LIDOCAINE HYDROCHLORIDE 20 MG/ML
INJECTION, SOLUTION EPIDURAL; INFILTRATION; INTRACAUDAL; PERINEURAL PRN
Status: DISCONTINUED | OUTPATIENT
Start: 2019-08-27 | End: 2019-08-27 | Stop reason: SDUPTHER

## 2019-08-27 RX ORDER — LIDOCAINE HYDROCHLORIDE 10 MG/ML
1 INJECTION, SOLUTION EPIDURAL; INFILTRATION; INTRACAUDAL; PERINEURAL
Status: DISCONTINUED | OUTPATIENT
Start: 2019-08-28 | End: 2019-08-27 | Stop reason: HOSPADM

## 2019-08-27 RX ORDER — ONDANSETRON 2 MG/ML
4 INJECTION INTRAMUSCULAR; INTRAVENOUS
Status: DISCONTINUED | OUTPATIENT
Start: 2019-08-27 | End: 2019-08-27 | Stop reason: HOSPADM

## 2019-08-27 RX ORDER — FENTANYL CITRATE 50 UG/ML
50 INJECTION, SOLUTION INTRAMUSCULAR; INTRAVENOUS EVERY 5 MIN PRN
Status: DISCONTINUED | OUTPATIENT
Start: 2019-08-27 | End: 2019-08-27 | Stop reason: HOSPADM

## 2019-08-27 RX ORDER — FENTANYL CITRATE 50 UG/ML
25 INJECTION, SOLUTION INTRAMUSCULAR; INTRAVENOUS EVERY 5 MIN PRN
Status: DISCONTINUED | OUTPATIENT
Start: 2019-08-27 | End: 2019-08-27 | Stop reason: HOSPADM

## 2019-08-27 RX ADMIN — PROPOFOL 50 MG: 10 INJECTION, EMULSION INTRAVENOUS at 12:35

## 2019-08-27 RX ADMIN — PROPOFOL 50 MG: 10 INJECTION, EMULSION INTRAVENOUS at 12:42

## 2019-08-27 RX ADMIN — PROPOFOL 50 MG: 10 INJECTION, EMULSION INTRAVENOUS at 12:38

## 2019-08-27 RX ADMIN — PROPOFOL 50 MG: 10 INJECTION, EMULSION INTRAVENOUS at 12:47

## 2019-08-27 RX ADMIN — PROPOFOL 50 MG: 10 INJECTION, EMULSION INTRAVENOUS at 12:34

## 2019-08-27 RX ADMIN — LIDOCAINE HYDROCHLORIDE 20 MG: 20 INJECTION, SOLUTION EPIDURAL; INFILTRATION; INTRACAUDAL; PERINEURAL at 12:31

## 2019-08-27 RX ADMIN — PROPOFOL 50 MG: 10 INJECTION, EMULSION INTRAVENOUS at 12:32

## 2019-08-27 RX ADMIN — PROPOFOL 50 MG: 10 INJECTION, EMULSION INTRAVENOUS at 12:48

## 2019-08-27 RX ADMIN — SODIUM CHLORIDE, POTASSIUM CHLORIDE, SODIUM LACTATE AND CALCIUM CHLORIDE: 600; 310; 30; 20 INJECTION, SOLUTION INTRAVENOUS at 11:45

## 2019-08-27 RX ADMIN — PROPOFOL 50 MG: 10 INJECTION, EMULSION INTRAVENOUS at 12:31

## 2019-08-27 ASSESSMENT — PULMONARY FUNCTION TESTS
PIF_VALUE: 1
PIF_VALUE: 0

## 2019-08-27 ASSESSMENT — PAIN SCALES - GENERAL
PAINLEVEL_OUTOF10: 0

## 2019-08-27 ASSESSMENT — PAIN - FUNCTIONAL ASSESSMENT: PAIN_FUNCTIONAL_ASSESSMENT: 0-10

## 2019-08-28 LAB — SURGICAL PATHOLOGY REPORT: NORMAL

## 2019-11-22 ENCOUNTER — APPOINTMENT (OUTPATIENT)
Dept: CT IMAGING | Age: 44
End: 2019-11-22
Payer: MEDICAID

## 2019-11-22 ENCOUNTER — HOSPITAL ENCOUNTER (EMERGENCY)
Age: 44
Discharge: HOME | End: 2019-11-22
Attending: EMERGENCY MEDICINE
Payer: MEDICAID

## 2019-11-22 VITALS
SYSTOLIC BLOOD PRESSURE: 123 MMHG | HEART RATE: 81 BPM | DIASTOLIC BLOOD PRESSURE: 91 MMHG | RESPIRATION RATE: 22 BRPM | TEMPERATURE: 98.2 F | OXYGEN SATURATION: 100 %

## 2019-11-22 DIAGNOSIS — K57.90 DIVERTICULOSIS: Primary | ICD-10-CM

## 2019-11-22 LAB
ABSOLUTE EOS #: 0.15 K/UL (ref 0–0.44)
ABSOLUTE IMMATURE GRANULOCYTE: 0.05 K/UL (ref 0–0.3)
ABSOLUTE LYMPH #: 2.77 K/UL (ref 1.1–3.7)
ABSOLUTE MONO #: 0.7 K/UL (ref 0.1–1.2)
ALBUMIN SERPL-MCNC: 4.4 G/DL (ref 3.5–5.2)
ALBUMIN/GLOBULIN RATIO: ABNORMAL (ref 1–2.5)
ALP BLD-CCNC: 35 U/L (ref 40–129)
ALT SERPL-CCNC: 19 U/L (ref 5–41)
ANION GAP SERPL CALCULATED.3IONS-SCNC: 14 MMOL/L (ref 9–17)
AST SERPL-CCNC: 20 U/L
BASOPHILS # BLD: 1 % (ref 0–2)
BASOPHILS ABSOLUTE: 0.07 K/UL (ref 0–0.2)
BILIRUB SERPL-MCNC: 0.21 MG/DL (ref 0.3–1.2)
BILIRUBIN DIRECT: <0.08 MG/DL
BILIRUBIN, INDIRECT: ABNORMAL MG/DL (ref 0–1)
BUN BLDV-MCNC: 16 MG/DL (ref 6–20)
BUN/CREAT BLD: 18 (ref 9–20)
CALCIUM SERPL-MCNC: 9.3 MG/DL (ref 8.6–10.4)
CHLORIDE BLD-SCNC: 102 MMOL/L (ref 98–107)
CO2: 23 MMOL/L (ref 20–31)
CREAT SERPL-MCNC: 0.87 MG/DL (ref 0.7–1.2)
DIFFERENTIAL TYPE: ABNORMAL
EOSINOPHILS RELATIVE PERCENT: 1 % (ref 1–4)
GFR AFRICAN AMERICAN: >60 ML/MIN
GFR NON-AFRICAN AMERICAN: >60 ML/MIN
GFR SERPL CREATININE-BSD FRML MDRD: NORMAL ML/MIN/{1.73_M2}
GFR SERPL CREATININE-BSD FRML MDRD: NORMAL ML/MIN/{1.73_M2}
GLOBULIN: ABNORMAL G/DL (ref 1.5–3.8)
GLUCOSE BLD-MCNC: 89 MG/DL (ref 70–99)
HCT VFR BLD CALC: 45.6 % (ref 40.7–50.3)
HEMOGLOBIN: 15 G/DL (ref 13–17)
IMMATURE GRANULOCYTES: 1 %
LIPASE: 45 U/L (ref 13–60)
LYMPHOCYTES # BLD: 26 % (ref 24–43)
MCH RBC QN AUTO: 28.6 PG (ref 25.2–33.5)
MCHC RBC AUTO-ENTMCNC: 32.9 G/DL (ref 28.4–34.8)
MCV RBC AUTO: 87 FL (ref 82.6–102.9)
MONOCYTES # BLD: 6 % (ref 3–12)
NRBC AUTOMATED: 0 PER 100 WBC
PDW BLD-RTO: 13.5 % (ref 11.8–14.4)
PLATELET # BLD: 337 K/UL (ref 138–453)
PLATELET ESTIMATE: ABNORMAL
PMV BLD AUTO: 8.9 FL (ref 8.1–13.5)
POTASSIUM SERPL-SCNC: 4.2 MMOL/L (ref 3.7–5.3)
RBC # BLD: 5.24 M/UL (ref 4.21–5.77)
RBC # BLD: ABNORMAL 10*6/UL
SEG NEUTROPHILS: 65 % (ref 36–65)
SEGMENTED NEUTROPHILS ABSOLUTE COUNT: 7.14 K/UL (ref 1.5–8.1)
SODIUM BLD-SCNC: 139 MMOL/L (ref 135–144)
TOTAL PROTEIN: 7.2 G/DL (ref 6.4–8.3)
WBC # BLD: 10.9 K/UL (ref 3.5–11.3)
WBC # BLD: ABNORMAL 10*3/UL

## 2019-11-22 PROCEDURE — 6360000004 HC RX CONTRAST MEDICATION: Performed by: NURSE PRACTITIONER

## 2019-11-22 PROCEDURE — 83690 ASSAY OF LIPASE: CPT

## 2019-11-22 PROCEDURE — 2580000003 HC RX 258: Performed by: NURSE PRACTITIONER

## 2019-11-22 PROCEDURE — 6360000002 HC RX W HCPCS: Performed by: NURSE PRACTITIONER

## 2019-11-22 PROCEDURE — 74177 CT ABD & PELVIS W/CONTRAST: CPT

## 2019-11-22 PROCEDURE — 96374 THER/PROPH/DIAG INJ IV PUSH: CPT

## 2019-11-22 PROCEDURE — 81003 URINALYSIS AUTO W/O SCOPE: CPT

## 2019-11-22 PROCEDURE — 80076 HEPATIC FUNCTION PANEL: CPT

## 2019-11-22 PROCEDURE — 85025 COMPLETE CBC W/AUTO DIFF WBC: CPT

## 2019-11-22 PROCEDURE — 96375 TX/PRO/DX INJ NEW DRUG ADDON: CPT

## 2019-11-22 PROCEDURE — 80048 BASIC METABOLIC PNL TOTAL CA: CPT

## 2019-11-22 PROCEDURE — 99284 EMERGENCY DEPT VISIT MOD MDM: CPT

## 2019-11-22 RX ORDER — ONDANSETRON 2 MG/ML
4 INJECTION INTRAMUSCULAR; INTRAVENOUS ONCE
Status: COMPLETED | OUTPATIENT
Start: 2019-11-22 | End: 2019-11-22

## 2019-11-22 RX ORDER — 0.9 % SODIUM CHLORIDE 0.9 %
80 INTRAVENOUS SOLUTION INTRAVENOUS ONCE
Status: COMPLETED | OUTPATIENT
Start: 2019-11-22 | End: 2019-11-22

## 2019-11-22 RX ORDER — ORPHENADRINE CITRATE 30 MG/ML
60 INJECTION INTRAMUSCULAR; INTRAVENOUS ONCE
Status: COMPLETED | OUTPATIENT
Start: 2019-11-22 | End: 2019-11-22

## 2019-11-22 RX ORDER — 0.9 % SODIUM CHLORIDE 0.9 %
1000 INTRAVENOUS SOLUTION INTRAVENOUS ONCE
Status: COMPLETED | OUTPATIENT
Start: 2019-11-22 | End: 2019-11-22

## 2019-11-22 RX ORDER — MORPHINE SULFATE 10 MG/ML
6 INJECTION, SOLUTION INTRAMUSCULAR; INTRAVENOUS ONCE
Status: COMPLETED | OUTPATIENT
Start: 2019-11-22 | End: 2019-11-22

## 2019-11-22 RX ORDER — KETOROLAC TROMETHAMINE 30 MG/ML
30 INJECTION, SOLUTION INTRAMUSCULAR; INTRAVENOUS ONCE
Status: COMPLETED | OUTPATIENT
Start: 2019-11-22 | End: 2019-11-22

## 2019-11-22 RX ORDER — SODIUM CHLORIDE 0.9 % (FLUSH) 0.9 %
10 SYRINGE (ML) INJECTION PRN
Status: DISCONTINUED | OUTPATIENT
Start: 2019-11-22 | End: 2019-11-22 | Stop reason: HOSPADM

## 2019-11-22 RX ORDER — DICYCLOMINE HYDROCHLORIDE 10 MG/1
10 CAPSULE ORAL EVERY 6 HOURS PRN
Qty: 20 CAPSULE | Refills: 0 | Status: SHIPPED | OUTPATIENT
Start: 2019-11-22 | End: 2020-04-20

## 2019-11-22 RX ADMIN — Medication 10 ML: at 17:39

## 2019-11-22 RX ADMIN — SODIUM CHLORIDE 80 ML: 9 INJECTION, SOLUTION INTRAVENOUS at 17:38

## 2019-11-22 RX ADMIN — ONDANSETRON 4 MG: 2 INJECTION INTRAMUSCULAR; INTRAVENOUS at 17:16

## 2019-11-22 RX ADMIN — IOPAMIDOL 80 ML: 755 INJECTION, SOLUTION INTRAVENOUS at 17:39

## 2019-11-22 RX ADMIN — MORPHINE SULFATE 6 MG: 10 INJECTION INTRAVENOUS at 17:16

## 2019-11-22 RX ADMIN — SODIUM CHLORIDE 1000 ML: 9 INJECTION, SOLUTION INTRAVENOUS at 17:16

## 2019-11-22 RX ADMIN — ORPHENADRINE CITRATE 60 MG: 30 INJECTION INTRAMUSCULAR; INTRAVENOUS at 18:18

## 2019-11-22 RX ADMIN — KETOROLAC TROMETHAMINE 30 MG: 30 INJECTION, SOLUTION INTRAMUSCULAR at 18:18

## 2019-11-22 ASSESSMENT — PAIN DESCRIPTION - PAIN TYPE: TYPE: ACUTE PAIN

## 2019-11-22 ASSESSMENT — PAIN DESCRIPTION - LOCATION: LOCATION: ABDOMEN;FLANK

## 2019-11-22 ASSESSMENT — PAIN DESCRIPTION - DESCRIPTORS: DESCRIPTORS: SHARP;SHOOTING;STABBING;CRAMPING

## 2019-11-22 ASSESSMENT — PAIN SCALES - GENERAL: PAINLEVEL_OUTOF10: 10

## 2019-11-22 ASSESSMENT — PAIN DESCRIPTION - ORIENTATION: ORIENTATION: RIGHT

## 2019-11-23 ASSESSMENT — ENCOUNTER SYMPTOMS
BACK PAIN: 1
SINUS PRESSURE: 0
DIARRHEA: 0
NAUSEA: 0
CONSTIPATION: 0
WHEEZING: 0
SHORTNESS OF BREATH: 0
COLOR CHANGE: 0
COUGH: 0
ABDOMINAL PAIN: 1
RHINORRHEA: 0
VOMITING: 0
SORE THROAT: 0

## 2020-04-20 ENCOUNTER — NURSE TRIAGE (OUTPATIENT)
Dept: OTHER | Facility: CLINIC | Age: 45
End: 2020-04-20

## 2020-04-20 ENCOUNTER — TELEMEDICINE (OUTPATIENT)
Dept: FAMILY MEDICINE CLINIC | Age: 45
End: 2020-04-20
Payer: MEDICAID

## 2020-04-20 VITALS — BODY MASS INDEX: 33.18 KG/M2 | HEIGHT: 72 IN | WEIGHT: 245 LBS

## 2020-04-20 PROCEDURE — G8427 DOCREV CUR MEDS BY ELIG CLIN: HCPCS | Performed by: INTERNAL MEDICINE

## 2020-04-20 PROCEDURE — 1036F TOBACCO NON-USER: CPT | Performed by: INTERNAL MEDICINE

## 2020-04-20 PROCEDURE — G8417 CALC BMI ABV UP PARAM F/U: HCPCS | Performed by: INTERNAL MEDICINE

## 2020-04-20 PROCEDURE — 99213 OFFICE O/P EST LOW 20 MIN: CPT | Performed by: INTERNAL MEDICINE

## 2020-04-20 RX ORDER — METHYLPREDNISOLONE 4 MG/1
TABLET ORAL
Qty: 1 KIT | Refills: 0 | Status: SHIPPED | OUTPATIENT
Start: 2020-04-20 | End: 2020-04-26

## 2020-04-20 RX ORDER — CEPHALEXIN 500 MG/1
500 CAPSULE ORAL 3 TIMES DAILY
Qty: 21 CAPSULE | Refills: 0 | Status: SHIPPED | OUTPATIENT
Start: 2020-04-20 | End: 2020-04-20

## 2020-04-20 ASSESSMENT — ENCOUNTER SYMPTOMS
DIARRHEA: 0
WHEEZING: 0
CHEST TIGHTNESS: 1
RECTAL PAIN: 0
SINUS PRESSURE: 1
COUGH: 1
CONSTIPATION: 0
APNEA: 0
VOICE CHANGE: 0
ABDOMINAL PAIN: 0
STRIDOR: 0
SHORTNESS OF BREATH: 1
NAUSEA: 0
CHOKING: 0
SINUS PAIN: 1
SORE THROAT: 0
VOMITING: 0
TROUBLE SWALLOWING: 0
RHINORRHEA: 1

## 2020-04-20 NOTE — PROGRESS NOTES
patient's (and/or legal guardian's) consent, to reduce the patient's risk of exposure to COVID-19 and provide necessary medical care. The patient (and/or legal guardian) has also been advised to contact this office for worsening conditions or problems, and seek emergency medical treatment and/or call 911 if deemed necessary. Services were provided through a video synchronous discussion virtually to substitute for in-person clinic visit. Patient and provider were located at their individual homes. --Benita Phan DO on 4/20/2020 at 3:47 PM    An electronic signature was used to authenticate this note.

## 2020-04-21 ENCOUNTER — HOSPITAL ENCOUNTER (OUTPATIENT)
Dept: GENERAL RADIOLOGY | Age: 45
Discharge: HOME | End: 2020-04-23
Payer: MEDICAID

## 2020-04-21 ENCOUNTER — HOSPITAL ENCOUNTER (OUTPATIENT)
Age: 45
Setting detail: SPECIMEN
Discharge: HOME | End: 2020-04-21
Payer: MEDICAID

## 2020-04-21 ENCOUNTER — HOSPITAL ENCOUNTER (OUTPATIENT)
Age: 45
Discharge: HOME | End: 2020-04-23
Payer: MEDICAID

## 2020-04-21 ENCOUNTER — OFFICE VISIT (OUTPATIENT)
Dept: PRIMARY CARE CLINIC | Age: 45
End: 2020-04-21
Payer: MEDICAID

## 2020-04-21 VITALS — OXYGEN SATURATION: 97 % | TEMPERATURE: 98.8 F | HEART RATE: 108 BPM

## 2020-04-21 PROCEDURE — G8427 DOCREV CUR MEDS BY ELIG CLIN: HCPCS | Performed by: FAMILY MEDICINE

## 2020-04-21 PROCEDURE — G8417 CALC BMI ABV UP PARAM F/U: HCPCS | Performed by: FAMILY MEDICINE

## 2020-04-21 PROCEDURE — 99213 OFFICE O/P EST LOW 20 MIN: CPT | Performed by: FAMILY MEDICINE

## 2020-04-21 PROCEDURE — 71046 X-RAY EXAM CHEST 2 VIEWS: CPT

## 2020-04-21 PROCEDURE — 1036F TOBACCO NON-USER: CPT | Performed by: FAMILY MEDICINE

## 2020-04-21 RX ORDER — AZITHROMYCIN 250 MG/1
TABLET, FILM COATED ORAL
Qty: 1 PACKET | Refills: 0 | Status: SHIPPED | OUTPATIENT
Start: 2020-04-21 | End: 2020-05-01

## 2020-04-21 ASSESSMENT — ENCOUNTER SYMPTOMS
SHORTNESS OF BREATH: 1
WHEEZING: 0
VOMITING: 0
CHEST TIGHTNESS: 1
SINUS PRESSURE: 1
ABDOMINAL PAIN: 0
NAUSEA: 0
DIARRHEA: 0
COUGH: 1
SORE THROAT: 0

## 2020-04-21 NOTE — PROGRESS NOTES
Bipolar Disorder Mother     No Known Problems Father        Social History     Tobacco Use    Smoking status: Former Smoker     Types: Cigarettes     Last attempt to quit: 2015     Years since quittin.3    Smokeless tobacco: Never Used   Substance Use Topics    Alcohol use: Yes     Comment: social      Current Outpatient Medications   Medication Sig Dispense Refill    methylPREDNISolone (MEDROL, RANDI,) 4 MG tablet Take as directed 1 kit 0     No current facility-administered medications for this visit. Allergies   Allergen Reactions    Augmentin [Amoxicillin-Pot Clavulanate] Rash       Health Maintenance   Topic Date Due    HIV screen  1989    Lipid screen  2014    Flu vaccine (Season Ended) 2020    Colon cancer screen colonoscopy  2024    DTaP/Tdap/Td vaccine (2 - Td) 2029    Hepatitis A vaccine  Aged Out    Hepatitis B vaccine  Aged Out    Hib vaccine  Aged Out    Meningococcal (ACWY) vaccine  Aged Out    Pneumococcal 0-64 years Vaccine  Aged Out       :      Review of Systems   Constitutional: Positive for chills and fever (101.3F). HENT: Positive for congestion and sinus pressure. Negative for ear pain and sore throat. Respiratory: Positive for cough, chest tightness and shortness of breath. Negative for wheezing. Gastrointestinal: Negative for abdominal pain, diarrhea, nausea and vomiting. Musculoskeletal: Positive for myalgias. Skin: Negative for pallor and rash. Allergic/Immunologic: Negative for environmental allergies. Hematological: Negative for adenopathy. Objective:   Patient was evaluated [place] today during this pandemic covid 19 situation. Physical Exam  Vitals signs and nursing note reviewed. Constitutional:       Appearance: Normal appearance. He is obese. He is ill-appearing. HENT:      Head: Normocephalic and atraumatic.       Right Ear: Hearing normal.      Left Ear: Hearing normal.      Nose: Nose normal. including petting, snuggling, being kissed or licked, and sharing food. If you must care for your pet or be around animals while you are sick, wash your hands before and after you interact with pets and wear a facemask. Call ahead before visiting your doctor  If you have a medical appointment, call the healthcare provider and tell them that you have or may have COVID-19. This will help the healthcare providers office take steps to keep other people from getting infected or exposed. Wear a facemask  You should wear a facemask when you are around other people (e.g., sharing a room or vehicle) or pets and before you enter a healthcare providers office. If you are not able to wear a facemask (for example, because it causes trouble breathing), then people who live with you should not stay in the same room with you, or they should wear a facemask if they enter your room. Cover your coughs and sneezes  Cover your mouth and nose with a tissue when you cough or sneeze. Throw used tissues in a lined trash can. Immediately wash your hands with soap and water for at least 20 seconds or, if soap and water are not available, clean your hands with an alcohol-based hand  that contains at least 60% alcohol. Clean your hands often  Wash your hands often with soap and water for at least 20 seconds, especially after blowing your nose, coughing, or sneezing; going to the bathroom; and before eating or preparing food. If soap and water are not readily available, use an alcohol-based hand  with at least 60% alcohol, covering all surfaces of your hands and rubbing them together until they feel dry. Soap and water are the best option if hands are visibly dirty. Avoid touching your eyes, nose, and mouth with unwashed hands. Avoid sharing personal household items  You should not share dishes, drinking glasses, cups, eating utensils, towels, or bedding with other people or pets in your home.  After using these items, they should be washed thoroughly with soap and water. Clean all high-touch surfaces everyday  High touch surfaces include counters, tabletops, doorknobs, bathroom fixtures, toilets, phones, keyboards, tablets, and bedside tables. Also, clean any surfaces that may have blood, stool, or body fluids on them. Use a household cleaning spray or wipe, according to the label instructions. Labels contain instructions for safe and effective use of the cleaning product including precautions you should take when applying the product, such as wearing gloves and making sure you have good ventilation during use of the product. Monitor your symptoms  Seek prompt medical attention if your illness is worsening (e.g., difficulty breathing). Before seeking care, call your healthcare provider and tell them that you have, or are being evaluated for, COVID-19. Put on a facemask before you enter the facility. These steps will help the healthcare providers office to keep other people in the office or waiting room from getting infected or exposed. Ask your healthcare provider to call the local or state health department. Persons who are placed under active monitoring or facilitated self-monitoring should follow instructions provided by their local health department or occupational health professionals, as appropriate. When working with your local health department check their available hours. If you have a medical emergency and need to call 911, notify the dispatch personnel that you have, or are being evaluated for COVID-19. If possible, put on a facemask before emergency medical services arrive. Discontinuing home isolation  Patients with confirmed COVID-19 should remain under home isolation precautions until the risk of secondary transmission to others is thought to be low.  The decision to discontinue home isolation precautions should be made on a case-by-case basis, in consultation with healthcare providers and state and local

## 2020-04-21 NOTE — PATIENT INSTRUCTIONS
least 20 seconds or, if soap and water are not available, clean your hands with an alcohol-based hand  that contains at least 60% alcohol. Clean your hands often  Wash your hands often with soap and water for at least 20 seconds, especially after blowing your nose, coughing, or sneezing; going to the bathroom; and before eating or preparing food. If soap and water are not readily available, use an alcohol-based hand  with at least 60% alcohol, covering all surfaces of your hands and rubbing them together until they feel dry. Soap and water are the best option if hands are visibly dirty. Avoid touching your eyes, nose, and mouth with unwashed hands. Avoid sharing personal household items  You should not share dishes, drinking glasses, cups, eating utensils, towels, or bedding with other people or pets in your home. After using these items, they should be washed thoroughly with soap and water. Clean all high-touch surfaces everyday  High touch surfaces include counters, tabletops, doorknobs, bathroom fixtures, toilets, phones, keyboards, tablets, and bedside tables. Also, clean any surfaces that may have blood, stool, or body fluids on them. Use a household cleaning spray or wipe, according to the label instructions. Labels contain instructions for safe and effective use of the cleaning product including precautions you should take when applying the product, such as wearing gloves and making sure you have good ventilation during use of the product. Monitor your symptoms  Seek prompt medical attention if your illness is worsening (e.g., difficulty breathing). Before seeking care, call your healthcare provider and tell them that you have, or are being evaluated for, COVID-19. Put on a facemask before you enter the facility. These steps will help the healthcare providers office to keep other people in the office or waiting room from getting infected or exposed.  Ask your healthcare provider to call the local or state health department. Persons who are placed under active monitoring or facilitated self-monitoring should follow instructions provided by their local health department or occupational health professionals, as appropriate. When working with your local health department check their available hours. If you have a medical emergency and need to call 911, notify the dispatch personnel that you have, or are being evaluated for COVID-19. If possible, put on a facemask before emergency medical services arrive. Discontinuing home isolation  Patients with confirmed COVID-19 should remain under home isolation precautions until the risk of secondary transmission to others is thought to be low. The decision to discontinue home isolation precautions should be made on a case-by-case basis, in consultation with healthcare providers and state and local health departments.

## 2020-04-23 ENCOUNTER — CARE COORDINATION (OUTPATIENT)
Dept: OTHER | Facility: CLINIC | Age: 45
End: 2020-04-23

## 2020-04-23 LAB — SARS-COV-2, NAA: NOT DETECTED

## 2020-04-25 ENCOUNTER — CARE COORDINATION (OUTPATIENT)
Dept: OTHER | Facility: CLINIC | Age: 45
End: 2020-04-25

## 2020-04-25 NOTE — CARE COORDINATION
Patient submitting comments: Thank you I'm starting to feel better. Just still very tired and wore out . Still have muscle and body soreness. And dealing with the headache and cough. And I was up the last 30 hours . I have had diarrhea all night. Honestly though bi starting to feel better slowly. Thank you for your concern god bless. RN: Ok, sounds great. If those symptoms worsen please let us know. I hope you continue to feel better.  Avelino Monzon RN

## 2020-07-08 ENCOUNTER — APPOINTMENT (OUTPATIENT)
Dept: CT IMAGING | Age: 45
End: 2020-07-08
Payer: MEDICAID

## 2020-07-08 ENCOUNTER — HOSPITAL ENCOUNTER (EMERGENCY)
Age: 45
Discharge: HOME | End: 2020-07-08
Attending: EMERGENCY MEDICINE
Payer: MEDICAID

## 2020-07-08 ENCOUNTER — OFFICE VISIT (OUTPATIENT)
Dept: FAMILY MEDICINE CLINIC | Age: 45
End: 2020-07-08
Payer: MEDICAID

## 2020-07-08 VITALS
OXYGEN SATURATION: 99 % | TEMPERATURE: 98.5 F | RESPIRATION RATE: 18 BRPM | SYSTOLIC BLOOD PRESSURE: 149 MMHG | HEIGHT: 72 IN | BODY MASS INDEX: 35.84 KG/M2 | HEART RATE: 78 BPM | DIASTOLIC BLOOD PRESSURE: 98 MMHG | WEIGHT: 264.6 LBS

## 2020-07-08 VITALS
HEIGHT: 72 IN | BODY MASS INDEX: 35.59 KG/M2 | DIASTOLIC BLOOD PRESSURE: 87 MMHG | WEIGHT: 262.8 LBS | TEMPERATURE: 97.9 F | SYSTOLIC BLOOD PRESSURE: 128 MMHG | OXYGEN SATURATION: 98 % | HEART RATE: 86 BPM

## 2020-07-08 LAB
ABSOLUTE EOS #: 0.28 K/UL (ref 0–0.44)
ABSOLUTE IMMATURE GRANULOCYTE: 0.03 K/UL (ref 0–0.3)
ABSOLUTE LYMPH #: 2.98 K/UL (ref 1.1–3.7)
ABSOLUTE MONO #: 0.58 K/UL (ref 0.1–1.2)
ALBUMIN SERPL-MCNC: 4.3 G/DL (ref 3.5–5.2)
ALBUMIN/GLOBULIN RATIO: ABNORMAL (ref 1–2.5)
ALP BLD-CCNC: 36 U/L (ref 40–129)
ALT SERPL-CCNC: 25 U/L (ref 5–41)
ANION GAP SERPL CALCULATED.3IONS-SCNC: 13 MMOL/L (ref 9–17)
AST SERPL-CCNC: 21 U/L
BASOPHILS # BLD: 1 % (ref 0–2)
BASOPHILS ABSOLUTE: 0.07 K/UL (ref 0–0.2)
BILIRUB SERPL-MCNC: 0.23 MG/DL (ref 0.3–1.2)
BUN BLDV-MCNC: 16 MG/DL (ref 6–20)
BUN/CREAT BLD: 19 (ref 9–20)
CALCIUM SERPL-MCNC: 9.5 MG/DL (ref 8.6–10.4)
CHLORIDE BLD-SCNC: 107 MMOL/L (ref 98–107)
CO2: 21 MMOL/L (ref 20–31)
CREAT SERPL-MCNC: 0.83 MG/DL (ref 0.7–1.2)
DIFFERENTIAL TYPE: NORMAL
EOSINOPHILS RELATIVE PERCENT: 3 % (ref 1–4)
GFR AFRICAN AMERICAN: >60 ML/MIN
GFR NON-AFRICAN AMERICAN: >60 ML/MIN
GFR SERPL CREATININE-BSD FRML MDRD: ABNORMAL ML/MIN/{1.73_M2}
GFR SERPL CREATININE-BSD FRML MDRD: ABNORMAL ML/MIN/{1.73_M2}
GLUCOSE BLD-MCNC: 90 MG/DL (ref 70–99)
HCT VFR BLD CALC: 46.3 % (ref 40.7–50.3)
HEMOGLOBIN: 15.4 G/DL (ref 13–17)
IMMATURE GRANULOCYTES: 0 %
LACTIC ACID: 0.8 MMOL/L (ref 0.5–2.2)
LIPASE: 45 U/L (ref 13–60)
LYMPHOCYTES # BLD: 32 % (ref 24–43)
MCH RBC QN AUTO: 29.5 PG (ref 25.2–33.5)
MCHC RBC AUTO-ENTMCNC: 33.3 G/DL (ref 28.4–34.8)
MCV RBC AUTO: 88.7 FL (ref 82.6–102.9)
MONOCYTES # BLD: 6 % (ref 3–12)
NRBC AUTOMATED: 0 PER 100 WBC
PDW BLD-RTO: 13.6 % (ref 11.8–14.4)
PLATELET # BLD: 329 K/UL (ref 138–453)
PLATELET ESTIMATE: NORMAL
PMV BLD AUTO: 8.8 FL (ref 8.1–13.5)
POTASSIUM SERPL-SCNC: 4.3 MMOL/L (ref 3.7–5.3)
RBC # BLD: 5.22 M/UL (ref 4.21–5.77)
RBC # BLD: NORMAL 10*6/UL
SEG NEUTROPHILS: 58 % (ref 36–65)
SEGMENTED NEUTROPHILS ABSOLUTE COUNT: 5.31 K/UL (ref 1.5–8.1)
SODIUM BLD-SCNC: 141 MMOL/L (ref 135–144)
TOTAL PROTEIN: 7.1 G/DL (ref 6.4–8.3)
WBC # BLD: 9.3 K/UL (ref 3.5–11.3)
WBC # BLD: NORMAL 10*3/UL

## 2020-07-08 PROCEDURE — 6360000002 HC RX W HCPCS: Performed by: EMERGENCY MEDICINE

## 2020-07-08 PROCEDURE — G8427 DOCREV CUR MEDS BY ELIG CLIN: HCPCS | Performed by: NURSE PRACTITIONER

## 2020-07-08 PROCEDURE — 85025 COMPLETE CBC W/AUTO DIFF WBC: CPT

## 2020-07-08 PROCEDURE — 74177 CT ABD & PELVIS W/CONTRAST: CPT

## 2020-07-08 PROCEDURE — 96376 TX/PRO/DX INJ SAME DRUG ADON: CPT

## 2020-07-08 PROCEDURE — G8417 CALC BMI ABV UP PARAM F/U: HCPCS | Performed by: NURSE PRACTITIONER

## 2020-07-08 PROCEDURE — 83690 ASSAY OF LIPASE: CPT

## 2020-07-08 PROCEDURE — 99283 EMERGENCY DEPT VISIT LOW MDM: CPT

## 2020-07-08 PROCEDURE — 83605 ASSAY OF LACTIC ACID: CPT

## 2020-07-08 PROCEDURE — 99213 OFFICE O/P EST LOW 20 MIN: CPT | Performed by: NURSE PRACTITIONER

## 2020-07-08 PROCEDURE — 96374 THER/PROPH/DIAG INJ IV PUSH: CPT

## 2020-07-08 PROCEDURE — 6360000004 HC RX CONTRAST MEDICATION: Performed by: EMERGENCY MEDICINE

## 2020-07-08 PROCEDURE — 1036F TOBACCO NON-USER: CPT | Performed by: NURSE PRACTITIONER

## 2020-07-08 PROCEDURE — 80053 COMPREHEN METABOLIC PANEL: CPT

## 2020-07-08 PROCEDURE — 2580000003 HC RX 258: Performed by: EMERGENCY MEDICINE

## 2020-07-08 PROCEDURE — 96375 TX/PRO/DX INJ NEW DRUG ADDON: CPT

## 2020-07-08 RX ORDER — MORPHINE SULFATE 10 MG/ML
6 INJECTION, SOLUTION INTRAMUSCULAR; INTRAVENOUS ONCE
Status: COMPLETED | OUTPATIENT
Start: 2020-07-08 | End: 2020-07-08

## 2020-07-08 RX ORDER — ONDANSETRON 4 MG/1
4 TABLET, ORALLY DISINTEGRATING ORAL 3 TIMES DAILY PRN
Qty: 12 TABLET | Refills: 0 | Status: SHIPPED | OUTPATIENT
Start: 2020-07-08 | End: 2020-07-12

## 2020-07-08 RX ORDER — SODIUM CHLORIDE 0.9 % (FLUSH) 0.9 %
10 SYRINGE (ML) INJECTION PRN
Status: DISCONTINUED | OUTPATIENT
Start: 2020-07-08 | End: 2020-07-08 | Stop reason: HOSPADM

## 2020-07-08 RX ORDER — 0.9 % SODIUM CHLORIDE 0.9 %
1000 INTRAVENOUS SOLUTION INTRAVENOUS ONCE
Status: COMPLETED | OUTPATIENT
Start: 2020-07-08 | End: 2020-07-08

## 2020-07-08 RX ORDER — HYDROCODONE BITARTRATE AND ACETAMINOPHEN 5; 325 MG/1; MG/1
1 TABLET ORAL EVERY 6 HOURS PRN
Qty: 12 TABLET | Refills: 0 | Status: SHIPPED | OUTPATIENT
Start: 2020-07-08 | End: 2020-07-12

## 2020-07-08 RX ORDER — 0.9 % SODIUM CHLORIDE 0.9 %
80 INTRAVENOUS SOLUTION INTRAVENOUS ONCE
Status: COMPLETED | OUTPATIENT
Start: 2020-07-08 | End: 2020-07-08

## 2020-07-08 RX ORDER — ONDANSETRON 2 MG/ML
4 INJECTION INTRAMUSCULAR; INTRAVENOUS ONCE
Status: COMPLETED | OUTPATIENT
Start: 2020-07-08 | End: 2020-07-08

## 2020-07-08 RX ADMIN — SODIUM CHLORIDE 80 ML: 9 INJECTION, SOLUTION INTRAVENOUS at 18:54

## 2020-07-08 RX ADMIN — Medication 10 ML: at 18:54

## 2020-07-08 RX ADMIN — ONDANSETRON 4 MG: 2 INJECTION INTRAMUSCULAR; INTRAVENOUS at 18:41

## 2020-07-08 RX ADMIN — SODIUM CHLORIDE 1000 ML: 9 INJECTION, SOLUTION INTRAVENOUS at 18:41

## 2020-07-08 RX ADMIN — MORPHINE SULFATE 6 MG: 10 INJECTION INTRAVENOUS at 18:41

## 2020-07-08 RX ADMIN — IOPAMIDOL 80 ML: 755 INJECTION, SOLUTION INTRAVENOUS at 18:54

## 2020-07-08 RX ADMIN — MORPHINE SULFATE 6 MG: 10 INJECTION INTRAVENOUS at 19:51

## 2020-07-08 ASSESSMENT — PAIN SCALES - GENERAL
PAINLEVEL_OUTOF10: 7
PAINLEVEL_OUTOF10: 4
PAINLEVEL_OUTOF10: 7
PAINLEVEL_OUTOF10: 7

## 2020-07-08 ASSESSMENT — ENCOUNTER SYMPTOMS
COUGH: 0
CHEST TIGHTNESS: 0
VOMITING: 0
NAUSEA: 0
SHORTNESS OF BREATH: 0
ABDOMINAL PAIN: 1

## 2020-07-08 ASSESSMENT — PAIN DESCRIPTION - ORIENTATION
ORIENTATION: LOWER
ORIENTATION: MID

## 2020-07-08 ASSESSMENT — PAIN DESCRIPTION - LOCATION
LOCATION: ABDOMEN
LOCATION: ABDOMEN

## 2020-07-08 ASSESSMENT — PAIN DESCRIPTION - DESCRIPTORS: DESCRIPTORS: SHARP;SPASM;PRESSURE

## 2020-07-08 ASSESSMENT — PAIN DESCRIPTION - FREQUENCY: FREQUENCY: INTERMITTENT

## 2020-07-08 NOTE — PROGRESS NOTES
[address]  [place], [address]  [phone]      Sada Black is a 38 y.o. male who presents today for his  medicalconditions/complaints as noted below. Sada Black is c/o of Pain (umbilical hernia,worsening since last night,has to wear binder to control it popping,BMs okay)  . HPI:    HPI  Pt. Here for evaluation of painful hernia to umbilicus. Ongoing since last surgery in   but recently worsening in the last few days. Has a lot of pain with movement, coughing, sneezing. He does have bad sunburn to abdomen so hard to say if warm or truly red. Denies any recent heavy lifting. Does need work note for now. Past Medical History:   Diagnosis Date    Anxiety     Diverticulitis     Dvt femoral (deep venous thrombosis) ([facility] [address] 2018      Past Surgical History:   Procedure Laterality Date    ANKLE SURGERY Left     APPENDECTOMY      BACK SURGERY      with hardware    COLONOSCOPY  2019    polypectomy and biopsy with dr. Linda Urban COLONOSCOPY N/A 2019    COLONOSCOPY POLYPECTOMY SNARE/COLD BIOPSY performed by Marly Lindsey MD at [address] [place] [address]  2017    LAPAROSCOPIC APPENDECTOMY N/A 2017    APPENDECTOMY LAPAROSCOPIC performed by Lilly Tejada MD at [address] [place] [address] Bilateral      Family History   Problem Relation Age of Onset    Heart Disease Mother         Valvular    Bipolar Disorder Mother     No Known Problems Father      Social History     Tobacco Use    Smoking status: Former Smoker     Types: Cigarettes     Last attempt to quit:      Years since quittin.5    Smokeless tobacco: Never Used   Substance Use Topics    Alcohol use: Yes     Comment: social      No current outpatient medications on file. No current facility-administered medications for this visit.       Allergies   Allergen Reactions    Augmentin [Amoxicillin-Pot Clavulanate] [facility] Maintenance   Topic Date Due    Pneumococcal 0-64 years Vaccine (1 of 1 - PPSV23) 08/11/1980    HIV screen  08/11/1989    Lipid screen  08/11/2014    Flu vaccine (1) 09/01/2020    Colon cancer screen colonoscopy  08/27/2024    DTaP/Tdap/Td vaccine (2 - Td) 01/21/2029    Hepatitis A vaccine  Aged Out    Hepatitis B vaccine  Aged Out    Hib vaccine  Aged Out    Meningococcal (ACWY) vaccine  Aged Out       Subjective:      Review of Systems   Constitutional: Positive for activity change. Negative for appetite change, chills, diaphoresis, fatigue and fever. Eyes: Negative for visual disturbance. Respiratory: Negative for cough, chest tightness and shortness of breath. Cardiovascular: Negative for chest pain, palpitations and leg swelling. Gastrointestinal: Positive for abdominal pain. Negative for nausea and vomiting. Skin: Negative for rash. Neurological: Negative for dizziness and headaches. Hematological: Negative for adenopathy. Psychiatric/Behavioral: Negative for sleep disturbance. Objective:      Physical Exam  Vitals signs and nursing note reviewed. Constitutional:       Appearance: Normal appearance. He is obese. He is not ill-appearing or diaphoretic. HENT:      Head: Normocephalic and atraumatic. Mouth/Throat:      Mouth: Mucous membranes are moist.   Neck:      Musculoskeletal: Neck supple. Cardiovascular:      Rate and Rhythm: Normal rate and regular rhythm. Pulses: Normal pulses. Heart sounds: Normal heart sounds. Pulmonary:      Effort: Pulmonary effort is normal.   Abdominal:      General: Bowel sounds are normal. There is no distension. Tenderness: There is abdominal tenderness. There is guarding. Hernia: A hernia (umbilical, very tender, sunburnt skin on entire abdomen) is present. Neurological:      General: No focal deficit present. Mental Status: He is alert and oriented to person, place, and time.    Psychiatric:         Mood and Affect: Mood normal.         Behavior: Behavior normal.         Thought Content: Thought content normal.         Judgment: Judgment normal.         Assessment:       Diagnosis Orders   1. Umbilical hernia with obstruction, without gangrene  AFL([facility]) - Queenie oHrton DO, General Surgery, [place]       Plan:      Return if symptoms worsen or fail to improve. Orders Placed This Encounter   Procedures    AFL([facility]) - Queenie Horton DO, General Surgery, [place]     Referral Priority:   Routine     Referral Type:   Eval and Treat     Referral Reason:   Specialty Services Required     Referred to Provider:   Kobe Calvert DO     Requested Specialty:   General Surgery     Number of Visits Requested:   1       Disregard referral  Pt advised to go to ER if severe pain and office did not take [place] medicaid  Pt agrees to plan  Avoid heavy lifting and work note given    Patient given educational materials - see patient instructions. Discussed use,benefit, and side effects of prescribed medications. All patient questions answered. Pt voiced understanding. Reviewed health maintenance. Instructed to continue currentmedications, diet and exercise.     Electronically signed by Kylie Roberson CNP on 7/8/2020 at 4:42 PM

## 2020-07-08 NOTE — PROGRESS NOTES
Visit Information    Have you changed or started any medications since your last visit including any over-the-counter medicines, vitamins, or herbal medicines? no   Have you stopped taking any of your medications? Is so, why? -  no  Are you having any side effects from any of your medications? - no    Have you seen any other physician or provider since your last visit?  no   Have you had any other diagnostic tests since your last visit?  no   Have you been seen in the emergency room and/or had an admission in a hospital since we last saw you?  no   Have you had your routine dental cleaning in the past 6 months?  no     Do you have an active MyChart account? If no, what is the barrier?   Yes    Patient Care Team:  Esthela Alvarado, DO as PCP - General (Family Medicine)  [facility]    Medical History Review  Past Medical, Family, and Social History reviewed and  contribute to the patient presenting condition    Health Maintenance   Topic Date Due    Pneumococcal 0-64 years Vaccine (1 of 1 - PPSV23) 08/11/1980    HIV screen  08/11/1989    Lipid screen  08/11/2014    Flu vaccine (1) 09/01/2020    Colon cancer screen colonoscopy  08/27/2024    DTaP/Tdap/Td vaccine (2 - Td) 01/21/2029    Hepatitis A vaccine  Aged Out    Hepatitis B vaccine  Aged Out    Hib vaccine  Aged Out    Meningococcal (ACWY) vaccine  Aged Out

## 2020-07-08 NOTE — ED PROVIDER NOTES
EMERGENCY DEPARTMENT ENCOUNTER    Pt Name: Patti Heard  MRN: [id number]  [place] 1975  Date of evaluation: 7/8/20  CHIEF COMPLAINT       Chief Complaint   Patient presents with    Hernia     HISTORY OF PRESENT ILLNESS   Patient is a 48-year-old male with PMH of diverticulitis, umbilical hernia status post back surgery, who presents to the ED from [address] [place] [address] for hernia complications. Today he states his hernia popped out. Typically he tolerates the pain, the hernia quiets down and he resumes his normal activities. However, states he is tired of these episodes and would like of surgery to correct the problem. He reports moderate to severe abdominal pain with nausea. Fevers, vomiting, diarrhea. Also no cough, shortness of breath, chest pain. REVIEW OF SYSTEMS     Review of Systems   All other systems reviewed and are negative. PASTMEDICAL HISTORY     Past Medical History:   Diagnosis Date    Anxiety     Diverticulitis     Dvt femoral (deep venous thrombosis) ([facility] [address] 09/14/2018     SURGICAL HISTORY       Past Surgical History:   Procedure Laterality Date    ANKLE SURGERY Left     APPENDECTOMY      BACK SURGERY      with hardware    COLONOSCOPY  08/27/2019    polypectomy and biopsy with dr. Octaviano Gomez COLONOSCOPY N/A 8/27/2019    COLONOSCOPY POLYPECTOMY SNARE/COLD BIOPSY performed by Qi Gomez MD at [address] [place] [address]  04/26/2017    LAPAROSCOPIC APPENDECTOMY N/A 4/26/2017    APPENDECTOMY LAPAROSCOPIC performed by Lily Reyes MD at [address] [place] [address]      CURRENT MEDICATIONS       Previous Medications    No medications on file     ALLERGIES     is allergic to augmentin [amoxicillin-pot clavulanate]. FAMILY HISTORY     He indicated that his mother is alive. He indicated that his father is alive.      SOCIAL HISTORY       Social History     Tobacco Use    Smoking status: Former Smoker     Types: Cigarettes     Last attempt to quit: 2015     Years since quittin.5    Smokeless tobacco: Never Used   Substance Use Topics    Alcohol use: Yes     Comment: social    Drug use: Yes     Frequency: 4.0 times per week     Types: Marijuana     PHYSICAL EXAM     INITIAL VITALS: BP (!) 154/101   Pulse 84   Temp 98.5 °F (36.9 °C) (Oral)   Resp 16   Ht 6' (1.829 m)   Wt 264 lb 9.6 oz (120 kg)   SpO2 98%   BMI 35.89 kg/m²    Physical Exam  HENT:      Head: Normocephalic. Right Ear: External ear normal.      Left Ear: External ear normal.      Nose: Nose normal.   Eyes:      Conjunctiva/sclera: Conjunctivae normal.   Cardiovascular:      Rate and Rhythm: Normal rate. Pulmonary:      Effort: Pulmonary effort is normal.   Abdominal:      General: Abdomen is flat. Tenderness: There is abdominal tenderness. Comments: Periumbilical hernia. No erythema. Skin:     General: Skin is dry. Neurological:      Mental Status: He is alert. Mental status is at baseline. Psychiatric:         Mood and Affect: Mood normal.         Behavior: Behavior normal.         MEDICAL DECISION MAKING:   The patient is hemodynamically stable, afebrile, nontoxic-appearing. Physical exam notable for periumbilical hernia. Based on history and exam will likely be admitted for hernia repair. ED plan for basic labs, CT and pelvis, surgery consult. ED Course as of 1954   Wed  I spoke with Dr. Rhiannon Barrera, we both agree that this could be held as an outpatient. Patient is to call Dr. Chun Orellana office tomorrow and follow-up with an appointment in 2 days.     [ELENA]      ED Course User Index  [ELENA] Coty Irby MD       DIAGNOSTIC RESULTS   EKG:All EKG's are interpreted by the Emergency Department Physician who either signs or Co-signs this chart in the absence of a cardiologist.        RADIOLOGY:All plain film, CT, MRI, and formal ultrasound images (except ED bedside ultrasound) are read by the radiologist, see reports below, unless 6 mg    ondansetron (ZOFRAN) injection 4 mg    iopamidol (ISOVUE-370) 76 % injection 80 mL    sodium chloride flush 0.9 % injection 10 mL    0.9 % sodium chloride bolus    morphine (PF) injection 6 mg    HYDROcodone-acetaminophen (NORCO) 5-325 MG per tablet     Sig: Take 1 tablet by mouth every 6 hours as needed for Pain for up to 4 days. Intended supply: 3 days. Take lowest dose possible to manage pain     Dispense:  12 tablet     Refill:  0    ondansetron (ZOFRAN-ODT) 4 MG disintegrating tablet     Sig: Take 1 tablet by mouth 3 times daily as needed for Nausea or Vomiting     Dispense:  12 tablet     Refill:  0     CONSULTS:  IP CONSULT TO GENERAL SURGERY    FINAL IMPRESSION      1. Ventral hernia without obstruction or gangrene          DISPOSITION/PLAN   DISPOSITION        PATIENT REFERRED TO:  Bibi Mujica MD  [address] [place] [address]  [phone]          DISCHARGE MEDICATIONS:  New Prescriptions    HYDROCODONE-ACETAMINOPHEN (NORCO) 5-325 MG PER TABLET    Take 1 tablet by mouth every 6 hours as needed for Pain for up to 4 days. Intended supply: 3 days.  Take lowest dose possible to manage pain    ONDANSETRON (ZOFRAN-ODT) 4 MG DISINTEGRATING TABLET    Take 1 tablet by mouth 3 times daily as needed for Nausea or Vomiting     Melodie Calderon MD  Attending Emergency Physician                    Morenita Brewster MD  07/08/20 1117

## 2020-07-08 NOTE — ED NOTES
Pt presents to ed c/o umbilical hernia since 1369 with worsening over the last few days. He has an abdominal binder on to help with it \"popping out. \" His pcp told him to come here for evaluation.       Chencho Lino RN  07/08/20 1930

## 2020-07-09 ENCOUNTER — NURSE TRIAGE (OUTPATIENT)
Dept: OTHER | Facility: CLINIC | Age: 45
End: 2020-07-09

## 2020-07-09 ENCOUNTER — TELEPHONE (OUTPATIENT)
Dept: FAMILY MEDICINE CLINIC | Age: 45
End: 2020-07-09

## 2020-07-09 NOTE — TELEPHONE ENCOUNTER
Can you place referral for Dr. Rene Huddleston?  He has appt with her tomorrow morning    i'm faxing the office notes and the imaging report now

## 2020-07-09 NOTE — TELEPHONE ENCOUNTER
Reason for Disposition  Garcia Goncalves Caller has already spoken with another triager or PCP (or office), and has further questions and triager able to answer questions.     Protocols used: NO CONTACT OR DUPLICATE CONTACT CALL-ADULT-[place]    Pt seen in ER was supposed to see surgeon in office this date but they don't take his insurance, reaching out to scheduling for assistance

## 2020-09-30 ENCOUNTER — VIRTUAL VISIT (OUTPATIENT)
Dept: FAMILY MEDICINE CLINIC | Age: 45
End: 2020-09-30
Payer: MEDICAID

## 2020-09-30 PROCEDURE — 99213 OFFICE O/P EST LOW 20 MIN: CPT | Performed by: INTERNAL MEDICINE

## 2020-09-30 RX ORDER — ACETAMINOPHEN 500 MG
500 TABLET ORAL EVERY 4 HOURS PRN
COMMUNITY

## 2020-09-30 RX ORDER — NAPROXEN 250 MG/1
250 TABLET ORAL
COMMUNITY
End: 2021-04-06

## 2020-09-30 RX ORDER — ERGOCALCIFEROL 1.25 MG/1
50000 CAPSULE ORAL WEEKLY
Qty: 12 CAPSULE | Refills: 1 | Status: SHIPPED | OUTPATIENT
Start: 2020-09-30 | End: 2021-04-06 | Stop reason: SDUPTHER

## 2020-09-30 RX ORDER — DULOXETIN HYDROCHLORIDE 30 MG/1
30 CAPSULE, DELAYED RELEASE ORAL DAILY
Qty: 30 CAPSULE | Refills: 3 | Status: SHIPPED | OUTPATIENT
Start: 2020-09-30 | End: 2021-04-06

## 2020-10-11 NOTE — PROGRESS NOTES
Visit Information    Have you changed or started any medications since your last visit including any over-the-counter medicines, vitamins, or herbal medicines? no   Are you having any side effects from any of your medications? -  no  Have you stopped taking any of your medications? Is so, why? -  no    Have you seen any other physician or provider since your last visit? Yes - Records Obtained  Have you had any other diagnostic tests since your last visit? No  Have you been seen in the emergency room and/or had an admission to a hospital since we last saw you? No  Have you had your routine dental cleaning in the past 6 months? no    Have you activated your [employer] account? If not, what are your barriers?  Yes     Patient Care Team:  Rajendra Menchaca DO as PCP - General (Family Medicine)  Rajendra Menchaca DO as PCP - [facility]    Medical History Review  Past Medical, Family, and Social History reviewed and does contribute to the patient presenting condition    Health Maintenance   Topic Date Due    Pneumococcal 0-64 years Vaccine (1 of 1 - PPSV23) 08/11/1980    HIV screen  08/11/1989    Lipid screen  08/11/2014    Flu vaccine (1) 09/01/2020    Colon cancer screen colonoscopy  08/27/2024    DTaP/Tdap/Td vaccine (2 - Td) 01/21/2029    Hepatitis A vaccine  Aged Out    Hepatitis B vaccine  Aged Out    Hib vaccine  Aged Out    Meningococcal (ACWY) vaccine  Aged Out
concern      Warden Aase

## 2020-12-19 ENCOUNTER — OFFICE VISIT (OUTPATIENT)
Dept: FAMILY MEDICINE CLINIC | Age: 45
End: 2020-12-19
Payer: MEDICAID

## 2020-12-19 VITALS
BODY MASS INDEX: 35.76 KG/M2 | TEMPERATURE: 98.3 F | HEIGHT: 72 IN | WEIGHT: 264 LBS | OXYGEN SATURATION: 100 % | HEART RATE: 99 BPM | DIASTOLIC BLOOD PRESSURE: 100 MMHG | SYSTOLIC BLOOD PRESSURE: 159 MMHG

## 2020-12-19 PROCEDURE — 99202 OFFICE O/P NEW SF 15 MIN: CPT | Performed by: NURSE PRACTITIONER

## 2020-12-19 PROCEDURE — G8427 DOCREV CUR MEDS BY ELIG CLIN: HCPCS | Performed by: NURSE PRACTITIONER

## 2020-12-19 PROCEDURE — G8417 CALC BMI ABV UP PARAM F/U: HCPCS | Performed by: NURSE PRACTITIONER

## 2020-12-19 PROCEDURE — 96372 THER/PROPH/DIAG INJ SC/IM: CPT | Performed by: NURSE PRACTITIONER

## 2020-12-19 PROCEDURE — G8484 FLU IMMUNIZE NO ADMIN: HCPCS | Performed by: NURSE PRACTITIONER

## 2020-12-19 PROCEDURE — 1036F TOBACCO NON-USER: CPT | Performed by: NURSE PRACTITIONER

## 2020-12-19 RX ORDER — KETOROLAC TROMETHAMINE 30 MG/ML
60 INJECTION, SOLUTION INTRAMUSCULAR; INTRAVENOUS ONCE
Status: COMPLETED | OUTPATIENT
Start: 2020-12-19 | End: 2020-12-19

## 2020-12-19 RX ORDER — HYDROCODONE BITARTRATE AND ACETAMINOPHEN 5; 325 MG/1; MG/1
1 TABLET ORAL EVERY 6 HOURS PRN
Qty: 12 TABLET | Refills: 0 | Status: SHIPPED | OUTPATIENT
Start: 2020-12-19 | End: 2020-12-22

## 2020-12-19 RX ORDER — CYCLOBENZAPRINE HCL 10 MG
10 TABLET ORAL 3 TIMES DAILY PRN
Qty: 21 TABLET | Refills: 0 | Status: SHIPPED | OUTPATIENT
Start: 2020-12-19 | End: 2020-12-29

## 2020-12-19 RX ADMIN — KETOROLAC TROMETHAMINE 60 MG: 30 INJECTION, SOLUTION INTRAMUSCULAR; INTRAVENOUS at 15:21

## 2020-12-19 ASSESSMENT — ENCOUNTER SYMPTOMS
NAUSEA: 0
SORE THROAT: 0
SHORTNESS OF BREATH: 0
ABDOMINAL PAIN: 0
COUGH: 0
EYE PAIN: 0
SINUS PAIN: 0
VOMITING: 0
DIARRHEA: 0
BACK PAIN: 1

## 2020-12-19 NOTE — PATIENT INSTRUCTIONS

## 2020-12-19 NOTE — LETTER
[address] [place] [address]  [place] [address]  Phone: [phone]  Fax: [phone]    ESHA Spencer CNP        December 19, 2020     Patient: Radha Ramirez   YOB: 1975   Date of Visit: 12/19/2020       To Whom It May Concern: It is my medical opinion that Cristal Melo is excused through 12/21/20. If you have any questions or concerns, please don't hesitate to call.     Sincerely,        ESHA Spencer CNP

## 2020-12-19 NOTE — PROGRESS NOTES
[address] [facility]  [address] [facility] [address] [address]  Dept: [phone]  Dept Fax: [phone]    Emiliano Rihcey is a 54 y.o. male who presents to the urgent care today for his medicalconditions/complaints as noted below. Emiliano Richey is c/o of Back Pain (thoracic pain on both sides and pt states he is havvign a hard time taking a deep breath )      HPI:       42-year-old male patient presents with complaint of back pain. Patient reports that he had onset of symptoms yesterday. Patient reports midline thoracic and bilateral thoracic back pain beginning yesterday. Reports he did cough the day prior to his symptoms. Reports he has had pain with movements when twisting and turning. Denies any known injury or trauma. Hx low back surgery and chronic low back pain. Describes the pain as intermittent spasms. Has tried epsom salt, heating pad, ibuprofen. Rates pain 6/10. Denies urinary sx. Denies chest pain or sob. Denies abd pain. Past Medical History:   Diagnosis Date    Anxiety     Diverticulitis     Dvt femoral (deep venous thrombosis) ([facility]) 09/14/2018        Current Outpatient Medications   Medication Sig Dispense Refill    HYDROcodone-acetaminophen (NORCO) 5-325 MG per tablet Take 1 tablet by mouth every 6 hours as needed for Pain for up to 3 days. Intended supply: 3 days.  Take lowest dose possible to manage pain 12 tablet 0    cyclobenzaprine (FLEXERIL) 10 MG tablet Take 1 tablet by mouth 3 times daily as needed for Muscle spasms 21 tablet 0    naproxen (NAPROSYN) 250 MG tablet Take 250 mg by mouth      acetaminophen (TYLENOL) 500 MG tablet Take 500 mg by mouth every 4 hours as needed      DULoxetine (CYMBALTA) 30 MG extended release capsule Take 1 capsule by mouth daily 30 capsule 3    vitamin D (ERGOCALCIFEROL) 1.25 MG (47324 UT) CAPS capsule Take 1 capsule by mouth once a week 12 capsule 1 No current facility-administered medications for this visit. Allergies   Allergen Reactions    Augmentin [Amoxicillin-Pot Clavulanate] Rash       Subjective:      Review of Systems   Constitutional: Negative for chills and fatigue. HENT: Negative for congestion, ear pain, sinus pain and sore throat. Eyes: Negative for pain and visual disturbance. Respiratory: Negative for cough and shortness of breath. Cardiovascular: Negative for chest pain and palpitations. Gastrointestinal: Negative for abdominal pain, diarrhea, nausea and vomiting. Genitourinary: Negative for penile pain and testicular pain. Musculoskeletal: Positive for back pain. Negative for joint swelling and neck pain. Skin: Negative for rash. Neurological: Negative for dizziness and light-headedness. Hematological: Does not bruise/bleed easily. All other systems reviewed and are negative. Objective:     Physical Exam  Vitals signs and nursing note reviewed. Constitutional:       General: He is not in acute distress. Appearance: Normal appearance. He is not toxic-appearing. Cardiovascular:      Rate and Rhythm: Normal rate. Pulmonary:      Effort: Pulmonary effort is normal. No respiratory distress. Breath sounds: Normal breath sounds. Musculoskeletal:      Thoracic back: He exhibits decreased range of motion, tenderness, bony tenderness and spasm. Lumbar back: He exhibits tenderness. He exhibits no bony tenderness. Skin:     General: Skin is warm and dry. Neurological:      General: No focal deficit present. Mental Status: He is alert and oriented to person, place, and time. BP (!) 159/100 (Site: Left Upper Arm, Position: Sitting, Cuff Size: Large Adult)   Pulse 99   Temp 98.3 °F (36.8 °C) (Oral)   Ht 6' (1.829 m)   Wt 264 lb (119.7 kg)   SpO2 100%   BMI 35.80 kg/m²   Lab Review   No visits with results within 2 Month(s) from this visit.    Latest known visit with results is: Admission on 07/08/2020, Discharged on 07/08/2020   Component Date Value    WBC 07/08/2020 9.3     RBC 07/08/2020 5.22     Hemoglobin 07/08/2020 15.4     Hematocrit 07/08/2020 46.3     MCV 07/08/2020 88.7     MCH 07/08/2020 29.5     MCHC 07/08/2020 33.3     RDW 07/08/2020 13.6     Platelets 03/55/3615 329     MPV 07/08/2020 8.8     NRBC Automated 07/08/2020 0.0     Differential Type 07/08/2020 NOT REPORTED     Seg Neutrophils 07/08/2020 58     Lymphocytes 07/08/2020 32     Monocytes 07/08/2020 6     Eosinophils % 07/08/2020 3     Basophils 07/08/2020 1     Immature Granulocytes 07/08/2020 0     Segs Absolute 07/08/2020 5.31     Absolute Lymph # 07/08/2020 2.98     Absolute Mono # 07/08/2020 0.58     Absolute Eos # 07/08/2020 0.28     Basophils Absolute 07/08/2020 0.07     Absolute Immature Granul* 07/08/2020 0.03     WBC Morphology 07/08/2020 NOT REPORTED     RBC Morphology 07/08/2020 NOT REPORTED     Platelet Estimate 00/21/4257 NOT REPORTED     Glucose 07/08/2020 90     BUN 07/08/2020 16     CREATININE 07/08/2020 0.83     Bun/Cre Ratio 07/08/2020 19     Calcium 07/08/2020 9.5     Sodium 07/08/2020 141     Potassium 07/08/2020 4.3     Chloride 07/08/2020 107     CO2 07/08/2020 21     Anion Gap 07/08/2020 13     Alkaline Phosphatase 07/08/2020 36*    ALT 07/08/2020 25     AST 07/08/2020 21     Total Bilirubin 07/08/2020 0.23*    Total Protein 07/08/2020 7.1     Alb 07/08/2020 4.3     Albumin/Globulin Ratio 07/08/2020 NOT REPORTED     GFR Non- 07/08/2020 >60     GFR  07/08/2020 >60     GFR Comment 07/08/2020          GFR Staging 07/08/2020 NOT REPORTED     Lipase 07/08/2020 45     Lactic Acid 07/08/2020 0.8        Assessment:       Diagnosis Orders   1.  Acute bilateral thoracic back pain  XR THORACIC SPINE (3 VIEWS)    HYDROcodone-acetaminophen (NORCO) 5-325 MG per tablet    cyclobenzaprine (FLEXERIL) 10 MG tablet ketorolac (TORADOL) injection 60 mg    External Referral To Neurosurgery       Plan:      Return if symptoms worsen or fail to improve. Orders Placed This Encounter   Medications    HYDROcodone-acetaminophen (NORCO) 5-325 MG per tablet     Sig: Take 1 tablet by mouth every 6 hours as needed for Pain for up to 3 days. Intended supply: 3 days. Take lowest dose possible to manage pain     Dispense:  12 tablet     Refill:  0     Reduce doses taken as pain becomes manageable    cyclobenzaprine (FLEXERIL) 10 MG tablet     Sig: Take 1 tablet by mouth 3 times daily as needed for Muscle spasms     Dispense:  21 tablet     Refill:  0    ketorolac (TORADOL) injection 60 mg       Administrations This Visit     ketorolac (TORADOL) injection 60 mg     Admin Date  12/19/2020 Action  Given Dose  60 mg Route  Intramuscular Administered By  ESHA Alfaro CNP                  Order for thoracic spine imaging placed. Discussed norco dose/ duration. Discussed flexeril dose/ duration. Recommend follow up with spinal specialist with pre-existing relationship. Return as needed, follow up with pcp       Patient given educational materials - see patient instructions. Discussed use, benefit, and side effects of prescribed medications. All patientquestions answered. Pt voiced understanding. This note was transcribed using dictation with Dragon services. Efforts were made to correct any errors but some words may be misinterpreted.      Electronically signed by ESHA Alfaro CNP on 12/19/2020at 3:21 PM

## 2021-01-22 ENCOUNTER — OFFICE VISIT (OUTPATIENT)
Dept: PRIMARY CARE CLINIC | Age: 46
End: 2021-01-22
Payer: MEDICAID

## 2021-01-22 ENCOUNTER — APPOINTMENT (OUTPATIENT)
Dept: CT IMAGING | Age: 46
End: 2021-01-22
Payer: MEDICAID

## 2021-01-22 ENCOUNTER — HOSPITAL ENCOUNTER (EMERGENCY)
Age: 46
Discharge: HOME | End: 2021-01-22
Attending: EMERGENCY MEDICINE
Payer: MEDICAID

## 2021-01-22 VITALS
HEIGHT: 72 IN | HEART RATE: 91 BPM | WEIGHT: 255 LBS | BODY MASS INDEX: 34.54 KG/M2 | TEMPERATURE: 98.6 F | OXYGEN SATURATION: 98 % | DIASTOLIC BLOOD PRESSURE: 103 MMHG | RESPIRATION RATE: 18 BRPM | SYSTOLIC BLOOD PRESSURE: 166 MMHG

## 2021-01-22 VITALS
RESPIRATION RATE: 20 BRPM | OXYGEN SATURATION: 99 % | BODY MASS INDEX: 34.54 KG/M2 | WEIGHT: 255 LBS | HEART RATE: 85 BPM | DIASTOLIC BLOOD PRESSURE: 118 MMHG | HEIGHT: 72 IN | SYSTOLIC BLOOD PRESSURE: 196 MMHG | TEMPERATURE: 98.5 F

## 2021-01-22 DIAGNOSIS — R42 DIZZINESS: ICD-10-CM

## 2021-01-22 DIAGNOSIS — R51.9 NONINTRACTABLE HEADACHE, UNSPECIFIED CHRONICITY PATTERN, UNSPECIFIED HEADACHE TYPE: Primary | ICD-10-CM

## 2021-01-22 DIAGNOSIS — L03.213 PRESEPTAL CELLULITIS OF LEFT EYE: ICD-10-CM

## 2021-01-22 DIAGNOSIS — R03.0 ELEVATED BLOOD PRESSURE READING: ICD-10-CM

## 2021-01-22 DIAGNOSIS — H57.11 PAIN OF RIGHT EYE: ICD-10-CM

## 2021-01-22 PROCEDURE — 96374 THER/PROPH/DIAG INJ IV PUSH: CPT

## 2021-01-22 PROCEDURE — 96375 TX/PRO/DX INJ NEW DRUG ADDON: CPT

## 2021-01-22 PROCEDURE — G8484 FLU IMMUNIZE NO ADMIN: HCPCS | Performed by: PHYSICIAN ASSISTANT

## 2021-01-22 PROCEDURE — 6360000002 HC RX W HCPCS: Performed by: NURSE PRACTITIONER

## 2021-01-22 PROCEDURE — 1036F TOBACCO NON-USER: CPT | Performed by: PHYSICIAN ASSISTANT

## 2021-01-22 PROCEDURE — 70450 CT HEAD/BRAIN W/O DYE: CPT

## 2021-01-22 PROCEDURE — 99204 OFFICE O/P NEW MOD 45 MIN: CPT | Performed by: PHYSICIAN ASSISTANT

## 2021-01-22 PROCEDURE — G8417 CALC BMI ABV UP PARAM F/U: HCPCS | Performed by: PHYSICIAN ASSISTANT

## 2021-01-22 PROCEDURE — G8427 DOCREV CUR MEDS BY ELIG CLIN: HCPCS | Performed by: PHYSICIAN ASSISTANT

## 2021-01-22 PROCEDURE — 99283 EMERGENCY DEPT VISIT LOW MDM: CPT

## 2021-01-22 PROCEDURE — 6370000000 HC RX 637 (ALT 250 FOR IP): Performed by: NURSE PRACTITIONER

## 2021-01-22 PROCEDURE — 2580000003 HC RX 258: Performed by: NURSE PRACTITIONER

## 2021-01-22 RX ORDER — SULFAMETHOXAZOLE AND TRIMETHOPRIM 800; 160 MG/1; MG/1
1 TABLET ORAL ONCE
Status: COMPLETED | OUTPATIENT
Start: 2021-01-22 | End: 2021-01-22

## 2021-01-22 RX ORDER — SULFACETAMIDE SODIUM 100 MG/ML
2 SOLUTION/ DROPS OPHTHALMIC 4 TIMES DAILY
Qty: 1 BOTTLE | Refills: 0 | Status: SHIPPED | OUTPATIENT
Start: 2021-01-22 | End: 2021-02-01

## 2021-01-22 RX ORDER — METOCLOPRAMIDE HYDROCHLORIDE 5 MG/ML
10 INJECTION INTRAMUSCULAR; INTRAVENOUS ONCE
Status: COMPLETED | OUTPATIENT
Start: 2021-01-22 | End: 2021-01-22

## 2021-01-22 RX ORDER — SULFAMETHOXAZOLE AND TRIMETHOPRIM 800; 160 MG/1; MG/1
1 TABLET ORAL 2 TIMES DAILY
Qty: 20 TABLET | Refills: 0 | Status: SHIPPED | OUTPATIENT
Start: 2021-01-22 | End: 2021-02-01

## 2021-01-22 RX ORDER — HYDROCODONE BITARTRATE AND ACETAMINOPHEN 5; 325 MG/1; MG/1
1 TABLET ORAL EVERY 6 HOURS PRN
Qty: 15 TABLET | Refills: 0 | Status: SHIPPED | OUTPATIENT
Start: 2021-01-22 | End: 2021-01-27

## 2021-01-22 RX ORDER — PROMETHAZINE HYDROCHLORIDE 25 MG/ML
12.5 INJECTION, SOLUTION INTRAMUSCULAR; INTRAVENOUS ONCE
Status: COMPLETED | OUTPATIENT
Start: 2021-01-22 | End: 2021-01-22

## 2021-01-22 RX ORDER — KETOROLAC TROMETHAMINE 15 MG/ML
15 INJECTION, SOLUTION INTRAMUSCULAR; INTRAVENOUS ONCE
Status: COMPLETED | OUTPATIENT
Start: 2021-01-22 | End: 2021-01-22

## 2021-01-22 RX ORDER — HYDROCODONE BITARTRATE AND ACETAMINOPHEN 5; 325 MG/1; MG/1
1 TABLET ORAL ONCE
Status: COMPLETED | OUTPATIENT
Start: 2021-01-22 | End: 2021-01-22

## 2021-01-22 RX ORDER — CEFDINIR 300 MG/1
300 CAPSULE ORAL 2 TIMES DAILY
Qty: 10 CAPSULE | Refills: 0 | Status: SHIPPED | OUTPATIENT
Start: 2021-01-22 | End: 2021-01-27

## 2021-01-22 RX ORDER — DIPHENHYDRAMINE HYDROCHLORIDE 50 MG/ML
25 INJECTION INTRAMUSCULAR; INTRAVENOUS ONCE
Status: COMPLETED | OUTPATIENT
Start: 2021-01-22 | End: 2021-01-22

## 2021-01-22 RX ORDER — CEFDINIR 300 MG/1
300 CAPSULE ORAL ONCE
Status: COMPLETED | OUTPATIENT
Start: 2021-01-22 | End: 2021-01-22

## 2021-01-22 RX ORDER — TETRACAINE HYDROCHLORIDE 5 MG/ML
2 SOLUTION OPHTHALMIC ONCE
Status: COMPLETED | OUTPATIENT
Start: 2021-01-22 | End: 2021-01-22

## 2021-01-22 RX ORDER — 0.9 % SODIUM CHLORIDE 0.9 %
1000 INTRAVENOUS SOLUTION INTRAVENOUS ONCE
Status: COMPLETED | OUTPATIENT
Start: 2021-01-22 | End: 2021-01-22

## 2021-01-22 RX ADMIN — CEFDINIR 300 MG: 300 CAPSULE ORAL at 22:20

## 2021-01-22 RX ADMIN — DIPHENHYDRAMINE HYDROCHLORIDE 25 MG: 50 INJECTION, SOLUTION INTRAMUSCULAR; INTRAVENOUS at 19:16

## 2021-01-22 RX ADMIN — PROMETHAZINE HYDROCHLORIDE 12.5 MG: 25 INJECTION INTRAMUSCULAR; INTRAVENOUS at 19:16

## 2021-01-22 RX ADMIN — METOCLOPRAMIDE 10 MG: 5 INJECTION, SOLUTION INTRAMUSCULAR; INTRAVENOUS at 19:16

## 2021-01-22 RX ADMIN — HYDROCODONE BITARTRATE AND ACETAMINOPHEN 1 TABLET: 5; 325 TABLET ORAL at 21:12

## 2021-01-22 RX ADMIN — TETRACAINE HYDROCHLORIDE 2 DROP: 5 SOLUTION OPHTHALMIC at 21:14

## 2021-01-22 RX ADMIN — SULFAMETHOXAZOLE AND TRIMETHOPRIM 1 TABLET: 800; 160 TABLET ORAL at 21:12

## 2021-01-22 RX ADMIN — KETOROLAC TROMETHAMINE 15 MG: 15 INJECTION, SOLUTION INTRAMUSCULAR; INTRAVENOUS at 21:12

## 2021-01-22 RX ADMIN — SODIUM CHLORIDE 1000 ML: 9 INJECTION, SOLUTION INTRAVENOUS at 19:22

## 2021-01-22 ASSESSMENT — ENCOUNTER SYMPTOMS
BLURRED VISION: 1
PHOTOPHOBIA: 1
SINUS PRESSURE: 0
SINUS PRESSURE: 1
EYE DISCHARGE: 0
ABDOMINAL PAIN: 0
ABDOMINAL PAIN: 0
EYE ITCHING: 0
BACK PAIN: 0
FOREIGN BODY SENSATION: 0
VOMITING: 0
EYE REDNESS: 1
VOMITING: 0
EYE PAIN: 1
SHORTNESS OF BREATH: 0
FACIAL SWEATING: 0
EYE REDNESS: 0
NAUSEA: 1
NAUSEA: 0
EYE PAIN: 1
DIARRHEA: 0
EYE WATERING: 1
COLOR CHANGE: 0
SORE THROAT: 0
EYE DISCHARGE: 1
PHOTOPHOBIA: 1
COUGH: 0

## 2021-01-22 ASSESSMENT — PAIN SCALES - GENERAL: PAINLEVEL_OUTOF10: 9

## 2021-01-22 NOTE — ED PROVIDER NOTES
[address] [place] [address] ED  EMERGENCY DEPARTMENT ENCOUNTER      Pt Name: Caleb Anglin  MRN: [id number]  [place] 1975  Date of evaluation: 1/22/21  CHIEF COMPLAINT       Chief Complaint   Patient presents with    Facial Swelling    Herpes Zoster     HISTORY OF PRESENT ILLNESS   Presents to the emergency room via private auto with headache and eye pain. Headache started 3 days ago but got significantly worse today. He complains of pain to the right frontal region. He also has significant eye redness, drainage and discomfort. He has a history of shingles to the right eye 15 years ago. He is unsure if this is related. He has not had any problems with the eyes since having the shingles. No additional shingles contact that he is aware of. No eye injury. No head injury. He denies neck pain or stiffness. No history of frequent headaches or migraines. No slurred speech, numbness or focal weakness. He does admit to some dizziness with the headache. The history is provided by the patient. REVIEW OF SYSTEMS     Review of Systems   Constitutional: Negative for fatigue and fever. HENT: Negative for congestion, sinus pressure and sore throat. Eyes: Positive for photophobia, pain, discharge, redness and visual disturbance. Respiratory: Negative for cough and shortness of breath. Cardiovascular: Negative for chest pain and palpitations. Gastrointestinal: Positive for nausea. Negative for abdominal pain, diarrhea and vomiting. Musculoskeletal: Negative for neck pain and neck stiffness. Skin: Negative for color change and rash. Neurological: Positive for dizziness and headaches. Negative for speech difficulty, weakness and numbness. Psychiatric/Behavioral: Negative for confusion and decreased concentration.      PASTMEDICAL HISTORY     Past Medical History:   Diagnosis Date    Anxiety     Diverticulitis     Dvt femoral (deep venous thrombosis) ([facility]) 09/14/2018     SURGICAL HISTORY       Past Surgical History:   Procedure Laterality Date    ANKLE SURGERY Left     APPENDECTOMY      BACK SURGERY      with hardware    COLONOSCOPY  2019    polypectomy and biopsy with dr. Luisa Dodd COLONOSCOPY N/A 2019    COLONOSCOPY POLYPECTOMY SNARE/COLD BIOPSY performed by Rajesh Arthur MD at [address]  2017    LAPAROSCOPIC APPENDECTOMY N/A 2017    APPENDECTOMY LAPAROSCOPIC performed by Emanuel Chong MD at [address] [place] [address] Bilateral      CURRENT MEDICATIONS       Previous Medications    ACETAMINOPHEN (TYLENOL) 500 MG TABLET    Take 500 mg by mouth every 4 hours as needed    DULOXETINE (CYMBALTA) 30 MG EXTENDED RELEASE CAPSULE    Take 1 capsule by mouth daily    NAPROXEN (NAPROSYN) 250 MG TABLET    Take 250 mg by mouth    VITAMIN D (ERGOCALCIFEROL) 1.25 MG (79327 UT) CAPS CAPSULE    Take 1 capsule by mouth once a week     ALLERGIES     is allergic to augmentin [amoxicillin-pot clavulanate]. FAMILY HISTORY     He indicated that his mother is alive. He indicated that his father is alive. SOCIAL HISTORY       Social History     Tobacco Use    Smoking status: Former Smoker     Types: Cigarettes     Quit date:      Years since quittin.0    Smokeless tobacco: Never Used   Substance Use Topics    Alcohol use: Yes     Comment: social    Drug use: Yes     Frequency: 4.0 times per week     Types: Marijuana     PHYSICAL EXAM     INITIAL VITALS: BP (!) 196/118   Pulse 85   Temp 98.5 °F (36.9 °C) (Oral)   Resp 20   Ht 6' (1.829 m)   Wt 255 lb (115.7 kg)   SpO2 99%   BMI 34.58 kg/m²    Physical Exam  Constitutional:       Appearance: Normal appearance. HENT:      Right Ear: External ear normal.      Left Ear: External ear normal.   Eyes:      Extraocular Movements: Extraocular movements intact. Pupils: Pupils are equal, round, and reactive to light. Right eye: No corneal abrasion or fluorescein uptake.       Comments: score 0. He does appear to be in discomfort. He also has significant erythema and irritation to the eye. CT scan does not have any acute findings of the brain but does show some preseptal cellulitis. He was evaluated by Dr. Vaibhav Huffman. Will be discharged home with topical and oral antibiotics as well as pain medication. He was educated on the importance of following up with the ophthalmologist.          The patient was given the following meds in the ED:  Orders Placed This Encounter   Medications    0.9 % sodium chloride bolus    metoclopramide (REGLAN) injection 10 mg    diphenhydrAMINE (BENADRYL) injection 25 mg    promethazine (PHENERGAN) injection 12.5 mg    tetracaine (TETRAVISC) 0.5 % ophthalmic solution 2 drop    HYDROcodone-acetaminophen (NORCO) 5-325 MG per tablet     Sig: Take 1 tablet by mouth every 6 hours as needed for Pain for up to 5 days. Dispense:  15 tablet     Refill:  0    sulfacetamide (BLEPH-10) 10 % ophthalmic solution     Sig: Place 2 drops into the right eye 4 times daily for 10 days     Dispense:  1 Bottle     Refill:  0    sulfamethoxazole-trimethoprim (BACTRIM DS) 800-160 MG per tablet     Sig: Take 1 tablet by mouth 2 times daily for 10 days     Dispense:  20 tablet     Refill:  0    cefdinir (OMNICEF) 300 MG capsule     Sig: Take 1 capsule by mouth 2 times daily for 5 days     Dispense:  10 capsule     Refill:  0    sulfamethoxazole-trimethoprim (BACTRIM DS;SEPTRA DS) 800-160 MG per tablet 1 tablet     Order Specific Question:   Antimicrobial Indications     Answer: Other     Order Specific Question:   Other Abx Indication     Answer:   preseptal cellulitis    cefdinir (OMNICEF) capsule 300 mg     Order Specific Question:   Antimicrobial Indications     Answer:    Other     Order Specific Question:   Other Abx Indication     Answer:   Septal cellulitis    ketorolac (TORADOL) injection 15 mg    HYDROcodone-acetaminophen (NORCO) 5-325 MG per tablet 1 tablet       FINAL IMPRESSION      1. Nonintractable headache, unspecified chronicity pattern, unspecified headache type    2. Preseptal cellulitis of left eye          DISPOSITION/PLAN   DISPOSITION Decision To Discharge 01/22/2021 08:45:09 PM      PATIENT REFERRED TO:   Georjean Fothergill, MD  [address] [facility]  [phone]    In 2 days      DO Kelly Waite [address] [place] [address]  [phone]      As needed    [facility] ED  [address] [facility]  [phone]    If symptoms worsen      DISCHARGE MEDICATIONS:     New Prescriptions    CEFDINIR (OMNICEF) 300 MG CAPSULE    Take 1 capsule by mouth 2 times daily for 5 days    HYDROCODONE-ACETAMINOPHEN (NORCO) 5-325 MG PER TABLET    Take 1 tablet by mouth every 6 hours as needed for Pain for up to 5 days.     SULFACETAMIDE (BLEPH-10) 10 % OPHTHALMIC SOLUTION    Place 2 drops into the right eye 4 times daily for 10 days    SULFAMETHOXAZOLE-TRIMETHOPRIM (BACTRIM DS) 800-160 MG PER TABLET    Take 1 tablet by mouth 2 times daily for 10 days       CRITICAL CARE TIME       Please note that portions of this note were completed with a voice recognition program.      ESHA PATTERSON CNP, APRN - CNP  01/22/21 2158       ESHA Bazzi CNP  01/22/21 2200

## 2021-01-22 NOTE — PROGRESS NOTES
[address]  [address]  [address]  Phone: [phone]  Fax: [phone] [address] [facility] Name: Charolet Simmonds  MRN: [id number]  [place] 1975  Date of evaluation: 1/22/2021  Provider: Kelsea Garcia, [address]       Chief Complaint   Patient presents with    Eye Pain     right eye pain and swelling along with blurriness, sensitivity to light along with a pounding HA and dizziness   hx of shingles on the same eye 15yrs ago     Headache    Dizziness           HISTORY OF PRESENT ILLNESS  (Location/Symptom, Timing/Onset, Context/Setting, Quality, Duration, Modifying Factors, Severity.)   Charolet Simmonds is a 54 y.o. White [1] male who presents to the office for evaluation of      Eye Pain   The right eye is affected. The current episode started today. The problem occurs constantly. There was no injury mechanism. The pain is at a severity of 9/10. The pain is severe. There is no known exposure to pink eye. He does not wear contacts. Associated symptoms include blurred vision and photophobia. Pertinent negatives include no eye discharge, eye redness, fever, foreign body sensation, itching, nausea, recent URI or vomiting. Headache   This is a new problem. The current episode started today. The problem has been unchanged. The pain is located in the right unilateral region. The pain does not radiate. The quality of the pain is described as throbbing (\"pounding\"). The pain is moderate. Associated symptoms include blurred vision, dizziness, eye pain, eye watering, photophobia and sinus pressure. Pertinent negatives include no abdominal pain, abnormal behavior, anorexia, back pain, drainage, ear pain, eye redness, facial sweating, fever, hearing loss, insomnia, loss of balance, muscle aches, nausea, neck pain, numbness, phonophobia or vomiting. The symptoms are aggravated by bright light and noise. Nursing Notes were reviewed. REVIEW OF SYSTEMS    (2-9 systems for level 4, 10 or more for level 5)     Review of Systems   Constitutional: Negative for fever. HENT: Positive for sinus pressure. Negative for ear pain and hearing loss. Eyes: Positive for blurred vision, photophobia and pain. Negative for discharge, redness and itching. Gastrointestinal: Negative for abdominal pain, anorexia, nausea and vomiting. Musculoskeletal: Negative for back pain and neck pain. Neurological: Positive for dizziness and headaches. Negative for numbness and loss of balance. Psychiatric/Behavioral: The patient does not have insomnia. Except as noted above the remainder of the review of systems was reviewed andnegative. PAST MEDICAL HISTORY   History reviewed. Past Medical History:   Diagnosis Date    Anxiety     Diverticulitis     Dvt femoral (deep venous thrombosis) ([facility] [address] 09/14/2018         SURGICAL HISTORY     History reviewed.     Past Surgical History:   Procedure Laterality Date    ANKLE SURGERY Left     APPENDECTOMY      BACK SURGERY      with hardware    COLONOSCOPY  08/27/2019    polypectomy and biopsy with dr. Den Freeman N/A 8/27/2019    COLONOSCOPY POLYPECTOMY SNARE/COLD BIOPSY performed by Virgie You MD at [address]  04/26/2017    LAPAROSCOPIC APPENDECTOMY N/A 4/26/2017    APPENDECTOMY LAPAROSCOPIC performed by Salazar Berrios MD at [address] [facility]          CURRENT MEDICATIONS       Current Outpatient Medications   Medication Sig Dispense Refill    naproxen (NAPROSYN) 250 MG tablet Take 250 mg by mouth      acetaminophen (TYLENOL) 500 MG tablet Take 500 mg by mouth every 4 hours as needed      DULoxetine (CYMBALTA) 30 MG extended release capsule Take 1 capsule by mouth daily 30 capsule 3    vitamin D (ERGOCALCIFEROL) 1.25 MG (32925 UT) CAPS capsule Take 1 capsule by mouth once a week 12 capsule 1 No current facility-administered medications for this visit. ALLERGIES     Augmentin [amoxicillin-pot clavulanate]    FAMILY HISTORY           Problem Relation Age of Onset    Heart Disease Mother         Valvular    Bipolar Disorder Mother     No Known Problems Father      Family Status   Relation Name Status    Mother  Alive    Father  Alive          SOCIAL HISTORY      reports that he quit smoking about 6 years ago. His smoking use included cigarettes. He has never used smokeless tobacco. He reports current alcohol use. He reports current drug use. Frequency: 4.00 times per week. Drug: Marijuana. PHYSICAL EXAM    (up to 7 for level 4, 8 or more for level 5)     Vitals:    01/22/21 1538 01/22/21 1541   BP: (!) 154/100 (!) 166/103   Site: Right Upper Arm Left Upper Arm   Position: Sitting Sitting   Cuff Size: Medium Adult Large Adult   Pulse: 91    Resp: 18    Temp: 98.6 °F (37 °C)    TempSrc: Oral    SpO2: 98%    Weight: 255 lb (115.7 kg)    Height: 6' (1.829 m)          Physical Exam  Vitals signs and nursing note reviewed. Constitutional:       Appearance: He is ill-appearing. HENT:      Head: Normocephalic and atraumatic. Right Ear: External ear normal.      Left Ear: External ear normal.      Nose: Nose normal.   Eyes:      Comments: Right eye seemingly swollen and watery. Pulmonary:      Effort: Pulmonary effort is normal.   Skin:     General: Skin is warm and dry. Neurological:      Mental Status: He is alert and oriented to person, place, and time. DIFFERENTIAL DIAGNOSIS:       Bj Osborn reviewed the disposition diagnosis with the patient and or their family/guardian. I have answered their questions and given discharge instructions. They voiced understanding of these instructions and did not have anyfurther questions or complaints. PROCEDURES:  No orders of the defined types were placed in this encounter. No results found for this visit on 01/22/21. Nikolas Cherry     Return if symptoms worsen or fail to improve. DISCHARGEMEDICATIONS:  No orders of the defined types were placed in this encounter. Plan:  Due to patients presenting symptoms patient will be sent to the ER for further evaluation/testing and treatment. Patient instructed to return to the office if symptoms worsen, return, or have any other concerns. Patient understands and is agreeable.          Noah Tracey PA-C 1/22/2021 3:58 PM

## 2021-03-20 ENCOUNTER — HOSPITAL ENCOUNTER (EMERGENCY)
Age: 46
Discharge: HOME | End: 2021-03-20
Attending: EMERGENCY MEDICINE
Payer: MEDICAID

## 2021-03-20 VITALS
SYSTOLIC BLOOD PRESSURE: 162 MMHG | RESPIRATION RATE: 20 BRPM | WEIGHT: 255 LBS | HEART RATE: 94 BPM | DIASTOLIC BLOOD PRESSURE: 93 MMHG | HEIGHT: 72 IN | OXYGEN SATURATION: 98 % | BODY MASS INDEX: 34.54 KG/M2 | TEMPERATURE: 98.7 F

## 2021-03-20 DIAGNOSIS — K08.89 PAIN, DENTAL: Primary | ICD-10-CM

## 2021-03-20 DIAGNOSIS — S02.5XXB OPEN FRACTURE OF TOOTH, INITIAL ENCOUNTER: ICD-10-CM

## 2021-03-20 PROCEDURE — 6370000000 HC RX 637 (ALT 250 FOR IP): Performed by: EMERGENCY MEDICINE

## 2021-03-20 PROCEDURE — 2500000003 HC RX 250 WO HCPCS: Performed by: EMERGENCY MEDICINE

## 2021-03-20 PROCEDURE — 64400 NJX AA&/STRD TRIGEMINAL NRV: CPT

## 2021-03-20 PROCEDURE — 99282 EMERGENCY DEPT VISIT SF MDM: CPT

## 2021-03-20 PROCEDURE — 96374 THER/PROPH/DIAG INJ IV PUSH: CPT

## 2021-03-20 PROCEDURE — 6360000002 HC RX W HCPCS: Performed by: EMERGENCY MEDICINE

## 2021-03-20 RX ORDER — BUPIVACAINE HYDROCHLORIDE 5 MG/ML
50 INJECTION, SOLUTION EPIDURAL; INTRACAUDAL ONCE
Status: COMPLETED | OUTPATIENT
Start: 2021-03-20 | End: 2021-03-20

## 2021-03-20 RX ORDER — IBUPROFEN 600 MG/1
600 TABLET ORAL EVERY 6 HOURS PRN
Qty: 120 TABLET | Refills: 0 | Status: SHIPPED | OUTPATIENT
Start: 2021-03-20 | End: 2021-05-29

## 2021-03-20 RX ORDER — KETOROLAC TROMETHAMINE 30 MG/ML
30 INJECTION, SOLUTION INTRAMUSCULAR; INTRAVENOUS ONCE
Status: COMPLETED | OUTPATIENT
Start: 2021-03-20 | End: 2021-03-20

## 2021-03-20 RX ORDER — OXYCODONE HYDROCHLORIDE AND ACETAMINOPHEN 5; 325 MG/1; MG/1
1 TABLET ORAL EVERY 6 HOURS PRN
Qty: 5 TABLET | Refills: 0 | Status: SHIPPED | OUTPATIENT
Start: 2021-03-20 | End: 2021-03-25

## 2021-03-20 RX ORDER — OXYCODONE HYDROCHLORIDE AND ACETAMINOPHEN 5; 325 MG/1; MG/1
1 TABLET ORAL ONCE
Status: COMPLETED | OUTPATIENT
Start: 2021-03-20 | End: 2021-03-20

## 2021-03-20 RX ADMIN — OXYCODONE AND ACETAMINOPHEN 1 TABLET: 5; 325 TABLET ORAL at 16:42

## 2021-03-20 RX ADMIN — Medication 1 EACH: at 16:42

## 2021-03-20 RX ADMIN — BUPIVACAINE HYDROCHLORIDE 50 MG: 5 INJECTION, SOLUTION EPIDURAL; INTRACAUDAL at 16:42

## 2021-03-20 RX ADMIN — KETOROLAC TROMETHAMINE 30 MG: 30 INJECTION, SOLUTION INTRAMUSCULAR at 16:42

## 2021-03-20 ASSESSMENT — ENCOUNTER SYMPTOMS
VOMITING: 0
DIARRHEA: 0
COUGH: 0
FACIAL SWELLING: 0
NAUSEA: 0
WHEEZING: 0
SHORTNESS OF BREATH: 0
VOICE CHANGE: 0
ABDOMINAL PAIN: 0

## 2021-03-20 ASSESSMENT — PAIN SCALES - GENERAL: PAINLEVEL_OUTOF10: 6

## 2021-03-20 NOTE — ED PROVIDER NOTES
[address] [place] [address] ED  EMERGENCY DEPARTMENT ENCOUNTER      Pt Name: Arnaud Pollard  MRN: [id number]  [place] 1975  Date of evaluation: 3/20/2021  Provider: Jax Dhaliwal MD    CHIEF COMPLAINT     Chief Complaint   Patient presents with    Dental Pain     broken front tooth         HISTORY OF PRESENT ILLNESS   (Location/Symptom, Timing/Onset, Context/Setting,Quality, Duration, Modifying Factors, Severity)  Note limiting factors. Arnaud Pollard is a52 y.o. male who presents to the emergency department      HPI    With prior dental repair on left upper tooth, broke off approximately half an arm prior to arrival.  He does have exposed dentin, no signs of infection, does have significant pain rating up to the face. Nursing Notes werereviewed. REVIEW OF SYSTEMS    (2-9 systems for level 4, 10 or more for level 5)     Review of Systems   Constitutional: Negative for appetite change, chills, fatigue and fever. HENT: Positive for dental problem. Negative for drooling, facial swelling, mouth sores and voice change. Eyes: Negative for visual disturbance. Respiratory: Negative for cough, shortness of breath and wheezing. Cardiovascular: Negative for chest pain. Gastrointestinal: Negative for abdominal pain, diarrhea, nausea and vomiting. Genitourinary: Negative for difficulty urinating and dysuria. Musculoskeletal: Negative for neck stiffness. Skin: Negative for rash. Neurological: Negative for weakness and numbness. Psychiatric/Behavioral: Negative for agitation. All other systems reviewed and are negative. Except as noted above the remainder of the review of systems was reviewed and negative.        PAST MEDICAL HISTORY     Past Medical History:   Diagnosis Date    Anxiety     Diverticulitis     [facility]         SURGICALHISTORY       Past Surgical History:   Procedure Laterality Date    ANKLE SURGERY Left     APPENDECTOMY      BACK SURGERY      with hardware    COLONOSCOPY  2019    polypectomy and biopsy with dr. Vinita Cisneros COLONOSCOPY N/A 2019    COLONOSCOPY POLYPECTOMY SNARE/COLD BIOPSY performed by Maribell Kee MD at [address]  2017    LAPAROSCOPIC APPENDECTOMY N/A 2017    APPENDECTOMY LAPAROSCOPIC performed by Travon Werner MD at [address] [place] [address] Bilateral          CURRENT MEDICATIONS       Previous Medications    ACETAMINOPHEN (TYLENOL) 500 MG TABLET    Take 500 mg by mouth every 4 hours as needed    DULOXETINE (CYMBALTA) 30 MG EXTENDED RELEASE CAPSULE    Take 1 capsule by mouth daily    NAPROXEN (NAPROSYN) 250 MG TABLET    Take 250 mg by mouth    VITAMIN D (ERGOCALCIFEROL) 1.25 MG (32350 UT) CAPS CAPSULE    Take 1 capsule by mouth once a week         ALLERGIES   Augmentin [amoxicillin-pot clavulanate]    FAMILY HISTORY       Family History   Problem Relation Age of Onset    Heart Disease Mother         Valvular    Bipolar Disorder Mother     No Known Problems Father           SOCIAL HISTORY       Social History     Socioeconomic History    Marital status:      Spouse name: None    Number of children: None    Years of education: None    Highest education level: None   Occupational History    None   Social Needs    Financial resource strain: None    Food insecurity     Worry: None     Inability: None    Transportation needs     Medical: None     Non-medical: None   Tobacco Use    Smoking status: Former Smoker     Types: Cigarettes     Quit date:      Years since quittin.2    Smokeless tobacco: Never Used   Substance and Sexual Activity    Alcohol use: Yes     Comment: social    Drug use: Yes     Frequency: 4.0 times per week     Types: Marijuana    Sexual activity: None   Lifestyle    Physical activity     Days per week: None     Minutes per session: None    Stress: None   Relationships    Social connections     Talks on phone: None     Gets together: None     Attends Gnosticist service: None     Active member of club or organization: None     Attends meetings of clubs or organizations: None     Relationship status: None    Intimate partner violence     Fear of current or ex partner: None     Emotionally abused: None     Physically abused: None     Forced sexual activity: None   Other Topics Concern    None   Social History Narrative    None       SCREENINGS             PHYSICAL EXAM    (up to 7 for level 4, 8 or more for level 5)     ED Triage Vitals [03/20/21 1543]   BP Temp Temp Source Pulse Resp SpO2 Height Weight   (!) 162/93 98.7 °F (37.1 °C) Oral 94 20 98 % 6' (1.829 m) 255 lb (115.7 kg)       Physical Exam  Constitutional:       General: He is not in acute distress. Appearance: He is well-developed. HENT:      Head: Normocephalic and atraumatic. Mouth/Throat:      Mouth: Mucous membranes are moist.      Pharynx: Oropharynx is clear. Comments: Fracture of prior repaired  Eyes:      Conjunctiva/sclera: Conjunctivae normal.   Neck:      Musculoskeletal: Normal range of motion and neck supple. Vascular: No JVD. Cardiovascular:      Rate and Rhythm: Normal rate and regular rhythm. Pulmonary:      Effort: Pulmonary effort is normal. No respiratory distress. Breath sounds: No stridor. Abdominal:      General: There is no distension. Palpations: Abdomen is soft. Musculoskeletal: Normal range of motion. Skin:     General: Skin is warm and dry. Neurological:      Mental Status: He is alert and oriented to person, place, and time.          DIAGNOSTIC RESULTS     EKG: All EKG's are interpreted by the Emergency Department Physician who either signs orCo-signs this chart in the absence of a cardiologist.      RADIOLOGY:   Non-plainfilm images such as CT, Ultrasound and MRI are read by the radiologist. Plain radiographic images are visualized and preliminarily interpreted by the emergency physician with the below findings:      Interpretationper the Radiologist below, if available at the time of this note:    No orders to display         ED BEDSIDE ULTRASOUND:   Performed by ED Physician - none    LABS:  Labs Reviewed - No data to display    All other labs were within normal range or not returned as of this dictation. EMERGENCY DEPARTMENT COURSE and DIFFERENTIAL DIAGNOSIS/MDM:   Vitals:    Vitals:    03/20/21 1543   BP: (!) 162/93   Pulse: 94   Resp: 20   Temp: 98.7 °F (37.1 °C)   TempSrc: Oral   SpO2: 98%   Weight: 255 lb (115.7 kg)   Height: 6' (1.829 m)         MDM  Number of Diagnoses or Management Options  Diagnosis management comments: Fracture of prior, patient has pain however no signs of infection is is just happened 30 minutes ago. We will do an apical block, discharged with pain control. He has dental follow-up and they are open on Monday. Dental Nerve Block    Date/Time: 3/20/2021 4:47 PM  Performed by: Griselda Goldstein, MD  Authorized by: Griselda Goldstein, MD     Consent:     Consent obtained:  Verbal    Consent given by:  Patient    Risks discussed:  Nerve damage, swelling and unsuccessful block    Alternatives discussed:  No treatment  Indications:     Indications: dental pain    Location:     Anesthesia block type: apical.  Procedure details (see MAR for exact dosages): Anesthetic injected:  Bupivacaine 0.5% w/o epi    Injection procedure:  Anatomic landmarks identified, introduced needle, incremental injection and negative aspiration for blood  Post-procedure details:     Outcome:  Pain relieved    Patient tolerance of procedure: Tolerated well, no immediate complications        FINAL IMPRESSION      1. Pain, dental    2.  Open fracture of tooth, initial encounter        DISPOSITION/PLAN   DISPOSITION Decision To Discharge 03/20/2021 04:48:38 PM      PATIENT REFERRED TO:  DO Kelly Waite [phone] [employer] [id number] [phone]    Schedule an appointment as soon as possible for a visit in 2 days  As needed, If symptoms worsen    Your dentist    Schedule an appointment as soon as possible for a visit in 2 days  For wound re-check      DISCHARGE MEDICATIONS:  New Prescriptions    IBUPROFEN (IBU) 600 MG TABLET    Take 1 tablet by mouth every 6 hours as needed for Pain    OXYCODONE-ACETAMINOPHEN (PERCOCET) 5-325 MG PER TABLET    Take 1 tablet by mouth every 6 hours as needed for Pain for up to 5 days. Intended supply: 3 days. Take lowest dose possible to manage pain              Summation      Patient Course:      ED Medications administered this visit:    Medications   bupivacaine (MARCAINE) 0.5 % injection 150 mg (has no administration in time range)   benzocaine (ORAJEL) 20 % mucosal gel 1 each (has no administration in time range)   oxyCODONE-acetaminophen (PERCOCET) 5-325 MG per tablet 1 tablet (1 tablet Oral Given 3/20/21 1642)   ketorolac (TORADOL) injection 30 mg (30 mg Intravenous Given 3/20/21 1642)       New Prescriptions from this visit:    New Prescriptions    IBUPROFEN (IBU) 600 MG TABLET    Take 1 tablet by mouth every 6 hours as needed for Pain    OXYCODONE-ACETAMINOPHEN (PERCOCET) 5-325 MG PER TABLET    Take 1 tablet by mouth every 6 hours as needed for Pain for up to 5 days. Intended supply: 3 days. Take lowest dose possible to manage pain       Follow-up:  Toney Kayser, DO [address]  [facility]  [phone]    Schedule an appointment as soon as possible for a visit in 2 days  As needed, If symptoms worsen    Your dentist    Schedule an appointment as soon as possible for a visit in 2 days  For wound re-check        Final Impression:   1. Pain, dental    2.  Open fracture of tooth, initial encounter               (Please note that portions of this note were completed with a voice recognition program.  Efforts were made to edit the dictations but occasionally words are mis-transcribed.)           Baudilio De Souza MD 03/20/21 5698

## 2021-04-06 ENCOUNTER — OFFICE VISIT (OUTPATIENT)
Dept: FAMILY MEDICINE CLINIC | Age: 46
End: 2021-04-06
Payer: MEDICAID

## 2021-04-06 VITALS
DIASTOLIC BLOOD PRESSURE: 86 MMHG | OXYGEN SATURATION: 98 % | HEART RATE: 81 BPM | BODY MASS INDEX: 37.52 KG/M2 | SYSTOLIC BLOOD PRESSURE: 138 MMHG | TEMPERATURE: 98.2 F | WEIGHT: 277 LBS | HEIGHT: 72 IN

## 2021-04-06 DIAGNOSIS — F39 MOOD DISORDER (HCC): Primary | ICD-10-CM

## 2021-04-06 DIAGNOSIS — F41.0 PANIC ATTACKS: ICD-10-CM

## 2021-04-06 DIAGNOSIS — F43.10 PTSD (POST-TRAUMATIC STRESS DISORDER): ICD-10-CM

## 2021-04-06 DIAGNOSIS — F41.9 ANXIETY: ICD-10-CM

## 2021-04-06 DIAGNOSIS — F43.29 STRESS AND ADJUSTMENT REACTION: ICD-10-CM

## 2021-04-06 PROCEDURE — 1036F TOBACCO NON-USER: CPT | Performed by: INTERNAL MEDICINE

## 2021-04-06 PROCEDURE — 99213 OFFICE O/P EST LOW 20 MIN: CPT | Performed by: INTERNAL MEDICINE

## 2021-04-06 PROCEDURE — G8417 CALC BMI ABV UP PARAM F/U: HCPCS | Performed by: INTERNAL MEDICINE

## 2021-04-06 PROCEDURE — G8427 DOCREV CUR MEDS BY ELIG CLIN: HCPCS | Performed by: INTERNAL MEDICINE

## 2021-04-06 RX ORDER — ERGOCALCIFEROL 1.25 MG/1
50000 CAPSULE ORAL WEEKLY
Qty: 12 CAPSULE | Refills: 1 | Status: SHIPPED | OUTPATIENT
Start: 2021-04-06 | End: 2021-10-06 | Stop reason: SDUPTHER

## 2021-04-06 RX ORDER — BUPROPION HYDROCHLORIDE 150 MG/1
150 TABLET ORAL EVERY MORNING
Qty: 30 TABLET | Refills: 3 | Status: SHIPPED | OUTPATIENT
Start: 2021-04-06 | End: 2021-06-03 | Stop reason: SDUPTHER

## 2021-04-06 ASSESSMENT — ENCOUNTER SYMPTOMS
DIARRHEA: 0
ABDOMINAL PAIN: 0
CONSTIPATION: 0

## 2021-04-06 NOTE — PROGRESS NOTES
Visit Information    Have you changed or started any medications since your last visit including any over-the-counter medicines, vitamins, or herbal medicines? no   Are you having any side effects from any of your medications? -  no  Have you stopped taking any of your medications? Is so, why? -  no    Have you seen any other physician or provider since your last visit? No  Have you had any other diagnostic tests since your last visit? No  Have you been seen in the emergency room and/or had an admission to a hospital since we last saw you? No  Have you had your routine dental cleaning in the past 6 months? no    Have you activated your [employer] account? If not, what are your barriers?  Yes     Patient Care Team:  Raji Walsh DO as PCP - General (Family Medicine)  Raji Walsh DO as PCP - [facility] Provider    Medical History Review  Past Medical, Family, and Social History reviewed and  contribute to the patient presenting condition    Health Maintenance   Topic Date Due    Hepatitis C screen  Never done    Pneumococcal 0-64 years Vaccine (1 of 1 - PPSV23) Never done    HIV screen  Never done    COVID-19 Vaccine (1) Never done    Lipid screen  Never done    Flu vaccine (Season Ended) 09/01/2021    Colon cancer screen colonoscopy  08/27/2024    DTaP/Tdap/Td vaccine (2 - Td) 01/21/2029    Hepatitis A vaccine  Aged Out    Hepatitis B vaccine  Aged Out    Hib vaccine  Aged Out    Meningococcal (ACWY) vaccine  Aged Out
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
## 2021-05-29 ENCOUNTER — APPOINTMENT (OUTPATIENT)
Dept: CT IMAGING | Age: 46
End: 2021-05-29
Payer: MEDICAID

## 2021-05-29 ENCOUNTER — HOSPITAL ENCOUNTER (EMERGENCY)
Age: 46
Discharge: HOME | End: 2021-05-29
Attending: EMERGENCY MEDICINE
Payer: MEDICAID

## 2021-05-29 VITALS
RESPIRATION RATE: 18 BRPM | WEIGHT: 265 LBS | OXYGEN SATURATION: 98 % | HEIGHT: 72 IN | SYSTOLIC BLOOD PRESSURE: 159 MMHG | TEMPERATURE: 98.1 F | DIASTOLIC BLOOD PRESSURE: 103 MMHG | HEART RATE: 84 BPM | BODY MASS INDEX: 35.89 KG/M2

## 2021-05-29 DIAGNOSIS — R10.9 FLANK PAIN: Primary | ICD-10-CM

## 2021-05-29 LAB
BILIRUBIN, POC: NORMAL
BLOOD URINE, POC: NORMAL
CHP ED QC CHECK: NORMAL
CLARITY, POC: CLEAR
COLOR, POC: YELLOW
GLUCOSE URINE, POC: NORMAL
KETONES, POC: NORMAL
LEUKOCYTE EST, POC: NORMAL
PH, POC: 5
PROTEIN, POC: NORMAL
SPECIFIC GRAVITY, POC: 1.03
UROBILINOGEN, POC: 0.2

## 2021-05-29 PROCEDURE — 74176 CT ABD & PELVIS W/O CONTRAST: CPT

## 2021-05-29 PROCEDURE — 6360000002 HC RX W HCPCS: Performed by: EMERGENCY MEDICINE

## 2021-05-29 PROCEDURE — 81003 URINALYSIS AUTO W/O SCOPE: CPT

## 2021-05-29 PROCEDURE — 99282 EMERGENCY DEPT VISIT SF MDM: CPT

## 2021-05-29 PROCEDURE — 96372 THER/PROPH/DIAG INJ SC/IM: CPT

## 2021-05-29 RX ORDER — ORPHENADRINE CITRATE 30 MG/ML
60 INJECTION INTRAMUSCULAR; INTRAVENOUS ONCE
Status: COMPLETED | OUTPATIENT
Start: 2021-05-29 | End: 2021-05-29

## 2021-05-29 RX ORDER — KETOROLAC TROMETHAMINE 30 MG/ML
60 INJECTION, SOLUTION INTRAMUSCULAR; INTRAVENOUS ONCE
Status: COMPLETED | OUTPATIENT
Start: 2021-05-29 | End: 2021-05-29

## 2021-05-29 RX ORDER — CYCLOBENZAPRINE HCL 10 MG
10 TABLET ORAL EVERY 8 HOURS PRN
Qty: 20 TABLET | Refills: 0 | Status: SHIPPED | OUTPATIENT
Start: 2021-05-29

## 2021-05-29 RX ORDER — IBUPROFEN 800 MG/1
800 TABLET ORAL EVERY 8 HOURS PRN
Qty: 20 TABLET | Refills: 0 | Status: SHIPPED | OUTPATIENT
Start: 2021-05-29 | End: 2021-10-09

## 2021-05-29 RX ORDER — HYDROCODONE BITARTRATE AND ACETAMINOPHEN 5; 325 MG/1; MG/1
1 TABLET ORAL EVERY 6 HOURS PRN
Qty: 20 TABLET | Refills: 0 | Status: SHIPPED | OUTPATIENT
Start: 2021-05-29 | End: 2021-06-03

## 2021-05-29 RX ADMIN — KETOROLAC TROMETHAMINE 60 MG: 30 INJECTION, SOLUTION INTRAMUSCULAR at 14:27

## 2021-05-29 RX ADMIN — ORPHENADRINE CITRATE 60 MG: 60 INJECTION INTRAMUSCULAR; INTRAVENOUS at 14:28

## 2021-05-29 ASSESSMENT — PAIN DESCRIPTION - FREQUENCY: FREQUENCY: INTERMITTENT

## 2021-05-29 ASSESSMENT — PAIN SCALES - GENERAL
PAINLEVEL_OUTOF10: 8
PAINLEVEL_OUTOF10: 10

## 2021-05-29 ASSESSMENT — ENCOUNTER SYMPTOMS
COLOR CHANGE: 0
EYE DISCHARGE: 0
CONSTIPATION: 0
EYE REDNESS: 0
SHORTNESS OF BREATH: 0
COUGH: 0
DIARRHEA: 0
FACIAL SWELLING: 0
ABDOMINAL PAIN: 0
VOMITING: 0

## 2021-05-29 ASSESSMENT — PAIN DESCRIPTION - DESCRIPTORS: DESCRIPTORS: SHARP;THROBBING

## 2021-05-29 ASSESSMENT — PAIN DESCRIPTION - LOCATION: LOCATION: BACK

## 2021-05-29 NOTE — ED PROVIDER NOTES
[address] [place] [address] ED  EMERGENCY DEPARTMENT ENCOUNTER      Pt Name: Ani Gongora  MRN: [id number]  [place] 1975  Date of evaluation: 5/29/2021  Provider: Raffy Capone MD    [address] [place] [address]       Chief Complaint   Patient presents with    Back Pain     onset 4 days no injury    Urinary Frequency         HISTORY OF PRESENT ILLNESS  (Location/Symptom, Timing/Onset, Context/Setting, Quality, Duration, Modifying Factors, Severity.)   Ani Gongora is a 54 y.o. male who presents to the emergency department for pain in his right flank area. He rates it as an 8 and its sharp and throbbing and it comes and goes. He has had this for 4 days. Sometimes he has urinary urgency. No hematuria or injury. No fever cough chest pain or shortness of breath. Nursing Notes were reviewed.     ALLERGIES     Augmentin [amoxicillin-pot clavulanate]    CURRENT MEDICATIONS       Previous Medications    ACETAMINOPHEN (TYLENOL) 500 MG TABLET    Take 500 mg by mouth every 4 hours as needed    BUPROPION (WELLBUTRIN XL) 150 MG EXTENDED RELEASE TABLET    Take 1 tablet by mouth every morning    VITAMIN D (ERGOCALCIFEROL) 1.25 MG (38214 UT) CAPS CAPSULE    Take 1 capsule by mouth once a week       PAST MEDICAL HISTORY         Diagnosis Date    Anxiety     Diverticulitis     Dvt femoral (deep venous thrombosis) (HCC) 09/14/2018       SURGICAL HISTORY           Procedure Laterality Date    ANKLE SURGERY Left     APPENDECTOMY      BACK SURGERY      with hardware    COLONOSCOPY  08/27/2019    polypectomy and biopsy with dr. Pushpa Pyle COLONOSCOPY N/A 8/27/2019    COLONOSCOPY POLYPECTOMY SNARE/COLD BIOPSY performed by Des Fernandez MD at [address] [place] [address]  04/26/2017    LAPAROSCOPIC APPENDECTOMY N/A 4/26/2017    APPENDECTOMY LAPAROSCOPIC performed by Kali Nolan MD at [address] [place] [address] Bilateral          FAMILY HISTORY           Problem Relation Age of Onset    Heart Disease Mother         Valvular    Bipolar Disorder Mother     No Known Problems Father      Family Status   Relation Name Status    Mother  Alive    Father  Alive        SOCIAL HISTORY      reports that he quit smoking about 6 years ago. His smoking use included cigarettes. He has never used smokeless tobacco. He reports current alcohol use. He reports current drug use. Frequency: 4.00 times per week. Drug: Marijuana. REVIEW OF SYSTEMS    (2-9 systems for level 4, 10 or more for level 5)     Review of Systems   Constitutional: Negative for chills, fatigue and fever. HENT: Negative for congestion, ear discharge and facial swelling. Eyes: Negative for discharge and redness. Respiratory: Negative for cough and shortness of breath. Cardiovascular: Negative for chest pain. Gastrointestinal: Negative for abdominal pain, constipation, diarrhea and vomiting. Genitourinary: Positive for flank pain and urgency. Negative for dysuria and hematuria. Musculoskeletal: Negative for arthralgias. Skin: Negative for color change and rash. Neurological: Negative for syncope, numbness and headaches. Hematological: Negative for adenopathy. Psychiatric/Behavioral: Negative for confusion. The patient is not nervous/anxious. Except as noted above the remainder of the review of systems was reviewed and negative. PHYSICAL EXAM    (up to 7 for level 4, 8 or more for level 5)     Vitals:    05/29/21 1241 05/29/21 1243   BP:  (!) 159/103   Pulse: 84    Resp: 18    Temp: 98.1 °F (36.7 °C)    TempSrc: Oral    SpO2: 98%    Weight: 265 lb (120.2 kg)    Height: 6' (1.829 m)        Physical Exam  Vitals reviewed. Constitutional:       General: He is not in acute distress. Appearance: He is well-developed. He is not diaphoretic. HENT:      Head: Normocephalic and atraumatic. Eyes:      General: No scleral icterus. Right eye: No discharge. Left eye: No discharge.    Cardiovascular:      Rate Medical Condition (MA) Reason for Exam: Right flank pain x 3 days, increased urination Acuity: Acute Type of Exam: Initial FINDINGS: Lower Chest: The lung bases are without consolidation or effusion. The visualized cardiac structures are unremarkable. Organs: The liver and spleen are normal size and overall attenuation. There are few punctate gallbladder stones. The pancreas and adrenal glands are unremarkable. The kidneys are without obstructive uropathy. The ureters are not dilated. The urinary bladder is unremarkable. GI/Bowel: The stomach is unremarkable. Loops of small bowel are normal in caliber without evidence for obstruction. Colon contains air and fecal residue. There are uncomplicated diverticula involving the sigmoid colon. There is no intraperitoneal free air or free fluid. Pelvis: The prostate gland and seminal vesicles are unremarkable. Peritoneum/Retroperitoneum: The psoas muscles are symmetric. The abdominal aorta is normal in caliber. The inferior vena cava is unremarkable. There is no retroperitoneal or mesenteric adenopathy. Bones/Soft Tissues: There is a fat containing umbilical hernia. There is no acute osseous abnormality. No acute abdominal or pelvic abnormality. Uncomplicated sigmoid diverticulosis. Fat containing umbilical hernia. LABS:  Labs Reviewed   POCT URINALYSIS DIPSTICK - Normal       All other labs were within normal range or not returned as of this dictation.     EMERGENCY DEPARTMENT COURSE and DIFFERENTIAL DIAGNOSIS/MDM:   Vitals:    Vitals:    05/29/21 1241 05/29/21 1243   BP:  (!) 159/103   Pulse: 84    Resp: 18    Temp: 98.1 °F (36.7 °C)    TempSrc: Oral    SpO2: 98%    Weight: 265 lb (120.2 kg)    Height: 6' (1.829 m)        Orders Placed This Encounter   Medications    ketorolac (TORADOL) injection 60 mg    orphenadrine (NORFLEX) injection 60 mg    HYDROcodone-acetaminophen (NORCO) 5-325 MG per tablet     Sig: Take 1 tablet by mouth every 6 hours as needed for Pain for up to 5 days. Dispense:  20 tablet     Refill:  0    ibuprofen (ADVIL;MOTRIN) 800 MG tablet     Sig: Take 1 tablet by mouth every 8 hours as needed for Pain     Dispense:  20 tablet     Refill:  0    cyclobenzaprine (FLEXERIL) 10 MG tablet     Sig: Take 1 tablet by mouth every 8 hours as needed for Muscle spasms     Dispense:  20 tablet     Refill:  0       Medical Decision Making: Work-up including CAT scan and urinalysis is negative. My clinical impression is that this is muscular pain. Treatment diagnosis and follow-up were discussed with the patient. There is no rash. He has had shingles before in a different location and he states that this is nothing like his shingles pain that was elsewhere. CONSULTS:  None    PROCEDURES:  None    FINAL IMPRESSION      1. Flank pain          DISPOSITION/PLAN   DISPOSITION Decision To Discharge 05/29/2021 02:24:32 PM      PATIENT REFERRED TO:   DO Kelly Graves [address] [place] [address]  [phone]      As needed    [facility] ED  [address] [facility]  [phone]    If symptoms worsen      DISCHARGE MEDICATIONS:     New Prescriptions    CYCLOBENZAPRINE (FLEXERIL) 10 MG TABLET    Take 1 tablet by mouth every 8 hours as needed for Muscle spasms    HYDROCODONE-ACETAMINOPHEN (NORCO) 5-325 MG PER TABLET    Take 1 tablet by mouth every 6 hours as needed for Pain for up to 5 days.     IBUPROFEN (ADVIL;MOTRIN) 800 MG TABLET    Take 1 tablet by mouth every 8 hours as needed for Pain         (Please note that portions of this note were completed with a voice recognition program.  Efforts were made to edit the dictations but occasionally words are mis-transcribed.)    Angel Styles MD  Attending Emergency Physician           Angel Styles MD  05/29/21 9496

## 2021-05-29 NOTE — LETTER
[facility] [place] ED  [address] [address]  Phone: [phone]             May 29, 2021    Patient: Mindy Lawrence   YOB: 1975   Date of Visit: 5/29/2021       To Whom It May Concern:    Jeremy Malin was seen and treated in our emergency department on 5/29/2021. He may return to work on 5/31/2021.       Sincerely,             Signature:__________________________________

## 2021-06-02 NOTE — TELEPHONE ENCOUNTER
Pt said that he did go ahead and double up on this medication per PCP instruction and is now out of the medication and needs a refill asap. He could not confirm the dosage but said it was currently the lowest dose without doubling the medication. Doubling the medication seems to be working for him.     Last OV 4/6/2021  Next OV Not Scheduled    Pharm- [facility] [place]

## 2021-06-03 RX ORDER — BUPROPION HYDROCHLORIDE 150 MG/1
150 TABLET ORAL EVERY MORNING
Qty: 30 TABLET | Refills: 3 | Status: SHIPPED | OUTPATIENT
Start: 2021-06-03 | End: 2021-10-12 | Stop reason: SDUPTHER

## 2021-07-21 ENCOUNTER — OFFICE VISIT (OUTPATIENT)
Dept: PRIMARY CARE CLINIC | Age: 46
End: 2021-07-21
Payer: MEDICAID

## 2021-07-21 VITALS
OXYGEN SATURATION: 99 % | SYSTOLIC BLOOD PRESSURE: 145 MMHG | DIASTOLIC BLOOD PRESSURE: 104 MMHG | HEART RATE: 93 BPM | TEMPERATURE: 97.5 F

## 2021-07-21 DIAGNOSIS — K64.5 THROMBOSED EXTERNAL HEMORRHOID: Primary | ICD-10-CM

## 2021-07-21 DIAGNOSIS — B02.9 HERPES ZOSTER WITHOUT COMPLICATION: ICD-10-CM

## 2021-07-21 PROCEDURE — G8427 DOCREV CUR MEDS BY ELIG CLIN: HCPCS | Performed by: FAMILY MEDICINE

## 2021-07-21 PROCEDURE — 99214 OFFICE O/P EST MOD 30 MIN: CPT | Performed by: FAMILY MEDICINE

## 2021-07-21 PROCEDURE — 1036F TOBACCO NON-USER: CPT | Performed by: FAMILY MEDICINE

## 2021-07-21 PROCEDURE — G8417 CALC BMI ABV UP PARAM F/U: HCPCS | Performed by: FAMILY MEDICINE

## 2021-07-21 RX ORDER — DIAPER,BRIEF,INFANT-TODD,DISP
EACH MISCELLANEOUS
Qty: 1 TUBE | Refills: 1 | Status: SHIPPED | OUTPATIENT
Start: 2021-07-21 | End: 2021-07-21

## 2021-07-21 RX ORDER — DIAPER,BRIEF,INFANT-TODD,DISP
EACH MISCELLANEOUS
Qty: 1 TUBE | Refills: 1 | Status: SHIPPED | OUTPATIENT
Start: 2021-07-21 | End: 2021-07-28

## 2021-07-21 RX ORDER — VALACYCLOVIR HYDROCHLORIDE 1 G/1
1000 TABLET, FILM COATED ORAL 3 TIMES DAILY
Qty: 21 TABLET | Refills: 0 | Status: SHIPPED | OUTPATIENT
Start: 2021-07-21 | End: 2021-07-28

## 2021-07-21 RX ORDER — PREDNISONE 20 MG/1
40 TABLET ORAL DAILY
Qty: 10 TABLET | Refills: 0 | Status: SHIPPED | OUTPATIENT
Start: 2021-07-21 | End: 2021-07-26

## 2021-07-21 ASSESSMENT — ENCOUNTER SYMPTOMS
SHORTNESS OF BREATH: 0
CHANGE IN BOWEL HABIT: 0
RHINORRHEA: 0
BLOOD IN STOOL: 1
COUGH: 0
VOMITING: 0
DIARRHEA: 0
SORE THROAT: 0
ANAL BLEEDING: 1
EYE PAIN: 0

## 2021-07-21 NOTE — PATIENT INSTRUCTIONS
Patient Education        Rash: Care Instructions  Your Care Instructions  A rash is any irritation or inflammation of the skin. Rashes have many possible causes, including allergy, infection, illness, heat, and emotional stress. Follow-up care is a key part of your treatment and safety. Be sure to make and go to all appointments, and call your doctor if you are having problems. It's also a good idea to know your test results and keep a list of the medicines you take. How can you care for yourself at home? · Wash the area with water only. Soap can make dryness and itching worse. Pat dry. · Put cold, wet cloths on the rash to reduce itching. · Keep cool, and stay out of the sun. · Leave the rash open to the air as much of the time as possible. · Sometimes petroleum jelly (Vaseline) can help relieve the discomfort caused by a rash. A moisturizing lotion, such as Cetaphil, also may help. Calamine lotion may help for rashes caused by contact with something (such as a plant or soap) that irritated the skin. Use it 3 or 4 times a day. · If your doctor prescribed a cream, use it as directed. If your doctor prescribed medicine, take it exactly as directed. · If your rash itches so badly that it interferes with your normal activities, take an over-the-counter antihistamine, such as diphenhydramine (Benadryl) or loratadine (Claritin). Read and follow all instructions on the label. When should you call for help? Call your doctor now or seek immediate medical care if:    · You have signs of infection, such as:  ? Increased pain, swelling, warmth, or redness. ? Red streaks leading from the area. ? Pus draining from the area. ? A fever.     · You have joint pain along with the rash. Watch closely for changes in your health, and be sure to contact your doctor if:    · Your rash is changing or getting worse. For example, call if you have pain along with the rash, the rash is spreading, or you have new blisters.   · You do not get better after 1 week. Where can you learn more? Go to https://chpepiceweb.Degordian. org and sign in to your Quintiles account. Enter C082 in the [facility] box to learn more about \"Rash: Care Instructions. \"     If you do not have an account, please click on the \"Sign Up Now\" link. Current as of: March 3, 2021               Content Version: 12.9  © 2006-2021 [employer]. Care instructions adapted under license by [facility] ([facility]). If you have questions about a medical condition or this instruction, always ask your healthcare professional. [address] [address] any warranty or liability for your use of this information. Patient Education        Hemorrhoids: Care Instructions  Your Care Instructions     Hemorrhoids are enlarged veins that develop in the anal canal. Bleeding during bowel movements, itching, swelling, and rectal pain are the most common symptoms. They can be uncomfortable at times, but hemorrhoids rarely are a serious problem. You can treat most hemorrhoids with simple changes to your diet and bowel habits. These changes include eating more fiber and not straining to pass stools. Most hemorrhoids do not need surgery or other treatment unless they are very large and painful or bleed a lot. Follow-up care is a key part of your treatment and safety. Be sure to make and go to all appointments, and call your doctor if you are having problems. It's also a good idea to know your test results and keep a list of the medicines you take. How can you care for yourself at home? · Sit in a few inches of warm water (sitz bath) 3 times a day and after bowel movements. The warm water helps with pain and itching. · Put ice on your anal area several times a day for 10 minutes at a time. Put a thin cloth between the ice and your skin. Follow this by placing a warm, wet towel on the area for another 10 to 20 minutes.   · Take pain medicines exactly as directed. ? If the doctor gave you a prescription medicine for pain, take it as prescribed. ? If you are not taking a prescription pain medicine, ask your doctor if you can take an over-the-counter medicine. · Keep the anal area clean, but be gentle. Use water and a fragrance-free soap, such as [place], or use baby wipes or medicated pads, such as Tucks. · Wear cotton underwear and loose clothing to decrease moisture in the anal area. · Eat more fiber. Include foods such as whole-grain breads and cereals, raw vegetables, raw and dried fruits, and beans. · Drink plenty of fluids. If you have kidney, heart, or liver disease and have to limit fluids, talk with your doctor before you increase the amount of fluids you drink. · Use a stool softener that contains bran or psyllium. You can save money by buying bran or psyllium (available in bulk at most health food stores) and sprinkling it on foods or stirring it into fruit juice. Or you can use a product such as Metamucil or Hydrocil. · Practice healthy bowel habits. ? Go to the bathroom as soon as you have the urge. ? Avoid straining to pass stools. Relax and give yourself time to let things happen naturally. ? Do not hold your breath while passing stools. ? Do not read while sitting on the toilet. Get off the toilet as soon as you have finished. · Take your medicines exactly as prescribed. Call your doctor if you think you are having a problem with your medicine. When should you call for help? Call 911 anytime you think you may need emergency care. For example, call if:    · You pass maroon or very bloody stools. Call your doctor now or seek immediate medical care if:    · You have increased pain.     · You have increased bleeding. Watch closely for changes in your health, and be sure to contact your doctor if:    · Your symptoms have not improved after 3 or 4 days. Where can you learn more? Go to https://xander.healthGHEN [employer]ERIALS. org and sign in to your i-marker account. Enter G913 in the PushCoin box to learn more about \"Hemorrhoids: Care Instructions. \"     If you do not have an account, please click on the \"Sign Up Now\" link. Current as of: February 10, 2021               Content Version: 12.9  © 8233-3991 Healthwise, Wik[employer]ot. Care instructions adapted under license by [facility] ([facility]). If you have questions about a medical condition or this instruction, always ask your healthcare professional. [address] any warranty or liability for your use of this information. Take valacyclovir and prednisone as prescribed for rash, likely shingles  May try using steroid cream for hemorrhoid but will likely need to have it drained.  If steroid cream not helping then recommend going to ER to have drained

## 2021-07-21 NOTE — PROGRESS NOTES
[facility] [id number] [facility]  [address] [address]  Dept: [phone]  Dept Fax: [phone]    Roxana Montesinos is a 54 y.o. male who presents today for his medical conditions/complaintsas noted below. Roxana Montesinos is c/o of Rash (around rt eye - noticed it yesterday and has ha with nausea that started this am and tried tylenol but didn't help) and Hemorrhoids (tried hemorrhoid cream and preporation H wipes and ice pack but nothing  is helping)        HPI:     Patient is here for rash above the right eyebrow and hemorrhoids  Patient reports that he has had hemorrhoids on and off over the years. This time the hemorrhoid does not seem to be improving with over-the-counter treatments and feels as though it is protruding out and hard to touch    Rash  This is a new problem. The current episode started yesterday. The problem is unchanged. The affected locations include the face (Above right eyebrow). The rash is characterized by redness, pain, itchiness and burning. It is unknown (Had been out in the sun prior to it starting) if there was an exposure to a precipitant. Pertinent negatives include no congestion, cough, diarrhea, eye pain, fatigue, fever, joint pain, rhinorrhea, shortness of breath, sore throat or vomiting. (Nausea, headache) Treatments tried: tylenol, aloe vera. The treatment provided no relief. There is no history of allergies, asthma or eczema. Other  This is a new problem. The current episode started in the past 7 days (5 days). The problem occurs constantly. The problem has been gradually worsening. Associated symptoms include a rash. Pertinent negatives include no change in bowel habit, chills, congestion, coughing, fatigue, fever, myalgias, numbness, sore throat or vomiting. Associated symptoms comments: Difficulty sitting due to pain. Exacerbated by: Sitting.  He has tried ice and position changes (Hemorrhoid cream, Preparation H, ice, sitz bath) for the symptoms. The treatment provided no relief. Patient reports history of shingles in the past    Past Medical History:   Diagnosis Date    Anxiety     Diverticulitis     Dvt femoral (deep venous thrombosis) ([address] 2018    Past medical history reviewed and pertinent positives/negatives in the HPI    Past Surgical History:   Procedure Laterality Date    ANKLE SURGERY Left     APPENDECTOMY      BACK SURGERY      with hardware    COLONOSCOPY  2019    polypectomy and biopsy with dr. Bib Tyler COLONOSCOPY N/A 2019    COLONOSCOPY POLYPECTOMY SNARE/COLD BIOPSY performed by Robyn Espinoza MD at [address] [place] [address]  2017    LAPAROSCOPIC APPENDECTOMY N/A 2017    APPENDECTOMY LAPAROSCOPIC performed by Tracey Arce MD at [address] [place] [address] Bilateral        Family History   Problem Relation Age of Onset    Heart Disease Mother         Valvular    Bipolar Disorder Mother     No Known Problems Father        Social History     Tobacco Use    Smoking status: Former Smoker     Types: Cigarettes     Quit date:      Years since quittin.5    Smokeless tobacco: Never Used   Substance Use Topics    Alcohol use: Yes     Comment: social      Current Outpatient Medications   Medication Sig Dispense Refill    valACYclovir (VALTREX) 1 g tablet Take 1 tablet by mouth 3 times daily for 7 days 21 tablet 0    predniSONE (DELTASONE) 20 MG tablet Take 2 tablets by mouth daily for 5 days 10 tablet 0    hydrocortisone 1 % cream Apply topically 2 -3 times daily for 5 - 7 days.  1 Tube 1    buPROPion (WELLBUTRIN XL) 150 MG extended release tablet Take 1 tablet by mouth every morning 30 tablet 3    ibuprofen (ADVIL;MOTRIN) 800 MG tablet Take 1 tablet by mouth every 8 hours as needed for Pain 20 tablet 0    vitamin D (ERGOCALCIFEROL) 1.25 MG (44322 UT) CAPS capsule Take 1 capsule by mouth once a week 12 capsule 1    acetaminophen (TYLENOL) 500 MG tablet Take 500 mg by mouth every 4 hours as needed      cyclobenzaprine (FLEXERIL) 10 MG tablet Take 1 tablet by mouth every 8 hours as needed for Muscle spasms (Patient not taking: Reported on 7/21/2021) 20 tablet 0     No current facility-administered medications for this visit. Allergies   Allergen Reactions    Augmentin [Amoxicillin-Pot Clavulanate] Rash       Health Maintenance   Topic Date Due    Hepatitis C screen  Never done    COVID-19 Vaccine (1) Never done    HIV screen  Never done    Lipid screen  Never done    Flu vaccine (1) 09/01/2021    Colon cancer screen colonoscopy  08/27/2024    DTaP/Tdap/Td vaccine (2 - Td or Tdap) 01/21/2029    Hepatitis A vaccine  Aged Out    Hepatitis B vaccine  Aged Out    Hib vaccine  Aged Out    Meningococcal (ACWY) vaccine  Aged Out    Pneumococcal 0-64 years Vaccine  Aged Out       :      Review of Systems   Constitutional: Negative for chills, fatigue and fever. HENT: Negative for congestion, ear pain, rhinorrhea and sore throat. Eyes: Negative for pain and visual disturbance. Respiratory: Negative for cough and shortness of breath. Gastrointestinal: Positive for anal bleeding and blood in stool. Negative for change in bowel habit, diarrhea and vomiting. Genitourinary: Negative. Musculoskeletal: Negative for joint pain and myalgias. Skin: Positive for rash. Negative for pallor. Neurological: Negative for numbness. Hematological: Negative for adenopathy. Objective:     Physical Exam  Vitals and nursing note reviewed. Exam conducted with a chaperone present Randa Zheng MA). Constitutional:       Appearance: Normal appearance. He is obese. HENT:      Head: Normocephalic and atraumatic. Right Ear: Hearing normal.      Left Ear: Hearing normal.      Nose: Nose normal.      Mouth/Throat:      Lips: Pink. Mouth: Mucous membranes are moist.      Pharynx: Oropharynx is clear.    Eyes:      Extraocular Movements: Extraocular movements intact. Conjunctiva/sclera: Conjunctivae normal.   Cardiovascular:      Rate and Rhythm: Normal rate. Pulmonary:      Effort: Pulmonary effort is normal.   Genitourinary:     Comments: Approximately 2 cm external hemorrhoid protruding from the rectum that feels hard to touch  Musculoskeletal:      Cervical back: No muscular tenderness. Skin:     General: Skin is warm and dry. Neurological:      Mental Status: He is alert and oriented to person, place, and time. Mental status is at baseline. Psychiatric:         Mood and Affect: Mood normal.         Behavior: Behavior normal.         Thought Content: Thought content normal.         Judgment: Judgment normal.       BP (!) 145/104 (Site: Left Upper Arm, Position: Sitting, Cuff Size: Large Adult)   Pulse 93   Temp 97.5 °F (36.4 °C) (Temporal)   SpO2 99%     Assessment:       Diagnosis Orders   1. Thrombosed external hemorrhoid     2. Herpes zoster without complication         Plan:    Take valacyclovir and prednisone as prescribed for rash, likely shingles  May try using steroid cream for hemorrhoid but will likely need to have it drained. If steroid cream not helping then recommend going to ER to have drained    No orders of the defined types were placed in this encounter. Orders Placed This Encounter   Medications    valACYclovir (VALTREX) 1 g tablet     Sig: Take 1 tablet by mouth 3 times daily for 7 days     Dispense:  21 tablet     Refill:  0    predniSONE (DELTASONE) 20 MG tablet     Sig: Take 2 tablets by mouth daily for 5 days     Dispense:  10 tablet     Refill:  0    DISCONTD: hydrocortisone 1 % cream     Sig: Apply topically 2 -3 times daily for 5 - 7 days. Dispense:  1 Tube     Refill:  1    hydrocortisone 1 % cream     Sig: Apply topically 2 -3 times daily for 5 - 7 days. Dispense:  1 Tube     Refill:  1      Patient given educational materials - see patient instructions.   Discussed use, benefit, and side effects of prescribed medications. All patient questions answered. Pt voiced understanding. Patient agreed with treatment plan. Follow up as directed.      Electronicallysigned by Earlene Roberts MD on 7/21/2021 at 4:43 PM

## 2021-10-06 DIAGNOSIS — Z76.0 MEDICATION REFILL: Primary | ICD-10-CM

## 2021-10-06 RX ORDER — ERGOCALCIFEROL 1.25 MG/1
50000 CAPSULE ORAL WEEKLY
Qty: 12 CAPSULE | Refills: 0 | Status: SHIPPED | OUTPATIENT
Start: 2021-10-06 | End: 2022-01-26 | Stop reason: SDUPTHER

## 2021-10-09 ENCOUNTER — OFFICE VISIT (OUTPATIENT)
Dept: PRIMARY CARE CLINIC | Age: 46
End: 2021-10-09
Payer: MEDICAID

## 2021-10-09 VITALS
OXYGEN SATURATION: 96 % | TEMPERATURE: 98.4 F | HEART RATE: 87 BPM | DIASTOLIC BLOOD PRESSURE: 85 MMHG | SYSTOLIC BLOOD PRESSURE: 123 MMHG

## 2021-10-09 DIAGNOSIS — Z98.1 HISTORY OF LUMBAR FUSION: ICD-10-CM

## 2021-10-09 DIAGNOSIS — M54.6 ACUTE RIGHT-SIDED THORACIC BACK PAIN: Primary | ICD-10-CM

## 2021-10-09 PROCEDURE — 99213 OFFICE O/P EST LOW 20 MIN: CPT | Performed by: NURSE PRACTITIONER

## 2021-10-09 PROCEDURE — 96372 THER/PROPH/DIAG INJ SC/IM: CPT | Performed by: NURSE PRACTITIONER

## 2021-10-09 PROCEDURE — G8427 DOCREV CUR MEDS BY ELIG CLIN: HCPCS | Performed by: NURSE PRACTITIONER

## 2021-10-09 PROCEDURE — G8417 CALC BMI ABV UP PARAM F/U: HCPCS | Performed by: NURSE PRACTITIONER

## 2021-10-09 PROCEDURE — G8484 FLU IMMUNIZE NO ADMIN: HCPCS | Performed by: NURSE PRACTITIONER

## 2021-10-09 PROCEDURE — 1036F TOBACCO NON-USER: CPT | Performed by: NURSE PRACTITIONER

## 2021-10-09 RX ORDER — TIZANIDINE 4 MG/1
4 TABLET ORAL 3 TIMES DAILY PRN
Qty: 30 TABLET | Refills: 0 | Status: SHIPPED | OUTPATIENT
Start: 2021-10-09

## 2021-10-09 RX ORDER — IBUPROFEN 800 MG/1
800 TABLET ORAL EVERY 6 HOURS PRN
Qty: 30 TABLET | Refills: 0 | Status: SHIPPED | OUTPATIENT
Start: 2021-10-09

## 2021-10-09 RX ORDER — KETOROLAC TROMETHAMINE 30 MG/ML
60 INJECTION, SOLUTION INTRAMUSCULAR; INTRAVENOUS ONCE
Status: COMPLETED | OUTPATIENT
Start: 2021-10-09 | End: 2021-10-09

## 2021-10-09 RX ADMIN — KETOROLAC TROMETHAMINE 60 MG: 30 INJECTION, SOLUTION INTRAMUSCULAR; INTRAVENOUS at 15:31

## 2021-10-09 ASSESSMENT — ENCOUNTER SYMPTOMS
COUGH: 0
ABDOMINAL PAIN: 0
SINUS PAIN: 0
EYE PAIN: 0
BACK PAIN: 1
VOMITING: 0
SORE THROAT: 0
DIARRHEA: 0
SHORTNESS OF BREATH: 0
NAUSEA: 0

## 2021-10-09 NOTE — PROGRESS NOTES
[facility] [address] [facility] [place] [facility]  [address] [address]  Dept: [phone]  Dept Fax: [phone]    Obed Jiménez is a 51 y.o. male who presents today for his medicalconditions/complaints as noted below. Obed Jiménez is c/o of Back Pain (mid rt side - started yesterday but is way worse today)      HPI:         41-year-old male patient presents with complaints of right middle to lower back pain. Patient reports that he felt mild discomfort yesterday and reportedly stumbled this morning and twisted his back causing significant right mid lower back pain. Patient has significant history of chronic lower back pain secondary to several lumbar back surgeries including fusion. Patient follows with neurosurgeon Dr. Kelle Duncan. Denies loss of bladder bowel control. Denies any saddle anesthesia. Past Medical History:   Diagnosis Date    Anxiety     Diverticulitis     Dvt femoral (deep venous thrombosis) ([facility]) 09/14/2018        Current Outpatient Medications   Medication Sig Dispense Refill    ibuprofen (ADVIL;MOTRIN) 800 MG tablet Take 1 tablet by mouth every 6 hours as needed for Pain 30 tablet 0    tiZANidine (ZANAFLEX) 4 MG tablet Take 1 tablet by mouth 3 times daily as needed (back) 30 tablet 0    vitamin D (ERGOCALCIFEROL) 1.25 MG (89513 UT) CAPS capsule Take 1 capsule by mouth once a week 12 capsule 0    buPROPion (WELLBUTRIN XL) 150 MG extended release tablet Take 1 tablet by mouth every morning 30 tablet 3    acetaminophen (TYLENOL) 500 MG tablet Take 500 mg by mouth every 4 hours as needed      cyclobenzaprine (FLEXERIL) 10 MG tablet Take 1 tablet by mouth every 8 hours as needed for Muscle spasms (Patient not taking: Reported on 7/21/2021) 20 tablet 0     No current facility-administered medications for this visit.      Allergies   Allergen Reactions    Augmentin [Amoxicillin-Pot Clavulanate] Rash       Subjective:      Review of Systems Constitutional: Negative for chills and fatigue. HENT: Negative for congestion, ear pain, sinus pain and sore throat. Eyes: Negative for pain and visual disturbance. Respiratory: Negative for cough and shortness of breath. Cardiovascular: Negative for chest pain and palpitations. Gastrointestinal: Negative for abdominal pain, diarrhea, nausea and vomiting. Genitourinary: Negative for penile pain and testicular pain. Musculoskeletal: Positive for back pain. Negative for joint swelling and neck pain. Skin: Negative for rash. Neurological: Negative for dizziness and light-headedness. Hematological: Does not bruise/bleed easily. All other systems reviewed and are negative.      :Objective     Physical Exam  Vitals and nursing note reviewed. Constitutional:       General: He is not in acute distress. Appearance: Normal appearance. He is not toxic-appearing. Cardiovascular:      Rate and Rhythm: Normal rate. Pulmonary:      Effort: Pulmonary effort is normal.      Breath sounds: Normal breath sounds. Musculoskeletal:        Back:    Skin:     General: Skin is warm and dry. Neurological:      General: No focal deficit present. Mental Status: He is alert and oriented to person, place, and time. /85 (Site: Left Upper Arm, Position: Sitting, Cuff Size: Large Adult)   Pulse 87   Temp 98.4 °F (36.9 °C) (Temporal)   SpO2 96%     Lab Review   Admission on 05/29/2021, Discharged on 05/29/2021   Component Date Value    Color, UA 05/29/2021 Yellow     Clarity, UA 05/29/2021 Clear     Glucose, UA POC 05/29/2021 Neg     Bilirubin, UA 05/29/2021 Neg     Ketones, UA 05/29/2021 Neg     Spec Grav, UA 05/29/2021 1.030     pH, UA 05/29/2021 5     Protein, UA POC 05/29/2021 TRACE     Urobilinogen, UA 05/29/2021 0.2     Leukocytes, UA 05/29/2021 Neg     Blood, UA POC 05/29/2021 Neg     QC OK? 05/29/2021 OK        Assessment and Plan      1.  Acute right-sided thoracic back pain  -     ketorolac (TORADOL) injection 60 mg; 60 mg, IntraMUSCular, ONCE, On Sat 10/9/21 at 1415, For 1 doseDo not administer for more than 5 days. -     ibuprofen (ADVIL;MOTRIN) 800 MG tablet; Take 1 tablet by mouth every 6 hours as needed for Pain, Disp-30 tablet, R-0Normal  -     tiZANidine (ZANAFLEX) 4 MG tablet; Take 1 tablet by mouth 3 times daily as needed (back), Disp-30 tablet, R-0Normal  -     XR LUMBAR SPINE (2-3 VIEWS); Future  -     XR THORACIC SPINE (2 VIEWS); Future  2. History of lumbar fusion  -     ketorolac (TORADOL) injection 60 mg; 60 mg, IntraMUSCular, ONCE, On Sat 10/9/21 at 1415, For 1 doseDo not administer for more than 5 days. -     ibuprofen (ADVIL;MOTRIN) 800 MG tablet; Take 1 tablet by mouth every 6 hours as needed for Pain, Disp-30 tablet, R-0Normal  -     tiZANidine (ZANAFLEX) 4 MG tablet; Take 1 tablet by mouth 3 times daily as needed (back), Disp-30 tablet, R-0Normal  -     XR LUMBAR SPINE (2-3 VIEWS); Future  -     XR THORACIC SPINE (2 VIEWS); Future    IM injection of Toradol  Orders for x-rays of thoracic and lumbar spine. Discussed ibuprofen, Zanaflex dose/duration. Patient has upcoming follow-up to establish care. Administrations This Visit     ketorolac (TORADOL) injection 60 mg     Admin Date  10/09/2021 Action  Given Dose  60 mg Route  IntraMUSCular Administered By  ESHA Tompkins CNP                    No results found for this visit on 10/09/21. Return if symptoms worsen or fail to improve. Orders Placed This Encounter   Medications    ketorolac (TORADOL) injection 60 mg    ibuprofen (ADVIL;MOTRIN) 800 MG tablet     Sig: Take 1 tablet by mouth every 6 hours as needed for Pain     Dispense:  30 tablet     Refill:  0    tiZANidine (ZANAFLEX) 4 MG tablet     Sig: Take 1 tablet by mouth 3 times daily as needed (back)     Dispense:  30 tablet     Refill:  0        Patient given educational materials - see patient instructions. Discussed use, benefit, and side effects of prescribed medications. All patientquestions answered. Pt voiced understanding. Patient given educational materials - see patient instructions. Discussed use, benefit, and side effects of prescribed medications. All patientquestions answered. Pt voiced understanding. This note was transcribed using dictation with Dragon services. Efforts were made to correct any errors but some words may be misinterpreted.     Electronically signed by ESHA Johnson CNP on 10/9/2021at 3:32 PM

## 2021-10-09 NOTE — PATIENT INSTRUCTIONS

## 2021-10-12 ENCOUNTER — OFFICE VISIT (OUTPATIENT)
Dept: FAMILY MEDICINE CLINIC | Age: 46
End: 2021-10-12
Payer: MEDICAID

## 2021-10-12 VITALS
OXYGEN SATURATION: 98 % | DIASTOLIC BLOOD PRESSURE: 82 MMHG | RESPIRATION RATE: 18 BRPM | SYSTOLIC BLOOD PRESSURE: 132 MMHG | HEART RATE: 78 BPM | HEIGHT: 72 IN | WEIGHT: 274 LBS | BODY MASS INDEX: 37.11 KG/M2

## 2021-10-12 DIAGNOSIS — Z13.1 DIABETES MELLITUS SCREENING: ICD-10-CM

## 2021-10-12 DIAGNOSIS — R79.89 LOW SERUM VITAMIN D: ICD-10-CM

## 2021-10-12 DIAGNOSIS — H91.93 BILATERAL HEARING LOSS, UNSPECIFIED HEARING LOSS TYPE: ICD-10-CM

## 2021-10-12 DIAGNOSIS — Z13.29 THYROID DISORDER SCREENING: ICD-10-CM

## 2021-10-12 DIAGNOSIS — G89.29 CHRONIC RIGHT-SIDED THORACIC BACK PAIN: Primary | ICD-10-CM

## 2021-10-12 DIAGNOSIS — Z12.5 PROSTATE CANCER SCREENING: ICD-10-CM

## 2021-10-12 DIAGNOSIS — Z13.220 LIPID SCREENING: ICD-10-CM

## 2021-10-12 DIAGNOSIS — F41.9 ANXIETY: ICD-10-CM

## 2021-10-12 DIAGNOSIS — M54.6 CHRONIC RIGHT-SIDED THORACIC BACK PAIN: Primary | ICD-10-CM

## 2021-10-12 PROCEDURE — G8427 DOCREV CUR MEDS BY ELIG CLIN: HCPCS | Performed by: NURSE PRACTITIONER

## 2021-10-12 PROCEDURE — 1036F TOBACCO NON-USER: CPT | Performed by: NURSE PRACTITIONER

## 2021-10-12 PROCEDURE — G8417 CALC BMI ABV UP PARAM F/U: HCPCS | Performed by: NURSE PRACTITIONER

## 2021-10-12 PROCEDURE — G8484 FLU IMMUNIZE NO ADMIN: HCPCS | Performed by: NURSE PRACTITIONER

## 2021-10-12 PROCEDURE — 99214 OFFICE O/P EST MOD 30 MIN: CPT | Performed by: NURSE PRACTITIONER

## 2021-10-12 RX ORDER — BUPROPION HYDROCHLORIDE 150 MG/1
150 TABLET ORAL EVERY MORNING
Qty: 30 TABLET | Refills: 3 | Status: SHIPPED | OUTPATIENT
Start: 2021-10-12 | End: 2022-07-12

## 2021-10-12 ASSESSMENT — ENCOUNTER SYMPTOMS
DIARRHEA: 0
VOMITING: 0
SHORTNESS OF BREATH: 0
EYE PAIN: 0
SINUS PAIN: 0
BACK PAIN: 1
ABDOMINAL PAIN: 0
SORE THROAT: 0
NAUSEA: 0
COUGH: 0

## 2021-10-12 NOTE — PATIENT INSTRUCTIONS
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
## 2021-10-13 ENCOUNTER — HOSPITAL ENCOUNTER (OUTPATIENT)
Age: 46
Discharge: HOME | End: 2021-10-13
Payer: MEDICAID

## 2021-10-13 ENCOUNTER — HOSPITAL ENCOUNTER (OUTPATIENT)
Dept: GENERAL RADIOLOGY | Age: 46
Discharge: HOME | End: 2021-10-15
Payer: MEDICAID

## 2021-10-13 ENCOUNTER — HOSPITAL ENCOUNTER (OUTPATIENT)
Age: 46
Discharge: HOME | End: 2021-10-15
Payer: MEDICAID

## 2021-10-13 DIAGNOSIS — R79.89 LOW SERUM VITAMIN D: ICD-10-CM

## 2021-10-13 DIAGNOSIS — M54.6 CHRONIC RIGHT-SIDED THORACIC BACK PAIN: ICD-10-CM

## 2021-10-13 DIAGNOSIS — F41.9 ANXIETY: ICD-10-CM

## 2021-10-13 DIAGNOSIS — Z13.220 LIPID SCREENING: ICD-10-CM

## 2021-10-13 DIAGNOSIS — Z13.29 THYROID DISORDER SCREENING: ICD-10-CM

## 2021-10-13 DIAGNOSIS — Z98.1 HISTORY OF LUMBAR FUSION: ICD-10-CM

## 2021-10-13 DIAGNOSIS — G89.29 CHRONIC RIGHT-SIDED THORACIC BACK PAIN: ICD-10-CM

## 2021-10-13 DIAGNOSIS — Z12.5 PROSTATE CANCER SCREENING: ICD-10-CM

## 2021-10-13 DIAGNOSIS — M54.6 ACUTE RIGHT-SIDED THORACIC BACK PAIN: ICD-10-CM

## 2021-10-13 DIAGNOSIS — Z13.1 DIABETES MELLITUS SCREENING: ICD-10-CM

## 2021-10-13 LAB
ABSOLUTE EOS #: 0.31 K/UL (ref 0–0.44)
ABSOLUTE IMMATURE GRANULOCYTE: 0.03 K/UL (ref 0–0.3)
ABSOLUTE LYMPH #: 2.68 K/UL (ref 1.1–3.7)
ABSOLUTE MONO #: 0.56 K/UL (ref 0.1–1.2)
ALBUMIN SERPL-MCNC: 4.4 G/DL (ref 3.5–5.2)
ALBUMIN/GLOBULIN RATIO: 1.7 (ref 1–2.5)
ALP BLD-CCNC: 41 U/L (ref 40–129)
ALT SERPL-CCNC: 26 U/L (ref 5–41)
ANION GAP SERPL CALCULATED.3IONS-SCNC: 15 MMOL/L (ref 9–17)
AST SERPL-CCNC: 18 U/L
BASOPHILS # BLD: 1 % (ref 0–2)
BASOPHILS ABSOLUTE: 0.11 K/UL (ref 0–0.2)
BILIRUB SERPL-MCNC: 0.29 MG/DL (ref 0.3–1.2)
BUN BLDV-MCNC: 16 MG/DL (ref 6–20)
BUN/CREAT BLD: ABNORMAL (ref 9–20)
CALCIUM SERPL-MCNC: 9.2 MG/DL (ref 8.6–10.4)
CHLORIDE BLD-SCNC: 106 MMOL/L (ref 98–107)
CHOLESTEROL/HDL RATIO: 4.6
CHOLESTEROL: 197 MG/DL
CO2: 20 MMOL/L (ref 20–31)
CREAT SERPL-MCNC: 0.91 MG/DL (ref 0.7–1.2)
DIFFERENTIAL TYPE: NORMAL
EOSINOPHILS RELATIVE PERCENT: 4 % (ref 1–4)
ESTIMATED AVERAGE GLUCOSE: 108 MG/DL
GFR AFRICAN AMERICAN: >60 ML/MIN
GFR NON-AFRICAN AMERICAN: >60 ML/MIN
GFR SERPL CREATININE-BSD FRML MDRD: ABNORMAL ML/MIN/{1.73_M2}
GFR SERPL CREATININE-BSD FRML MDRD: ABNORMAL ML/MIN/{1.73_M2}
GLUCOSE BLD-MCNC: 93 MG/DL (ref 70–99)
HBA1C MFR BLD: 5.4 % (ref 4–6)
HCT VFR BLD CALC: 48.7 % (ref 40.7–50.3)
HDLC SERPL-MCNC: 43 MG/DL
HEMOGLOBIN: 15.9 G/DL (ref 13–17)
IMMATURE GRANULOCYTES: 0 %
LDL CHOLESTEROL: 125 MG/DL (ref 0–130)
LYMPHOCYTES # BLD: 33 % (ref 24–43)
MCH RBC QN AUTO: 28.6 PG (ref 25.2–33.5)
MCHC RBC AUTO-ENTMCNC: 32.6 G/DL (ref 28.4–34.8)
MCV RBC AUTO: 87.6 FL (ref 82.6–102.9)
MONOCYTES # BLD: 7 % (ref 3–12)
NRBC AUTOMATED: 0 PER 100 WBC
PDW BLD-RTO: 13.2 % (ref 11.8–14.4)
PLATELET # BLD: 344 K/UL (ref 138–453)
PLATELET ESTIMATE: NORMAL
PMV BLD AUTO: 9.4 FL (ref 8.1–13.5)
POTASSIUM SERPL-SCNC: 4.6 MMOL/L (ref 3.7–5.3)
PROSTATE SPECIFIC ANTIGEN: 1.16 UG/L
RBC # BLD: 5.56 M/UL (ref 4.21–5.77)
RBC # BLD: NORMAL 10*6/UL
SEG NEUTROPHILS: 55 % (ref 36–65)
SEGMENTED NEUTROPHILS ABSOLUTE COUNT: 4.35 K/UL (ref 1.5–8.1)
SODIUM BLD-SCNC: 141 MMOL/L (ref 135–144)
TOTAL PROTEIN: 7 G/DL (ref 6.4–8.3)
TRIGL SERPL-MCNC: 145 MG/DL
TSH SERPL DL<=0.05 MIU/L-ACNC: 0.87 MIU/L (ref 0.3–5)
VITAMIN D 25-HYDROXY: 27.6 NG/ML (ref 30–100)
VLDLC SERPL CALC-MCNC: NORMAL MG/DL (ref 1–30)
WBC # BLD: 8 K/UL (ref 3.5–11.3)
WBC # BLD: NORMAL 10*3/UL

## 2021-10-13 PROCEDURE — 36415 COLL VENOUS BLD VENIPUNCTURE: CPT

## 2021-10-13 PROCEDURE — G0103 PSA SCREENING: HCPCS

## 2021-10-13 PROCEDURE — 80061 LIPID PANEL: CPT

## 2021-10-13 PROCEDURE — 80053 COMPREHEN METABOLIC PANEL: CPT

## 2021-10-13 PROCEDURE — 72100 X-RAY EXAM L-S SPINE 2/3 VWS: CPT

## 2021-10-13 PROCEDURE — 85025 COMPLETE CBC W/AUTO DIFF WBC: CPT

## 2021-10-13 PROCEDURE — 83036 HEMOGLOBIN GLYCOSYLATED A1C: CPT

## 2021-10-13 PROCEDURE — 82306 VITAMIN D 25 HYDROXY: CPT

## 2021-10-13 PROCEDURE — 84443 ASSAY THYROID STIM HORMONE: CPT

## 2021-10-13 PROCEDURE — 72070 X-RAY EXAM THORAC SPINE 2VWS: CPT

## 2021-10-15 DIAGNOSIS — Z98.1 HISTORY OF LUMBAR FUSION: ICD-10-CM

## 2021-10-15 DIAGNOSIS — M54.6 THORACOLUMBAR BACK PAIN: ICD-10-CM

## 2021-10-15 DIAGNOSIS — M47.817 LUMBOSACRAL SPONDYLOSIS WITHOUT MYELOPATHY: Primary | ICD-10-CM

## 2021-10-15 DIAGNOSIS — M54.50 THORACOLUMBAR BACK PAIN: ICD-10-CM

## 2021-12-28 ENCOUNTER — OFFICE VISIT (OUTPATIENT)
Dept: PRIMARY CARE CLINIC | Age: 46
End: 2021-12-28
Payer: MEDICAID

## 2021-12-28 VITALS
OXYGEN SATURATION: 96 % | DIASTOLIC BLOOD PRESSURE: 89 MMHG | SYSTOLIC BLOOD PRESSURE: 139 MMHG | BODY MASS INDEX: 34.67 KG/M2 | TEMPERATURE: 98.1 F | HEART RATE: 85 BPM | HEIGHT: 72 IN | WEIGHT: 256 LBS

## 2021-12-28 DIAGNOSIS — Z20.822 SUSPECTED COVID-19 VIRUS INFECTION: Primary | ICD-10-CM

## 2021-12-28 PROCEDURE — G8417 CALC BMI ABV UP PARAM F/U: HCPCS | Performed by: NURSE PRACTITIONER

## 2021-12-28 PROCEDURE — 1036F TOBACCO NON-USER: CPT | Performed by: NURSE PRACTITIONER

## 2021-12-28 PROCEDURE — G8484 FLU IMMUNIZE NO ADMIN: HCPCS | Performed by: NURSE PRACTITIONER

## 2021-12-28 PROCEDURE — 99213 OFFICE O/P EST LOW 20 MIN: CPT | Performed by: NURSE PRACTITIONER

## 2021-12-28 PROCEDURE — G8427 DOCREV CUR MEDS BY ELIG CLIN: HCPCS | Performed by: NURSE PRACTITIONER

## 2021-12-28 RX ORDER — ONDANSETRON 4 MG/1
4 TABLET, FILM COATED ORAL EVERY 8 HOURS PRN
Qty: 30 TABLET | Refills: 0 | Status: SHIPPED | OUTPATIENT
Start: 2021-12-28

## 2021-12-28 RX ORDER — PREDNISONE 20 MG/1
40 TABLET ORAL DAILY
Qty: 10 TABLET | Refills: 0 | Status: SHIPPED | OUTPATIENT
Start: 2021-12-28 | End: 2022-01-02

## 2021-12-28 RX ORDER — BENZONATATE 200 MG/1
200 CAPSULE ORAL 3 TIMES DAILY PRN
Qty: 30 CAPSULE | Refills: 0 | Status: SHIPPED | OUTPATIENT
Start: 2021-12-28 | End: 2022-01-04

## 2021-12-28 ASSESSMENT — ENCOUNTER SYMPTOMS
SINUS PAIN: 0
BACK PAIN: 0
DIARRHEA: 1
ABDOMINAL PAIN: 0
RHINORRHEA: 1
VOMITING: 0
SHORTNESS OF BREATH: 1
NAUSEA: 1
EYE PAIN: 0
COUGH: 1
SORE THROAT: 0

## 2021-12-28 NOTE — PATIENT INSTRUCTIONS
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
## 2021-12-28 NOTE — LETTER
[facility]  [address] [place] [address]  Phone: [phone]  Fax: [phone]    ESHA Greer CNP        December 28, 2021     Patient: Jaciel Fleming   YOB: 1975   Date of Visit: 12/28/2021       To Whom It May Concern: It is my medical opinion that Lan De Los Santos Is excused pending COVID results. If you have any questions or concerns, please don't hesitate to call.     Sincerely,        ESHA Greer - CNP

## 2021-12-29 ENCOUNTER — HOSPITAL ENCOUNTER (OUTPATIENT)
Age: 46
Setting detail: SPECIMEN
Discharge: HOME | End: 2021-12-29

## 2021-12-29 DIAGNOSIS — Z20.822 SUSPECTED COVID-19 VIRUS INFECTION: ICD-10-CM

## 2021-12-30 LAB
SARS-COV-2: NORMAL
SARS-COV-2: NOT DETECTED
SOURCE: NORMAL

## 2022-01-05 ENCOUNTER — TELEPHONE (OUTPATIENT)
Dept: FAMILY MEDICINE CLINIC | Age: 47
End: 2022-01-05

## 2022-01-05 DIAGNOSIS — J20.9 ACUTE BRONCHITIS, UNSPECIFIED ORGANISM: Primary | ICD-10-CM

## 2022-01-05 RX ORDER — AZITHROMYCIN 250 MG/1
250 TABLET, FILM COATED ORAL SEE ADMIN INSTRUCTIONS
Qty: 6 TABLET | Refills: 0 | Status: SHIPPED | OUTPATIENT
Start: 2022-01-05 | End: 2022-01-10

## 2022-01-05 NOTE — TELEPHONE ENCOUNTER
----- Message from Shaggy Cuadra sent at 1/4/2022  4:04 PM EST -----  Subject: Message to Provider    QUESTIONS  Information for Provider? PT WAS SEEN IN URGENT CARE AND WAS GIVEN   STEROIDS AND NEEDS SOMETHING ELSE ,NOW HAS HAD A FEVER FOR 3 DAYS AND IS   HAVING CHEST CONGESTION  ---------------------------------------------------------------------------  --------------  CALL BACK INFO  What is the best way for the office to contact you? OK to leave message on   voicemail  Preferred Call Back Phone Number? [phone]  ---------------------------------------------------------------------------  --------------  SCRIPT ANSWERS  Relationship to Patient?  Self

## 2022-01-12 ENCOUNTER — HOSPITAL ENCOUNTER (EMERGENCY)
Age: 47
Discharge: HOME | End: 2022-01-12
Attending: EMERGENCY MEDICINE
Payer: MEDICAID

## 2022-01-12 VITALS
HEIGHT: 72 IN | HEART RATE: 81 BPM | DIASTOLIC BLOOD PRESSURE: 105 MMHG | TEMPERATURE: 98.4 F | RESPIRATION RATE: 19 BRPM | BODY MASS INDEX: 35.21 KG/M2 | OXYGEN SATURATION: 95 % | WEIGHT: 260 LBS | SYSTOLIC BLOOD PRESSURE: 167 MMHG

## 2022-01-12 DIAGNOSIS — I82.432 ACUTE DEEP VEIN THROMBOSIS (DVT) OF POPLITEAL VEIN OF LEFT LOWER EXTREMITY (HCC): Primary | ICD-10-CM

## 2022-01-12 LAB
ABSOLUTE EOS #: 0.08 K/UL (ref 0–0.44)
ABSOLUTE IMMATURE GRANULOCYTE: 0.04 K/UL (ref 0–0.3)
ABSOLUTE LYMPH #: 2.46 K/UL (ref 1.1–3.7)
ABSOLUTE MONO #: 0.54 K/UL (ref 0.1–1.2)
ANION GAP SERPL CALCULATED.3IONS-SCNC: 13 MMOL/L (ref 9–17)
BASOPHILS # BLD: 0 % (ref 0–2)
BASOPHILS ABSOLUTE: 0.03 K/UL (ref 0–0.2)
BUN BLDV-MCNC: 16 MG/DL (ref 6–20)
BUN/CREAT BLD: 19 (ref 9–20)
CALCIUM SERPL-MCNC: 9.5 MG/DL (ref 8.6–10.4)
CHLORIDE BLD-SCNC: 105 MMOL/L (ref 98–107)
CO2: 24 MMOL/L (ref 20–31)
CREAT SERPL-MCNC: 0.83 MG/DL (ref 0.7–1.2)
DIFFERENTIAL TYPE: ABNORMAL
EOSINOPHILS RELATIVE PERCENT: 1 % (ref 1–4)
GFR AFRICAN AMERICAN: >60 ML/MIN
GFR NON-AFRICAN AMERICAN: >60 ML/MIN
GFR SERPL CREATININE-BSD FRML MDRD: NORMAL ML/MIN/{1.73_M2}
GFR SERPL CREATININE-BSD FRML MDRD: NORMAL ML/MIN/{1.73_M2}
GLUCOSE BLD-MCNC: 89 MG/DL (ref 70–99)
HCT VFR BLD CALC: 44.5 % (ref 40.7–50.3)
HEMOGLOBIN: 15 G/DL (ref 13–17)
IMMATURE GRANULOCYTES: 1 %
INR BLD: 1
LYMPHOCYTES # BLD: 30 % (ref 24–43)
MCH RBC QN AUTO: 28.8 PG (ref 25.2–33.5)
MCHC RBC AUTO-ENTMCNC: 33.7 G/DL (ref 28.4–34.8)
MCV RBC AUTO: 85.6 FL (ref 82.6–102.9)
MONOCYTES # BLD: 7 % (ref 3–12)
NRBC AUTOMATED: 0 PER 100 WBC
PDW BLD-RTO: 13 % (ref 11.8–14.4)
PLATELET # BLD: 295 K/UL (ref 138–453)
PLATELET ESTIMATE: ABNORMAL
PMV BLD AUTO: 9 FL (ref 8.1–13.5)
POTASSIUM SERPL-SCNC: 3.9 MMOL/L (ref 3.7–5.3)
PROTHROMBIN TIME: 13.1 SEC (ref 11.5–14.2)
RBC # BLD: 5.2 M/UL (ref 4.21–5.77)
RBC # BLD: ABNORMAL 10*6/UL
SEG NEUTROPHILS: 61 % (ref 36–65)
SEGMENTED NEUTROPHILS ABSOLUTE COUNT: 4.99 K/UL (ref 1.5–8.1)
SODIUM BLD-SCNC: 142 MMOL/L (ref 135–144)
WBC # BLD: 8.1 K/UL (ref 3.5–11.3)
WBC # BLD: ABNORMAL 10*3/UL

## 2022-01-12 PROCEDURE — 99283 EMERGENCY DEPT VISIT LOW MDM: CPT

## 2022-01-12 PROCEDURE — 80048 BASIC METABOLIC PNL TOTAL CA: CPT

## 2022-01-12 PROCEDURE — 93971 EXTREMITY STUDY: CPT

## 2022-01-12 PROCEDURE — 85025 COMPLETE CBC W/AUTO DIFF WBC: CPT

## 2022-01-12 PROCEDURE — 85610 PROTHROMBIN TIME: CPT

## 2022-01-12 PROCEDURE — 6370000000 HC RX 637 (ALT 250 FOR IP): Performed by: PHYSICIAN ASSISTANT

## 2022-01-12 RX ADMIN — APIXABAN 10 MG: 5 TABLET, FILM COATED ORAL at 20:00

## 2022-01-12 ASSESSMENT — ENCOUNTER SYMPTOMS: COLOR CHANGE: 0

## 2022-01-12 ASSESSMENT — PAIN SCALES - GENERAL: PAINLEVEL_OUTOF10: 7

## 2022-01-12 ASSESSMENT — PAIN DESCRIPTION - ORIENTATION: ORIENTATION: LEFT;LOWER

## 2022-01-12 ASSESSMENT — PAIN DESCRIPTION - PAIN TYPE: TYPE: ACUTE PAIN

## 2022-01-12 ASSESSMENT — PAIN DESCRIPTION - LOCATION: LOCATION: LEG

## 2022-01-12 NOTE — Clinical Note
Stephanie Hickman was seen and treated in our emergency department on 1/12/2022. He may return to work on 01/15/2022. If you have any questions or concerns, please don't hesitate to call.       Ela Andrade MD

## 2022-01-13 NOTE — CONSULTS
ED ordered starter doses for Outpatient Management of DVT/PE. Dispensed  Apixaban (Eliquis) 5 mg Starter Pack #1 per policy. The patient is referred to the [facility] - INPATIENT Outpatient Anticoagulation Service with the following responsible outpatient physician:Dr. Susan Chaparro    Faxed Medication Management (Anticoagulation Service) appropriate billing information.   ([facility] [phone])
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
## 2022-01-17 NOTE — ED PROVIDER NOTES
eMERGENCY dEPARTMENT eNCOUnter   Independent Attestation     Pt Name: Drew Winchester  MRN: [id number]  [place] 1975  Date of evaluation: 1/17/22     Drew Winchester is a 51 y.o. male with CC: Leg Pain (Pt reports left calf pain since yesterday. Pt reports similiar pain 2 times before when pt has been diagnosed with DVT. Pt states that he is not on blood thinners, was taken off of blood thinners a couple of years ago )        This visit was performed by both a physician and an APC. I performed all aspects of the MDM as documented. The care is provided during an unprecedented national emergency due to the novel coronavirus, COVID 19.     Sandeep Gomez MD  Attending Emergency Physician            Sandeep Gomez MD  01/17/22 8737

## 2022-01-18 ENCOUNTER — OFFICE VISIT (OUTPATIENT)
Dept: FAMILY MEDICINE CLINIC | Age: 47
End: 2022-01-18
Payer: MEDICAID

## 2022-01-18 VITALS
SYSTOLIC BLOOD PRESSURE: 132 MMHG | WEIGHT: 277.4 LBS | HEART RATE: 76 BPM | DIASTOLIC BLOOD PRESSURE: 84 MMHG | OXYGEN SATURATION: 98 % | TEMPERATURE: 97.1 F | BODY MASS INDEX: 37.57 KG/M2 | HEIGHT: 72 IN

## 2022-01-18 DIAGNOSIS — I82.412 ACUTE DEEP VEIN THROMBOSIS (DVT) OF FEMORAL VEIN OF LEFT LOWER EXTREMITY (HCC): Primary | ICD-10-CM

## 2022-01-18 DIAGNOSIS — U07.1 COVID-19: ICD-10-CM

## 2022-01-18 PROCEDURE — G8417 CALC BMI ABV UP PARAM F/U: HCPCS | Performed by: NURSE PRACTITIONER

## 2022-01-18 PROCEDURE — 99213 OFFICE O/P EST LOW 20 MIN: CPT | Performed by: NURSE PRACTITIONER

## 2022-01-18 PROCEDURE — G8484 FLU IMMUNIZE NO ADMIN: HCPCS | Performed by: NURSE PRACTITIONER

## 2022-01-18 PROCEDURE — G8427 DOCREV CUR MEDS BY ELIG CLIN: HCPCS | Performed by: NURSE PRACTITIONER

## 2022-01-18 PROCEDURE — 1036F TOBACCO NON-USER: CPT | Performed by: NURSE PRACTITIONER

## 2022-01-18 ASSESSMENT — ENCOUNTER SYMPTOMS
NAUSEA: 0
COUGH: 0
SINUS PAIN: 0
SHORTNESS OF BREATH: 0
EYE PAIN: 0
VOMITING: 0
BACK PAIN: 0
DIARRHEA: 0
ABDOMINAL PAIN: 0
SORE THROAT: 0

## 2022-01-18 ASSESSMENT — PATIENT HEALTH QUESTIONNAIRE - PHQ9
2. FEELING DOWN, DEPRESSED OR HOPELESS: 0
SUM OF ALL RESPONSES TO PHQ QUESTIONS 1-9: 0
1. LITTLE INTEREST OR PLEASURE IN DOING THINGS: 0
SUM OF ALL RESPONSES TO PHQ QUESTIONS 1-9: 0
SUM OF ALL RESPONSES TO PHQ9 QUESTIONS 1 & 2: 0

## 2022-01-18 NOTE — PROGRESS NOTES
Visit Information    Have you changed or started any medications since your last visit including any over-the-counter medicines, vitamins, or herbal medicines? no   Are you having any side effects from any of your medications? -  no  Have you stopped taking any of your medications? Is so, why? -  no    Have you seen any other physician or provider since your last visit? No  Have you had any other diagnostic tests since your last visit? Yes - Records Obtained  Have you been seen in the emergency room and/or had an admission to a hospital since we last saw you? Yes - Records Obtained  Have you had your routine dental cleaning in the past 6 months? no    Have you activated your [employer] account? If not, what are your barriers?  Yes     Patient Care Team:  ESHA Samaniego CNP as PCP - General (Certified Nurse Practitioner)  ESHA Samaniego CNP [facility] PCP [facility]    Medical History Review  Past Medical, Family, and Social History reviewed and does contribute to the patient presenting condition    Health Maintenance   Topic Date Due    Hepatitis C screen  Never done    COVID-19 Vaccine (1) Never done    Depression Monitoring  Never done    HIV screen  Never done    Flu vaccine (1) 09/01/2021    Colon cancer screen colonoscopy  08/27/2024    Lipid screen  10/13/2026    DTaP/Tdap/Td vaccine (2 - Td or Tdap) 01/21/2029    Hepatitis A vaccine  Aged Out    Hepatitis B vaccine  Aged Out    Hib vaccine  Aged Out    Meningococcal (ACWY) vaccine  Aged Out    Pneumococcal 0-64 years Vaccine  Aged Out

## 2022-01-18 NOTE — PATIENT INSTRUCTIONS
Patient Education        Deep Vein Thrombosis: Care Instructions  Overview     A deep vein thrombosis (DVT) is a blood clot in certain veins, usually in the legs, pelvis, or arms. Blood clots in these veins need to be treated because they can get bigger, break loose, and travel through the bloodstream to the lungs. A blood clot in a lung can be life-threatening. The doctor may have given you a blood thinner (anticoagulant). A blood thinner can stop the blood clot from growing larger and prevent new clots from forming. You will need to take a blood thinner for at least 3 months. The doctor has checked you carefully, but problems can develop later. If you notice any problems or new symptoms, get medical treatment right away. Follow-up care is a key part of your treatment and safety. Be sure to make and go to all appointments, and call your doctor if you are having problems. It's also a good idea to know your test results and keep a list of the medicines you take. How can you care for yourself at home? · Take your medicines exactly as prescribed. Call your doctor if you think you are having a problem with your medicine. · If you are taking a blood thinner, be sure you get instructions about how to take your medicine safely. Blood thinners can cause serious bleeding problems. · Try to walk several times a day. · Wear compression stockings if your doctor recommends them. These stockings are tighter at the feet than on the legs. They may reduce pain and swelling in your legs. But there are different types of stockings, and they need to fit right. So your doctor will recommend what you need. · When you sit, use a pillow to raise the arm or leg that has the blood clot. Try to keep it above the level of your heart. When should you call for help? Call 911 anytime you think you may need emergency care.  For example, call if:    · You passed out (lost consciousness).     · You have symptoms of a blood clot in your lung (called a pulmonary embolism). These include:  ? Sudden chest pain. ? Trouble breathing. ? Coughing up blood. Call your doctor now or seek immediate medical care if:    · You have new or worse trouble breathing.     · You are dizzy or lightheaded, or you feel like you may faint.     · You have symptoms of a blood clot in your arm or leg. These may include:  ? Pain in the arm, calf, back of the knee, thigh, or groin. ? Redness and swelling in the arm, leg, or groin. Watch closely for changes in your health, and be sure to contact your doctor if:    · You do not get better as expected. Where can you learn more? Go to [email or web address] and sign in to your [employer] account. Enter W418 in the [employer] box to learn more about \"Deep Vein Thrombosis: Care Instructions. \"     If you do not have an account, please click on the \"Sign Up Now\" link. Current as of: July 6, 2021               Content Version: 13.1  © 7561-5351 Healthwise, Incorporated. Care instructions adapted under license by [facility] ([facility]). If you have questions about a medical condition or this instruction, always ask your healthcare professional. [place] [address] any warranty or liability for your use of this information.
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
## 2022-01-21 ENCOUNTER — HOSPITAL ENCOUNTER (OUTPATIENT)
Age: 47
Discharge: HOME | End: 2022-01-21
Payer: MEDICAID

## 2022-01-21 DIAGNOSIS — I82.412 ACUTE DEEP VEIN THROMBOSIS (DVT) OF FEMORAL VEIN OF LEFT LOWER EXTREMITY (HCC): ICD-10-CM

## 2022-01-21 DIAGNOSIS — U07.1 COVID-19: ICD-10-CM

## 2022-01-21 LAB
HOMOCYSTEINE: 9.6 UMOL/L
VITAMIN D 25-HYDROXY: 18.8 NG/ML (ref 30–100)

## 2022-01-21 PROCEDURE — 85305 CLOT INHIBIT PROT S TOTAL: CPT

## 2022-01-21 PROCEDURE — 83090 ASSAY OF HOMOCYSTEINE: CPT

## 2022-01-21 PROCEDURE — 82306 VITAMIN D 25 HYDROXY: CPT

## 2022-01-21 PROCEDURE — 85302 CLOT INHIBIT PROT C ANTIGEN: CPT

## 2022-01-21 PROCEDURE — 36415 COLL VENOUS BLD VENIPUNCTURE: CPT

## 2022-01-21 PROCEDURE — 81241 F5 GENE: CPT

## 2022-01-24 LAB
PROTEIN C ANTIGEN: >95 % (ref 63–153)
PROTEIN S ANTIGEN, TOTAL: 131 % (ref 84–134)

## 2022-01-26 ENCOUNTER — TELEPHONE (OUTPATIENT)
Dept: FAMILY MEDICINE CLINIC | Age: 47
End: 2022-01-26

## 2022-01-26 DIAGNOSIS — R79.89 LOW SERUM VITAMIN D: Primary | ICD-10-CM

## 2022-01-26 DIAGNOSIS — Z76.0 MEDICATION REFILL: ICD-10-CM

## 2022-01-26 LAB
FACTOR V MUTATION: NEGATIVE
SPECIMEN: NORMAL

## 2022-01-26 RX ORDER — ERGOCALCIFEROL 1.25 MG/1
50000 CAPSULE ORAL WEEKLY
Qty: 12 CAPSULE | Refills: 0 | Status: SHIPPED | OUTPATIENT
Start: 2022-01-26 | End: 2022-07-19 | Stop reason: SDUPTHER

## 2022-01-26 NOTE — TELEPHONE ENCOUNTER
----- Message from Jimbo Nesbitt sent at 1/26/2022  1:15 PM EST -----  Subject: Results Request    QUESTIONS  Which lab or imaging result is the patient calling about? bloodwork  Which provider ordered the test? Kelly Diallo   At what location was the test performed? Date the test was performed? 2022-01-19  Additional Information for Provider? patient does not have access to   [employer] to review his results. ---------------------------------------------------------------------------  --------------  Poly CRESPO  What is the best way for the office to contact you? Do not leave any   message, patient will call back for answer  Preferred Call Back Phone Number?  [phone]

## 2022-05-11 DIAGNOSIS — R79.89 LOW SERUM VITAMIN D: ICD-10-CM

## 2022-05-11 DIAGNOSIS — Z76.0 MEDICATION REFILL: ICD-10-CM

## 2022-05-11 RX ORDER — ERGOCALCIFEROL 1.25 MG/1
CAPSULE ORAL
Qty: 12 CAPSULE | Refills: 0 | OUTPATIENT
Start: 2022-05-11

## 2022-07-12 DIAGNOSIS — R79.89 LOW SERUM VITAMIN D: ICD-10-CM

## 2022-07-12 DIAGNOSIS — F41.9 ANXIETY: ICD-10-CM

## 2022-07-12 DIAGNOSIS — Z76.0 MEDICATION REFILL: ICD-10-CM

## 2022-07-12 RX ORDER — BUPROPION HYDROCHLORIDE 150 MG/1
TABLET ORAL
Qty: 30 TABLET | Refills: 3 | Status: SHIPPED | OUTPATIENT
Start: 2022-07-12 | End: 2022-10-18

## 2022-07-12 RX ORDER — ERGOCALCIFEROL 1.25 MG/1
CAPSULE ORAL
Qty: 12 CAPSULE | Refills: 0 | OUTPATIENT
Start: 2022-07-12

## 2022-07-19 ENCOUNTER — OFFICE VISIT (OUTPATIENT)
Dept: FAMILY MEDICINE CLINIC | Age: 47
End: 2022-07-19
Payer: MEDICAID

## 2022-07-19 VITALS
SYSTOLIC BLOOD PRESSURE: 128 MMHG | HEART RATE: 88 BPM | HEIGHT: 72 IN | DIASTOLIC BLOOD PRESSURE: 84 MMHG | RESPIRATION RATE: 16 BRPM | OXYGEN SATURATION: 96 % | BODY MASS INDEX: 36.98 KG/M2 | WEIGHT: 273 LBS | TEMPERATURE: 98.1 F

## 2022-07-19 DIAGNOSIS — Z76.0 MEDICATION REFILL: ICD-10-CM

## 2022-07-19 DIAGNOSIS — Z13.220 LIPID SCREENING: ICD-10-CM

## 2022-07-19 DIAGNOSIS — Z13.1 DIABETES MELLITUS SCREENING: ICD-10-CM

## 2022-07-19 DIAGNOSIS — F43.21 GRIEF REACTION: Primary | ICD-10-CM

## 2022-07-19 DIAGNOSIS — R79.89 LOW SERUM VITAMIN D: ICD-10-CM

## 2022-07-19 DIAGNOSIS — Z13.29 THYROID DISORDER SCREENING: ICD-10-CM

## 2022-07-19 PROCEDURE — G8427 DOCREV CUR MEDS BY ELIG CLIN: HCPCS | Performed by: NURSE PRACTITIONER

## 2022-07-19 PROCEDURE — 99213 OFFICE O/P EST LOW 20 MIN: CPT | Performed by: NURSE PRACTITIONER

## 2022-07-19 PROCEDURE — G8417 CALC BMI ABV UP PARAM F/U: HCPCS | Performed by: NURSE PRACTITIONER

## 2022-07-19 PROCEDURE — 1036F TOBACCO NON-USER: CPT | Performed by: NURSE PRACTITIONER

## 2022-07-19 RX ORDER — ERGOCALCIFEROL 1.25 MG/1
50000 CAPSULE ORAL WEEKLY
Qty: 12 CAPSULE | Refills: 0 | Status: SHIPPED | OUTPATIENT
Start: 2022-07-19

## 2022-07-19 RX ORDER — DULOXETIN HYDROCHLORIDE 30 MG/1
30 CAPSULE, DELAYED RELEASE ORAL DAILY
Qty: 30 CAPSULE | Refills: 3 | Status: SHIPPED | OUTPATIENT
Start: 2022-07-19 | End: 2022-10-18

## 2022-07-19 SDOH — ECONOMIC STABILITY: FOOD INSECURITY: WITHIN THE PAST 12 MONTHS, YOU WORRIED THAT YOUR FOOD WOULD RUN OUT BEFORE YOU GOT MONEY TO BUY MORE.: NEVER TRUE

## 2022-07-19 SDOH — ECONOMIC STABILITY: FOOD INSECURITY: WITHIN THE PAST 12 MONTHS, THE FOOD YOU BOUGHT JUST DIDN'T LAST AND YOU DIDN'T HAVE MONEY TO GET MORE.: NEVER TRUE

## 2022-07-19 ASSESSMENT — ENCOUNTER SYMPTOMS
SINUS PAIN: 0
NAUSEA: 0
SORE THROAT: 0
EYE PAIN: 0
VOMITING: 0
BACK PAIN: 0
COUGH: 0
ABDOMINAL PAIN: 0
DIARRHEA: 0
SHORTNESS OF BREATH: 0

## 2022-07-19 ASSESSMENT — SOCIAL DETERMINANTS OF HEALTH (SDOH): HOW HARD IS IT FOR YOU TO PAY FOR THE VERY BASICS LIKE FOOD, HOUSING, MEDICAL CARE, AND HEATING?: NOT HARD AT ALL
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
## 2022-07-19 NOTE — PROGRESS NOTES
Visit Information    Have you changed or started any medications since your last visit including any over-the-counter medicines, vitamins, or herbal medicines? no   Are you having any side effects from any of your medications? -  no  Have you stopped taking any of your medications? Is so, why? -  no    Have you seen any other physician or provider since your last visit? No  Have you had any other diagnostic tests since your last visit? No  Have you been seen in the emergency room and/or had an admission to a hospital since we last saw you? No  Have you had your routine dental cleaning in the past 6 months? no    Have you activated your [employer] account? If not, what are your barriers?  Yes     Patient Care Team:  ESHA Rangel CNP as PCP - General (Certified Nurse Practitioner)  ESHA Rangel CNP as PCP - [facility] Provider    Medical History Review  Past Medical, Family, and Social History reviewed and does contribute to the patient presenting condition    Health Maintenance   Topic Date Due    HIV screen  Never done    Hepatitis C screen  Never done    COVID-19 Vaccine (1) 01/01/2023 (Originally 2/11/1976)    Flu vaccine (1) 09/01/2022    Depression Monitoring  01/01/2023    Colorectal Cancer Screen  08/27/2024    Lipids  10/13/2026    DTaP/Tdap/Td vaccine (2 - Td or Tdap) 01/21/2029    Hepatitis A vaccine  Aged Out    Hepatitis B vaccine  Aged Out    Hib vaccine  Aged Out    Meningococcal (ACWY) vaccine  Aged Out    Pneumococcal 0-64 years Vaccine  Aged Out

## 2022-08-22 ENCOUNTER — HOSPITAL ENCOUNTER (OUTPATIENT)
Age: 47
Discharge: HOME | End: 2022-08-22
Payer: MEDICAID

## 2022-08-22 DIAGNOSIS — Z13.29 THYROID DISORDER SCREENING: ICD-10-CM

## 2022-08-22 DIAGNOSIS — F43.21 GRIEF REACTION: ICD-10-CM

## 2022-08-22 DIAGNOSIS — Z13.1 DIABETES MELLITUS SCREENING: ICD-10-CM

## 2022-08-22 DIAGNOSIS — Z13.220 LIPID SCREENING: ICD-10-CM

## 2022-08-22 DIAGNOSIS — Z76.0 MEDICATION REFILL: ICD-10-CM

## 2022-08-22 DIAGNOSIS — R79.89 LOW SERUM VITAMIN D: ICD-10-CM

## 2022-08-22 LAB
ABSOLUTE EOS #: 0.29 K/UL (ref 0–0.44)
ABSOLUTE IMMATURE GRANULOCYTE: 0.03 K/UL (ref 0–0.3)
ABSOLUTE LYMPH #: 2.88 K/UL (ref 1.1–3.7)
ABSOLUTE MONO #: 0.57 K/UL (ref 0.1–1.2)
ALBUMIN SERPL-MCNC: 4.5 G/DL (ref 3.5–5.2)
ALBUMIN/GLOBULIN RATIO: 1.7 (ref 1–2.5)
ALP BLD-CCNC: 37 U/L (ref 40–129)
ALT SERPL-CCNC: 23 U/L (ref 5–41)
ANION GAP SERPL CALCULATED.3IONS-SCNC: 13 MMOL/L (ref 9–17)
AST SERPL-CCNC: 20 U/L
BASOPHILS # BLD: 1 % (ref 0–2)
BASOPHILS ABSOLUTE: 0.06 K/UL (ref 0–0.2)
BILIRUB SERPL-MCNC: 0.36 MG/DL (ref 0.3–1.2)
BUN BLDV-MCNC: 13 MG/DL (ref 6–20)
CALCIUM SERPL-MCNC: 9.3 MG/DL (ref 8.6–10.4)
CHLORIDE BLD-SCNC: 106 MMOL/L (ref 98–107)
CHOLESTEROL/HDL RATIO: 5.7
CHOLESTEROL: 218 MG/DL
CO2: 25 MMOL/L (ref 20–31)
CREAT SERPL-MCNC: 1.05 MG/DL (ref 0.7–1.2)
EOSINOPHILS RELATIVE PERCENT: 3 % (ref 1–4)
GFR AFRICAN AMERICAN: >60 ML/MIN
GFR NON-AFRICAN AMERICAN: >60 ML/MIN
GFR SERPL CREATININE-BSD FRML MDRD: ABNORMAL ML/MIN/{1.73_M2}
GLUCOSE BLD-MCNC: 105 MG/DL (ref 70–99)
HCT VFR BLD CALC: 47.4 % (ref 40.7–50.3)
HDLC SERPL-MCNC: 38 MG/DL
HEMOGLOBIN: 16 G/DL (ref 13–17)
IMMATURE GRANULOCYTES: 0 %
LDL CHOLESTEROL: 156 MG/DL (ref 0–130)
LYMPHOCYTES # BLD: 34 % (ref 24–43)
MCH RBC QN AUTO: 29.6 PG (ref 25.2–33.5)
MCHC RBC AUTO-ENTMCNC: 33.8 G/DL (ref 28.4–34.8)
MCV RBC AUTO: 87.6 FL (ref 82.6–102.9)
MONOCYTES # BLD: 7 % (ref 3–12)
NRBC AUTOMATED: 0 PER 100 WBC
PDW BLD-RTO: 13.7 % (ref 11.8–14.4)
PLATELET # BLD: 357 K/UL (ref 138–453)
PMV BLD AUTO: 9.5 FL (ref 8.1–13.5)
POTASSIUM SERPL-SCNC: 4.9 MMOL/L (ref 3.7–5.3)
RBC # BLD: 5.41 M/UL (ref 4.21–5.77)
SEG NEUTROPHILS: 55 % (ref 36–65)
SEGMENTED NEUTROPHILS ABSOLUTE COUNT: 4.7 K/UL (ref 1.5–8.1)
SODIUM BLD-SCNC: 144 MMOL/L (ref 135–144)
TOTAL PROTEIN: 7.1 G/DL (ref 6.4–8.3)
TRIGL SERPL-MCNC: 119 MG/DL
TSH SERPL DL<=0.05 MIU/L-ACNC: 1.18 UIU/ML (ref 0.3–5)
VITAMIN D 25-HYDROXY: 36.5 NG/ML
WBC # BLD: 8.5 K/UL (ref 3.5–11.3)

## 2022-08-22 PROCEDURE — 85025 COMPLETE CBC W/AUTO DIFF WBC: CPT

## 2022-08-22 PROCEDURE — 80053 COMPREHEN METABOLIC PANEL: CPT

## 2022-08-22 PROCEDURE — 82306 VITAMIN D 25 HYDROXY: CPT

## 2022-08-22 PROCEDURE — 80061 LIPID PANEL: CPT

## 2022-08-22 PROCEDURE — 36415 COLL VENOUS BLD VENIPUNCTURE: CPT

## 2022-08-22 PROCEDURE — 84443 ASSAY THYROID STIM HORMONE: CPT

## 2022-09-07 ENCOUNTER — HOSPITAL ENCOUNTER (OUTPATIENT)
Dept: VASCULAR LAB | Age: 47
Discharge: HOME | End: 2022-09-07
Payer: MEDICAID

## 2022-09-07 DIAGNOSIS — I82.402 ACUTE DEEP VEIN THROMBOSIS (DVT) OF LEFT LOWER EXTREMITY, UNSPECIFIED VEIN (HCC): ICD-10-CM

## 2022-09-07 PROCEDURE — 93971 EXTREMITY STUDY: CPT

## 2022-09-26 DIAGNOSIS — I82.412 ACUTE DEEP VEIN THROMBOSIS (DVT) OF FEMORAL VEIN OF LEFT LOWER EXTREMITY (HCC): Primary | ICD-10-CM

## 2022-10-18 ENCOUNTER — OFFICE VISIT (OUTPATIENT)
Dept: FAMILY MEDICINE CLINIC | Age: 47
End: 2022-10-18
Payer: MEDICAID

## 2022-10-18 VITALS
BODY MASS INDEX: 37.11 KG/M2 | SYSTOLIC BLOOD PRESSURE: 138 MMHG | HEIGHT: 72 IN | OXYGEN SATURATION: 97 % | TEMPERATURE: 97.7 F | HEART RATE: 90 BPM | RESPIRATION RATE: 18 BRPM | WEIGHT: 274 LBS | DIASTOLIC BLOOD PRESSURE: 96 MMHG

## 2022-10-18 DIAGNOSIS — E78.5 HYPERLIPIDEMIA, UNSPECIFIED HYPERLIPIDEMIA TYPE: ICD-10-CM

## 2022-10-18 DIAGNOSIS — R73.01 IMPAIRED FASTING GLUCOSE: ICD-10-CM

## 2022-10-18 DIAGNOSIS — R03.0 BORDERLINE HYPERTENSION: Primary | ICD-10-CM

## 2022-10-18 PROCEDURE — G8417 CALC BMI ABV UP PARAM F/U: HCPCS | Performed by: NURSE PRACTITIONER

## 2022-10-18 PROCEDURE — 1036F TOBACCO NON-USER: CPT | Performed by: NURSE PRACTITIONER

## 2022-10-18 PROCEDURE — G8427 DOCREV CUR MEDS BY ELIG CLIN: HCPCS | Performed by: NURSE PRACTITIONER

## 2022-10-18 PROCEDURE — 99213 OFFICE O/P EST LOW 20 MIN: CPT | Performed by: NURSE PRACTITIONER

## 2022-10-18 PROCEDURE — G8484 FLU IMMUNIZE NO ADMIN: HCPCS | Performed by: NURSE PRACTITIONER

## 2022-10-18 ASSESSMENT — ENCOUNTER SYMPTOMS
SORE THROAT: 0
EYE PAIN: 0
COUGH: 0
VOMITING: 0
ABDOMINAL PAIN: 0
SHORTNESS OF BREATH: 0
SINUS PAIN: 0
NAUSEA: 0
BACK PAIN: 0
DIARRHEA: 0

## 2022-10-18 NOTE — PATIENT INSTRUCTIONS
Patient Education        High Blood Pressure: Care Instructions  Overview     It's normal for blood pressure to go up and down throughout the day. But if it stays up, you have high blood pressure. Another name for high blood pressure is hypertension. Despite what a lot of people think, high blood pressure usually doesn't cause headaches or make you feel dizzy or lightheaded. It usually has no symptoms. But it does increase your risk of stroke, heart attack, and other problems. You and your doctor will talk about your risks of these problems based on your blood pressure. Your doctor will give you a goal for your blood pressure. Your goal will be based on your health and your age. Lifestyle changes, such as eating healthy and being active, are always important to help lower blood pressure. You might also take medicine to reach your blood pressure goal.  Follow-up care is a key part of your treatment and safety. Be sure to make and go to all appointments, and call your doctor if you are having problems. It's also a good idea to know your test results and keep a list of the medicines you take. How can you care for yourself at home? Medical treatment  If you stop taking your medicine, your blood pressure will go back up. You may take one or more types of medicine to lower your blood pressure. Be safe with medicines. Take your medicine exactly as prescribed. Call your doctor if you think you are having a problem with your medicine. Talk to your doctor before you start taking aspirin every day. Aspirin can help certain people lower their risk of a heart attack or stroke. But taking aspirin isn't right for everyone, because it can cause serious bleeding. See your doctor regularly. You may need to see the doctor more often at first or until your blood pressure comes down. If you are taking blood pressure medicine, talk to your doctor before you take decongestants or anti-inflammatory medicine, such as ibuprofen.  Some of these medicines can raise blood pressure. Learn how to check your blood pressure at home. Lifestyle changes  Stay at a healthy weight. This is especially important if you put on weight around the waist. Losing even 10 pounds can help you lower your blood pressure. If your doctor recommends it, get more exercise. Walking is a good choice. Bit by bit, increase the amount you walk every day. Try for at least 30 minutes on most days of the week. You also may want to swim, bike, or do other activities. Avoid or limit alcohol. Talk to your doctor about whether you can drink any alcohol. Try to limit how much sodium you eat to less than 2,300 milligrams (mg) a day. Your doctor may ask you to try to eat less than 1,500 mg a day. Eat plenty of fruits (such as bananas and oranges), vegetables, legumes, whole grains, and low-fat dairy products. Lower the amount of saturated fat in your diet. Saturated fat is found in animal products such as milk, cheese, and meat. Limiting these foods may help you lose weight and also lower your risk for heart disease. Do not smoke. Smoking increases your risk for heart attack and stroke. If you need help quitting, talk to your doctor about stop-smoking programs and medicines. These can increase your chances of quitting for good. When should you call for help? Call 911  anytime you think you may need emergency care. This may mean having symptoms that suggest that your blood pressure is causing a serious heart or blood vessel problem. Your blood pressure may be over 180/120. For example, call 911 if:    You have symptoms of a heart attack. These may include:  Chest pain or pressure, or a strange feeling in the chest.  Sweating. Shortness of breath. Nausea or vomiting. Pain, pressure, or a strange feeling in the back, neck, jaw, or upper belly or in one or both shoulders or arms. Lightheadedness or sudden weakness. A fast or irregular heartbeat.      You have symptoms of a stroke. These may include:  Sudden numbness, tingling, weakness, or loss of movement in your face, arm, or leg, especially on only one side of your body. Sudden vision changes. Sudden trouble speaking. Sudden confusion or trouble understanding simple statements. Sudden problems with walking or balance. A sudden, severe headache that is different from past headaches. You have severe back or belly pain. Do not wait until your blood pressure comes down on its own. Get help right away. Call your doctor now or seek immediate care if:    Your blood pressure is much higher than normal (such as 180/120 or higher), but you don't have symptoms. You think high blood pressure is causing symptoms, such as:  Severe headache. Blurry vision. Watch closely for changes in your health, and be sure to contact your doctor if:    Your blood pressure measures higher than your doctor recommends at least 2 times. That means the top number is higher or the bottom number is higher, or both. You think you may be having side effects from your blood pressure medicine. Where can you learn more? Go to https://[employer]. org and sign in to your [employer] account. Enter F493 in the [employer] box to learn more about \"High Blood Pressure: Care Instructions. \"     If you do not have an account, please click on the \"Sign Up Now\" link. Current as of: October 6, 2021               Content Version: 13.4  © 8635-4410 Healthwise, Incorporated. Care instructions adapted under license by [facility] ([facility]). If you have questions about a medical condition or this instruction, always ask your healthcare professional. [place] [address] any warranty or liability for your use of this information.
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
## 2023-01-03 ENCOUNTER — APPOINTMENT (OUTPATIENT)
Dept: CT IMAGING | Age: 48
End: 2023-01-03
Payer: MEDICAID

## 2023-01-03 ENCOUNTER — HOSPITAL ENCOUNTER (EMERGENCY)
Age: 48
Discharge: HOME | End: 2023-01-03
Attending: EMERGENCY MEDICINE
Payer: MEDICAID

## 2023-01-03 VITALS
TEMPERATURE: 98.6 F | HEART RATE: 89 BPM | BODY MASS INDEX: 35.21 KG/M2 | OXYGEN SATURATION: 97 % | WEIGHT: 260 LBS | RESPIRATION RATE: 16 BRPM | DIASTOLIC BLOOD PRESSURE: 103 MMHG | SYSTOLIC BLOOD PRESSURE: 158 MMHG | HEIGHT: 72 IN

## 2023-01-03 DIAGNOSIS — R10.84 GENERALIZED ABDOMINAL PAIN: Primary | ICD-10-CM

## 2023-01-03 DIAGNOSIS — K42.9 PERIUMBILICAL HERNIA: ICD-10-CM

## 2023-01-03 LAB
ABSOLUTE EOS #: 0.19 K/UL (ref 0–0.44)
ABSOLUTE IMMATURE GRANULOCYTE: 0.03 K/UL (ref 0–0.3)
ABSOLUTE LYMPH #: 2.37 K/UL (ref 1.1–3.7)
ABSOLUTE MONO #: 0.59 K/UL (ref 0.1–1.2)
ALBUMIN SERPL-MCNC: 4.2 G/DL (ref 3.5–5.2)
ALP BLD-CCNC: 50 U/L (ref 40–129)
ALT SERPL-CCNC: 21 U/L (ref 5–41)
ANION GAP SERPL CALCULATED.3IONS-SCNC: 13 MMOL/L (ref 9–17)
AST SERPL-CCNC: 14 U/L
BASOPHILS # BLD: 1 % (ref 0–2)
BASOPHILS ABSOLUTE: 0.07 K/UL (ref 0–0.2)
BILIRUB SERPL-MCNC: 0.2 MG/DL (ref 0.3–1.2)
BILIRUBIN DIRECT: <0.1 MG/DL
BILIRUBIN URINE: NEGATIVE
BILIRUBIN, INDIRECT: ABNORMAL MG/DL (ref 0–1)
BUN BLDV-MCNC: 16 MG/DL (ref 6–20)
BUN/CREAT BLD: 16 (ref 9–20)
CALCIUM SERPL-MCNC: 9.4 MG/DL (ref 8.6–10.4)
CHLORIDE BLD-SCNC: 101 MMOL/L (ref 98–107)
CO2: 23 MMOL/L (ref 20–31)
COLOR: YELLOW
CREAT SERPL-MCNC: 1.03 MG/DL (ref 0.7–1.2)
EOSINOPHILS RELATIVE PERCENT: 2 % (ref 1–4)
GFR SERPL CREATININE-BSD FRML MDRD: >60 ML/MIN/1.73M2
GLUCOSE BLD-MCNC: 160 MG/DL (ref 70–99)
GLUCOSE URINE: NEGATIVE
HCT VFR BLD CALC: 48 % (ref 40.7–50.3)
HEMOGLOBIN: 15.5 G/DL (ref 13–17)
IMMATURE GRANULOCYTES: 0 %
INR BLD: 1
KETONES, URINE: NEGATIVE
LEUKOCYTE ESTERASE, URINE: NEGATIVE
LIPASE: 50 U/L (ref 13–60)
LYMPHOCYTES # BLD: 29 % (ref 24–43)
MCH RBC QN AUTO: 27.7 PG (ref 25.2–33.5)
MCHC RBC AUTO-ENTMCNC: 32.3 G/DL (ref 28.4–34.8)
MCV RBC AUTO: 85.7 FL (ref 82.6–102.9)
MONOCYTES # BLD: 7 % (ref 3–12)
NITRITE, URINE: NEGATIVE
NRBC AUTOMATED: 0 PER 100 WBC
PDW BLD-RTO: 13.2 % (ref 11.8–14.4)
PH UA: 6 (ref 5–8)
PLATELET # BLD: 333 K/UL (ref 138–453)
PMV BLD AUTO: 8.9 FL (ref 8.1–13.5)
POTASSIUM SERPL-SCNC: 4.5 MMOL/L (ref 3.7–5.3)
PROTEIN UA: NEGATIVE
PROTHROMBIN TIME: 12.9 SEC (ref 11.5–14.2)
RBC # BLD: 5.6 M/UL (ref 4.21–5.77)
SEG NEUTROPHILS: 61 % (ref 36–65)
SEGMENTED NEUTROPHILS ABSOLUTE COUNT: 5.04 K/UL (ref 1.5–8.1)
SODIUM BLD-SCNC: 137 MMOL/L (ref 135–144)
SPECIFIC GRAVITY UA: 1.01 (ref 1–1.03)
TOTAL PROTEIN: 7.1 G/DL (ref 6.4–8.3)
TURBIDITY: CLEAR
URINE HGB: NEGATIVE
UROBILINOGEN, URINE: NORMAL
WBC # BLD: 8.3 K/UL (ref 3.5–11.3)

## 2023-01-03 PROCEDURE — 80076 HEPATIC FUNCTION PANEL: CPT

## 2023-01-03 PROCEDURE — 85610 PROTHROMBIN TIME: CPT

## 2023-01-03 PROCEDURE — 80048 BASIC METABOLIC PNL TOTAL CA: CPT

## 2023-01-03 PROCEDURE — 6360000002 HC RX W HCPCS: Performed by: EMERGENCY MEDICINE

## 2023-01-03 PROCEDURE — 99285 EMERGENCY DEPT VISIT HI MDM: CPT

## 2023-01-03 PROCEDURE — 96375 TX/PRO/DX INJ NEW DRUG ADDON: CPT

## 2023-01-03 PROCEDURE — 74177 CT ABD & PELVIS W/CONTRAST: CPT

## 2023-01-03 PROCEDURE — 6370000000 HC RX 637 (ALT 250 FOR IP): Performed by: EMERGENCY MEDICINE

## 2023-01-03 PROCEDURE — 85025 COMPLETE CBC W/AUTO DIFF WBC: CPT

## 2023-01-03 PROCEDURE — 96374 THER/PROPH/DIAG INJ IV PUSH: CPT

## 2023-01-03 PROCEDURE — 2580000003 HC RX 258: Performed by: EMERGENCY MEDICINE

## 2023-01-03 PROCEDURE — 81003 URINALYSIS AUTO W/O SCOPE: CPT

## 2023-01-03 PROCEDURE — 83690 ASSAY OF LIPASE: CPT

## 2023-01-03 PROCEDURE — 6360000004 HC RX CONTRAST MEDICATION: Performed by: EMERGENCY MEDICINE

## 2023-01-03 RX ORDER — ONDANSETRON 2 MG/ML
4 INJECTION INTRAMUSCULAR; INTRAVENOUS ONCE
Status: COMPLETED | OUTPATIENT
Start: 2023-01-03 | End: 2023-01-03

## 2023-01-03 RX ORDER — 0.9 % SODIUM CHLORIDE 0.9 %
1000 INTRAVENOUS SOLUTION INTRAVENOUS ONCE
Status: COMPLETED | OUTPATIENT
Start: 2023-01-03 | End: 2023-01-03

## 2023-01-03 RX ORDER — DOCUSATE SODIUM 100 MG/1
100 CAPSULE, LIQUID FILLED ORAL 2 TIMES DAILY
Qty: 30 CAPSULE | Refills: 0 | Status: SHIPPED | OUTPATIENT
Start: 2023-01-03 | End: 2023-01-18

## 2023-01-03 RX ORDER — ACETAMINOPHEN 325 MG/1
650 TABLET ORAL ONCE
Status: COMPLETED | OUTPATIENT
Start: 2023-01-03 | End: 2023-01-03

## 2023-01-03 RX ORDER — MORPHINE SULFATE 2 MG/ML
2 INJECTION, SOLUTION INTRAMUSCULAR; INTRAVENOUS ONCE
Status: COMPLETED | OUTPATIENT
Start: 2023-01-03 | End: 2023-01-03

## 2023-01-03 RX ORDER — SODIUM CHLORIDE 0.9 % (FLUSH) 0.9 %
10 SYRINGE (ML) INJECTION ONCE
Status: COMPLETED | OUTPATIENT
Start: 2023-01-03 | End: 2023-01-03

## 2023-01-03 RX ORDER — 0.9 % SODIUM CHLORIDE 0.9 %
80 INTRAVENOUS SOLUTION INTRAVENOUS ONCE
Status: COMPLETED | OUTPATIENT
Start: 2023-01-03 | End: 2023-01-03

## 2023-01-03 RX ADMIN — ACETAMINOPHEN 650 MG: 325 TABLET ORAL at 05:21

## 2023-01-03 RX ADMIN — IOPAMIDOL 75 ML: 755 INJECTION, SOLUTION INTRAVENOUS at 04:22

## 2023-01-03 RX ADMIN — SODIUM CHLORIDE, PRESERVATIVE FREE 10 ML: 5 INJECTION INTRAVENOUS at 04:23

## 2023-01-03 RX ADMIN — MORPHINE SULFATE 2 MG: 2 INJECTION, SOLUTION INTRAMUSCULAR; INTRAVENOUS at 03:44

## 2023-01-03 RX ADMIN — SODIUM CHLORIDE 80 ML: 9 INJECTION, SOLUTION INTRAVENOUS at 04:24

## 2023-01-03 RX ADMIN — SODIUM CHLORIDE 1000 ML: 9 INJECTION, SOLUTION INTRAVENOUS at 03:44

## 2023-01-03 RX ADMIN — ONDANSETRON 4 MG: 2 INJECTION INTRAMUSCULAR; INTRAVENOUS at 03:44

## 2023-01-03 ASSESSMENT — ENCOUNTER SYMPTOMS
TROUBLE SWALLOWING: 0
SHORTNESS OF BREATH: 0
COUGH: 0
PHOTOPHOBIA: 0
COLOR CHANGE: 0
DIARRHEA: 0
VOMITING: 0
NAUSEA: 1
ABDOMINAL PAIN: 1

## 2023-01-03 ASSESSMENT — PAIN SCALES - GENERAL: PAINLEVEL_OUTOF10: 8

## 2023-01-03 ASSESSMENT — PAIN DESCRIPTION - LOCATION: LOCATION: ABDOMEN

## 2023-01-03 ASSESSMENT — PAIN DESCRIPTION - ORIENTATION: ORIENTATION: LEFT;LOWER;MID

## 2023-01-03 ASSESSMENT — PAIN DESCRIPTION - DESCRIPTORS: DESCRIPTORS: DISCOMFORT;CRAMPING
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
## 2023-01-03 NOTE — ED NOTES
Pt presents to ED via private auto with c/o abdominal pain, onset last night. Pt states hx of diverticulitis. Pt states he is out of the medication he usually takes at home. Pt afebrile, vitals stable.  Pt able to ambulate without assist.      Thi Ortiz, RN  01/03/23 0750

## 2023-01-03 NOTE — LETTER
[facility] ED  [address] [id number]  Phone: [phone]               January 3, 2023    Patient: Zuhair Box   YOB: 1975   Date of Visit: 1/3/2023       To Whom It May Concern:    Chelo Adams was seen and treated in our emergency department on 1/3/2023. He may return to work on 1/4/2023.       Sincerely,                Signature:__________________________________

## 2023-01-23 ENCOUNTER — OFFICE VISIT (OUTPATIENT)
Dept: FAMILY MEDICINE CLINIC | Age: 48
End: 2023-01-23
Payer: MEDICAID

## 2023-01-23 VITALS
RESPIRATION RATE: 18 BRPM | TEMPERATURE: 97.3 F | DIASTOLIC BLOOD PRESSURE: 84 MMHG | OXYGEN SATURATION: 98 % | BODY MASS INDEX: 36.57 KG/M2 | HEIGHT: 72 IN | WEIGHT: 270 LBS | SYSTOLIC BLOOD PRESSURE: 136 MMHG | HEART RATE: 84 BPM

## 2023-01-23 DIAGNOSIS — Z11.4 ENCOUNTER FOR SCREENING FOR HIV: ICD-10-CM

## 2023-01-23 DIAGNOSIS — K42.9 PERIUMBILICAL HERNIA: Primary | ICD-10-CM

## 2023-01-23 DIAGNOSIS — R03.0 BORDERLINE HYPERTENSION: ICD-10-CM

## 2023-01-23 DIAGNOSIS — R73.01 IMPAIRED FASTING GLUCOSE: ICD-10-CM

## 2023-01-23 DIAGNOSIS — Z11.59 NEED FOR HEPATITIS C SCREENING TEST: ICD-10-CM

## 2023-01-23 DIAGNOSIS — E78.5 HYPERLIPIDEMIA, UNSPECIFIED HYPERLIPIDEMIA TYPE: ICD-10-CM

## 2023-01-23 PROCEDURE — 1036F TOBACCO NON-USER: CPT | Performed by: NURSE PRACTITIONER

## 2023-01-23 PROCEDURE — G8417 CALC BMI ABV UP PARAM F/U: HCPCS | Performed by: NURSE PRACTITIONER

## 2023-01-23 PROCEDURE — G8484 FLU IMMUNIZE NO ADMIN: HCPCS | Performed by: NURSE PRACTITIONER

## 2023-01-23 PROCEDURE — 99213 OFFICE O/P EST LOW 20 MIN: CPT | Performed by: NURSE PRACTITIONER

## 2023-01-23 PROCEDURE — G8427 DOCREV CUR MEDS BY ELIG CLIN: HCPCS | Performed by: NURSE PRACTITIONER

## 2023-01-23 ASSESSMENT — PATIENT HEALTH QUESTIONNAIRE - PHQ9
8. MOVING OR SPEAKING SO SLOWLY THAT OTHER PEOPLE COULD HAVE NOTICED. OR THE OPPOSITE, BEING SO FIGETY OR RESTLESS THAT YOU HAVE BEEN MOVING AROUND A LOT MORE THAN USUAL: 0
9. THOUGHTS THAT YOU WOULD BE BETTER OFF DEAD, OR OF HURTING YOURSELF: 0
7. TROUBLE CONCENTRATING ON THINGS, SUCH AS READING THE NEWSPAPER OR WATCHING TELEVISION: 1
3. TROUBLE FALLING OR STAYING ASLEEP: 1
2. FEELING DOWN, DEPRESSED OR HOPELESS: 1
10. IF YOU CHECKED OFF ANY PROBLEMS, HOW DIFFICULT HAVE THESE PROBLEMS MADE IT FOR YOU TO DO YOUR WORK, TAKE CARE OF THINGS AT HOME, OR GET ALONG WITH OTHER PEOPLE: 1
SUM OF ALL RESPONSES TO PHQ9 QUESTIONS 1 & 2: 2
5. POOR APPETITE OR OVEREATING: 1
SUM OF ALL RESPONSES TO PHQ QUESTIONS 1-9: 7
SUM OF ALL RESPONSES TO PHQ QUESTIONS 1-9: 7
4. FEELING TIRED OR HAVING LITTLE ENERGY: 1
6. FEELING BAD ABOUT YOURSELF - OR THAT YOU ARE A FAILURE OR HAVE LET YOURSELF OR YOUR FAMILY DOWN: 1
1. LITTLE INTEREST OR PLEASURE IN DOING THINGS: 1
SUM OF ALL RESPONSES TO PHQ QUESTIONS 1-9: 7
SUM OF ALL RESPONSES TO PHQ QUESTIONS 1-9: 7

## 2023-01-23 ASSESSMENT — ENCOUNTER SYMPTOMS
ABDOMINAL PAIN: 1
BACK PAIN: 0
NAUSEA: 0
COUGH: 0
ABDOMINAL DISTENTION: 1
EYE PAIN: 0
DIARRHEA: 0
SHORTNESS OF BREATH: 0
VOMITING: 0
SORE THROAT: 0
SINUS PAIN: 0

## 2023-01-23 NOTE — PROGRESS NOTES
Visit Information    Have you changed or started any medications since your last visit including any over-the-counter medicines, vitamins, or herbal medicines? no   Are you having any side effects from any of your medications? -  no  Have you stopped taking any of your medications? Is so, why? -  no    Have you seen any other physician or provider since your last visit? No  Have you had any other diagnostic tests since your last visit? No  Have you been seen in the emergency room and/or had an admission to a hospital since we last saw you? Yes - Records Obtained  Have you had your routine dental cleaning in the past 6 months? yes -     Have you activated your [employer] account? If not, what are your barriers?  Yes     Patient Care Team:  ESHA Hamlin CNP as PCP - General (Certified Nurse Practitioner)  ESHA Hamlin CNP as PCP - [facility] Provider    Medical History Review  Past Medical, Family, and Social History reviewed and does contribute to the patient presenting condition    Health Maintenance   Topic Date Due    COVID-19 Vaccine (1) Never done    HIV screen  Never done    Hepatitis C screen  Never done    Depression Monitoring  01/01/2023    Flu vaccine (1) 10/18/2023 (Originally 8/1/2022)    Colorectal Cancer Screen  08/27/2024    Diabetes screen  10/13/2024    Lipids  08/22/2027    DTaP/Tdap/Td vaccine (2 - Td or Tdap) 01/21/2029    Hepatitis A vaccine  Aged Out    Hib vaccine  Aged Out    Meningococcal (ACWY) vaccine  Aged Out    Pneumococcal 0-64 years Vaccine  Aged Out
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
## 2023-03-01 ENCOUNTER — OFFICE VISIT (OUTPATIENT)
Dept: PRIMARY CARE CLINIC | Age: 48
End: 2023-03-01
Payer: MEDICAID

## 2023-03-01 VITALS
OXYGEN SATURATION: 99 % | HEART RATE: 78 BPM | TEMPERATURE: 97.8 F | DIASTOLIC BLOOD PRESSURE: 103 MMHG | SYSTOLIC BLOOD PRESSURE: 157 MMHG

## 2023-03-01 DIAGNOSIS — K42.9 PERIUMBILICAL HERNIA: Primary | ICD-10-CM

## 2023-03-01 PROCEDURE — 99213 OFFICE O/P EST LOW 20 MIN: CPT

## 2023-03-01 PROCEDURE — G8484 FLU IMMUNIZE NO ADMIN: HCPCS

## 2023-03-01 PROCEDURE — G8417 CALC BMI ABV UP PARAM F/U: HCPCS

## 2023-03-01 PROCEDURE — G8427 DOCREV CUR MEDS BY ELIG CLIN: HCPCS

## 2023-03-01 PROCEDURE — 1036F TOBACCO NON-USER: CPT

## 2023-03-01 ASSESSMENT — ENCOUNTER SYMPTOMS
BELCHING: 0
CHEST TIGHTNESS: 0
NAUSEA: 0
BLOOD IN STOOL: 0
SHORTNESS OF BREATH: 0
DIARRHEA: 1
COUGH: 0
COLOR CHANGE: 0
EYE ITCHING: 0
VOMITING: 0
CONSTIPATION: 0
HEMATOCHEZIA: 0
ABDOMINAL PAIN: 1
ABDOMINAL DISTENTION: 0
FLATUS: 0
RECTAL PAIN: 0
EYE PAIN: 0

## 2023-03-01 ASSESSMENT — CROHNS DISEASE ACTIVITY INDEX (CDAI): CDAI SCORE: 0

## 2023-03-01 NOTE — LETTER
March 1, 2023       Royer Perez YOB: 1975   [facility] [place] [address] Date of Visit:  3/1/2023       To Whom It May Concern: It is my medical opinion that Ebonie Leyva may return to work on 3/4/2023. Please excuse patient from work on 3/1 - 3/3. If you have any questions or concerns, please don't hesitate to call.     Sincerely,        Terell May, ESHA - NP
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
## 2023-04-18 ENCOUNTER — OFFICE VISIT (OUTPATIENT)
Dept: FAMILY MEDICINE CLINIC | Age: 48
End: 2023-04-18
Payer: MEDICAID

## 2023-04-18 VITALS
OXYGEN SATURATION: 98 % | SYSTOLIC BLOOD PRESSURE: 138 MMHG | HEART RATE: 115 BPM | RESPIRATION RATE: 18 BRPM | HEIGHT: 72 IN | WEIGHT: 265 LBS | TEMPERATURE: 98.2 F | BODY MASS INDEX: 35.89 KG/M2 | DIASTOLIC BLOOD PRESSURE: 84 MMHG

## 2023-04-18 DIAGNOSIS — R15.9 INCONTINENCE OF FECES, UNSPECIFIED FECAL INCONTINENCE TYPE: ICD-10-CM

## 2023-04-18 DIAGNOSIS — T15.90XS FOREIGN BODY IN EYE, UNSPECIFIED LATERALITY, SEQUELA: ICD-10-CM

## 2023-04-18 DIAGNOSIS — M54.50 ACUTE EXACERBATION OF CHRONIC LOW BACK PAIN: ICD-10-CM

## 2023-04-18 DIAGNOSIS — G89.29 ACUTE EXACERBATION OF CHRONIC LOW BACK PAIN: ICD-10-CM

## 2023-04-18 DIAGNOSIS — Z98.1 S/P LUMBAR FUSION: Primary | ICD-10-CM

## 2023-04-18 PROCEDURE — 1036F TOBACCO NON-USER: CPT | Performed by: NURSE PRACTITIONER

## 2023-04-18 PROCEDURE — G8417 CALC BMI ABV UP PARAM F/U: HCPCS | Performed by: NURSE PRACTITIONER

## 2023-04-18 PROCEDURE — 99213 OFFICE O/P EST LOW 20 MIN: CPT | Performed by: NURSE PRACTITIONER

## 2023-04-18 PROCEDURE — G8427 DOCREV CUR MEDS BY ELIG CLIN: HCPCS | Performed by: NURSE PRACTITIONER

## 2023-04-18 RX ORDER — HYDROCODONE BITARTRATE AND ACETAMINOPHEN 5; 325 MG/1; MG/1
1 TABLET ORAL EVERY 6 HOURS PRN
Qty: 12 TABLET | Refills: 0 | Status: SHIPPED | OUTPATIENT
Start: 2023-04-18 | End: 2023-04-21

## 2023-04-18 RX ORDER — CYCLOBENZAPRINE HCL 5 MG
5 TABLET ORAL 3 TIMES DAILY PRN
Qty: 20 TABLET | Refills: 0 | Status: SHIPPED | OUTPATIENT
Start: 2023-04-18 | End: 2023-04-28

## 2023-04-18 SDOH — ECONOMIC STABILITY: FOOD INSECURITY: WITHIN THE PAST 12 MONTHS, YOU WORRIED THAT YOUR FOOD WOULD RUN OUT BEFORE YOU GOT MONEY TO BUY MORE.: NEVER TRUE

## 2023-04-18 SDOH — ECONOMIC STABILITY: FOOD INSECURITY: WITHIN THE PAST 12 MONTHS, THE FOOD YOU BOUGHT JUST DIDN'T LAST AND YOU DIDN'T HAVE MONEY TO GET MORE.: NEVER TRUE

## 2023-04-18 SDOH — ECONOMIC STABILITY: HOUSING INSECURITY
IN THE LAST 12 MONTHS, WAS THERE A TIME WHEN YOU DID NOT HAVE A STEADY PLACE TO SLEEP OR SLEPT IN A SHELTER (INCLUDING NOW)?: NO

## 2023-04-18 SDOH — ECONOMIC STABILITY: INCOME INSECURITY: HOW HARD IS IT FOR YOU TO PAY FOR THE VERY BASICS LIKE FOOD, HOUSING, MEDICAL CARE, AND HEATING?: NOT HARD AT ALL

## 2023-04-18 ASSESSMENT — ENCOUNTER SYMPTOMS
NAUSEA: 0
EYE PAIN: 0
BACK PAIN: 1
COUGH: 0
SHORTNESS OF BREATH: 0
SORE THROAT: 0
DIARRHEA: 0
VOMITING: 0
SINUS PAIN: 0
ABDOMINAL PAIN: 0

## 2023-04-18 NOTE — PROGRESS NOTES
Visit Information    Have you changed or started any medications since your last visit including any over-the-counter medicines, vitamins, or herbal medicines? no   Are you having any side effects from any of your medications? -  no  Have you stopped taking any of your medications? Is so, why? -  no    Have you seen any other physician or provider since your last visit? Yes - Records Obtained  Have you had any other diagnostic tests since your last visit? No  Have you been seen in the emergency room and/or had an admission to a hospital since we last saw you? No  Have you had your routine dental cleaning in the past 6 months? yes -     Have you activated your [employer] account? If not, what are your barriers?  Yes     Patient Care Team:  ESHA Melendez CNP as PCP - General (Certified Nurse Practitioner)  ESHA Melendez CNP as PCP - Empaneled Provider    Medical History Review  Past Medical, Family, and Social History reviewed and does contribute to the patient presenting condition    Health Maintenance   Topic Date Due    COVID-19 Vaccine (1) Never done    HIV screen  Never done    Hepatitis C screen  Never done    Flu vaccine (Season Ended) 10/18/2023 (Originally 8/1/2023)    Depression Monitoring  01/23/2024    Colorectal Cancer Screen  08/27/2024    Diabetes screen  10/13/2024    Lipids  08/22/2027    DTaP/Tdap/Td vaccine (2 - Td or Tdap) 01/21/2029    Hepatitis A vaccine  Aged Out    Hib vaccine  Aged Out    Meningococcal (ACWY) vaccine  Aged Out    Pneumococcal 0-64 years Vaccine  Aged Out
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
## 2023-04-18 NOTE — PATIENT INSTRUCTIONS
Patient Education        Back Pain: Care Instructions  Overview     In most cases, there isn't a clear cause for back pain. It may be related to problems with muscles and ligaments of the back. It may also be related to problems with the nerves, discs, or bones of the back. Moving, lifting, standing, sitting, or sleeping in an awkward way can strain the back. Arthritis is another cause of back pain. Although it may hurt a lot, back pain usually improves on its own within several weeks. Most people recover in 12 weeks or less. Using self-care, such as ice or heat and light activity (like walking) may help you feel better sooner. Follow-up care is a key part of your treatment and safety. Be sure to make and go to all appointments, and call your doctor if you are having problems. It's also a good idea to know your test results and keep a list of the medicines you take. How can you care for yourself at home? Sit or lie in positions that are most comfortable and reduce your pain. Try one of these positions when you lie down:  Lie on your back with your knees bent and supported by pillows. Lie on the floor with your legs on the seat of a sofa or chair. Lie on your side with your knees and hips bent and a pillow between your legs. Lie on your stomach if it does not make pain worse. Do not sit up in bed, and avoid soft couches and twisted positions. Bed rest can help relieve pain at first, but it delays healing. Avoid bed rest after the first day of back pain. Change positions every 30 minutes. If you must sit for long periods of time, take breaks from sitting. Get up and walk around, or lie in a comfortable position. Try using a heating pad on a low or medium setting for 15 to 20 minutes every 2 or 3 hours. Try a warm shower in place of one session with the heating pad. You can also try an ice pack for 10 to 15 minutes every 2 to 3 hours. Put a thin cloth between the ice pack and your skin.   Take pain medicines

## 2023-04-24 ENCOUNTER — TELEPHONE (OUTPATIENT)
Dept: FAMILY MEDICINE CLINIC | Age: 48
End: 2023-04-24

## 2023-04-24 DIAGNOSIS — Z98.1 S/P LUMBAR FUSION: ICD-10-CM

## 2023-04-24 DIAGNOSIS — M54.50 ACUTE EXACERBATION OF CHRONIC LOW BACK PAIN: Primary | ICD-10-CM

## 2023-04-24 DIAGNOSIS — G89.29 ACUTE EXACERBATION OF CHRONIC LOW BACK PAIN: Primary | ICD-10-CM

## 2023-04-24 RX ORDER — PREGABALIN 25 MG/1
25 CAPSULE ORAL 2 TIMES DAILY
Qty: 60 CAPSULE | Refills: 1 | Status: SHIPPED | OUTPATIENT
Start: 2023-04-24 | End: 2023-05-24

## 2023-04-24 NOTE — TELEPHONE ENCOUNTER
Patient contacted the office and is asking about test results. Writer pulled form care everywhere. Patient states after today he will be out of muscle relaxers and pain medication. Patient is asking if PCP can prescribe anything. Patient also states he hasn't been to work yet due to the pain. Please advise.

## 2023-04-24 NOTE — TELEPHONE ENCOUNTER
Pt contacted office states he did not go to work today and states he won't go back to work till after he sees pain management. Pt asked is Prashanth Lock going to write him a note to be off work?     Please advise

## 2023-05-09 ENCOUNTER — TELEPHONE (OUTPATIENT)
Dept: FAMILY MEDICINE CLINIC | Age: 48
End: 2023-05-09

## 2023-05-09 ENCOUNTER — INITIAL CONSULT (OUTPATIENT)
Dept: PAIN MANAGEMENT | Age: 48
End: 2023-05-09

## 2023-05-09 VITALS
WEIGHT: 265 LBS | BODY MASS INDEX: 35.89 KG/M2 | OXYGEN SATURATION: 97 % | HEART RATE: 83 BPM | DIASTOLIC BLOOD PRESSURE: 108 MMHG | HEIGHT: 72 IN | SYSTOLIC BLOOD PRESSURE: 165 MMHG

## 2023-05-09 DIAGNOSIS — M54.16 RIGHT LUMBAR RADICULITIS: Primary | ICD-10-CM

## 2023-05-09 DIAGNOSIS — Z98.890 HX OF LUMBOSACRAL SPINE SURGERY: ICD-10-CM

## 2023-05-09 DIAGNOSIS — M48.061 LUMBAR FORAMINAL STENOSIS: ICD-10-CM

## 2023-05-09 RX ORDER — CYCLOBENZAPRINE HCL 5 MG
5 TABLET ORAL 2 TIMES DAILY PRN
Qty: 60 TABLET | Refills: 0 | Status: SHIPPED | OUTPATIENT
Start: 2023-05-09 | End: 2023-06-08

## 2023-05-09 ASSESSMENT — ENCOUNTER SYMPTOMS
CHEST TIGHTNESS: 0
WHEEZING: 0
NAUSEA: 0
SHORTNESS OF BREATH: 0
DIARRHEA: 0
CONSTIPATION: 0
BACK PAIN: 1
VOMITING: 0

## 2023-05-09 NOTE — PROGRESS NOTES
constipation, diarrhea, nausea and vomiting. Musculoskeletal:  Positive for back pain. Neurological:  Positive for numbness. Negative for dizziness and weakness. Objective:  General Appearance:  Uncomfortable, in pain and well-appearing. Vital signs: (most recent): Blood pressure (!) 165/108, pulse 83, height 6' (1.829 m), weight 265 lb (120.2 kg), SpO2 97 %. Vital signs are normal.  No fever. Output: Producing urine and producing stool. HEENT: Normal HEENT exam.    Lungs:  Normal effort and normal respiratory rate. He is not in respiratory distress. Heart: Normal rate. Extremities: Normal range of motion. There is no deformity. Neurological: Patient is alert and oriented to person, place and time. Normal strength. Patient has normal muscle tone and normal coordination. Pupils:  Pupils are equal, round, and reactive to light. Pupils are equal.   Skin:  Warm and dry. No rash or cyanosis.    Lumbar spine examination  No apparent deformity or infection  Range of motion marked limited and  Tenderness palpation in lumbar area  Straight leg raise positive on right side  Gait antalgic  Motor strength 5/5 in both lower extremities    Assessment & Plan    Pain History  48 Yo man with history of chronic low back pain  Had lumbar spine surgery 12 years ago at L5 level discectomy  Around the time he saw pain clinic at [facility] clinic and had back injection  Since surgery his pain is manageable with occasional flareup  3 weeks back without any particular injury he had a severe flareup of low back pain with radiation down right leg with radiating posterior thigh to the knee associated with right leg numbness  No significant changes in bladder or bowel control  No fever chills or weight loss  Was seen in urgent care  Tried with rest, oral steroids and muscle relaxant and NSAIDs  Had recent MRI lumbar spine  Here for review of the imaging and discuss treatment options  Pain score today

## 2023-05-09 NOTE — TELEPHONE ENCOUNTER
----- Message from Emily Shelton sent at 5/9/2023 10:43 AM EDT -----  Subject: Message to Provider    QUESTIONS  Information for Provider? Pt called and stated that Deuce Mccullough wrote   him off of work until 05/09/2023 for back pain until the pt had his   appointment with pain management. Pt states that he did have his   appointment with pain medicine but was told at that appointment that   Deuce Mccullough will have to write a letter u until his next appointment   with pain management on 05/24/2023. Pt does have an appointment on   05/11/2023 but is needing the paperwork today for work. Please call the pt   back. ---------------------------------------------------------------------------  --------------  Purnima Leyva XREY  [phone]; OK to leave message on voicemail  ---------------------------------------------------------------------------  --------------  SCRIPT ANSWERS  Relationship to Patient?  Self

## 2023-05-10 ENCOUNTER — HOSPITAL ENCOUNTER (OUTPATIENT)
Dept: PHYSICAL THERAPY | Facility: CLINIC | Age: 48
Setting detail: THERAPIES SERIES
Discharge: HOME | End: 2023-05-10
Payer: MEDICAID

## 2023-05-10 PROCEDURE — 97161 PT EVAL LOW COMPLEX 20 MIN: CPT

## 2023-05-10 NOTE — CONSULTS
ability to complete bending/lifting/twisting tasks. Due to the pt symptoms he has been unable to return to work and complete IADLs such as cleaning, cooking, and grocery shopping. Pt. Presented throughout eval with increased pain, difficulty with bed mobility and assuming testing positions, with decreased tolerance for transfers/gait. Because of the irritability and intensity of of the Pt's symptoms PT treatment sessions will be held off until the completion of a lumbar epidural on 5/24/23 with pain management. Patient would benefit from skilled physical therapy services in order to: improve lumbar mobility, global core and hip strength, reduce pain, and normalize gait and transfers in order to return to full participation with work and household chores. Problems:    [x] ? Pain: Pain rating up to 9/10  [x] ? ROM: Lumbar flexion, extension, and R hip ROM  [x] ? Strength: Abdominals, R hip, knee, and ankle strength globally  [x] ? Function: Impaired functional by oswestry score of 72%  [x] Other: Impaired gait, balance, and bed mobility    STG: (to be met in 6 treatments)  ? Pain: Pt to report pain levels at 6/10 to ease ADL progression  ? ROM: Pt to present with 70 degrees or greater SLR to ease ADL progression. ? Strength: Pt to have improved strength to 4+/5 globally at the hip and knee for improved ambulation  ? Function: Pt to demonstrate equal bilateral lower extremity weight bearing during STS transfers to ease ADLs  Patient to be independent with home exercise program as demonstrated by performance with correct form without cues.   LTG: (to be met in 10 treatments)  Pt to report reduced pain levels to 4/10 for ease of sleeping throughout the night  Pt to demonstrate improved functional mobility with lumbar flexion and extension ROM in order to return to bending/lifting/twisting tasks at work  Pt to ambulate 300' without an increase in pain greater than 1/10 on the numeric pain rating scale and a with more

## 2023-05-11 ENCOUNTER — OFFICE VISIT (OUTPATIENT)
Dept: FAMILY MEDICINE CLINIC | Age: 48
End: 2023-05-11
Payer: MEDICAID

## 2023-05-11 VITALS
WEIGHT: 269.25 LBS | DIASTOLIC BLOOD PRESSURE: 84 MMHG | SYSTOLIC BLOOD PRESSURE: 126 MMHG | HEART RATE: 76 BPM | HEIGHT: 72 IN | OXYGEN SATURATION: 98 % | BODY MASS INDEX: 36.47 KG/M2

## 2023-05-11 DIAGNOSIS — G89.29 ACUTE EXACERBATION OF CHRONIC LOW BACK PAIN: Primary | ICD-10-CM

## 2023-05-11 DIAGNOSIS — F33.1 MODERATE EPISODE OF RECURRENT MAJOR DEPRESSIVE DISORDER (HCC): ICD-10-CM

## 2023-05-11 DIAGNOSIS — M54.50 ACUTE EXACERBATION OF CHRONIC LOW BACK PAIN: Primary | ICD-10-CM

## 2023-05-11 PROCEDURE — 1036F TOBACCO NON-USER: CPT | Performed by: NURSE PRACTITIONER

## 2023-05-11 PROCEDURE — 99213 OFFICE O/P EST LOW 20 MIN: CPT | Performed by: NURSE PRACTITIONER

## 2023-05-11 PROCEDURE — G8427 DOCREV CUR MEDS BY ELIG CLIN: HCPCS | Performed by: NURSE PRACTITIONER

## 2023-05-11 PROCEDURE — G8417 CALC BMI ABV UP PARAM F/U: HCPCS | Performed by: NURSE PRACTITIONER

## 2023-05-11 RX ORDER — AMITRIPTYLINE HYDROCHLORIDE 10 MG/1
10 TABLET, FILM COATED ORAL NIGHTLY
Qty: 30 TABLET | Refills: 1 | Status: SHIPPED | OUTPATIENT
Start: 2023-05-11

## 2023-05-11 RX ORDER — MELOXICAM 15 MG/1
15 TABLET ORAL DAILY
Qty: 30 TABLET | Refills: 1 | Status: SHIPPED | OUTPATIENT
Start: 2023-05-11

## 2023-05-11 ASSESSMENT — ENCOUNTER SYMPTOMS
SORE THROAT: 0
SINUS PAIN: 0
VOMITING: 0
DIARRHEA: 0
BACK PAIN: 1
EYE PAIN: 0
NAUSEA: 0
COUGH: 0
ABDOMINAL PAIN: 0
SHORTNESS OF BREATH: 0

## 2023-05-11 NOTE — PROGRESS NOTES
[Amoxicillin-Pot Clavulanate] Rash       Subjective:      Review of Systems   Constitutional:  Negative for chills and fatigue. HENT:  Negative for congestion, ear pain, sinus pain and sore throat. Eyes:  Negative for pain and visual disturbance. Respiratory:  Negative for cough and shortness of breath. Cardiovascular:  Negative for chest pain and palpitations. Gastrointestinal:  Negative for abdominal pain, diarrhea, nausea and vomiting. Genitourinary:  Negative for penile pain and testicular pain. Musculoskeletal:  Positive for back pain. Negative for joint swelling and neck pain. Skin:  Negative for rash. Neurological:  Negative for dizziness and light-headedness. Hematological:  Does not bruise/bleed easily. All other systems reviewed and are negative.    :Objective     Physical Exam  Vitals and nursing note reviewed. Constitutional:       Appearance: Normal appearance. Cardiovascular:      Rate and Rhythm: Normal rate. Pulmonary:      Effort: Pulmonary effort is normal.   Neurological:      General: No focal deficit present. Mental Status: He is alert and oriented to person, place, and time. /84 (Site: Right Upper Arm, Position: Sitting, Cuff Size: Large Adult)   Pulse 76   Ht 6' (1.829 m)   Wt 269 lb 4 oz (122.1 kg)   SpO2 98%   BMI 36.52 kg/m²     Lab Review not applicable    LUMBAR SPINE MRI WITHOUT CONTRAST     History: Lumbar fusion, back pain, progressive despite conservative management. History of lumbar spinal fusion. Comparison: 5/14/2011     Technique: Routine multiplanar multisequence MR imaging of the lumbar spine was performed without contrast.     Findings:     Preserved lumbar vertebral body heights and alignment. No suspicious bone marrow replacing process. No substantial bone marrow edema. Since 5/14/2011 interval anterior fixation L5-S1, discectomy. Conus terminates at T12-L1, no distal cord signal change.      [address]

## 2023-05-23 ENCOUNTER — TELEPHONE (OUTPATIENT)
Dept: FAMILY MEDICINE CLINIC | Age: 48
End: 2023-05-23

## 2023-05-23 DIAGNOSIS — M54.50 ACUTE EXACERBATION OF CHRONIC LOW BACK PAIN: Primary | ICD-10-CM

## 2023-05-23 DIAGNOSIS — G89.29 ACUTE EXACERBATION OF CHRONIC LOW BACK PAIN: Primary | ICD-10-CM

## 2023-05-23 NOTE — TELEPHONE ENCOUNTER
Patient contacted the office and states the office contacted him to cancel his injections appt tomorrow due to insurance not approving it. Patient has another appt on the 6th of June. Patient states he is a lot of pain. Patient states meloxicam doesn't help at all. The other medication prescribed helps patient sleep but that is all. Patient was crying on the phone because he is in pain and frustrated with his procedure being cancelled. The pain management office advised patient they don't prescribe any medications until after the procedure so they are unable to call in medications at this time. Patient states he want to know if PCP can prescribe something to help with the pain. Patient will also need an extension on his work note to be off until the 6th of June now.  Please advise

## 2023-05-24 RX ORDER — HYDROCODONE BITARTRATE AND ACETAMINOPHEN 5; 325 MG/1; MG/1
1 TABLET ORAL EVERY 6 HOURS PRN
Qty: 15 TABLET | Refills: 0 | Status: SHIPPED | OUTPATIENT
Start: 2023-05-24 | End: 2023-05-29

## 2023-05-30 ENCOUNTER — HOSPITAL ENCOUNTER (OUTPATIENT)
Dept: PHYSICAL THERAPY | Facility: CLINIC | Age: 48
Setting detail: THERAPIES SERIES
Discharge: HOME | End: 2023-05-30
Payer: MEDICAID

## 2023-05-30 NOTE — FLOWSHEET NOTE
[] [address]  [address]    [phone]  F: [phone]   [x] [address] [facility]   [address]  P: [phone]  F: [phone]  [] [address]  [address] [facility]  P: [phone]  F: [phone] [] [address]  P: [phone]  F: [phone]  [] [address]  [address] [facility] [address]   [address]   [place]: [phone]  F: [phone]   [] [address]  [place]: [phone]   F: [phone]     Physical Therapy Cancel/No Show note    Date: 2023  Patient: Tara Honeycutt  : 1975  MRN: [id number]    Cancels/No Shows to date:     For today's appointment patient:    [x]  Cancelled    [] Rescheduled appointment    [] No-show     Reason given by patient:    []  Patient ill    []  Conflicting appointment    [] No transportation      [] Conflict with work    [] No reason given    [] Weather related    [] COVID-19    [x] Other:      Comments:  pain clinic procedure       [] Next appointment was confirmed    Electronically signed by: Yasmeen Lee PT

## 2023-06-06 ENCOUNTER — OFFICE VISIT (OUTPATIENT)
Dept: PAIN MANAGEMENT | Age: 48
End: 2023-06-06

## 2023-06-06 ENCOUNTER — TELEPHONE (OUTPATIENT)
Dept: FAMILY MEDICINE CLINIC | Age: 48
End: 2023-06-06

## 2023-06-06 VITALS — WEIGHT: 269 LBS | BODY MASS INDEX: 36.44 KG/M2 | HEART RATE: 134 BPM | HEIGHT: 72 IN | OXYGEN SATURATION: 99 %

## 2023-06-06 DIAGNOSIS — M54.16 RIGHT LUMBAR RADICULITIS: ICD-10-CM

## 2023-06-06 DIAGNOSIS — Z98.890 HX OF LUMBOSACRAL SPINE SURGERY: ICD-10-CM

## 2023-06-06 DIAGNOSIS — M54.50 ACUTE EXACERBATION OF CHRONIC LOW BACK PAIN: Primary | ICD-10-CM

## 2023-06-06 DIAGNOSIS — G89.29 ACUTE EXACERBATION OF CHRONIC LOW BACK PAIN: Primary | ICD-10-CM

## 2023-06-06 DIAGNOSIS — M47.817 LUMBOSACRAL SPONDYLOSIS WITHOUT MYELOPATHY: Primary | ICD-10-CM

## 2023-06-06 RX ORDER — CYCLOBENZAPRINE HCL 5 MG
5 TABLET ORAL 2 TIMES DAILY PRN
Qty: 60 TABLET | Refills: 1 | Status: SHIPPED | OUTPATIENT
Start: 2023-06-06 | End: 2023-08-05

## 2023-06-06 RX ORDER — CYCLOBENZAPRINE HCL 5 MG
5 TABLET ORAL 2 TIMES DAILY PRN
Qty: 60 TABLET | Refills: 0 | Status: SHIPPED | OUTPATIENT
Start: 2023-06-06 | End: 2023-06-06 | Stop reason: SDUPTHER

## 2023-06-06 RX ORDER — MELOXICAM 15 MG/1
15 TABLET ORAL DAILY
Qty: 30 TABLET | Refills: 1 | Status: SHIPPED | OUTPATIENT
Start: 2023-06-06

## 2023-06-06 ASSESSMENT — ENCOUNTER SYMPTOMS
SHORTNESS OF BREATH: 0
WHEEZING: 0
CONSTIPATION: 0
CHEST TIGHTNESS: 0
DIARRHEA: 1
NAUSEA: 1
BACK PAIN: 1
VOMITING: 1

## 2023-06-06 NOTE — TELEPHONE ENCOUNTER
ESHA Moreland - CHINTAN Bowen MA  Caller: Unspecified (Today,  9:58 AM)  Yes please notify when his appt is scheduled so I can adjust dates     Patient notified he will let us know when he has that appt scheduled.

## 2023-06-06 NOTE — TELEPHONE ENCOUNTER
Patient called back and appointment is tomorrow, he will call back to see if he even needs a new note.  He'll let us know tomorrow

## 2023-06-06 NOTE — PROGRESS NOTES
fatigue and fever. Respiratory:  Negative for chest tightness, shortness of breath and wheezing. Cardiovascular:  Negative for leg swelling. Gastrointestinal:  Positive for diarrhea, nausea and vomiting. Negative for constipation. Musculoskeletal:  Positive for back pain. Neurological:  Positive for weakness and numbness. Negative for dizziness. Objective:  General Appearance:  Uncomfortable, in pain, well-appearing and in no acute distress. Vital signs: (most recent): Pulse (!) 134, height 6' (1.829 m), weight 269 lb (122 kg), SpO2 99 %. Vital signs are normal.  No fever. Output: Producing urine and producing stool. HEENT: Normal HEENT exam.    Lungs:  Normal effort and normal respiratory rate. He is not in respiratory distress. Heart: Normal rate. Extremities: Normal range of motion. There is no deformity. Neurological: Patient is alert and oriented to person, place and time. Patient has normal coordination. Pupils:  Pupils are equal, round, and reactive to light. Pupils are equal.   Skin:  Warm and dry. No rash or cyanosis. Assessment & Plan    1. Lumbosacral spondylosis without myelopathy    2. Hx of lumbosacral spine surgery    3.  Right lumbar radiculitis        Orders Placed This Encounter   Procedures    Jorge Luis Napoles, [place], DO, Neurosurgery, [employer]      Orders Placed This Encounter   Medications    cyclobenzaprine (FLEXERIL) 5 MG tablet     Sig: Take 1 tablet by mouth 2 times daily as needed for Muscle spasms     Dispense:  60 tablet     Refill:  0            Electronically signed by Tara Velazquez MD on 6/6/2023 at 9:09 AM

## 2023-06-06 NOTE — TELEPHONE ENCOUNTER
Patient called in stating that he has a back injury and was referred to a pain specialist but his insurance will not be covering this one due to needing one that is in state([place]). He states that he was written off until he got in with pain management today but since he is not going to be able to see them he needs to have this extended. Patient also requesting pain medication.

## 2023-06-07 ENCOUNTER — TELEPHONE (OUTPATIENT)
Dept: FAMILY MEDICINE CLINIC | Age: 48
End: 2023-06-07

## 2023-06-07 DIAGNOSIS — M54.50 ACUTE EXACERBATION OF CHRONIC LOW BACK PAIN: Primary | ICD-10-CM

## 2023-06-07 DIAGNOSIS — G89.29 ACUTE EXACERBATION OF CHRONIC LOW BACK PAIN: Primary | ICD-10-CM

## 2023-06-07 NOTE — TELEPHONE ENCOUNTER
Pt contacted the office asked if Lennox Layer could write him a note to be off work through 6/29? Pt states he can not get in to pain management till then. Please advise.

## 2023-06-08 NOTE — TELEPHONE ENCOUNTER
Pt states he was at [facility] in [place] yesterday. Pt states he did not get an injection. Pt states his appt for an injection is on 6/29. Pt states Dr. Arben Neal office is out of network for his insurance. Pt states the [facility] states they will not write him a work note till after he gets his first injection on 6/29 and since pts pcp took him off work he would have to get a work note from Julius Gill. Please advise.

## 2023-08-02 ENCOUNTER — OFFICE VISIT (OUTPATIENT)
Dept: FAMILY MEDICINE CLINIC | Age: 48
End: 2023-08-02
Payer: COMMERCIAL

## 2023-08-02 VITALS
SYSTOLIC BLOOD PRESSURE: 162 MMHG | DIASTOLIC BLOOD PRESSURE: 104 MMHG | HEIGHT: 72 IN | WEIGHT: 207 LBS | TEMPERATURE: 98.6 F | BODY MASS INDEX: 28.04 KG/M2 | OXYGEN SATURATION: 97 % | HEART RATE: 98 BPM

## 2023-08-02 DIAGNOSIS — Z13.1 DIABETES MELLITUS SCREENING: ICD-10-CM

## 2023-08-02 DIAGNOSIS — Z98.1 S/P LUMBAR FUSION: ICD-10-CM

## 2023-08-02 DIAGNOSIS — M48.061 LUMBAR FORAMINAL STENOSIS: ICD-10-CM

## 2023-08-02 DIAGNOSIS — Z13.220 LIPID SCREENING: ICD-10-CM

## 2023-08-02 DIAGNOSIS — R03.0 BORDERLINE HYPERTENSION: Primary | ICD-10-CM

## 2023-08-02 DIAGNOSIS — F33.1 MODERATE EPISODE OF RECURRENT MAJOR DEPRESSIVE DISORDER (HCC): ICD-10-CM

## 2023-08-02 PROCEDURE — 99214 OFFICE O/P EST MOD 30 MIN: CPT | Performed by: NURSE PRACTITIONER

## 2023-08-02 RX ORDER — AMITRIPTYLINE HYDROCHLORIDE 25 MG/1
25 TABLET, FILM COATED ORAL NIGHTLY
Qty: 30 TABLET | Refills: 2 | Status: SHIPPED | OUTPATIENT
Start: 2023-08-02

## 2023-08-02 RX ORDER — LOSARTAN POTASSIUM 25 MG/1
25 TABLET ORAL DAILY
Qty: 30 TABLET | Refills: 2 | Status: SHIPPED | OUTPATIENT
Start: 2023-08-02

## 2023-08-02 RX ORDER — CYCLOBENZAPRINE HCL 10 MG
10 TABLET ORAL 2 TIMES DAILY PRN
Qty: 60 TABLET | Refills: 2 | Status: SHIPPED | OUTPATIENT
Start: 2023-08-02 | End: 2023-10-31

## 2023-08-02 ASSESSMENT — ENCOUNTER SYMPTOMS
ABDOMINAL PAIN: 0
DIARRHEA: 0
SORE THROAT: 0
BACK PAIN: 1
EYE PAIN: 0
SHORTNESS OF BREATH: 0
SINUS PAIN: 0
VOMITING: 0
COUGH: 0
NAUSEA: 0

## 2023-08-02 NOTE — PROGRESS NOTES
Visit Information    Have you changed or started any medications since your last visit including any over-the-counter medicines, vitamins, or herbal medicines? no   Have you stopped taking any of your medications? Is so, why? -  no  Are you having any side effects from any of your medications? - no    Have you seen any other physician or provider since your last visit?  no   Have you had any other diagnostic tests since your last visit?  no   Have you been seen in the emergency room and/or had an admission in a hospital since we last saw you?  no   Have you had your routine dental cleaning in the past 6 months?  no     Do you have an active MyChart account? If no, what is the barrier?   Yes    Patient Care Team:  ESHA Grey CNP as PCP - General (Certified Nurse Practitioner)  ESHA Grey CNP as PCP - Empaneled Provider    Medical History Review  Past Medical, Family, and Social History reviewed and  contribute to the patient presenting condition    Health Maintenance   Topic Date Due    COVID-19 Vaccine (1) Never done    HIV screen  Never done    Hepatitis C screen  Never done    Flu vaccine (1) 08/01/2023    Depression Monitoring  01/23/2024    Colorectal Cancer Screen  08/27/2024    Diabetes screen  10/13/2024    Lipids  08/22/2027    DTaP/Tdap/Td vaccine (2 - Td or Tdap) 01/21/2029    Hepatitis A vaccine  Aged Out    Hib vaccine  Aged Out    Meningococcal (ACWY) vaccine  Aged Out    Pneumococcal 0-64 years Vaccine  Aged Out
Gravity, UA 01/03/2023 1.010     Urine Hgb 01/03/2023 NEGATIVE     pH, UA 01/03/2023 6.0     Protein, UA 01/03/2023 NEGATIVE     Urobilinogen, Urine 01/03/2023 Normal     Nitrite, Urine 01/03/2023 NEGATIVE     Leukocyte Esterase, Urine 01/03/2023 NEGATIVE     Protime 01/03/2023 12.9     INR 01/03/2023 1.0     Lipase 01/03/2023 50     Albumin 01/03/2023 4.2     Alkaline Phosphatase 01/03/2023 50     ALT 01/03/2023 21     AST 01/03/2023 14     Total Bilirubin 01/03/2023 0.2 (L)     Bilirubin, Direct 01/03/2023 <0.1     Bilirubin, Indirect 01/03/2023 Can not be calculated     Total Protein 01/03/2023 7.1     Glucose 01/03/2023 160 (H)     BUN 01/03/2023 16     Creatinine 01/03/2023 1.03     Est, Glom Filt Rate 01/03/2023 >60     Bun/Cre Ratio 01/03/2023 16     Calcium 01/03/2023 9.4     Sodium 01/03/2023 137     Potassium 01/03/2023 4.5     Chloride 01/03/2023 101     CO2 01/03/2023 23     Anion Gap 01/03/2023 13     WBC 01/03/2023 8.3     RBC 01/03/2023 5.60     Hemoglobin 01/03/2023 15.5     Hematocrit 01/03/2023 48.0     MCV 01/03/2023 85.7     MCH 01/03/2023 27.7     MCHC 01/03/2023 32.3     RDW 01/03/2023 13.2     Platelets 83/63/6162 333     MPV 01/03/2023 8.9     NRBC Automated 01/03/2023 0.0     Seg Neutrophils 01/03/2023 61     Lymphocytes 01/03/2023 29     Monocytes 01/03/2023 7     Eosinophils % 01/03/2023 2     Basophils 01/03/2023 1     Immature Granulocytes 01/03/2023 0     Segs Absolute 01/03/2023 5.04     Absolute Lymph # 01/03/2023 2.37     Absolute Mono # 01/03/2023 0.59     Absolute Eos # 01/03/2023 0.19     Basophils Absolute 01/03/2023 0.07     Absolute Immature Granul* 01/03/2023 0.03        Assessment and Plan      1. Borderline hypertension  -     losartan (COZAAR) 25 MG tablet; Take 1 tablet by mouth daily, Disp-30 tablet, R-2Normal  -     [facility] Referral for Social Determinants of Health (Primary Care Practices)  2.  Lumbar foraminal stenosis  -     Laura Mckinnon MD, Pain Management,

## 2023-12-06 DIAGNOSIS — R03.0 BORDERLINE HYPERTENSION: ICD-10-CM

## 2023-12-06 RX ORDER — LOSARTAN POTASSIUM 25 MG/1
25 TABLET ORAL DAILY
Qty: 30 TABLET | Refills: 0 | Status: SHIPPED | OUTPATIENT
Start: 2023-12-06

## 2023-12-11 ENCOUNTER — OFFICE VISIT (OUTPATIENT)
Dept: PRIMARY CARE CLINIC | Age: 48
End: 2023-12-11
Payer: COMMERCIAL

## 2023-12-11 ENCOUNTER — HOSPITAL ENCOUNTER (OUTPATIENT)
Age: 48
Setting detail: SPECIMEN
Discharge: HOME | End: 2023-12-11

## 2023-12-11 ENCOUNTER — TELEPHONE (OUTPATIENT)
Dept: PRIMARY CARE CLINIC | Age: 48
End: 2023-12-11

## 2023-12-11 VITALS
SYSTOLIC BLOOD PRESSURE: 149 MMHG | TEMPERATURE: 98.4 F | OXYGEN SATURATION: 99 % | DIASTOLIC BLOOD PRESSURE: 112 MMHG | HEART RATE: 86 BPM

## 2023-12-11 DIAGNOSIS — I10 HYPERTENSION, UNSPECIFIED TYPE: ICD-10-CM

## 2023-12-11 DIAGNOSIS — Z13.1 DIABETES MELLITUS SCREENING: ICD-10-CM

## 2023-12-11 DIAGNOSIS — Z13.220 LIPID SCREENING: ICD-10-CM

## 2023-12-11 DIAGNOSIS — R00.2 PALPITATIONS: ICD-10-CM

## 2023-12-11 DIAGNOSIS — R42 DIZZINESS: ICD-10-CM

## 2023-12-11 DIAGNOSIS — R42 DIZZINESS: Primary | ICD-10-CM

## 2023-12-11 LAB
ALBUMIN SERPL-MCNC: 4.4 G/DL (ref 3.5–5.2)
ALBUMIN/GLOB SERPL: 1.4 {RATIO} (ref 1–2.5)
ALP SERPL-CCNC: 40 U/L (ref 40–129)
ALT SERPL-CCNC: 24 U/L (ref 5–41)
ANION GAP SERPL CALCULATED.3IONS-SCNC: 13 MMOL/L (ref 9–17)
AST SERPL-CCNC: 21 U/L
BASOPHILS # BLD: 0.07 K/UL (ref 0–0.2)
BASOPHILS NFR BLD: 1 % (ref 0–2)
BILIRUB SERPL-MCNC: 0.3 MG/DL (ref 0.3–1.2)
BUN SERPL-MCNC: 11 MG/DL (ref 6–20)
CALCIUM SERPL-MCNC: 9.7 MG/DL (ref 8.6–10.4)
CHLORIDE SERPL-SCNC: 102 MMOL/L (ref 98–107)
CHOLEST SERPL-MCNC: 193 MG/DL
CHOLESTEROL/HDL RATIO: 4.9
CO2 SERPL-SCNC: 25 MMOL/L (ref 20–31)
CREAT SERPL-MCNC: 1 MG/DL (ref 0.7–1.2)
EOSINOPHIL # BLD: 0.12 K/UL (ref 0–0.44)
EOSINOPHILS RELATIVE PERCENT: 1 % (ref 1–4)
ERYTHROCYTE [DISTWIDTH] IN BLOOD BY AUTOMATED COUNT: 13.6 % (ref 11.8–14.4)
EST. AVERAGE GLUCOSE BLD GHB EST-MCNC: 108 MG/DL
GFR SERPL CREATININE-BSD FRML MDRD: >60 ML/MIN/1.73M2
GLUCOSE SERPL-MCNC: 93 MG/DL (ref 70–99)
HBA1C MFR BLD: 5.4 % (ref 4–6)
HCT VFR BLD AUTO: 49.1 % (ref 40.7–50.3)
HDLC SERPL-MCNC: 39 MG/DL
HGB BLD-MCNC: 16.2 G/DL (ref 13–17)
IMM GRANULOCYTES # BLD AUTO: 0.03 K/UL (ref 0–0.3)
IMM GRANULOCYTES NFR BLD: 0 %
LDLC SERPL CALC-MCNC: 129 MG/DL (ref 0–130)
LYMPHOCYTES NFR BLD: 2.63 K/UL (ref 1.1–3.7)
LYMPHOCYTES RELATIVE PERCENT: 30 % (ref 24–43)
MCH RBC QN AUTO: 28.7 PG (ref 25.2–33.5)
MCHC RBC AUTO-ENTMCNC: 33 G/DL (ref 28.4–34.8)
MCV RBC AUTO: 87.1 FL (ref 82.6–102.9)
MONOCYTES NFR BLD: 0.58 K/UL (ref 0.1–1.2)
MONOCYTES NFR BLD: 7 % (ref 3–12)
NEUTROPHILS NFR BLD: 61 % (ref 36–65)
NEUTS SEG NFR BLD: 5.37 K/UL (ref 1.5–8.1)
NRBC BLD-RTO: 0 PER 100 WBC
PLATELET # BLD AUTO: 388 K/UL (ref 138–453)
PMV BLD AUTO: 9.4 FL (ref 8.1–13.5)
POTASSIUM SERPL-SCNC: 4.5 MMOL/L (ref 3.7–5.3)
PROT SERPL-MCNC: 7.6 G/DL (ref 6.4–8.3)
RBC # BLD AUTO: 5.64 M/UL (ref 4.21–5.77)
SODIUM SERPL-SCNC: 140 MMOL/L (ref 135–144)
TRIGL SERPL-MCNC: 125 MG/DL
TROPONIN I SERPL HS-MCNC: 13 NG/L (ref 0–22)
TSH SERPL DL<=0.05 MIU/L-ACNC: 1.39 UIU/ML (ref 0.3–5)
WBC OTHER # BLD: 8.8 K/UL (ref 3.5–11.3)

## 2023-12-11 PROCEDURE — 3077F SYST BP >= 140 MM HG: CPT | Performed by: FAMILY MEDICINE

## 2023-12-11 PROCEDURE — 3080F DIAST BP >= 90 MM HG: CPT | Performed by: FAMILY MEDICINE

## 2023-12-11 PROCEDURE — 99214 OFFICE O/P EST MOD 30 MIN: CPT | Performed by: FAMILY MEDICINE

## 2023-12-11 RX ORDER — MECLIZINE HYDROCHLORIDE 25 MG/1
25 TABLET ORAL 3 TIMES DAILY PRN
Qty: 20 TABLET | Refills: 0 | Status: SHIPPED | OUTPATIENT
Start: 2023-12-11 | End: 2023-12-11 | Stop reason: SDUPTHER

## 2023-12-11 RX ORDER — LOSARTAN POTASSIUM 25 MG/1
25 TABLET ORAL DAILY
Qty: 30 TABLET | Refills: 0 | Status: SHIPPED | OUTPATIENT
Start: 2023-12-11 | End: 2023-12-11 | Stop reason: SDUPTHER

## 2023-12-11 RX ORDER — LOSARTAN POTASSIUM 25 MG/1
25 TABLET ORAL DAILY
Qty: 30 TABLET | Refills: 0 | Status: SHIPPED | OUTPATIENT
Start: 2023-12-11

## 2023-12-11 RX ORDER — BLOOD PRESSURE TEST KIT
KIT MISCELLANEOUS
Qty: 1 KIT | Refills: 0 | Status: SHIPPED | OUTPATIENT
Start: 2023-12-11 | End: 2023-12-11 | Stop reason: SDUPTHER

## 2023-12-11 RX ORDER — MECLIZINE HYDROCHLORIDE 25 MG/1
25 TABLET ORAL 3 TIMES DAILY PRN
Qty: 20 TABLET | Refills: 0 | Status: SHIPPED | OUTPATIENT
Start: 2023-12-11 | End: 2023-12-21

## 2023-12-11 RX ORDER — BLOOD PRESSURE TEST KIT
KIT MISCELLANEOUS
Qty: 1 KIT | Refills: 0 | Status: SHIPPED | OUTPATIENT
Start: 2023-12-11

## 2023-12-11 ASSESSMENT — ENCOUNTER SYMPTOMS
CHANGE IN BOWEL HABIT: 0
COUGH: 0
SHORTNESS OF BREATH: 0
DIARRHEA: 0
RHINORRHEA: 0
VISUAL CHANGE: 1
SORE THROAT: 0
VOMITING: 0
CHEST TIGHTNESS: 0
NAUSEA: 1

## 2023-12-11 NOTE — PATIENT INSTRUCTIONS
Restart blood pressure medication and take meclizine as prescribed for dizziness and nausea. Have blood work and EKG to rule out cardiac or issues with electrolytes, thyroid.  If symptoms worsen or do not improve please follow-up with PCP or return to clinic  If you notice any stroke like symptoms such as numbness, tingling, muscle weakness, dropping of face or change in speech then go to the ER

## 2023-12-11 NOTE — PROGRESS NOTES
unspecified type            Plan:    Restart blood pressure medication and take meclizine as prescribed for dizziness and nausea. Have blood work and EKG to rule out cardiac or issues with electrolytes, thyroid. If symptoms worsen or do not improve please follow-up with PCP or return to clinic  If you notice any stroke like symptoms such as numbness, tingling, muscle weakness, dropping of face or change in speech then go to the ER      Patient declined viral testing at this time  Orders Placed This Encounter   Procedures    CBC with Auto Differential     Standing Status:   Future     Standing Expiration Date:   12/10/2024    TSH With Reflex Ft4     Standing Status:   Future     Standing Expiration Date:   12/11/2024    Comprehensive Metabolic Panel     Standing Status:   Future     Standing Expiration Date:   12/11/2024    Troponin     Standing Status:   Future     Standing Expiration Date:   12/11/2024    EKG 12 lead     Standing Status:   Future     Standing Expiration Date:   2/9/2024     Order Specific Question:   Reason for Exam?     Answer:   Dizziness     Orders Placed This Encounter   Medications    meclizine (ANTIVERT) 25 MG tablet     Sig: Take 1 tablet by mouth 3 times daily as needed for Dizziness or Nausea     Dispense:  20 tablet     Refill:  0    Blood Pressure KIT     Sig: Use to measure blood pressure daily     Dispense:  1 kit     Refill:  0    losartan (COZAAR) 25 MG tablet     Sig: Take 1 tablet by mouth daily     Dispense:  30 tablet     Refill:  0      Patient given educational materials - see patient instructions. Discussed use, benefit, and side effects of prescribed medications. All patient questions answered. Pt voiced understanding. Patient agreed with treatment plan. Follow up as directed.      Electronicallysigned by Caryn Thomas MD on 12/11/2023 at 8:54 AM

## 2023-12-11 NOTE — TELEPHONE ENCOUNTER
Pt saw Dr MASCORRO this morning and couldn't get his meds due to something with Medicaid and Dr MASCORRO not being contracted. Can you please [facility] in [facility] [place]? ?

## 2023-12-12 ENCOUNTER — HOSPITAL ENCOUNTER (OUTPATIENT)
Age: 48
Discharge: HOME | End: 2023-12-12
Payer: COMMERCIAL

## 2023-12-12 LAB
EKG ATRIAL RATE: 76 BPM
EKG P AXIS: 36 DEGREES
EKG P-R INTERVAL: 118 MS
EKG Q-T INTERVAL: 360 MS
EKG QRS DURATION: 86 MS
EKG QTC CALCULATION (BAZETT): 405 MS
EKG R AXIS: -10 DEGREES
EKG T AXIS: 18 DEGREES
EKG VENTRICULAR RATE: 76 BPM

## 2023-12-12 PROCEDURE — 93005 ELECTROCARDIOGRAM TRACING: CPT | Performed by: FAMILY MEDICINE

## 2023-12-12 RX ORDER — LOSARTAN POTASSIUM 25 MG/1
25 TABLET ORAL DAILY
Qty: 90 TABLET | OUTPATIENT
Start: 2023-12-12

## 2023-12-29 ENCOUNTER — OFFICE VISIT (OUTPATIENT)
Dept: PRIMARY CARE CLINIC | Age: 48
End: 2023-12-29
Payer: COMMERCIAL

## 2023-12-29 VITALS
TEMPERATURE: 98.1 F | OXYGEN SATURATION: 95 % | DIASTOLIC BLOOD PRESSURE: 87 MMHG | SYSTOLIC BLOOD PRESSURE: 149 MMHG | HEART RATE: 90 BPM

## 2023-12-29 DIAGNOSIS — H10.9 BACTERIAL CONJUNCTIVITIS OF RIGHT EYE: Primary | ICD-10-CM

## 2023-12-29 PROCEDURE — 99213 OFFICE O/P EST LOW 20 MIN: CPT

## 2023-12-29 RX ORDER — POLYMYXIN B SULFATE AND TRIMETHOPRIM 1; 10000 MG/ML; [USP'U]/ML
1 SOLUTION OPHTHALMIC EVERY 4 HOURS
Qty: 3 ML | Refills: 0 | Status: SHIPPED | OUTPATIENT
Start: 2023-12-29 | End: 2024-01-08

## 2023-12-29 RX ORDER — MECLIZINE HCL 12.5 MG/1
12.5 TABLET ORAL 3 TIMES DAILY PRN
COMMUNITY
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
## 2024-01-10 ENCOUNTER — ANESTHESIA (OUTPATIENT)
Dept: OPERATING ROOM | Age: 49
End: 2024-01-10
Payer: COMMERCIAL

## 2024-01-10 ENCOUNTER — ANESTHESIA EVENT (OUTPATIENT)
Dept: OPERATING ROOM | Age: 49
End: 2024-01-10
Payer: COMMERCIAL

## 2024-01-10 ENCOUNTER — HOSPITAL ENCOUNTER (OUTPATIENT)
Age: 49
Setting detail: OBSERVATION
Discharge: HOME | End: 2024-01-11
Attending: EMERGENCY MEDICINE | Admitting: SURGERY
Payer: COMMERCIAL

## 2024-01-10 ENCOUNTER — APPOINTMENT (OUTPATIENT)
Dept: CT IMAGING | Age: 49
End: 2024-01-10
Payer: COMMERCIAL

## 2024-01-10 DIAGNOSIS — R10.9 ABDOMINAL PAIN, UNSPECIFIED ABDOMINAL LOCATION: Primary | ICD-10-CM

## 2024-01-10 DIAGNOSIS — K42.0 UMBILICAL HERNIA WITH OBSTRUCTION, WITHOUT GANGRENE: ICD-10-CM

## 2024-01-10 LAB
ALBUMIN SERPL-MCNC: 4.4 G/DL (ref 3.5–5.2)
ALP SERPL-CCNC: 42 U/L (ref 40–129)
ALT SERPL-CCNC: 30 U/L (ref 5–41)
ANION GAP SERPL CALCULATED.3IONS-SCNC: 11 MMOL/L (ref 9–17)
AST SERPL-CCNC: 22 U/L
BASOPHILS # BLD: 0.06 K/UL (ref 0–0.2)
BASOPHILS NFR BLD: 1 % (ref 0–2)
BILIRUB DIRECT SERPL-MCNC: 0.1 MG/DL
BILIRUB INDIRECT SERPL-MCNC: 0.1 MG/DL (ref 0–1)
BILIRUB SERPL-MCNC: 0.2 MG/DL (ref 0.3–1.2)
BUN SERPL-MCNC: 17 MG/DL (ref 6–20)
BUN/CREAT SERPL: 17 (ref 9–20)
CALCIUM SERPL-MCNC: 9.5 MG/DL (ref 8.6–10.4)
CHLORIDE SERPL-SCNC: 104 MMOL/L (ref 98–107)
CO2 SERPL-SCNC: 24 MMOL/L (ref 20–31)
CREAT SERPL-MCNC: 1 MG/DL (ref 0.7–1.2)
EOSINOPHIL # BLD: 0.09 K/UL (ref 0–0.44)
EOSINOPHILS RELATIVE PERCENT: 1 % (ref 1–4)
ERYTHROCYTE [DISTWIDTH] IN BLOOD BY AUTOMATED COUNT: 13.6 % (ref 11.8–14.4)
GFR SERPL CREATININE-BSD FRML MDRD: >60 ML/MIN/1.73M2
GLUCOSE SERPL-MCNC: 103 MG/DL (ref 70–99)
HCT VFR BLD AUTO: 47.6 % (ref 40.7–50.3)
HGB BLD-MCNC: 15.9 G/DL (ref 13–17)
IMM GRANULOCYTES # BLD AUTO: 0.06 K/UL (ref 0–0.3)
IMM GRANULOCYTES NFR BLD: 1 %
LIPASE SERPL-CCNC: 34 U/L (ref 13–60)
LYMPHOCYTES NFR BLD: 1.98 K/UL (ref 1.1–3.7)
LYMPHOCYTES RELATIVE PERCENT: 19 % (ref 24–43)
MCH RBC QN AUTO: 29 PG (ref 25.2–33.5)
MCHC RBC AUTO-ENTMCNC: 33.4 G/DL (ref 28.4–34.8)
MCV RBC AUTO: 86.7 FL (ref 82.6–102.9)
MONOCYTES NFR BLD: 0.56 K/UL (ref 0.1–1.2)
MONOCYTES NFR BLD: 5 % (ref 3–12)
NEUTROPHILS NFR BLD: 73 % (ref 36–65)
NEUTS SEG NFR BLD: 7.55 K/UL (ref 1.5–8.1)
NRBC BLD-RTO: 0 PER 100 WBC
PLATELET # BLD AUTO: 340 K/UL (ref 138–453)
PMV BLD AUTO: 8.8 FL (ref 8.1–13.5)
POTASSIUM SERPL-SCNC: 4.5 MMOL/L (ref 3.7–5.3)
PROT SERPL-MCNC: 7.4 G/DL (ref 6.4–8.3)
RBC # BLD AUTO: 5.49 M/UL (ref 4.21–5.77)
SODIUM SERPL-SCNC: 139 MMOL/L (ref 135–144)
WBC OTHER # BLD: 10.3 K/UL (ref 3.5–11.3)

## 2024-01-10 PROCEDURE — 3600000009 HC SURGERY ROBOT BASE: Performed by: SURGERY

## 2024-01-10 PROCEDURE — 83690 ASSAY OF LIPASE: CPT

## 2024-01-10 PROCEDURE — 6360000002 HC RX W HCPCS: Performed by: ANESTHESIOLOGY

## 2024-01-10 PROCEDURE — G0378 HOSPITAL OBSERVATION PER HR: HCPCS

## 2024-01-10 PROCEDURE — 2580000003 HC RX 258: Performed by: SURGERY

## 2024-01-10 PROCEDURE — 6370000000 HC RX 637 (ALT 250 FOR IP): Performed by: SURGERY

## 2024-01-10 PROCEDURE — 2580000003 HC RX 258: Performed by: NURSE PRACTITIONER

## 2024-01-10 PROCEDURE — 6360000002 HC RX W HCPCS: Performed by: SURGERY

## 2024-01-10 PROCEDURE — 74177 CT ABD & PELVIS W/CONTRAST: CPT

## 2024-01-10 PROCEDURE — 7100000000 HC PACU RECOVERY - FIRST 15 MIN: Performed by: SURGERY

## 2024-01-10 PROCEDURE — 87205 SMEAR GRAM STAIN: CPT

## 2024-01-10 PROCEDURE — 85025 COMPLETE CBC W/AUTO DIFF WBC: CPT

## 2024-01-10 PROCEDURE — 96374 THER/PROPH/DIAG INJ IV PUSH: CPT

## 2024-01-10 PROCEDURE — 99253 IP/OBS CNSLTJ NEW/EST LOW 45: CPT | Performed by: NURSE PRACTITIONER

## 2024-01-10 PROCEDURE — 6360000002 HC RX W HCPCS: Performed by: NURSE ANESTHETIST, CERTIFIED REGISTERED

## 2024-01-10 PROCEDURE — 2500000003 HC RX 250 WO HCPCS: Performed by: SURGERY

## 2024-01-10 PROCEDURE — 3700000000 HC ANESTHESIA ATTENDED CARE: Performed by: SURGERY

## 2024-01-10 PROCEDURE — 96375 TX/PRO/DX INJ NEW DRUG ADDON: CPT

## 2024-01-10 PROCEDURE — 3600000019 HC SURGERY ROBOT ADDTL 15MIN: Performed by: SURGERY

## 2024-01-10 PROCEDURE — 87070 CULTURE OTHR SPECIMN AEROBIC: CPT

## 2024-01-10 PROCEDURE — 6360000004 HC RX CONTRAST MEDICATION: Performed by: NURSE PRACTITIONER

## 2024-01-10 PROCEDURE — S2900 ROBOTIC SURGICAL SYSTEM: HCPCS | Performed by: SURGERY

## 2024-01-10 PROCEDURE — 6360000002 HC RX W HCPCS: Performed by: NURSE PRACTITIONER

## 2024-01-10 PROCEDURE — 2709999900 HC NON-CHARGEABLE SUPPLY: Performed by: SURGERY

## 2024-01-10 PROCEDURE — 2580000003 HC RX 258: Performed by: NURSE ANESTHETIST, CERTIFIED REGISTERED

## 2024-01-10 PROCEDURE — 2500000003 HC RX 250 WO HCPCS: Performed by: NURSE ANESTHETIST, CERTIFIED REGISTERED

## 2024-01-10 PROCEDURE — 99285 EMERGENCY DEPT VISIT HI MDM: CPT

## 2024-01-10 PROCEDURE — 3700000001 HC ADD 15 MINUTES (ANESTHESIA): Performed by: SURGERY

## 2024-01-10 PROCEDURE — 80076 HEPATIC FUNCTION PANEL: CPT

## 2024-01-10 PROCEDURE — 80048 BASIC METABOLIC PNL TOTAL CA: CPT

## 2024-01-10 RX ORDER — SODIUM CHLORIDE 0.9 % (FLUSH) 0.9 %
5-40 SYRINGE (ML) INJECTION EVERY 12 HOURS SCHEDULED
Status: CANCELLED | OUTPATIENT
Start: 2024-01-10

## 2024-01-10 RX ORDER — KETOROLAC TROMETHAMINE 30 MG/ML
INJECTION, SOLUTION INTRAMUSCULAR; INTRAVENOUS PRN
Status: DISCONTINUED | OUTPATIENT
Start: 2024-01-10 | End: 2024-01-10 | Stop reason: SDUPTHER

## 2024-01-10 RX ORDER — POTASSIUM CHLORIDE 20 MEQ/1
40 TABLET, EXTENDED RELEASE ORAL PRN
Status: CANCELLED | OUTPATIENT
Start: 2024-01-10

## 2024-01-10 RX ORDER — BUPIVACAINE HYDROCHLORIDE 2.5 MG/ML
INJECTION, SOLUTION EPIDURAL; INFILTRATION; INTRACAUDAL PRN
Status: DISCONTINUED | OUTPATIENT
Start: 2024-01-10 | End: 2024-01-10 | Stop reason: ALTCHOICE

## 2024-01-10 RX ORDER — ENOXAPARIN SODIUM 100 MG/ML
30 INJECTION SUBCUTANEOUS 2 TIMES DAILY
Status: CANCELLED | OUTPATIENT
Start: 2024-01-11

## 2024-01-10 RX ORDER — SODIUM CHLORIDE 9 MG/ML
INJECTION, SOLUTION INTRAVENOUS CONTINUOUS
Status: DISCONTINUED | OUTPATIENT
Start: 2024-01-10 | End: 2024-01-11 | Stop reason: HOSPADM

## 2024-01-10 RX ORDER — MAGNESIUM SULFATE 1 G/100ML
1000 INJECTION INTRAVENOUS PRN
Status: CANCELLED | OUTPATIENT
Start: 2024-01-10

## 2024-01-10 RX ORDER — SODIUM CHLORIDE 0.9 % (FLUSH) 0.9 %
10 SYRINGE (ML) INJECTION ONCE
Status: COMPLETED | OUTPATIENT
Start: 2024-01-10 | End: 2024-01-10

## 2024-01-10 RX ORDER — MORPHINE SULFATE 2 MG/ML
2 INJECTION, SOLUTION INTRAMUSCULAR; INTRAVENOUS
Status: DISCONTINUED | OUTPATIENT
Start: 2024-01-10 | End: 2024-01-11 | Stop reason: HOSPADM

## 2024-01-10 RX ORDER — CYCLOBENZAPRINE HCL 10 MG
10 TABLET ORAL 3 TIMES DAILY PRN
Status: DISCONTINUED | OUTPATIENT
Start: 2024-01-10 | End: 2024-01-11 | Stop reason: HOSPADM

## 2024-01-10 RX ORDER — SODIUM CHLORIDE 0.9 % (FLUSH) 0.9 %
5-40 SYRINGE (ML) INJECTION PRN
Status: DISCONTINUED | OUTPATIENT
Start: 2024-01-10 | End: 2024-01-11 | Stop reason: HOSPADM

## 2024-01-10 RX ORDER — MAGNESIUM SULFATE 1 G/100ML
1000 INJECTION INTRAVENOUS PRN
Status: DISCONTINUED | OUTPATIENT
Start: 2024-01-10 | End: 2024-01-11 | Stop reason: HOSPADM

## 2024-01-10 RX ORDER — MORPHINE SULFATE 2 MG/ML
2 INJECTION, SOLUTION INTRAMUSCULAR; INTRAVENOUS
Status: CANCELLED | OUTPATIENT
Start: 2024-01-10

## 2024-01-10 RX ORDER — ONDANSETRON 2 MG/ML
4 INJECTION INTRAMUSCULAR; INTRAVENOUS EVERY 6 HOURS PRN
Status: CANCELLED | OUTPATIENT
Start: 2024-01-10

## 2024-01-10 RX ORDER — 0.9 % SODIUM CHLORIDE 0.9 %
80 INTRAVENOUS SOLUTION INTRAVENOUS ONCE
Status: COMPLETED | OUTPATIENT
Start: 2024-01-10 | End: 2024-01-10

## 2024-01-10 RX ORDER — OXYCODONE HYDROCHLORIDE AND ACETAMINOPHEN 5; 325 MG/1; MG/1
1 TABLET ORAL EVERY 6 HOURS PRN
Status: DISCONTINUED | OUTPATIENT
Start: 2024-01-10 | End: 2024-01-11 | Stop reason: HOSPADM

## 2024-01-10 RX ORDER — BUPIVACAINE HYDROCHLORIDE AND EPINEPHRINE 5; 5 MG/ML; UG/ML
INJECTION, SOLUTION EPIDURAL; INTRACAUDAL; PERINEURAL PRN
Status: DISCONTINUED | OUTPATIENT
Start: 2024-01-10 | End: 2024-01-10 | Stop reason: ALTCHOICE

## 2024-01-10 RX ORDER — SODIUM CHLORIDE 0.9 % (FLUSH) 0.9 %
5-40 SYRINGE (ML) INJECTION EVERY 12 HOURS SCHEDULED
Status: DISCONTINUED | OUTPATIENT
Start: 2024-01-10 | End: 2024-01-10 | Stop reason: HOSPADM

## 2024-01-10 RX ORDER — DEXAMETHASONE SODIUM PHOSPHATE 10 MG/ML
INJECTION, SOLUTION INTRAMUSCULAR; INTRAVENOUS PRN
Status: DISCONTINUED | OUTPATIENT
Start: 2024-01-10 | End: 2024-01-10 | Stop reason: SDUPTHER

## 2024-01-10 RX ORDER — SODIUM CHLORIDE 9 MG/ML
INJECTION, SOLUTION INTRAVENOUS PRN
Status: CANCELLED | OUTPATIENT
Start: 2024-01-10

## 2024-01-10 RX ORDER — SODIUM CHLORIDE 9 MG/ML
INJECTION, SOLUTION INTRAVENOUS CONTINUOUS
Status: DISCONTINUED | OUTPATIENT
Start: 2024-01-10 | End: 2024-01-10

## 2024-01-10 RX ORDER — SODIUM CHLORIDE, SODIUM LACTATE, POTASSIUM CHLORIDE, CALCIUM CHLORIDE 600; 310; 30; 20 MG/100ML; MG/100ML; MG/100ML; MG/100ML
INJECTION, SOLUTION INTRAVENOUS CONTINUOUS PRN
Status: DISCONTINUED | OUTPATIENT
Start: 2024-01-10 | End: 2024-01-10 | Stop reason: SDUPTHER

## 2024-01-10 RX ORDER — FENTANYL CITRATE 50 UG/ML
INJECTION, SOLUTION INTRAMUSCULAR; INTRAVENOUS PRN
Status: DISCONTINUED | OUTPATIENT
Start: 2024-01-10 | End: 2024-01-10 | Stop reason: SDUPTHER

## 2024-01-10 RX ORDER — ROCURONIUM BROMIDE 50 MG/5 ML
SYRINGE (ML) INTRAVENOUS PRN
Status: DISCONTINUED | OUTPATIENT
Start: 2024-01-10 | End: 2024-01-10 | Stop reason: SDUPTHER

## 2024-01-10 RX ORDER — SODIUM CHLORIDE 0.9 % (FLUSH) 0.9 %
5-40 SYRINGE (ML) INJECTION EVERY 12 HOURS SCHEDULED
Status: DISCONTINUED | OUTPATIENT
Start: 2024-01-10 | End: 2024-01-11 | Stop reason: HOSPADM

## 2024-01-10 RX ORDER — IBUPROFEN 800 MG/1
800 TABLET ORAL EVERY 8 HOURS PRN
Status: DISCONTINUED | OUTPATIENT
Start: 2024-01-10 | End: 2024-01-11 | Stop reason: HOSPADM

## 2024-01-10 RX ORDER — MIDAZOLAM HYDROCHLORIDE 1 MG/ML
INJECTION INTRAMUSCULAR; INTRAVENOUS PRN
Status: DISCONTINUED | OUTPATIENT
Start: 2024-01-10 | End: 2024-01-10 | Stop reason: SDUPTHER

## 2024-01-10 RX ORDER — ONDANSETRON 2 MG/ML
4 INJECTION INTRAMUSCULAR; INTRAVENOUS
Status: COMPLETED | OUTPATIENT
Start: 2024-01-10 | End: 2024-01-10

## 2024-01-10 RX ORDER — MORPHINE SULFATE 4 MG/ML
4 INJECTION, SOLUTION INTRAMUSCULAR; INTRAVENOUS
Status: DISCONTINUED | OUTPATIENT
Start: 2024-01-10 | End: 2024-01-11 | Stop reason: HOSPADM

## 2024-01-10 RX ORDER — ONDANSETRON 2 MG/ML
INJECTION INTRAMUSCULAR; INTRAVENOUS PRN
Status: DISCONTINUED | OUTPATIENT
Start: 2024-01-10 | End: 2024-01-10 | Stop reason: SDUPTHER

## 2024-01-10 RX ORDER — SODIUM CHLORIDE 9 MG/ML
INJECTION, SOLUTION INTRAVENOUS CONTINUOUS
Status: CANCELLED | OUTPATIENT
Start: 2024-01-10 | End: 2024-01-12

## 2024-01-10 RX ORDER — LOSARTAN POTASSIUM 25 MG/1
25 TABLET ORAL DAILY
Status: CANCELLED | OUTPATIENT
Start: 2024-01-10

## 2024-01-10 RX ORDER — SUCCINYLCHOLINE/SOD CL,ISO/PF 100 MG/5ML
SYRINGE (ML) INTRAVENOUS PRN
Status: DISCONTINUED | OUTPATIENT
Start: 2024-01-10 | End: 2024-01-10 | Stop reason: SDUPTHER

## 2024-01-10 RX ORDER — ACETAMINOPHEN 325 MG/1
650 TABLET ORAL EVERY 6 HOURS PRN
Status: CANCELLED | OUTPATIENT
Start: 2024-01-10

## 2024-01-10 RX ORDER — SODIUM CHLORIDE 9 MG/ML
INJECTION, SOLUTION INTRAVENOUS PRN
Status: DISCONTINUED | OUTPATIENT
Start: 2024-01-10 | End: 2024-01-10 | Stop reason: HOSPADM

## 2024-01-10 RX ORDER — FENTANYL CITRATE 50 UG/ML
25 INJECTION, SOLUTION INTRAMUSCULAR; INTRAVENOUS EVERY 5 MIN PRN
Status: DISCONTINUED | OUTPATIENT
Start: 2024-01-10 | End: 2024-01-10 | Stop reason: HOSPADM

## 2024-01-10 RX ORDER — LIDOCAINE HYDROCHLORIDE 20 MG/ML
INJECTION, SOLUTION EPIDURAL; INFILTRATION; INTRACAUDAL; PERINEURAL PRN
Status: DISCONTINUED | OUTPATIENT
Start: 2024-01-10 | End: 2024-01-10 | Stop reason: SDUPTHER

## 2024-01-10 RX ORDER — MORPHINE SULFATE 2 MG/ML
2 INJECTION, SOLUTION INTRAMUSCULAR; INTRAVENOUS ONCE
Status: COMPLETED | OUTPATIENT
Start: 2024-01-10 | End: 2024-01-10

## 2024-01-10 RX ORDER — POLYETHYLENE GLYCOL 3350 17 G/17G
17 POWDER, FOR SOLUTION ORAL DAILY PRN
Status: CANCELLED | OUTPATIENT
Start: 2024-01-10

## 2024-01-10 RX ORDER — HALOPERIDOL 5 MG/ML
INJECTION INTRAMUSCULAR PRN
Status: DISCONTINUED | OUTPATIENT
Start: 2024-01-10 | End: 2024-01-10 | Stop reason: SDUPTHER

## 2024-01-10 RX ORDER — POTASSIUM CHLORIDE 7.45 MG/ML
10 INJECTION INTRAVENOUS PRN
Status: DISCONTINUED | OUTPATIENT
Start: 2024-01-10 | End: 2024-01-11 | Stop reason: HOSPADM

## 2024-01-10 RX ORDER — SODIUM CHLORIDE 0.9 % (FLUSH) 0.9 %
10 SYRINGE (ML) INJECTION PRN
Status: CANCELLED | OUTPATIENT
Start: 2024-01-10

## 2024-01-10 RX ORDER — DIPHENHYDRAMINE HYDROCHLORIDE 50 MG/ML
25 INJECTION INTRAMUSCULAR; INTRAVENOUS EVERY 6 HOURS PRN
Status: DISCONTINUED | OUTPATIENT
Start: 2024-01-10 | End: 2024-01-11 | Stop reason: HOSPADM

## 2024-01-10 RX ORDER — ACETAMINOPHEN 650 MG/1
650 SUPPOSITORY RECTAL EVERY 6 HOURS PRN
Status: CANCELLED | OUTPATIENT
Start: 2024-01-10

## 2024-01-10 RX ORDER — CEFAZOLIN SODIUM 1 G/3ML
INJECTION, POWDER, FOR SOLUTION INTRAMUSCULAR; INTRAVENOUS PRN
Status: DISCONTINUED | OUTPATIENT
Start: 2024-01-10 | End: 2024-01-10 | Stop reason: SDUPTHER

## 2024-01-10 RX ORDER — PROPOFOL 10 MG/ML
INJECTION, EMULSION INTRAVENOUS PRN
Status: DISCONTINUED | OUTPATIENT
Start: 2024-01-10 | End: 2024-01-10 | Stop reason: SDUPTHER

## 2024-01-10 RX ORDER — ONDANSETRON 4 MG/1
4 TABLET, ORALLY DISINTEGRATING ORAL EVERY 8 HOURS PRN
Status: CANCELLED | OUTPATIENT
Start: 2024-01-10

## 2024-01-10 RX ORDER — HYDROMORPHONE HYDROCHLORIDE 2 MG/ML
INJECTION, SOLUTION INTRAMUSCULAR; INTRAVENOUS; SUBCUTANEOUS PRN
Status: DISCONTINUED | OUTPATIENT
Start: 2024-01-10 | End: 2024-01-10 | Stop reason: SDUPTHER

## 2024-01-10 RX ORDER — POTASSIUM CHLORIDE 20 MEQ/1
40 TABLET, EXTENDED RELEASE ORAL PRN
Status: DISCONTINUED | OUTPATIENT
Start: 2024-01-10 | End: 2024-01-11 | Stop reason: HOSPADM

## 2024-01-10 RX ORDER — HYDROMORPHONE HYDROCHLORIDE 1 MG/ML
0.5 INJECTION, SOLUTION INTRAMUSCULAR; INTRAVENOUS; SUBCUTANEOUS EVERY 5 MIN PRN
Status: DISCONTINUED | OUTPATIENT
Start: 2024-01-10 | End: 2024-01-10 | Stop reason: HOSPADM

## 2024-01-10 RX ORDER — ONDANSETRON 2 MG/ML
4 INJECTION INTRAMUSCULAR; INTRAVENOUS EVERY 6 HOURS PRN
Status: DISCONTINUED | OUTPATIENT
Start: 2024-01-10 | End: 2024-01-11 | Stop reason: HOSPADM

## 2024-01-10 RX ORDER — ONDANSETRON 2 MG/ML
4 INJECTION INTRAMUSCULAR; INTRAVENOUS ONCE
Status: COMPLETED | OUTPATIENT
Start: 2024-01-10 | End: 2024-01-10

## 2024-01-10 RX ORDER — POTASSIUM CHLORIDE 7.45 MG/ML
10 INJECTION INTRAVENOUS PRN
Status: CANCELLED | OUTPATIENT
Start: 2024-01-10

## 2024-01-10 RX ORDER — SODIUM CHLORIDE 9 MG/ML
INJECTION, SOLUTION INTRAVENOUS PRN
Status: DISCONTINUED | OUTPATIENT
Start: 2024-01-10 | End: 2024-01-11 | Stop reason: HOSPADM

## 2024-01-10 RX ORDER — MORPHINE SULFATE 4 MG/ML
4 INJECTION, SOLUTION INTRAMUSCULAR; INTRAVENOUS
Status: CANCELLED | OUTPATIENT
Start: 2024-01-10

## 2024-01-10 RX ORDER — ONDANSETRON 4 MG/1
4 TABLET, ORALLY DISINTEGRATING ORAL EVERY 8 HOURS PRN
Status: DISCONTINUED | OUTPATIENT
Start: 2024-01-10 | End: 2024-01-11 | Stop reason: HOSPADM

## 2024-01-10 RX ORDER — SODIUM CHLORIDE 0.9 % (FLUSH) 0.9 %
5-40 SYRINGE (ML) INJECTION PRN
Status: DISCONTINUED | OUTPATIENT
Start: 2024-01-10 | End: 2024-01-10 | Stop reason: HOSPADM

## 2024-01-10 RX ADMIN — HYDROMORPHONE HYDROCHLORIDE 0.5 MG: 2 INJECTION, SOLUTION INTRAMUSCULAR; INTRAVENOUS; SUBCUTANEOUS at 17:45

## 2024-01-10 RX ADMIN — SODIUM CHLORIDE: 9 INJECTION, SOLUTION INTRAVENOUS at 21:10

## 2024-01-10 RX ADMIN — FENTANYL CITRATE 50 MCG: 50 INJECTION, SOLUTION INTRAMUSCULAR; INTRAVENOUS at 16:50

## 2024-01-10 RX ADMIN — CYCLOBENZAPRINE 10 MG: 10 TABLET, FILM COATED ORAL at 21:08

## 2024-01-10 RX ADMIN — FENTANYL CITRATE 75 MCG: 50 INJECTION, SOLUTION INTRAMUSCULAR; INTRAVENOUS at 16:45

## 2024-01-10 RX ADMIN — MORPHINE SULFATE 2 MG: 2 INJECTION, SOLUTION INTRAMUSCULAR; INTRAVENOUS at 10:15

## 2024-01-10 RX ADMIN — FENTANYL CITRATE 50 MCG: 50 INJECTION, SOLUTION INTRAMUSCULAR; INTRAVENOUS at 16:54

## 2024-01-10 RX ADMIN — Medication 100 MG: at 16:20

## 2024-01-10 RX ADMIN — PROPOFOL 200 MG: 10 INJECTION, EMULSION INTRAVENOUS at 16:20

## 2024-01-10 RX ADMIN — MIDAZOLAM 2 MG: 1 INJECTION INTRAMUSCULAR; INTRAVENOUS at 18:33

## 2024-01-10 RX ADMIN — KETOROLAC TROMETHAMINE 30 MG: 30 INJECTION, SOLUTION INTRAMUSCULAR at 18:12

## 2024-01-10 RX ADMIN — HYDROMORPHONE HYDROCHLORIDE 0.5 MG: 2 INJECTION, SOLUTION INTRAMUSCULAR; INTRAVENOUS; SUBCUTANEOUS at 17:36

## 2024-01-10 RX ADMIN — HYDROMORPHONE HYDROCHLORIDE 0.5 MG: 1 INJECTION, SOLUTION INTRAMUSCULAR; INTRAVENOUS; SUBCUTANEOUS at 14:24

## 2024-01-10 RX ADMIN — ONDANSETRON 4 MG: 2 INJECTION INTRAMUSCULAR; INTRAVENOUS at 10:15

## 2024-01-10 RX ADMIN — OXYCODONE HYDROCHLORIDE AND ACETAMINOPHEN 1 TABLET: 5; 325 TABLET ORAL at 21:08

## 2024-01-10 RX ADMIN — HYDROMORPHONE HYDROCHLORIDE 0.5 MG: 2 INJECTION, SOLUTION INTRAMUSCULAR; INTRAVENOUS; SUBCUTANEOUS at 18:22

## 2024-01-10 RX ADMIN — SODIUM CHLORIDE: 9 INJECTION, SOLUTION INTRAVENOUS at 10:15

## 2024-01-10 RX ADMIN — MIDAZOLAM 2 MG: 1 INJECTION INTRAMUSCULAR; INTRAVENOUS at 16:14

## 2024-01-10 RX ADMIN — ONDANSETRON 4 MG: 2 INJECTION INTRAMUSCULAR; INTRAVENOUS at 18:08

## 2024-01-10 RX ADMIN — IOPAMIDOL 75 ML: 755 INJECTION, SOLUTION INTRAVENOUS at 10:52

## 2024-01-10 RX ADMIN — FENTANYL CITRATE 75 MCG: 50 INJECTION, SOLUTION INTRAMUSCULAR; INTRAVENOUS at 16:20

## 2024-01-10 RX ADMIN — DEXAMETHASONE SODIUM PHOSPHATE 10 MG: 10 INJECTION, SOLUTION INTRAMUSCULAR; INTRAVENOUS at 16:29

## 2024-01-10 RX ADMIN — SODIUM CHLORIDE 80 ML: 9 INJECTION, SOLUTION INTRAVENOUS at 10:52

## 2024-01-10 RX ADMIN — SODIUM CHLORIDE, POTASSIUM CHLORIDE, SODIUM LACTATE AND CALCIUM CHLORIDE: 600; 310; 30; 20 INJECTION, SOLUTION INTRAVENOUS at 16:14

## 2024-01-10 RX ADMIN — Medication 20 MG: at 17:26

## 2024-01-10 RX ADMIN — Medication 50 MG: at 16:36

## 2024-01-10 RX ADMIN — CEFAZOLIN 2 G: 1 INJECTION, POWDER, FOR SOLUTION INTRAMUSCULAR; INTRAVENOUS at 16:35

## 2024-01-10 RX ADMIN — SODIUM CHLORIDE, PRESERVATIVE FREE 10 ML: 5 INJECTION INTRAVENOUS at 10:52

## 2024-01-10 RX ADMIN — ONDANSETRON 4 MG: 2 INJECTION INTRAMUSCULAR; INTRAVENOUS at 19:35

## 2024-01-10 RX ADMIN — SUGAMMADEX 200 MG: 100 INJECTION, SOLUTION INTRAVENOUS at 18:10

## 2024-01-10 RX ADMIN — HALOPERIDOL LACTATE 5 MG: 5 INJECTION, SOLUTION INTRAMUSCULAR at 18:33

## 2024-01-10 RX ADMIN — LIDOCAINE HYDROCHLORIDE 100 MG: 20 INJECTION, SOLUTION EPIDURAL; INFILTRATION; INTRACAUDAL; PERINEURAL at 16:20

## 2024-01-10 RX ADMIN — SODIUM CHLORIDE, POTASSIUM CHLORIDE, SODIUM LACTATE AND CALCIUM CHLORIDE: 600; 310; 30; 20 INJECTION, SOLUTION INTRAVENOUS at 16:29

## 2024-01-10 ASSESSMENT — PAIN DESCRIPTION - PAIN TYPE: TYPE: SURGICAL PAIN

## 2024-01-10 ASSESSMENT — PAIN SCALES - GENERAL
PAINLEVEL_OUTOF10: 7
PAINLEVEL_OUTOF10: 7
PAINLEVEL_OUTOF10: 6
PAINLEVEL_OUTOF10: 7

## 2024-01-10 ASSESSMENT — PAIN DESCRIPTION - LOCATION
LOCATION: ABDOMEN

## 2024-01-10 ASSESSMENT — PAIN DESCRIPTION - ORIENTATION
ORIENTATION: MID
ORIENTATION: ANTERIOR

## 2024-01-10 ASSESSMENT — ENCOUNTER SYMPTOMS
DIARRHEA: 0
COLOR CHANGE: 0
ABDOMINAL PAIN: 1
VOMITING: 0
SHORTNESS OF BREATH: 0
NAUSEA: 0
BACK PAIN: 0
COUGH: 0

## 2024-01-10 ASSESSMENT — PAIN DESCRIPTION - DESCRIPTORS
DESCRIPTORS: SHARP
DESCRIPTORS: ACHING;SORE
DESCRIPTORS: SHARP

## 2024-01-10 ASSESSMENT — PAIN DESCRIPTION - FREQUENCY: FREQUENCY: INTERMITTENT

## 2024-01-10 ASSESSMENT — PAIN - FUNCTIONAL ASSESSMENT: PAIN_FUNCTIONAL_ASSESSMENT: 0-10

## 2024-01-10 NOTE — OP NOTE
defect.  Superiorly omentum was taken down uncovering the mesh.  The superior and right lateral borders of the mesh was flat against the abdominal wall.  The remaining portions of the mesh was crumpled and was pulled into the umbilical defect.  There was no direct communication from the umbilical skin to the mesh but the umbilical bleeding was likely the mesh eroding through the skin.  Decision was made to explant the mesh.  The mesh was taken down from the abdominal wall with scissor electrocautery.  The resulting umbilical fascial defect measured 5 cm.  Decision was made to close primarily without mesh because of concern the hernia sac communicates to the umbilicus.  The insufflation was placed down to 10 mmHg and the umbilical defect was reapproximated using two 0 V-Loc sutures.  Once the umbilical fascia was close,  the old mesh was placed into an endocatch bag and removed from the abdomen through the 12 mm port site.  The instruments were removed and the robot was undocked.  The 12 mm port site was closed with 0-Vicryl stitch with a Tai Lee suture needle passer. The remaining ports were then removed.  The port site skin was reapproximated using 4-0 Monocryl. Marcaine 0.5% was used to anesthetize all incision sites. Dermabond was placed over the incision.     The patient tolerated the procedure well. There were no complications.  Patient was extubated and taken to recovery in stable condition.  All sponge, instrument and needle counts were correct prior to skin closure.    Electronically signed by Martinez Santacruz IV, DO on 1/10/2024 at 6:05 PM

## 2024-01-10 NOTE — ANESTHESIA PRE PROCEDURE
Department of Anesthesiology  Preprocedure Note       Name:  Melvin Sierra   Age:  48 y.o.  :  1975                                          MRN:  [id number]         Date:  1/10/2024      Surgeon: Surgeon(s):  Martinez Santacruz IV, DO    Procedure: Procedure(s):  HERNIA UMBILICAL REPAIR LAPAROSCOPIC ROBOTIC XI    Medications prior to admission:   Prior to Admission medications    Medication Sig Start Date End Date Taking? Authorizing Provider   meclizine (ANTIVERT) 12.5 MG tablet Take 1 tablet by mouth 3 times daily as needed    Provider, MD Christo   losartan (COZAAR) 25 MG tablet Take 1 tablet by mouth daily 23   Ed Palafox APRN - CNP   amitriptyline (ELAVIL) 25 MG tablet Take 1 tablet by mouth nightly  Patient not taking: Reported on 2023   Ed Palafox APRN - CNP       Current medications:    Current Facility-Administered Medications   Medication Dose Route Frequency Provider Last Rate Last Admin   • 0.9 % sodium chloride infusion   IntraVENous Continuous Ginette Contreras APRN -  mL/hr at 01/10/24 1015 New Bag at 01/10/24 1015     Current Outpatient Medications   Medication Sig Dispense Refill   • meclizine (ANTIVERT) 12.5 MG tablet Take 1 tablet by mouth 3 times daily as needed     • losartan (COZAAR) 25 MG tablet Take 1 tablet by mouth daily 90 tablet 1   • amitriptyline (ELAVIL) 25 MG tablet Take 1 tablet by mouth nightly (Patient not taking: Reported on 2023) 30 tablet 2       Allergies:    Allergies   Allergen Reactions   • Augmentin [Amoxicillin-Pot Clavulanate] Rash       Problem List:    Patient Active Problem List   Diagnosis Code   • Unspecified acute appendicitis K35.80   • Major depressive disorder with current active episode F32.9   • Obesity (BMI 30-39.9) E66.9   • Acute deep vein thrombosis (DVT) of femoral vein of left lower extremity (HCC) I82.412   • Sigmoid diverticulosis K57.30   • Lumbosacral spondylosis without myelopathy

## 2024-01-10 NOTE — PLAN OF CARE
Medical admission being deferred to general surgery as patient being taken emergently to surgery. Will gladly consult on patient post operatively if needed.    Apurva Leslie, APRN - CNP

## 2024-01-10 NOTE — ED PROVIDER NOTES
103 (*)     All other components within normal limits   CBC WITH AUTO DIFFERENTIAL - Abnormal; Notable for the following components:    Neutrophils % 73 (*)     Lymphocytes % 19 (*)     Immature Granulocytes 1 (*)     All other components within normal limits   HEPATIC FUNCTION PANEL - Abnormal; Notable for the following components:    Total Bilirubin 0.2 (*)     All other components within normal limits   LIPASE       All other labs were within normal range or not returned as of this dictation.    EMERGENCY DEPARTMENT COURSE and DIFFERENTIAL DIAGNOSIS/MDM:   Vitals:    Vitals:    01/10/24 0952 01/10/24 1344   BP: (!) 143/99 (!) 142/95   Pulse: (!) 102 80   Resp: 16 18   Temp: 98.5 °F (36.9 °C)    TempSrc: Oral    SpO2: 96% 96%   Weight: 115.7 kg (255 lb)          MEDICATIONS GIVEN IN THE ED:  Medications   0.9 % sodium chloride infusion ( IntraVENous New Bag 1/10/24 1015)   ondansetron (ZOFRAN) injection 4 mg (4 mg IntraVENous Given 1/10/24 1015)   morphine (PF) injection 2 mg (2 mg IntraVENous Given 1/10/24 1015)   sodium chloride 0.9 % bolus 80 mL (0 mLs IntraVENous Stopped 1/10/24 1053)   iopamidol (ISOVUE-370) 76 % injection 75 mL (75 mLs IntraVENous Given 1/10/24 1052)   sodium chloride flush 0.9 % injection 10 mL (10 mLs IntraVENous Given 1/10/24 1052)   HYDROmorphone (DILAUDID) injection 0.5 mg (0.5 mg IntraVENous Given 1/10/24 1424)         ED ORDERS:  Orders Placed This Encounter   Procedures    Culture, Wound     Standing Status:   Standing     Number of Occurrences:   1    CT ABDOMEN PELVIS W IV CONTRAST Additional Contrast? None     Standing Status:   Standing     Number of Occurrences:   1     Order Specific Question:   Reason for exam:     Answer:   mid abd pain     Order Specific Question:   Additional Contrast?     Answer:   None     Order Specific Question:   Decision Support Exception - unselect if not a suspected or confirmed emergency medical condition     Answer:   Emergency Medical Condition

## 2024-01-10 NOTE — DISCHARGE INSTRUCTIONS
falls off.     If you have staples closing the cut, you will need to visit your doctor in 1 to 2 weeks to have them removed.     Wash the area daily with warm, soapy water and pat it dry.   Follow-up care is a key part of your treatment and safety. Be sure to make and go to all appointments, and call your doctor if you are having problems. It's also a good idea to know your test results and keep a list of the medicines you take.  When should you call for help?   Call 911 anytime you think you may need emergency care. For example, call if:    You passed out (lost consciousness).     You are short of breath.   Call your doctor now or seek immediate medical care if:    You have pain that does not get better after you take pain medicine.     You are sick to your stomach and cannot keep fluids down.     You have signs of a blood clot in your leg (called a deep vein thrombosis), such as:  Pain in your calf, back of the knee, thigh, or groin.  Redness and swelling in your leg or groin.     You cannot pass stools or gas.     Bright red blood has soaked through the bandage over your incision.     You have loose stitches, or your incision comes open.     You have signs of infection, such as:  Increased pain, swelling, warmth, or redness.  Red streaks leading from the incision.  Pus draining from the incision.  A fever.   Watch closely for any changes in your health, and be sure to contact your doctor if you have any problems.  Where can you learn more?  Go to [email or web address]://[email or web address] [facility] [employer]net/patientEd and enter G367 to learn more about \"Hernia Repair: What to Expect at Home.\"  Current as of: November 30, 2022               Content Version: 13.7  © 1235-7507 [employer].   Care instructions adapted under license by [employer]. If you have questions about a medical condition or this instruction, always ask your healthcare professional. [employer] disclaims any warranty or liability for your use of this

## 2024-01-10 NOTE — ED NOTES
Pt to er with c/o abdominal pain. Pt states he has hx of umbilical hernia. Pt states he had surgery approximately 3 years ago to fix hernia. Pt states he has had pain to area and has noticed drainage and odor from umbilical area. Pt denies know fever or chills. Pt states he has an appointment with Dr. John on 1/17. Pt a&ox3. Skin warm and dry. Respirations even and non-labored.

## 2024-01-11 VITALS
OXYGEN SATURATION: 94 % | TEMPERATURE: 97.9 F | WEIGHT: 255 LBS | SYSTOLIC BLOOD PRESSURE: 122 MMHG | RESPIRATION RATE: 18 BRPM | BODY MASS INDEX: 34.58 KG/M2 | HEART RATE: 86 BPM | DIASTOLIC BLOOD PRESSURE: 85 MMHG

## 2024-01-11 PROBLEM — M54.16 LUMBAR RADICULOPATHY: Status: ACTIVE | Noted: 2023-05-09

## 2024-01-11 PROBLEM — I10 PRIMARY HYPERTENSION: Status: ACTIVE | Noted: 2024-01-11

## 2024-01-11 PROBLEM — K43.6 INCARCERATED VENTRAL HERNIA: Status: ACTIVE | Noted: 2020-07-29

## 2024-01-11 PROBLEM — M47.817 SPONDYLOSIS OF LUMBOSACRAL SPINE WITHOUT MYELOPATHY: Status: ACTIVE | Noted: 2024-01-11

## 2024-01-11 LAB
ANION GAP SERPL CALCULATED.3IONS-SCNC: 12 MMOL/L (ref 9–17)
BASOPHILS # BLD: <0.03 K/UL (ref 0–0.2)
BASOPHILS NFR BLD: 0 % (ref 0–2)
BUN SERPL-MCNC: 17 MG/DL (ref 6–20)
BUN/CREAT SERPL: 15 (ref 9–20)
CALCIUM SERPL-MCNC: 8.8 MG/DL (ref 8.6–10.4)
CHLORIDE SERPL-SCNC: 106 MMOL/L (ref 98–107)
CO2 SERPL-SCNC: 24 MMOL/L (ref 20–31)
CREAT SERPL-MCNC: 1.1 MG/DL (ref 0.7–1.2)
EOSINOPHIL # BLD: <0.03 K/UL (ref 0–0.44)
EOSINOPHILS RELATIVE PERCENT: 0 % (ref 1–4)
ERYTHROCYTE [DISTWIDTH] IN BLOOD BY AUTOMATED COUNT: 14 % (ref 11.8–14.4)
GFR SERPL CREATININE-BSD FRML MDRD: >60 ML/MIN/1.73M2
GLUCOSE SERPL-MCNC: 131 MG/DL (ref 70–99)
HCT VFR BLD AUTO: 42.9 % (ref 40.7–50.3)
HGB BLD-MCNC: 14 G/DL (ref 13–17)
IMM GRANULOCYTES # BLD AUTO: 0.07 K/UL (ref 0–0.3)
IMM GRANULOCYTES NFR BLD: 1 %
LYMPHOCYTES NFR BLD: 1.32 K/UL (ref 1.1–3.7)
LYMPHOCYTES RELATIVE PERCENT: 9 % (ref 24–43)
MCH RBC QN AUTO: 28.7 PG (ref 25.2–33.5)
MCHC RBC AUTO-ENTMCNC: 32.6 G/DL (ref 28.4–34.8)
MCV RBC AUTO: 87.9 FL (ref 82.6–102.9)
MONOCYTES NFR BLD: 0.82 K/UL (ref 0.1–1.2)
MONOCYTES NFR BLD: 6 % (ref 3–12)
NEUTROPHILS NFR BLD: 84 % (ref 36–65)
NEUTS SEG NFR BLD: 12.26 K/UL (ref 1.5–8.1)
NRBC BLD-RTO: 0 PER 100 WBC
PLATELET # BLD AUTO: 314 K/UL (ref 138–453)
PMV BLD AUTO: 9.1 FL (ref 8.1–13.5)
POTASSIUM SERPL-SCNC: 4.8 MMOL/L (ref 3.7–5.3)
RBC # BLD AUTO: 4.88 M/UL (ref 4.21–5.77)
SODIUM SERPL-SCNC: 142 MMOL/L (ref 135–144)
WBC OTHER # BLD: 14.5 K/UL (ref 3.5–11.3)

## 2024-01-11 PROCEDURE — 80048 BASIC METABOLIC PNL TOTAL CA: CPT

## 2024-01-11 PROCEDURE — 6360000002 HC RX W HCPCS: Performed by: NURSE PRACTITIONER

## 2024-01-11 PROCEDURE — 36415 COLL VENOUS BLD VENIPUNCTURE: CPT

## 2024-01-11 PROCEDURE — 96374 THER/PROPH/DIAG INJ IV PUSH: CPT

## 2024-01-11 PROCEDURE — 2580000003 HC RX 258: Performed by: SURGERY

## 2024-01-11 PROCEDURE — 6370000000 HC RX 637 (ALT 250 FOR IP): Performed by: SURGERY

## 2024-01-11 PROCEDURE — G0378 HOSPITAL OBSERVATION PER HR: HCPCS

## 2024-01-11 PROCEDURE — 2580000003 HC RX 258: Performed by: NURSE PRACTITIONER

## 2024-01-11 PROCEDURE — 85025 COMPLETE CBC W/AUTO DIFF WBC: CPT

## 2024-01-11 RX ORDER — IBUPROFEN 800 MG/1
800 TABLET ORAL EVERY 8 HOURS PRN
Qty: 120 TABLET | Refills: 3 | Status: SHIPPED | OUTPATIENT
Start: 2024-01-11

## 2024-01-11 RX ORDER — CYCLOBENZAPRINE HCL 10 MG
10 TABLET ORAL 3 TIMES DAILY PRN
Qty: 30 TABLET | Refills: 0 | Status: SHIPPED | OUTPATIENT
Start: 2024-01-11 | End: 2024-01-21

## 2024-01-11 RX ORDER — DOCUSATE SODIUM 100 MG/1
100 CAPSULE, LIQUID FILLED ORAL 2 TIMES DAILY
Qty: 60 CAPSULE | Refills: 0 | Status: SHIPPED | OUTPATIENT
Start: 2024-01-11 | End: 2024-02-10

## 2024-01-11 RX ORDER — OXYCODONE HYDROCHLORIDE AND ACETAMINOPHEN 5; 325 MG/1; MG/1
1 TABLET ORAL EVERY 6 HOURS PRN
Qty: 12 TABLET | Refills: 0 | Status: SHIPPED | OUTPATIENT
Start: 2024-01-11 | End: 2024-01-14

## 2024-01-11 RX ORDER — ALPRAZOLAM 0.5 MG/1
0.5 TABLET ORAL 2 TIMES DAILY PRN
Status: DISCONTINUED | OUTPATIENT
Start: 2024-01-11 | End: 2024-01-11 | Stop reason: HOSPADM

## 2024-01-11 RX ORDER — ONDANSETRON 4 MG/1
4 TABLET, ORALLY DISINTEGRATING ORAL EVERY 8 HOURS PRN
Qty: 30 TABLET | Refills: 0 | Status: SHIPPED | OUTPATIENT
Start: 2024-01-11 | End: 2024-01-23

## 2024-01-11 RX ADMIN — OXYCODONE HYDROCHLORIDE AND ACETAMINOPHEN 1 TABLET: 5; 325 TABLET ORAL at 09:34

## 2024-01-11 RX ADMIN — SODIUM CHLORIDE, PRESERVATIVE FREE 10 ML: 5 INJECTION INTRAVENOUS at 08:10

## 2024-01-11 RX ADMIN — DIPHENHYDRAMINE HYDROCHLORIDE 25 MG: 50 INJECTION INTRAMUSCULAR; INTRAVENOUS at 00:43

## 2024-01-11 RX ADMIN — CYCLOBENZAPRINE 10 MG: 10 TABLET, FILM COATED ORAL at 05:30

## 2024-01-11 RX ADMIN — Medication 2000 MG: at 08:10

## 2024-01-11 RX ADMIN — OXYCODONE HYDROCHLORIDE AND ACETAMINOPHEN 1 TABLET: 5; 325 TABLET ORAL at 03:21

## 2024-01-11 RX ADMIN — SODIUM CHLORIDE: 9 INJECTION, SOLUTION INTRAVENOUS at 05:31

## 2024-01-11 RX ADMIN — Medication 2000 MG: at 00:44

## 2024-01-11 ASSESSMENT — PAIN DESCRIPTION - ORIENTATION
ORIENTATION: ANTERIOR
ORIENTATION: ANTERIOR

## 2024-01-11 ASSESSMENT — ENCOUNTER SYMPTOMS
ABDOMINAL PAIN: 1
VOMITING: 0
EYES NEGATIVE: 1
RESPIRATORY NEGATIVE: 1
DIARRHEA: 0
NAUSEA: 0

## 2024-01-11 ASSESSMENT — PAIN DESCRIPTION - LOCATION
LOCATION: ABDOMEN

## 2024-01-11 ASSESSMENT — PAIN DESCRIPTION - PAIN TYPE
TYPE: SURGICAL PAIN

## 2024-01-11 ASSESSMENT — PAIN SCALES - GENERAL
PAINLEVEL_OUTOF10: 4
PAINLEVEL_OUTOF10: 7
PAINLEVEL_OUTOF10: 5
PAINLEVEL_OUTOF10: 7
PAINLEVEL_OUTOF10: 5

## 2024-01-11 ASSESSMENT — PAIN DESCRIPTION - DESCRIPTORS
DESCRIPTORS: SORE
DESCRIPTORS: ACHING
DESCRIPTORS: ACHING

## 2024-01-11 ASSESSMENT — PAIN - FUNCTIONAL ASSESSMENT: PAIN_FUNCTIONAL_ASSESSMENT: ACTIVITIES ARE NOT PREVENTED

## 2024-01-11 NOTE — PROGRESS NOTES
Patient discharged via wheelchair to home with all his belongings in stable condition. Patient understood and acknowledged AVS.

## 2024-01-11 NOTE — CARE COORDINATION
Potential DME:    Patient expects to discharge to: House  Plan for transportation at discharge: Family    Financial    Payor: [employer] / Plan: [employer] / Product Type: *No Product type* /     Does insurance require precert for SNF: Yes    Potential assistance Purchasing Medications: No  Meds-to-Beds request: No      [id number] - [place], [place] - [address] -  [phone] - F [phone]  [address] [facility] [address]  Phone: [phone] Fax: [phone]    [id number] - [place], [place] - [address]  - P [phone] - F [phone] [address] [facility] [address]  Phone: [phone] Fax: [phone]      Notes:    Factors facilitating achievement of predicted outcomes: Motivated, Cooperative, and Pleasant    Barriers to discharge: none    Additional Case Management Notes:   Patient plans to return home independently. Patient states that he will have transportation home and he verbalizes having no transitional needs at this time.    The Plan for Transition of Care is related to the following treatment goals of Abdominal pain [R10.9]  Umbilical hernia with obstruction, without gangrene [K42.0]  Abdominal pain, unspecified abdominal location [R10.9]  Incarcerated umbilical hernia [K42.0]    IF APPLICABLE: The Patient and/or patient representative Melvin and his family were provided with a choice of provider and agrees with the discharge plan. Freedom of choice list with basic dialogue that supports the patient's individualized plan of care/goals and shares the quality data associated with the providers was provided to: Patient   Patient Representative Name:       The Patient and/or Patient Representative Agree with the Discharge Plan? Yes    Mariah Gupta RN  Case Management Department  Ph: [phone]
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
## 2024-01-11 NOTE — PROGRESS NOTES
General Surgery:  Daily Progress Note          POD # 1 s/p robotics umbilical hernia repair and explantation of mesh          PATIENT NAME: Melvin Sierra     TODAY'S DATE: 1/11/2024, 6:14 AM    SUBJECTIVE:     Pt seen and examined at bedside.    He reports no complaints this am. He denies fever, chills,nausea, vomiting, or pain. Tolerated regular diet    OBJECTIVE:   VITALS:  BP (!) 134/91   Pulse 87   Temp 98.2 °F (36.8 °C)   Resp 20   Wt 115.7 kg (255 lb)   SpO2 94%   BMI 34.58 kg/m²      INTAKE/OUTPUT:      Intake/Output Summary (Last 24 hours) at 1/11/2024 0614  Last data filed at 1/11/2024 0318  Gross per 24 hour   Intake 1400 ml   Output 1150 ml   Net 250 ml       PHYSICAL EXAM:  General Appearance: awake, alert, oriented, in no acute distress  HEENT:  Normocephalic, atraumatic, mucus membranes moist   Heart: Heart regular rate and rhythm  Lungs: Normal expansion.  Clear to auscultation.  No rales, rhonchi, or wheezing.  Abdomen:soft, nontender, incision with skin glue intact, no erythema or drainage. Umbilical dressing removed     Extremities: No cyanosis, pitting edema, rashes noted.    Skin: Skin color, texture, turgor normal. No rashes or lesions.      Data:  CBC:   Lab Results   Component Value Date/Time    WBC 14.5 01/11/2024 05:47 AM    RBC 4.88 01/11/2024 05:47 AM    RBC 4.71 11/01/2011 12:27 AM    HGB 14.0 01/11/2024 05:47 AM    HCT 42.9 01/11/2024 05:47 AM    MCV 87.9 01/11/2024 05:47 AM    MCH 28.7 01/11/2024 05:47 AM    MCHC 32.6 01/11/2024 05:47 AM    RDW 14.0 01/11/2024 05:47 AM     01/11/2024 05:47 AM     11/01/2011 12:27 AM    MPV 9.1 01/11/2024 05:47 AM     BMP:    Lab Results   Component Value Date/Time     01/10/2024 10:00 AM    K 4.5 01/10/2024 10:00 AM     01/10/2024 10:00 AM    CO2 24 01/10/2024 10:00 AM    BUN 17 01/10/2024 10:00 AM    LABALBU 4.4 01/10/2024 10:00 AM    CREATININE 1.0 01/10/2024 10:00 AM    CALCIUM 9.5 01/10/2024 10:00 AM    GFRAA >60

## 2024-01-11 NOTE — PLAN OF CARE
Patient's pain controlled, passing gas. Patient's sites c,d,I. Patient up ambulating, gait steady.   Problem: Discharge Planning  Goal: Discharge to home or other facility with appropriate resources  Outcome: Progressing     Problem: Pain  Goal: Verbalizes/displays adequate comfort level or baseline comfort level  Outcome: Progressing

## 2024-01-11 NOTE — PLAN OF CARE
Per RN, patient has already been discharged this morning. Patient denies having any issues or needs at this time. If something is needed by IM or his discharge is held up for any reason please re-consult. Otherwise, IM will sign off at this time.     Apurva Leslie, APRN - CNP

## 2024-01-11 NOTE — PLAN OF CARE
Pain at 5/10 upon assessment. Patient reports no other issues and is ready for discharge after breakfast.    Problem: Discharge Planning  Goal: Discharge to home or other facility with appropriate resources  1/11/2024 0823 by Ping Coleman RN  Outcome: Adequate for Discharge  Flowsheets (Taken 1/11/2024 0740)  Discharge to home or other facility with appropriate resources:   Identify barriers to discharge with patient and caregiver   Arrange for needed discharge resources and transportation as appropriate   Identify discharge learning needs (meds, wound care, etc)   Refer to discharge planning if patient needs post-hospital services based on physician order or complex needs related to functional status, cognitive ability or social support system  1/11/2024 0533 by Virgie Sabillon, RN  Outcome: Progressing     Problem: Pain  Goal: Verbalizes/displays adequate comfort level or baseline comfort level  1/11/2024 0533 by Virgie Sabillon, RN  Outcome: Progressing

## 2024-01-12 LAB
MICROORGANISM SPEC CULT: ABNORMAL
MICROORGANISM/AGENT SPEC: ABNORMAL
MICROORGANISM/AGENT SPEC: ABNORMAL
SPECIMEN DESCRIPTION: ABNORMAL

## 2024-01-12 NOTE — ANESTHESIA POSTPROCEDURE EVALUATION
Department of Anesthesiology  Postprocedure Note    Patient: Melvin Sierra  MRN: [id number]  YOB: 1975  Date of evaluation: 1/12/2024    Procedure Summary     Date: 01/10/24 Room / Location: [address]    Anesthesia Start: 1614 Anesthesia Stop: 1846    Procedure: HERNIA UMBILICAL REPAIR LAPAROSCOPIC ROBOTIC XI (Abdomen) Diagnosis:       Incarcerated hernia      (Incarcerated hernia [K46.0])    Surgeons: Martinez Santacruz IV, DO Responsible Provider: Lisa Jeffers MD    Anesthesia Type: general ASA Status: 2          Anesthesia Type: No value filed.    Scotty Phase I: Scotty Score: 8    Scotty Phase II:      Anesthesia Post Evaluation    Patient location during evaluation: PACU  Patient participation: complete - patient participated  Level of consciousness: awake  Airway patency: patent  Nausea & Vomiting: no nausea  Cardiovascular status: blood pressure returned to baseline  Respiratory status: acceptable  Hydration status: euvolemic  Comments: Multimodal analgesia pain management as indicated by procedure  Multimodal analgesia pain management approach  Pain management: adequate        No notable events documented.

## 2024-01-18 ENCOUNTER — OFFICE VISIT (OUTPATIENT)
Dept: FAMILY MEDICINE CLINIC | Age: 49
End: 2024-01-18
Payer: COMMERCIAL

## 2024-01-18 VITALS
OXYGEN SATURATION: 98 % | HEART RATE: 89 BPM | BODY MASS INDEX: 36.95 KG/M2 | SYSTOLIC BLOOD PRESSURE: 128 MMHG | DIASTOLIC BLOOD PRESSURE: 80 MMHG | TEMPERATURE: 97.8 F | HEIGHT: 72 IN | WEIGHT: 272.8 LBS

## 2024-01-18 DIAGNOSIS — Z87.19 S/P HERNIA REPAIR: Primary | ICD-10-CM

## 2024-01-18 DIAGNOSIS — S90.512A ABRASION OF LEFT ANKLE, INITIAL ENCOUNTER: ICD-10-CM

## 2024-01-18 DIAGNOSIS — Z98.890 S/P HERNIA REPAIR: Primary | ICD-10-CM

## 2024-01-18 DIAGNOSIS — H57.89 EYE REDNESS: ICD-10-CM

## 2024-01-18 PROCEDURE — 3074F SYST BP LT 130 MM HG: CPT | Performed by: NURSE PRACTITIONER

## 2024-01-18 PROCEDURE — 3079F DIAST BP 80-89 MM HG: CPT | Performed by: NURSE PRACTITIONER

## 2024-01-18 PROCEDURE — 99214 OFFICE O/P EST MOD 30 MIN: CPT | Performed by: NURSE PRACTITIONER

## 2024-01-18 ASSESSMENT — ENCOUNTER SYMPTOMS
SORE THROAT: 0
COUGH: 0
NAUSEA: 0
ABDOMINAL PAIN: 1
SHORTNESS OF BREATH: 0
BACK PAIN: 0
SINUS PAIN: 0
VOMITING: 0
EYE PAIN: 0
DIARRHEA: 0

## 2024-01-18 ASSESSMENT — PATIENT HEALTH QUESTIONNAIRE - PHQ9
2. FEELING DOWN, DEPRESSED OR HOPELESS: 1
SUM OF ALL RESPONSES TO PHQ QUESTIONS 1-9: 9
SUM OF ALL RESPONSES TO PHQ9 QUESTIONS 1 & 2: 3
7. TROUBLE CONCENTRATING ON THINGS, SUCH AS READING THE NEWSPAPER OR WATCHING TELEVISION: 1
SUM OF ALL RESPONSES TO PHQ QUESTIONS 1-9: 9
4. FEELING TIRED OR HAVING LITTLE ENERGY: 3
6. FEELING BAD ABOUT YOURSELF - OR THAT YOU ARE A FAILURE OR HAVE LET YOURSELF OR YOUR FAMILY DOWN: 0
10. IF YOU CHECKED OFF ANY PROBLEMS, HOW DIFFICULT HAVE THESE PROBLEMS MADE IT FOR YOU TO DO YOUR WORK, TAKE CARE OF THINGS AT HOME, OR GET ALONG WITH OTHER PEOPLE: 1
1. LITTLE INTEREST OR PLEASURE IN DOING THINGS: 2
5. POOR APPETITE OR OVEREATING: 0
3. TROUBLE FALLING OR STAYING ASLEEP: 2
SUM OF ALL RESPONSES TO PHQ QUESTIONS 1-9: 9
9. THOUGHTS THAT YOU WOULD BE BETTER OFF DEAD, OR OF HURTING YOURSELF: 0
SUM OF ALL RESPONSES TO PHQ QUESTIONS 1-9: 9
8. MOVING OR SPEAKING SO SLOWLY THAT OTHER PEOPLE COULD HAVE NOTICED. OR THE OPPOSITE, BEING SO FIGETY OR RESTLESS THAT YOU HAVE BEEN MOVING AROUND A LOT MORE THAN USUAL: 0
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
## 2024-01-18 NOTE — PROGRESS NOTES
Visit Information    Have you changed or started any medications since your last visit including any over-the-counter medicines, vitamins, or herbal medicines? no   Have you stopped taking any of your medications? Is so, why? -  no  Are you having any side effects from any of your medications? - no    Have you seen any other physician or provider since your last visit?  no   Have you had any other diagnostic tests since your last visit?  no   Have you been seen in the emergency room and/or had an admission in a hospital since we last saw you?  no   Have you had your routine dental cleaning in the past 6 months?  no     Do you have an active MyChart account? If no, what is the barrier?  Yes    Patient Care Team:  Ed Palafox APRN - CNP as PCP - General (Certified Nurse Practitioner)  Ed Palafox APRN - CNP as PCP - Empaneled Provider    Medical History Review  Past Medical, Family, and Social History reviewed and  contribute to the patient presenting condition    Health Maintenance   Topic Date Due    Hepatitis B vaccine (1 of 3 - 3-dose series) Never done    COVID-19 Vaccine (1) Never done    HIV screen  Never done    Hepatitis C screen  Never done    Depression Monitoring  01/23/2024    Flu vaccine (1) 12/22/2024 (Originally 8/1/2023)    Colorectal Cancer Screen  08/27/2024    Diabetes screen  12/11/2026    Lipids  12/11/2028    DTaP/Tdap/Td vaccine (2 - Td or Tdap) 01/21/2029    Hepatitis A vaccine  Aged Out    Hib vaccine  Aged Out    Polio vaccine  Aged Out    Meningococcal (ACWY) vaccine  Aged Out    Pneumococcal 0-64 years Vaccine  Aged Out

## 2024-05-30 ENCOUNTER — OFFICE VISIT (OUTPATIENT)
Dept: PRIMARY CARE CLINIC | Age: 49
End: 2024-05-30
Payer: COMMERCIAL

## 2024-05-30 VITALS
DIASTOLIC BLOOD PRESSURE: 78 MMHG | HEIGHT: 72 IN | WEIGHT: 272 LBS | TEMPERATURE: 97.8 F | SYSTOLIC BLOOD PRESSURE: 137 MMHG | OXYGEN SATURATION: 96 % | HEART RATE: 108 BPM | BODY MASS INDEX: 36.84 KG/M2

## 2024-05-30 DIAGNOSIS — J02.0 ACUTE STREPTOCOCCAL PHARYNGITIS: Primary | ICD-10-CM

## 2024-05-30 PROCEDURE — 99213 OFFICE O/P EST LOW 20 MIN: CPT | Performed by: NURSE PRACTITIONER

## 2024-05-30 PROCEDURE — 3075F SYST BP GE 130 - 139MM HG: CPT | Performed by: NURSE PRACTITIONER

## 2024-05-30 PROCEDURE — 3078F DIAST BP <80 MM HG: CPT | Performed by: NURSE PRACTITIONER

## 2024-05-30 RX ORDER — POLYMYXIN B SULFATE AND TRIMETHOPRIM 1; 10000 MG/ML; [USP'U]/ML
1 SOLUTION OPHTHALMIC EVERY 6 HOURS
Qty: 10 ML | Refills: 0 | Status: SHIPPED | OUTPATIENT
Start: 2024-05-30 | End: 2024-06-06

## 2024-05-30 RX ORDER — AZITHROMYCIN 250 MG/1
TABLET, FILM COATED ORAL
Qty: 1 PACKET | Refills: 0 | Status: SHIPPED | OUTPATIENT
Start: 2024-05-30

## 2024-05-30 ASSESSMENT — ENCOUNTER SYMPTOMS
VOMITING: 0
EYE ITCHING: 0
VOICE CHANGE: 0
SHORTNESS OF BREATH: 0
WHEEZING: 0
FOREIGN BODY SENSATION: 0
SORE THROAT: 0
BLOATING: 0
SINUS PRESSURE: 0
CONSTIPATION: 0
ABDOMINAL PAIN: 1
PHOTOPHOBIA: 0
NAUSEA: 0
CHEST TIGHTNESS: 0
BLOOD IN STOOL: 0
COUGH: 0
DIARRHEA: 1
EYE DISCHARGE: 1
EYE REDNESS: 1
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
## 2024-06-04 ENCOUNTER — OFFICE VISIT (OUTPATIENT)
Dept: PRIMARY CARE CLINIC | Age: 49
End: 2024-06-04
Payer: COMMERCIAL

## 2024-06-04 ENCOUNTER — HOSPITAL ENCOUNTER (OUTPATIENT)
Age: 49
Setting detail: SPECIMEN
Discharge: HOME | End: 2024-06-04

## 2024-06-04 VITALS
HEIGHT: 72 IN | TEMPERATURE: 98 F | OXYGEN SATURATION: 97 % | DIASTOLIC BLOOD PRESSURE: 87 MMHG | BODY MASS INDEX: 35.59 KG/M2 | HEART RATE: 92 BPM | SYSTOLIC BLOOD PRESSURE: 137 MMHG | WEIGHT: 262.8 LBS

## 2024-06-04 DIAGNOSIS — T36.95XA ANTIBIOTIC-ASSOCIATED DIARRHEA: Primary | ICD-10-CM

## 2024-06-04 DIAGNOSIS — Z87.09 H/O STREPTOCOCCAL PHARYNGITIS: ICD-10-CM

## 2024-06-04 DIAGNOSIS — R11.2 NAUSEA, VOMITING AND DIARRHEA: ICD-10-CM

## 2024-06-04 DIAGNOSIS — K52.1 ANTIBIOTIC-ASSOCIATED DIARRHEA: Primary | ICD-10-CM

## 2024-06-04 DIAGNOSIS — R63.4 WEIGHT LOSS, NON-INTENTIONAL: ICD-10-CM

## 2024-06-04 DIAGNOSIS — R19.7 NAUSEA, VOMITING AND DIARRHEA: ICD-10-CM

## 2024-06-04 PROCEDURE — 3075F SYST BP GE 130 - 139MM HG: CPT

## 2024-06-04 PROCEDURE — 3079F DIAST BP 80-89 MM HG: CPT

## 2024-06-04 PROCEDURE — 99213 OFFICE O/P EST LOW 20 MIN: CPT

## 2024-06-04 RX ORDER — ONDANSETRON 4 MG/1
4 TABLET, ORALLY DISINTEGRATING ORAL EVERY 8 HOURS PRN
Qty: 15 TABLET | Refills: 0 | Status: SHIPPED | OUTPATIENT
Start: 2024-06-04 | End: 2024-06-09

## 2024-06-04 RX ORDER — MECLIZINE HYDROCHLORIDE 25 MG/1
25 TABLET ORAL 3 TIMES DAILY PRN
Qty: 15 TABLET | Refills: 0 | Status: SHIPPED | OUTPATIENT
Start: 2024-06-04 | End: 2024-06-09

## 2024-06-04 RX ORDER — LOPERAMIDE HYDROCHLORIDE 2 MG/1
2 CAPSULE ORAL 4 TIMES DAILY PRN
Qty: 28 CAPSULE | Refills: 0 | Status: SHIPPED | OUTPATIENT
Start: 2024-06-04 | End: 2024-06-11

## 2024-06-04 ASSESSMENT — ENCOUNTER SYMPTOMS
NAUSEA: 0
EYES NEGATIVE: 1
PHOTOPHOBIA: 0
SINUS PAIN: 0
EYE DISCHARGE: 0
CHANGE IN BOWEL HABIT: 0
APNEA: 0
SWOLLEN GLANDS: 0
CHOKING: 0
SORE THROAT: 0
WHEEZING: 0
RHINORRHEA: 0
TROUBLE SWALLOWING: 0
STRIDOR: 0
ANAL BLEEDING: 0
RECTAL PAIN: 0
CHEST TIGHTNESS: 0
VISUAL CHANGE: 0
FACIAL SWELLING: 0
ABDOMINAL PAIN: 0
DIARRHEA: 1
SINUS PRESSURE: 0
CONSTIPATION: 0
EYE ITCHING: 0
ABDOMINAL DISTENTION: 0
BACK PAIN: 0
VOMITING: 1
SHORTNESS OF BREATH: 0
RESPIRATORY NEGATIVE: 1
EYE REDNESS: 0
BLOOD IN STOOL: 0
EYE PAIN: 0
COLOR CHANGE: 0
VOICE CHANGE: 0
COUGH: 0
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
## 2024-06-05 LAB
MICROORGANISM/AGENT SPEC: NORMAL
SPECIMEN DESCRIPTION: NORMAL

## 2024-06-07 ENCOUNTER — HOSPITAL ENCOUNTER (OUTPATIENT)
Age: 49
Setting detail: SPECIMEN
Discharge: HOME | End: 2024-06-07

## 2024-06-07 DIAGNOSIS — T36.95XA ANTIBIOTIC-ASSOCIATED DIARRHEA: ICD-10-CM

## 2024-06-07 DIAGNOSIS — K52.1 ANTIBIOTIC-ASSOCIATED DIARRHEA: ICD-10-CM

## 2024-08-02 ENCOUNTER — HOSPITAL ENCOUNTER (OUTPATIENT)
Age: 49
Discharge: HOME | End: 2024-08-02
Payer: COMMERCIAL

## 2024-08-02 ENCOUNTER — OFFICE VISIT (OUTPATIENT)
Dept: PRIMARY CARE CLINIC | Age: 49
End: 2024-08-02
Payer: COMMERCIAL

## 2024-08-02 VITALS
TEMPERATURE: 98.3 F | OXYGEN SATURATION: 97 % | DIASTOLIC BLOOD PRESSURE: 109 MMHG | SYSTOLIC BLOOD PRESSURE: 172 MMHG | HEART RATE: 79 BPM

## 2024-08-02 DIAGNOSIS — R42 VERTIGO: Primary | ICD-10-CM

## 2024-08-02 DIAGNOSIS — R42 DIZZINESS: ICD-10-CM

## 2024-08-02 DIAGNOSIS — I10 ELEVATED BLOOD PRESSURE READING IN OFFICE WITH DIAGNOSIS OF HYPERTENSION: ICD-10-CM

## 2024-08-02 LAB
ALBUMIN SERPL-MCNC: 4.3 G/DL (ref 3.5–5.2)
ALP SERPL-CCNC: 46 U/L (ref 40–129)
ALT SERPL-CCNC: 25 U/L (ref 5–41)
ANION GAP SERPL CALCULATED.3IONS-SCNC: 10 MMOL/L (ref 9–17)
AST SERPL-CCNC: 18 U/L
BASOPHILS # BLD: 0.08 K/UL (ref 0–0.2)
BASOPHILS NFR BLD: 1 % (ref 0–2)
BILIRUB SERPL-MCNC: 0.3 MG/DL (ref 0.3–1.2)
BUN SERPL-MCNC: 17 MG/DL (ref 6–20)
BUN/CREAT SERPL: 19 (ref 9–20)
CALCIUM SERPL-MCNC: 9.5 MG/DL (ref 8.6–10.4)
CHLORIDE SERPL-SCNC: 106 MMOL/L (ref 98–107)
CO2 SERPL-SCNC: 25 MMOL/L (ref 20–31)
CREAT SERPL-MCNC: 0.9 MG/DL (ref 0.7–1.2)
EOSINOPHIL # BLD: 0.1 K/UL (ref 0–0.44)
EOSINOPHILS RELATIVE PERCENT: 1 % (ref 1–4)
ERYTHROCYTE [DISTWIDTH] IN BLOOD BY AUTOMATED COUNT: 13.9 % (ref 11.8–14.4)
GFR, ESTIMATED: >90 ML/MIN/1.73M2
GLUCOSE SERPL-MCNC: 96 MG/DL (ref 70–99)
HCT VFR BLD AUTO: 47.5 % (ref 40.7–50.3)
HGB BLD-MCNC: 15.6 G/DL (ref 13–17)
IMM GRANULOCYTES # BLD AUTO: 0.07 K/UL (ref 0–0.3)
IMM GRANULOCYTES NFR BLD: 1 %
LYMPHOCYTES NFR BLD: 2.41 K/UL (ref 1.1–3.7)
LYMPHOCYTES RELATIVE PERCENT: 27 % (ref 24–43)
Lab: NORMAL
MCH RBC QN AUTO: 28.8 PG (ref 25.2–33.5)
MCHC RBC AUTO-ENTMCNC: 32.8 G/DL (ref 28.4–34.8)
MCV RBC AUTO: 87.6 FL (ref 82.6–102.9)
MONOCYTES NFR BLD: 0.6 K/UL (ref 0.1–1.2)
MONOCYTES NFR BLD: 7 % (ref 3–12)
NEUTROPHILS NFR BLD: 63 % (ref 36–65)
NEUTS SEG NFR BLD: 5.76 K/UL (ref 1.5–8.1)
NRBC BLD-RTO: 0 PER 100 WBC
PLATELET # BLD AUTO: 331 K/UL (ref 138–453)
PMV BLD AUTO: 9 FL (ref 8.1–13.5)
POTASSIUM SERPL-SCNC: 4.4 MMOL/L (ref 3.7–5.3)
PROT SERPL-MCNC: 7.3 G/DL (ref 6.4–8.3)
QC PASS/FAIL: NORMAL
RBC # BLD AUTO: 5.42 M/UL (ref 4.21–5.77)
SARS-COV-2 RDRP RESP QL NAA+PROBE: NEGATIVE
SODIUM SERPL-SCNC: 141 MMOL/L (ref 135–144)
TROPONIN I SERPL HS-MCNC: 12 NG/L (ref 0–22)
TSH SERPL DL<=0.05 MIU/L-ACNC: 0.88 UIU/ML (ref 0.3–5)
WBC OTHER # BLD: 9 K/UL (ref 3.5–11.3)

## 2024-08-02 PROCEDURE — 3077F SYST BP >= 140 MM HG: CPT

## 2024-08-02 PROCEDURE — 85025 COMPLETE CBC W/AUTO DIFF WBC: CPT

## 2024-08-02 PROCEDURE — 99213 OFFICE O/P EST LOW 20 MIN: CPT

## 2024-08-02 PROCEDURE — 84443 ASSAY THYROID STIM HORMONE: CPT

## 2024-08-02 PROCEDURE — 84484 ASSAY OF TROPONIN QUANT: CPT

## 2024-08-02 PROCEDURE — 36415 COLL VENOUS BLD VENIPUNCTURE: CPT

## 2024-08-02 PROCEDURE — 87635 SARS-COV-2 COVID-19 AMP PRB: CPT

## 2024-08-02 PROCEDURE — 80053 COMPREHEN METABOLIC PANEL: CPT

## 2024-08-02 PROCEDURE — 3080F DIAST BP >= 90 MM HG: CPT

## 2024-08-02 RX ORDER — NAPROXEN 250 MG/1
TABLET ORAL
COMMUNITY
Start: 2020-07-28

## 2024-08-02 RX ORDER — MECLIZINE HYDROCHLORIDE 25 MG/1
25 TABLET ORAL 3 TIMES DAILY PRN
Qty: 21 TABLET | Refills: 0 | Status: SHIPPED | OUTPATIENT
Start: 2024-08-02 | End: 2024-08-09

## 2024-08-02 RX ORDER — ACETAMINOPHEN 500 MG
TABLET ORAL
COMMUNITY
Start: 2020-07-28

## 2024-08-02 ASSESSMENT — ENCOUNTER SYMPTOMS
BLOOD IN STOOL: 0
SINUS PRESSURE: 0
VOICE CHANGE: 0
SINUS PAIN: 0
WHEEZING: 0
NAUSEA: 0
VISUAL CHANGE: 0
RECTAL PAIN: 0
STRIDOR: 0
CHEST TIGHTNESS: 1
TROUBLE SWALLOWING: 0
SORE THROAT: 0
CONSTIPATION: 0
CHANGE IN BOWEL HABIT: 0
ABDOMINAL DISTENTION: 0
EYE REDNESS: 0
SWOLLEN GLANDS: 0
PHOTOPHOBIA: 0
DIARRHEA: 0
EYE DISCHARGE: 0
VOMITING: 0
SHORTNESS OF BREATH: 0
EYE ITCHING: 0
BACK PAIN: 0
EYES NEGATIVE: 1
COLOR CHANGE: 0
CHOKING: 0
FACIAL SWELLING: 0
ANAL BLEEDING: 0
EYE PAIN: 0
APNEA: 0
COUGH: 0
ABDOMINAL PAIN: 1
RHINORRHEA: 0
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
## 2024-08-03 LAB
EKG ATRIAL RATE: 78 BPM
EKG P AXIS: 39 DEGREES
EKG P-R INTERVAL: 132 MS
EKG Q-T INTERVAL: 366 MS
EKG QRS DURATION: 80 MS
EKG QTC CALCULATION (BAZETT): 417 MS
EKG R AXIS: -16 DEGREES
EKG T AXIS: 40 DEGREES
EKG VENTRICULAR RATE: 78 BPM

## 2024-10-03 ENCOUNTER — HOSPITAL ENCOUNTER (EMERGENCY)
Age: 49
Discharge: HOME | End: 2024-10-03
Attending: EMERGENCY MEDICINE
Payer: COMMERCIAL

## 2024-10-03 ENCOUNTER — APPOINTMENT (OUTPATIENT)
Dept: CT IMAGING | Age: 49
End: 2024-10-03
Payer: COMMERCIAL

## 2024-10-03 VITALS
DIASTOLIC BLOOD PRESSURE: 98 MMHG | WEIGHT: 250 LBS | HEART RATE: 88 BPM | BODY MASS INDEX: 33.91 KG/M2 | RESPIRATION RATE: 14 BRPM | SYSTOLIC BLOOD PRESSURE: 141 MMHG | TEMPERATURE: 98.1 F | OXYGEN SATURATION: 96 %

## 2024-10-03 DIAGNOSIS — R10.9 RIGHT FLANK PAIN: Primary | ICD-10-CM

## 2024-10-03 LAB
ALBUMIN SERPL-MCNC: 4.4 G/DL (ref 3.5–5.2)
ALP SERPL-CCNC: 46 U/L (ref 40–129)
ALT SERPL-CCNC: 27 U/L (ref 5–41)
ANION GAP SERPL CALCULATED.3IONS-SCNC: 13 MMOL/L (ref 9–17)
AST SERPL-CCNC: 19 U/L
BASOPHILS # BLD: 0.06 K/UL (ref 0–0.2)
BASOPHILS NFR BLD: 1 % (ref 0–2)
BILIRUB SERPL-MCNC: 0.3 MG/DL (ref 0.3–1.2)
BILIRUB UR QL STRIP: NEGATIVE
BUN SERPL-MCNC: 17 MG/DL (ref 6–20)
BUN/CREAT SERPL: 21 (ref 9–20)
CALCIUM SERPL-MCNC: 9.3 MG/DL (ref 8.6–10.4)
CHLORIDE SERPL-SCNC: 105 MMOL/L (ref 98–107)
CLARITY UR: CLEAR
CO2 SERPL-SCNC: 22 MMOL/L (ref 20–31)
COLOR UR: YELLOW
CREAT SERPL-MCNC: 0.8 MG/DL (ref 0.7–1.2)
EOSINOPHIL # BLD: 0.22 K/UL (ref 0–0.44)
EOSINOPHILS RELATIVE PERCENT: 3 % (ref 1–4)
EPI CELLS #/AREA URNS HPF: ABNORMAL /HPF (ref 0–5)
ERYTHROCYTE [DISTWIDTH] IN BLOOD BY AUTOMATED COUNT: 14.3 % (ref 11.8–14.4)
GFR, ESTIMATED: >90 ML/MIN/1.73M2
GLUCOSE SERPL-MCNC: 142 MG/DL (ref 70–99)
GLUCOSE UR STRIP-MCNC: NEGATIVE MG/DL
HCT VFR BLD AUTO: 47 % (ref 40.7–50.3)
HGB BLD-MCNC: 15.7 G/DL (ref 13–17)
HGB UR QL STRIP.AUTO: NEGATIVE
IMM GRANULOCYTES # BLD AUTO: 0.03 K/UL (ref 0–0.3)
IMM GRANULOCYTES NFR BLD: 0 %
KETONES UR STRIP-MCNC: NEGATIVE MG/DL
LEUKOCYTE ESTERASE UR QL STRIP: NEGATIVE
LIPASE SERPL-CCNC: 86 U/L (ref 13–60)
LYMPHOCYTES NFR BLD: 2.16 K/UL (ref 1.1–3.7)
LYMPHOCYTES RELATIVE PERCENT: 27 % (ref 24–43)
MCH RBC QN AUTO: 29.3 PG (ref 25.2–33.5)
MCHC RBC AUTO-ENTMCNC: 33.4 G/DL (ref 28.4–34.8)
MCV RBC AUTO: 87.9 FL (ref 82.6–102.9)
MONOCYTES NFR BLD: 0.42 K/UL (ref 0.1–1.2)
MONOCYTES NFR BLD: 5 % (ref 3–12)
NEUTROPHILS NFR BLD: 64 % (ref 36–65)
NEUTS SEG NFR BLD: 4.99 K/UL (ref 1.5–8.1)
NITRITE UR QL STRIP: NEGATIVE
NRBC BLD-RTO: 0 PER 100 WBC
PH UR STRIP: 6 [PH] (ref 5–8)
PLATELET # BLD AUTO: 365 K/UL (ref 138–453)
PMV BLD AUTO: 8.8 FL (ref 8.1–13.5)
POTASSIUM SERPL-SCNC: 4.3 MMOL/L (ref 3.7–5.3)
PROT SERPL-MCNC: 7.2 G/DL (ref 6.4–8.3)
PROT UR STRIP-MCNC: NEGATIVE MG/DL
RBC # BLD AUTO: 5.35 M/UL (ref 4.21–5.77)
RBC #/AREA URNS HPF: ABNORMAL /HPF (ref 0–2)
SODIUM SERPL-SCNC: 140 MMOL/L (ref 135–144)
SP GR UR STRIP: 1.04 (ref 1–1.03)
UROBILINOGEN UR STRIP-ACNC: NORMAL EU/DL (ref 0–1)
WBC #/AREA URNS HPF: ABNORMAL /HPF (ref 0–5)
WBC OTHER # BLD: 7.9 K/UL (ref 3.5–11.3)

## 2024-10-03 PROCEDURE — 6360000002 HC RX W HCPCS: Performed by: EMERGENCY MEDICINE

## 2024-10-03 PROCEDURE — 96374 THER/PROPH/DIAG INJ IV PUSH: CPT

## 2024-10-03 PROCEDURE — 6370000000 HC RX 637 (ALT 250 FOR IP): Performed by: EMERGENCY MEDICINE

## 2024-10-03 PROCEDURE — 99284 EMERGENCY DEPT VISIT MOD MDM: CPT

## 2024-10-03 PROCEDURE — 85025 COMPLETE CBC W/AUTO DIFF WBC: CPT

## 2024-10-03 PROCEDURE — 96375 TX/PRO/DX INJ NEW DRUG ADDON: CPT

## 2024-10-03 PROCEDURE — 80053 COMPREHEN METABOLIC PANEL: CPT

## 2024-10-03 PROCEDURE — 74176 CT ABD & PELVIS W/O CONTRAST: CPT

## 2024-10-03 PROCEDURE — 83690 ASSAY OF LIPASE: CPT

## 2024-10-03 PROCEDURE — 81001 URINALYSIS AUTO W/SCOPE: CPT

## 2024-10-03 RX ORDER — TAMSULOSIN HYDROCHLORIDE 0.4 MG/1
0.4 CAPSULE ORAL ONCE
Status: COMPLETED | OUTPATIENT
Start: 2024-10-03 | End: 2024-10-03

## 2024-10-03 RX ORDER — HYDROCODONE BITARTRATE AND ACETAMINOPHEN 5; 325 MG/1; MG/1
1 TABLET ORAL EVERY 6 HOURS PRN
Qty: 12 TABLET | Refills: 0 | Status: SHIPPED | OUTPATIENT
Start: 2024-10-03 | End: 2024-10-06

## 2024-10-03 RX ORDER — TAMSULOSIN HYDROCHLORIDE 0.4 MG/1
0.4 CAPSULE ORAL DAILY
Qty: 7 CAPSULE | Refills: 0 | Status: SHIPPED | OUTPATIENT
Start: 2024-10-03

## 2024-10-03 RX ORDER — ONDANSETRON 2 MG/ML
4 INJECTION INTRAMUSCULAR; INTRAVENOUS ONCE
Status: COMPLETED | OUTPATIENT
Start: 2024-10-03 | End: 2024-10-03

## 2024-10-03 RX ORDER — KETOROLAC TROMETHAMINE 30 MG/ML
30 INJECTION, SOLUTION INTRAMUSCULAR; INTRAVENOUS ONCE
Status: COMPLETED | OUTPATIENT
Start: 2024-10-03 | End: 2024-10-03

## 2024-10-03 RX ORDER — HYDROCODONE BITARTRATE AND ACETAMINOPHEN 5; 325 MG/1; MG/1
1 TABLET ORAL ONCE
Status: COMPLETED | OUTPATIENT
Start: 2024-10-03 | End: 2024-10-03

## 2024-10-03 RX ORDER — IBUPROFEN 800 MG/1
800 TABLET, FILM COATED ORAL 2 TIMES DAILY PRN
Qty: 25 TABLET | Refills: 1 | Status: SHIPPED | OUTPATIENT
Start: 2024-10-03

## 2024-10-03 RX ORDER — MORPHINE SULFATE 4 MG/ML
4 INJECTION, SOLUTION INTRAMUSCULAR; INTRAVENOUS ONCE
Status: COMPLETED | OUTPATIENT
Start: 2024-10-03 | End: 2024-10-03

## 2024-10-03 RX ADMIN — HYDROCODONE BITARTRATE AND ACETAMINOPHEN 1 TABLET: 5; 325 TABLET ORAL at 09:59

## 2024-10-03 RX ADMIN — KETOROLAC TROMETHAMINE 30 MG: 30 INJECTION, SOLUTION INTRAMUSCULAR at 09:15

## 2024-10-03 RX ADMIN — MORPHINE SULFATE 4 MG: 4 INJECTION, SOLUTION INTRAMUSCULAR; INTRAVENOUS at 07:36

## 2024-10-03 RX ADMIN — ONDANSETRON 4 MG: 2 INJECTION, SOLUTION INTRAMUSCULAR; INTRAVENOUS at 07:36

## 2024-10-03 RX ADMIN — TAMSULOSIN HYDROCHLORIDE 0.4 MG: 0.4 CAPSULE ORAL at 09:59

## 2024-10-03 ASSESSMENT — ENCOUNTER SYMPTOMS
ABDOMINAL PAIN: 0
EYE DISCHARGE: 0
BACK PAIN: 0
SHORTNESS OF BREATH: 0
EYE PAIN: 0
FACIAL SWELLING: 0
ABDOMINAL DISTENTION: 0
CHEST TIGHTNESS: 0

## 2024-10-03 ASSESSMENT — PAIN - FUNCTIONAL ASSESSMENT: PAIN_FUNCTIONAL_ASSESSMENT: 0-10

## 2024-10-03 ASSESSMENT — PAIN SCALES - GENERAL
PAINLEVEL_OUTOF10: 8
PAINLEVEL_OUTOF10: 3
PAINLEVEL_OUTOF10: 6
PAINLEVEL_OUTOF10: 6
PAINLEVEL_OUTOF10: 9

## 2024-10-03 NOTE — ED PROVIDER NOTES
reviewed and interpreted by me as:  none    SEPSIS    Is this patient to be included in the SEP-1 core measure due to severe sepsis or septic shock?      See more data below for the lab and radiology tests and orders.      3)  Treatment and Disposition    Patient assessment: The patient is a 49-year-old male who presented to the emergency department secondary to flank pain.  Orders for CT Abdo pelvis without contrast labs.  Pending laboratory analysis and imaging patient received 1 L fluid bolus morphine and Zofran, patient will be reevaluated  CT scan positive for diverticulosis no evidence of diverticulitis.  Patient received additional dose of Toradol with some improvement in his pain he also received Flomax and Norco.  Concern for possible passed kidney stone.  Urinalysis no acute findings.  Results discussed with patient.  Discharged home with a prescription for Norco outpatient follow-up and parameters to return to the emergency department    Disposition discussion with patient/family, Shared Decision Making:  yes    Case discussed with consulting clinician:  NA    MIPS:  NA    Social determinants of health impacting treatment or disposition:  none    Code Status Discussion:  full code    CRITICAL CARE:       PROCEDURES:    Procedures         DATA FOR LAB AND RADIOLOGY TESTS ORDERED BELOW ARE REVIEWED BY THE ED CLINICIAN:    RADIOLOGY: All x-rays, CT, MRI, and formal ultrasound images (except ED bedside ultrasound) are read by the radiologist, see reports below, unless otherwise noted in MDM or here.  Reports below are reviewed by myself.  CT ABDOMEN PELVIS WO CONTRAST Additional Contrast? None   Final Result   No acute abdominal or pelvic abnormality.      Uncomplicated colonic diverticulosis.      A few stones dependently in the gallbladder.      Fat containing periumbilical hernia that is smaller compared to prior exam.             LABS: Lab orders shown below, the results are reviewed by myself, and all  abnormals are listed below.  Labs Reviewed   COMPREHENSIVE METABOLIC PANEL - Abnormal; Notable for the following components:       Result Value    Glucose 142 (*)     BUN/Creatinine Ratio 21 (*)     All other components within normal limits   LIPASE - Abnormal; Notable for the following components:    Lipase 86 (*)     All other components within normal limits   URINALYSIS WITH MICROSCOPIC - Abnormal; Notable for the following components:    Specific Gravity, UA 1.038 (*)     All other components within normal limits   CBC WITH AUTO DIFFERENTIAL       Vitals Reviewed:    Vitals:    10/03/24 0719 10/03/24 1000   BP: (!) 163/95 (!) 141/98   Pulse: 97 88   Resp: 16 14   Temp: 98.1 °F (36.7 °C)    TempSrc: Oral    SpO2: 96% 96%   Weight: 113.4 kg (250 lb)      MEDICATIONS GIVEN TO PATIENT THIS ENCOUNTER:  Orders Placed This Encounter   Medications    morphine sulfate (PF) injection 4 mg    ondansetron (ZOFRAN) injection 4 mg    ketorolac (TORADOL) injection 30 mg    tamsulosin (FLOMAX) capsule 0.4 mg    HYDROcodone-acetaminophen (NORCO) 5-325 MG per tablet 1 tablet    tamsulosin (FLOMAX) 0.4 MG capsule     Sig: Take 1 capsule by mouth daily     Dispense:  7 capsule     Refill:  0    HYDROcodone-acetaminophen (NORCO) 5-325 MG per tablet     Sig: Take 1 tablet by mouth every 6 hours as needed for Pain for up to 3 days. Intended supply: 3 days. Take lowest dose possible to manage pain Max Daily Amount: 4 tablets     Dispense:  12 tablet     Refill:  0    ibuprofen (ADVIL;MOTRIN) 800 MG tablet     Sig: Take 1 tablet by mouth 2 times daily as needed for Pain     Dispense:  25 tablet     Refill:  1     DISCHARGE PRESCRIPTIONS:  New Prescriptions    HYDROCODONE-ACETAMINOPHEN (NORCO) 5-325 MG PER TABLET    Take 1 tablet by mouth every 6 hours as needed for Pain for up to 3 days. Intended supply: 3 days. Take lowest dose possible to manage pain Max Daily Amount: 4 tablets    IBUPROFEN (ADVIL;MOTRIN) 800 MG TABLET    Take 1

## 2024-10-29 DIAGNOSIS — I10 ESSENTIAL HYPERTENSION: ICD-10-CM

## 2024-10-29 RX ORDER — LOSARTAN POTASSIUM 25 MG/1
25 TABLET ORAL DAILY
Qty: 30 TABLET | Refills: 0 | Status: SHIPPED | OUTPATIENT
Start: 2024-10-29

## 2024-11-14 ENCOUNTER — OFFICE VISIT (OUTPATIENT)
Dept: FAMILY MEDICINE CLINIC | Age: 49
End: 2024-11-14

## 2024-11-14 VITALS
OXYGEN SATURATION: 98 % | SYSTOLIC BLOOD PRESSURE: 152 MMHG | HEART RATE: 70 BPM | TEMPERATURE: 97.7 F | WEIGHT: 270.4 LBS | DIASTOLIC BLOOD PRESSURE: 96 MMHG | BODY MASS INDEX: 36.62 KG/M2 | HEIGHT: 72 IN

## 2024-11-14 DIAGNOSIS — I10 ESSENTIAL HYPERTENSION: ICD-10-CM

## 2024-11-14 DIAGNOSIS — Z12.5 PROSTATE CANCER SCREENING: ICD-10-CM

## 2024-11-14 DIAGNOSIS — R73.01 IMPAIRED FASTING GLUCOSE: ICD-10-CM

## 2024-11-14 DIAGNOSIS — E78.5 HYPERLIPIDEMIA, UNSPECIFIED HYPERLIPIDEMIA TYPE: ICD-10-CM

## 2024-11-14 DIAGNOSIS — Z12.11 COLON CANCER SCREENING: Primary | ICD-10-CM

## 2024-11-14 RX ORDER — LOSARTAN POTASSIUM 50 MG/1
50 TABLET ORAL DAILY
Qty: 90 TABLET | Refills: 1 | Status: SHIPPED | OUTPATIENT
Start: 2024-11-14

## 2024-11-14 SDOH — ECONOMIC STABILITY: FOOD INSECURITY: WITHIN THE PAST 12 MONTHS, THE FOOD YOU BOUGHT JUST DIDN'T LAST AND YOU DIDN'T HAVE MONEY TO GET MORE.: NEVER TRUE

## 2024-11-14 SDOH — ECONOMIC STABILITY: FOOD INSECURITY: WITHIN THE PAST 12 MONTHS, YOU WORRIED THAT YOUR FOOD WOULD RUN OUT BEFORE YOU GOT MONEY TO BUY MORE.: NEVER TRUE

## 2024-11-14 SDOH — ECONOMIC STABILITY: INCOME INSECURITY: HOW HARD IS IT FOR YOU TO PAY FOR THE VERY BASICS LIKE FOOD, HOUSING, MEDICAL CARE, AND HEATING?: NOT VERY HARD

## 2024-11-14 ASSESSMENT — ENCOUNTER SYMPTOMS
SORE THROAT: 0
NAUSEA: 0
DIARRHEA: 0
EYE PAIN: 0
ABDOMINAL PAIN: 0
VOMITING: 0
SHORTNESS OF BREATH: 0
COUGH: 0
BACK PAIN: 1
SINUS PAIN: 0

## 2024-11-14 NOTE — PROGRESS NOTES
Visit Information    Have you changed or started any medications since your last visit including any over-the-counter medicines, vitamins, or herbal medicines? no   Have you stopped taking any of your medications? Is so, why? -  no  Are you having any side effects from any of your medications? - no    Have you seen any other physician or provider since your last visit?  no   Have you had any other diagnostic tests since your last visit?  no   Have you been seen in the emergency room and/or had an admission in a hospital since we last saw you?  no   Have you had your routine dental cleaning in the past 6 months?  no     Do you have an active MyChart account? If no, what is the barrier?  Yes    Patient Care Team:  Ed Palafox APRN - CNP as PCP - General (Certified Nurse Practitioner)  Ed Palafox APRN - CNP as PCP - Empaneled Provider    Medical History Review  Past Medical, Family, and Social History reviewed and  contribute to the patient presenting condition    Health Maintenance   Topic Date Due    HIV screen  Never done    Hepatitis C screen  Never done    Hepatitis B vaccine (1 of 3 - 19+ 3-dose series) Never done    Flu vaccine (1) 08/01/2024    Shingles vaccine (1 of 2) Never done    Colorectal Cancer Screen  08/27/2024    COVID-19 Vaccine (1 - 2023-24 season) Never done    Depression Monitoring  01/18/2025    Diabetes screen  12/11/2026    Lipids  12/11/2028    DTaP/Tdap/Td vaccine (2 - Td or Tdap) 01/21/2029    Hepatitis A vaccine  Aged Out    Hib vaccine  Aged Out    Polio vaccine  Aged Out    Meningococcal (ACWY) vaccine  Aged Out    Pneumococcal 0-64 years Vaccine  Aged Out               
    MCV 10/03/2024 87.9     MCH 10/03/2024 29.3     MCHC 10/03/2024 33.4     RDW 10/03/2024 14.3     Platelets 10/03/2024 365     MPV 10/03/2024 8.8     NRBC Automated 10/03/2024 0.0     Neutrophils % 10/03/2024 64     Lymphocytes % 10/03/2024 27     Monocytes % 10/03/2024 5     Eosinophils % 10/03/2024 3     Basophils % 10/03/2024 1     Immature Granulocytes % 10/03/2024 0     Neutrophils Absolute 10/03/2024 4.99     Lymphocytes Absolute 10/03/2024 2.16     Monocytes Absolute 10/03/2024 0.42     Eosinophils Absolute 10/03/2024 0.22     Basophils Absolute 10/03/2024 0.06     Immature Granulocytes Ab* 10/03/2024 0.03     Sodium 10/03/2024 140     Potassium 10/03/2024 4.3     Chloride 10/03/2024 105     CO2 10/03/2024 22     Anion Gap 10/03/2024 13     Glucose 10/03/2024 142 (H)     BUN 10/03/2024 17     Creatinine 10/03/2024 0.8     Est, Glom Filt Rate 10/03/2024 >90     BUN/Creatinine Ratio 10/03/2024 21 (H)     Calcium 10/03/2024 9.3     Total Protein 10/03/2024 7.2     Albumin 10/03/2024 4.4     Total Bilirubin 10/03/2024 0.3     Alkaline Phosphatase 10/03/2024 46     ALT 10/03/2024 27     AST 10/03/2024 19     Lipase 10/03/2024 86 (H)     Color, UA 10/03/2024 Yellow     Turbidity UA 10/03/2024 Clear     Glucose, Ur 10/03/2024 NEGATIVE     Bilirubin, Urine 10/03/2024 NEGATIVE     Ketones, Urine 10/03/2024 NEGATIVE     Specific Gravity, UA 10/03/2024 1.038 (H)     Urine Hgb 10/03/2024 NEGATIVE     pH, Urine 10/03/2024 6.0     Protein, UA 10/03/2024 NEGATIVE     Urobilinogen, Urine 10/03/2024 Normal     Nitrite, Urine 10/03/2024 NEGATIVE     Leukocyte Esterase, Urine 10/03/2024 NEGATIVE     WBC, UA 10/03/2024 0 TO 2     RBC, UA 10/03/2024 None     Epithelial Cells, UA 10/03/2024 0 TO 2        Assessment and Plan   Assessment & Plan   1. Colon cancer screening  -     [facility] Screening Colonoscopy  2. Essential hypertension  -     losartan (COZAAR) 50 MG tablet; Take 1 tablet by mouth daily, Disp-90 tablet,

## 2025-02-18 ENCOUNTER — HOSPITAL ENCOUNTER (INPATIENT)
Age: 50
LOS: 3 days | Discharge: HOME | End: 2025-02-21
Attending: EMERGENCY MEDICINE | Admitting: FAMILY MEDICINE
Payer: COMMERCIAL

## 2025-02-18 ENCOUNTER — APPOINTMENT (OUTPATIENT)
Dept: CT IMAGING | Age: 50
End: 2025-02-18
Payer: COMMERCIAL

## 2025-02-18 DIAGNOSIS — K57.32 DIVERTICULITIS OF COLON: Primary | ICD-10-CM

## 2025-02-18 PROBLEM — K57.92 ACUTE DIVERTICULITIS OF INTESTINE: Status: ACTIVE | Noted: 2025-02-18

## 2025-02-18 LAB
ANION GAP SERPL CALCULATED.3IONS-SCNC: 10 MMOL/L (ref 9–16)
BACTERIA URNS QL MICRO: ABNORMAL
BASOPHILS # BLD: 0.07 K/UL (ref 0–0.2)
BASOPHILS NFR BLD: 0 % (ref 0–2)
BILIRUB UR QL STRIP: NEGATIVE
BUN SERPL-MCNC: 14 MG/DL (ref 6–20)
CALCIUM SERPL-MCNC: 9 MG/DL (ref 8.6–10.4)
CHLORIDE SERPL-SCNC: 107 MMOL/L (ref 98–107)
CLARITY UR: CLEAR
CO2 SERPL-SCNC: 24 MMOL/L (ref 20–31)
COLOR UR: YELLOW
CREAT SERPL-MCNC: 0.9 MG/DL (ref 0.7–1.2)
EOSINOPHIL # BLD: 0.07 K/UL (ref 0–0.44)
EOSINOPHILS RELATIVE PERCENT: 0 % (ref 1–4)
EPI CELLS #/AREA URNS HPF: ABNORMAL /HPF (ref 0–5)
ERYTHROCYTE [DISTWIDTH] IN BLOOD BY AUTOMATED COUNT: 14.6 % (ref 11.8–14.4)
GFR, ESTIMATED: >90 ML/MIN/1.73M2
GLUCOSE SERPL-MCNC: 89 MG/DL (ref 74–99)
GLUCOSE UR STRIP-MCNC: NEGATIVE MG/DL
HCT VFR BLD AUTO: 46.6 % (ref 40.7–50.3)
HGB BLD-MCNC: 15.9 G/DL (ref 13–17)
HGB UR QL STRIP.AUTO: NEGATIVE
IMM GRANULOCYTES # BLD AUTO: 0.07 K/UL (ref 0–0.3)
IMM GRANULOCYTES NFR BLD: 0 %
KETONES UR STRIP-MCNC: NEGATIVE MG/DL
LEUKOCYTE ESTERASE UR QL STRIP: NEGATIVE
LYMPHOCYTES NFR BLD: 2.07 K/UL (ref 1.1–3.7)
LYMPHOCYTES RELATIVE PERCENT: 12 % (ref 24–43)
MCH RBC QN AUTO: 29 PG (ref 25.2–33.5)
MCHC RBC AUTO-ENTMCNC: 34.1 G/DL (ref 28.4–34.8)
MCV RBC AUTO: 84.9 FL (ref 82.6–102.9)
MONOCYTES NFR BLD: 1.11 K/UL (ref 0.1–1.2)
MONOCYTES NFR BLD: 6 % (ref 3–12)
MUCOUS THREADS URNS QL MICRO: ABNORMAL
NEUTROPHILS NFR BLD: 82 % (ref 36–65)
NEUTS SEG NFR BLD: 14.24 K/UL (ref 1.5–8.1)
NITRITE UR QL STRIP: NEGATIVE
NRBC BLD-RTO: 0 PER 100 WBC
PH UR STRIP: 6 [PH] (ref 5–8)
PLATELET # BLD AUTO: 304 K/UL (ref 138–453)
PMV BLD AUTO: 9.6 FL (ref 8.1–13.5)
POTASSIUM SERPL-SCNC: 4.4 MMOL/L (ref 3.7–5.3)
PROT UR STRIP-MCNC: ABNORMAL MG/DL
RBC # BLD AUTO: 5.49 M/UL (ref 4.21–5.77)
RBC # BLD: ABNORMAL 10*6/UL
RBC #/AREA URNS HPF: ABNORMAL /HPF (ref 0–2)
SODIUM SERPL-SCNC: 141 MMOL/L (ref 136–145)
SP GR UR STRIP: 1.02 (ref 1–1.03)
UROBILINOGEN UR STRIP-ACNC: NORMAL EU/DL (ref 0–1)
WBC #/AREA URNS HPF: ABNORMAL /HPF (ref 0–5)
WBC OTHER # BLD: 17.6 K/UL (ref 3.5–11.3)

## 2025-02-18 PROCEDURE — 96374 THER/PROPH/DIAG INJ IV PUSH: CPT

## 2025-02-18 PROCEDURE — 96376 TX/PRO/DX INJ SAME DRUG ADON: CPT

## 2025-02-18 PROCEDURE — 2500000003 HC RX 250 WO HCPCS: Performed by: EMERGENCY MEDICINE

## 2025-02-18 PROCEDURE — 6360000004 HC RX CONTRAST MEDICATION: Performed by: EMERGENCY MEDICINE

## 2025-02-18 PROCEDURE — 6360000002 HC RX W HCPCS: Performed by: NURSE PRACTITIONER

## 2025-02-18 PROCEDURE — 6370000000 HC RX 637 (ALT 250 FOR IP): Performed by: NURSE PRACTITIONER

## 2025-02-18 PROCEDURE — 2580000003 HC RX 258: Performed by: NURSE PRACTITIONER

## 2025-02-18 PROCEDURE — 80048 BASIC METABOLIC PNL TOTAL CA: CPT

## 2025-02-18 PROCEDURE — 1200000000 HC SEMI PRIVATE

## 2025-02-18 PROCEDURE — 81001 URINALYSIS AUTO W/SCOPE: CPT

## 2025-02-18 PROCEDURE — 96375 TX/PRO/DX INJ NEW DRUG ADDON: CPT

## 2025-02-18 PROCEDURE — 99222 1ST HOSP IP/OBS MODERATE 55: CPT | Performed by: NURSE PRACTITIONER

## 2025-02-18 PROCEDURE — 6360000002 HC RX W HCPCS: Performed by: EMERGENCY MEDICINE

## 2025-02-18 PROCEDURE — 99285 EMERGENCY DEPT VISIT HI MDM: CPT

## 2025-02-18 PROCEDURE — 74177 CT ABD & PELVIS W/CONTRAST: CPT

## 2025-02-18 PROCEDURE — 85025 COMPLETE CBC W/AUTO DIFF WBC: CPT

## 2025-02-18 RX ORDER — SODIUM CHLORIDE 0.9 % (FLUSH) 0.9 %
10 SYRINGE (ML) INJECTION ONCE
Status: COMPLETED | OUTPATIENT
Start: 2025-02-18 | End: 2025-02-18

## 2025-02-18 RX ORDER — ONDANSETRON 2 MG/ML
4 INJECTION INTRAMUSCULAR; INTRAVENOUS EVERY 6 HOURS PRN
Status: DISCONTINUED | OUTPATIENT
Start: 2025-02-18 | End: 2025-02-21 | Stop reason: HOSPADM

## 2025-02-18 RX ORDER — POTASSIUM CHLORIDE 7.45 MG/ML
10 INJECTION INTRAVENOUS PRN
Status: DISCONTINUED | OUTPATIENT
Start: 2025-02-18 | End: 2025-02-21 | Stop reason: HOSPADM

## 2025-02-18 RX ORDER — SODIUM CHLORIDE 0.9 % (FLUSH) 0.9 %
5-40 SYRINGE (ML) INJECTION EVERY 12 HOURS SCHEDULED
Status: DISCONTINUED | OUTPATIENT
Start: 2025-02-18 | End: 2025-02-21 | Stop reason: HOSPADM

## 2025-02-18 RX ORDER — 0.9 % SODIUM CHLORIDE 0.9 %
80 INTRAVENOUS SOLUTION INTRAVENOUS ONCE
Status: DISCONTINUED | OUTPATIENT
Start: 2025-02-18 | End: 2025-02-21 | Stop reason: HOSPADM

## 2025-02-18 RX ORDER — SODIUM CHLORIDE 9 MG/ML
INJECTION, SOLUTION INTRAVENOUS CONTINUOUS
Status: DISCONTINUED | OUTPATIENT
Start: 2025-02-18 | End: 2025-02-21 | Stop reason: HOSPADM

## 2025-02-18 RX ORDER — LOSARTAN POTASSIUM 50 MG/1
50 TABLET ORAL DAILY
Status: DISCONTINUED | OUTPATIENT
Start: 2025-02-18 | End: 2025-02-21 | Stop reason: HOSPADM

## 2025-02-18 RX ORDER — ENOXAPARIN SODIUM 100 MG/ML
30 INJECTION SUBCUTANEOUS 2 TIMES DAILY
Status: DISCONTINUED | OUTPATIENT
Start: 2025-02-18 | End: 2025-02-21 | Stop reason: HOSPADM

## 2025-02-18 RX ORDER — ACETAMINOPHEN 325 MG/1
650 TABLET ORAL EVERY 6 HOURS PRN
Status: DISCONTINUED | OUTPATIENT
Start: 2025-02-18 | End: 2025-02-21 | Stop reason: HOSPADM

## 2025-02-18 RX ORDER — OXYCODONE HYDROCHLORIDE 5 MG/1
10 TABLET ORAL EVERY 4 HOURS PRN
Status: DISCONTINUED | OUTPATIENT
Start: 2025-02-18 | End: 2025-02-21 | Stop reason: HOSPADM

## 2025-02-18 RX ORDER — METRONIDAZOLE 500 MG/100ML
500 INJECTION, SOLUTION INTRAVENOUS EVERY 8 HOURS
Status: DISCONTINUED | OUTPATIENT
Start: 2025-02-18 | End: 2025-02-21 | Stop reason: HOSPADM

## 2025-02-18 RX ORDER — MAGNESIUM SULFATE 1 G/100ML
1000 INJECTION INTRAVENOUS PRN
Status: DISCONTINUED | OUTPATIENT
Start: 2025-02-18 | End: 2025-02-21 | Stop reason: HOSPADM

## 2025-02-18 RX ORDER — MORPHINE SULFATE 4 MG/ML
4 INJECTION, SOLUTION INTRAMUSCULAR; INTRAVENOUS ONCE
Status: COMPLETED | OUTPATIENT
Start: 2025-02-18 | End: 2025-02-18

## 2025-02-18 RX ORDER — POLYETHYLENE GLYCOL 3350 17 G/17G
17 POWDER, FOR SOLUTION ORAL DAILY PRN
Status: DISCONTINUED | OUTPATIENT
Start: 2025-02-18 | End: 2025-02-21 | Stop reason: HOSPADM

## 2025-02-18 RX ORDER — POTASSIUM CHLORIDE 1500 MG/1
40 TABLET, EXTENDED RELEASE ORAL PRN
Status: DISCONTINUED | OUTPATIENT
Start: 2025-02-18 | End: 2025-02-21 | Stop reason: HOSPADM

## 2025-02-18 RX ORDER — IOPAMIDOL 755 MG/ML
75 INJECTION, SOLUTION INTRAVASCULAR
Status: COMPLETED | OUTPATIENT
Start: 2025-02-18 | End: 2025-02-18

## 2025-02-18 RX ORDER — SODIUM CHLORIDE 9 MG/ML
INJECTION, SOLUTION INTRAVENOUS PRN
Status: DISCONTINUED | OUTPATIENT
Start: 2025-02-18 | End: 2025-02-21 | Stop reason: HOSPADM

## 2025-02-18 RX ORDER — MORPHINE SULFATE 4 MG/ML
4 INJECTION, SOLUTION INTRAMUSCULAR; INTRAVENOUS EVERY 4 HOURS PRN
Status: DISCONTINUED | OUTPATIENT
Start: 2025-02-18 | End: 2025-02-21 | Stop reason: HOSPADM

## 2025-02-18 RX ORDER — OXYCODONE HYDROCHLORIDE 5 MG/1
5 TABLET ORAL EVERY 4 HOURS PRN
Status: DISCONTINUED | OUTPATIENT
Start: 2025-02-18 | End: 2025-02-21 | Stop reason: HOSPADM

## 2025-02-18 RX ORDER — ONDANSETRON 4 MG/1
4 TABLET, ORALLY DISINTEGRATING ORAL EVERY 8 HOURS PRN
Status: DISCONTINUED | OUTPATIENT
Start: 2025-02-18 | End: 2025-02-21 | Stop reason: HOSPADM

## 2025-02-18 RX ORDER — SODIUM CHLORIDE 0.9 % (FLUSH) 0.9 %
10 SYRINGE (ML) INJECTION PRN
Status: DISCONTINUED | OUTPATIENT
Start: 2025-02-18 | End: 2025-02-21 | Stop reason: HOSPADM

## 2025-02-18 RX ORDER — METRONIDAZOLE 500 MG/100ML
500 INJECTION, SOLUTION INTRAVENOUS ONCE
Status: COMPLETED | OUTPATIENT
Start: 2025-02-18 | End: 2025-02-18

## 2025-02-18 RX ORDER — KETOROLAC TROMETHAMINE 15 MG/ML
15 INJECTION, SOLUTION INTRAMUSCULAR; INTRAVENOUS ONCE
Status: COMPLETED | OUTPATIENT
Start: 2025-02-18 | End: 2025-02-18

## 2025-02-18 RX ORDER — ACETAMINOPHEN 650 MG/1
650 SUPPOSITORY RECTAL EVERY 6 HOURS PRN
Status: DISCONTINUED | OUTPATIENT
Start: 2025-02-18 | End: 2025-02-21 | Stop reason: HOSPADM

## 2025-02-18 RX ORDER — ONDANSETRON 2 MG/ML
4 INJECTION INTRAMUSCULAR; INTRAVENOUS ONCE
Status: COMPLETED | OUTPATIENT
Start: 2025-02-18 | End: 2025-02-18

## 2025-02-18 RX ADMIN — ENOXAPARIN SODIUM 30 MG: 100 INJECTION SUBCUTANEOUS at 20:08

## 2025-02-18 RX ADMIN — KETOROLAC TROMETHAMINE 15 MG: 15 INJECTION, SOLUTION INTRAMUSCULAR; INTRAVENOUS at 08:08

## 2025-02-18 RX ADMIN — OXYCODONE HYDROCHLORIDE 10 MG: 5 TABLET ORAL at 13:44

## 2025-02-18 RX ADMIN — MORPHINE SULFATE 4 MG: 4 INJECTION, SOLUTION INTRAMUSCULAR; INTRAVENOUS at 09:42

## 2025-02-18 RX ADMIN — IOPAMIDOL 75 ML: 755 INJECTION, SOLUTION INTRAVENOUS at 09:30

## 2025-02-18 RX ADMIN — SODIUM CHLORIDE, PRESERVATIVE FREE 10 ML: 5 INJECTION INTRAVENOUS at 09:31

## 2025-02-18 RX ADMIN — MORPHINE SULFATE 4 MG: 4 INJECTION, SOLUTION INTRAMUSCULAR; INTRAVENOUS at 20:13

## 2025-02-18 RX ADMIN — METRONIDAZOLE 500 MG: 500 INJECTION, SOLUTION INTRAVENOUS at 11:11

## 2025-02-18 RX ADMIN — OXYCODONE HYDROCHLORIDE 10 MG: 5 TABLET ORAL at 18:08

## 2025-02-18 RX ADMIN — OXYCODONE HYDROCHLORIDE 10 MG: 5 TABLET ORAL at 23:05

## 2025-02-18 RX ADMIN — MORPHINE SULFATE 4 MG: 4 INJECTION, SOLUTION INTRAMUSCULAR; INTRAVENOUS at 15:35

## 2025-02-18 RX ADMIN — ONDANSETRON 4 MG: 2 INJECTION, SOLUTION INTRAMUSCULAR; INTRAVENOUS at 08:08

## 2025-02-18 RX ADMIN — ENOXAPARIN SODIUM 30 MG: 100 INJECTION SUBCUTANEOUS at 13:39

## 2025-02-18 RX ADMIN — LOSARTAN POTASSIUM 50 MG: 50 TABLET, FILM COATED ORAL at 13:39

## 2025-02-18 RX ADMIN — SODIUM CHLORIDE: 9 INJECTION, SOLUTION INTRAVENOUS at 23:07

## 2025-02-18 RX ADMIN — METRONIDAZOLE 500 MG: 500 INJECTION, SOLUTION INTRAVENOUS at 19:14

## 2025-02-18 RX ADMIN — SODIUM CHLORIDE: 9 INJECTION, SOLUTION INTRAVENOUS at 13:40

## 2025-02-18 RX ADMIN — MORPHINE SULFATE 4 MG: 4 INJECTION, SOLUTION INTRAMUSCULAR; INTRAVENOUS at 11:05

## 2025-02-18 RX ADMIN — WATER 1000 MG: 1 INJECTION INTRAMUSCULAR; INTRAVENOUS; SUBCUTANEOUS at 11:04

## 2025-02-18 RX ADMIN — Medication 80 ML: at 09:31

## 2025-02-18 ASSESSMENT — PAIN SCALES - GENERAL
PAINLEVEL_OUTOF10: 3
PAINLEVEL_OUTOF10: 8
PAINLEVEL_OUTOF10: 8
PAINLEVEL_OUTOF10: 6
PAINLEVEL_OUTOF10: 8
PAINLEVEL_OUTOF10: 8
PAINLEVEL_OUTOF10: 10
PAINLEVEL_OUTOF10: 7
PAINLEVEL_OUTOF10: 8
PAINLEVEL_OUTOF10: 6
PAINLEVEL_OUTOF10: 8
PAINLEVEL_OUTOF10: 3

## 2025-02-18 ASSESSMENT — PAIN DESCRIPTION - LOCATION
LOCATION: ABDOMEN
LOCATION: ABDOMEN
LOCATION: ABDOMEN;GROIN;BACK
LOCATION: ABDOMEN;BACK;GROIN

## 2025-02-18 ASSESSMENT — ENCOUNTER SYMPTOMS
SINUS PAIN: 0
DIARRHEA: 0
SHORTNESS OF BREATH: 0
CONSTIPATION: 1
PHOTOPHOBIA: 0
COUGH: 0
VOMITING: 0
WHEEZING: 0
ABDOMINAL PAIN: 1
SINUS PRESSURE: 0
NAUSEA: 1

## 2025-02-18 ASSESSMENT — PAIN - FUNCTIONAL ASSESSMENT
PAIN_FUNCTIONAL_ASSESSMENT: 0-10
PAIN_FUNCTIONAL_ASSESSMENT: ACTIVITIES ARE NOT PREVENTED

## 2025-02-18 ASSESSMENT — PAIN DESCRIPTION - DESCRIPTORS
DESCRIPTORS: PRESSURE;CRAMPING
DESCRIPTORS: ACHING;CRAMPING;THROBBING
DESCRIPTORS: ACHING;THROBBING;CRAMPING

## 2025-02-18 ASSESSMENT — PAIN DESCRIPTION - ORIENTATION
ORIENTATION: RIGHT;LEFT;LOWER
ORIENTATION: LOWER;RIGHT;LEFT

## 2025-02-18 ASSESSMENT — LIFESTYLE VARIABLES
HOW OFTEN DO YOU HAVE A DRINK CONTAINING ALCOHOL: MONTHLY OR LESS
HOW MANY STANDARD DRINKS CONTAINING ALCOHOL DO YOU HAVE ON A TYPICAL DAY: PATIENT DOES NOT DRINK

## 2025-02-18 ASSESSMENT — PAIN DESCRIPTION - FREQUENCY: FREQUENCY: CONTINUOUS

## 2025-02-18 ASSESSMENT — PAIN DESCRIPTION - DIRECTION: RADIATING_TOWARDS: GROIN

## 2025-02-18 ASSESSMENT — PAIN DESCRIPTION - PAIN TYPE: TYPE: ACUTE PAIN

## 2025-02-18 NOTE — PROGRESS NOTES
ADMISSION NOTE         Patient admitted to room: 2108     Time of admit: 1705     Admit from: ED      Reason for admission: Diverticulitis     Where patient has been residing for the last 24 hrs: home      Has the patient been admitted to any facility in the last 4 weeks, which one:  no      Family at bedside: Yes     Patient is currently: resting in bed comfortably, vitals obtained, telemetry placed on pt. No distress noted. Patient has been oriented to room, educated on how to use call light, and to call for assistance prior to getting up. Bed in lowest and locked position. 2 siderails up for safety. Call light within reach.          [x] Medication Reconciliation was completed and the patient's home medication list was verified. The Med List Status has been marked \"Complete\". The following sources were used to assist with Medication Reconciliation:    [x] Patient had a list of medications which was transcribed into the EHR.

## 2025-02-18 NOTE — H&P
[facility]  Office: [phone]  Jason Doe DO, Melvin Green DO, Tremaine Lam DO, Chidi Corey DO, Rosa Solano MD, Brittney Clarke MD, Melissa Ward MD, Halina Soriano MD,  Edgar Mueller MD, Neri Samson MD, Michael Lion MD,  Mumtaz Tuttle DO, Chris Beckett MD, Vinod Bridges MD, Baldo Doe DO, Nataliya Arredondo MD,  Zac Saleh DO, Brunilda Kaplan MD, Annamaria Patel MD, Roxana Chavis MD, Pato Bonilla MD,  Venu Fontanez MD, Abram Brush MD, Shaun Mann MD, Son Escoto MD, Arvin Topete MD, Bhargav Evans MD, Juno Ramírez DO, Derian Lyons MD, Mumtaz Mcdowell MD, Mohsin Reza, MD, Shirley Waterhouse, CNP,  Kristina Barajas CNP, Juno Sanchez, CNP,  Lexie Salas, TYRON, Irene Acosta, CNP, Marielena Hdz, CNP, Genevieve Cao, CNP, Fauzia Barcenas CNP, Jazmyne Sampson PA-C, Ellen Le, CNP, Cricket Thapa, CNP,  Jeanne Washington, CNP, Anny Butler, CNP, Ale Patel, CNP,  Angie Garcia, CNS, Apurva Leslie CNP, Alem Ayala CNP,   Gisella Corrales, CNP         [facility]   IN-PATIENT SERVICE   [facility]    HISTORY AND PHYSICAL EXAMINATION            Date:   2/18/2025  Patient name:  Melvin Sierra  Date of admission:  2/18/2025  7:48 AM  MRN:   [id number]  Account:  [id number]  YOB: 1975  PCP:    Ed Palafox APRN - CNP  Room:   [place] [address]  Code Status:    Prior    Chief Complaint:     Chief Complaint   Patient presents with    Abdominal Pain     C/o abd pain and constipation with nausea since Sunday, last normal bowel movement was Friday or Saturday, states pain radiates into his groin, has taken medication to help but no relief    Constipation       History Obtained From:     patient    History of Present Illness:     Melvin Atwoodultz is a 49 y.o. Non- / non  male who presents with Abdominal Pain (C/o abd pain and constipation with nausea since Sunday, last normal bowel movement was Friday or  Normal range of motion.      Cervical back: Normal range of motion and neck supple.   Skin:     General: Skin is warm and dry.      Capillary Refill: Capillary refill takes less than 2 seconds.      Coloration: Skin is not pale.   Neurological:      General: No focal deficit present.      Mental Status: He is alert and oriented to person, place, and time. Mental status is at baseline.   Psychiatric:         Mood and Affect: Mood normal.         Behavior: Behavior normal.         Thought Content: Thought content normal.         Judgment: Judgment normal.         Investigations:      Laboratory Testing:  Recent Results (from the past 24 hour(s))   CBC with Auto Differential    Collection Time: 02/18/25  8:10 AM   Result Value Ref Range    WBC 17.6 (H) 3.5 - 11.3 k/uL    RBC 5.49 4.21 - 5.77 m/uL    Hemoglobin 15.9 13.0 - 17.0 g/dL    Hematocrit 46.6 40.7 - 50.3 %    MCV 84.9 82.6 - 102.9 fL    MCH 29.0 25.2 - 33.5 pg    MCHC 34.1 28.4 - 34.8 g/dL    RDW 14.6 (H) 11.8 - 14.4 %    Platelets 304 138 - 453 k/uL    MPV 9.6 8.1 - 13.5 fL    NRBC Automated 0.0 0.0 per 100 WBC    Neutrophils % 82 (H) 36 - 65 %    Lymphocytes % 12 (L) 24 - 43 %    Monocytes % 6 3 - 12 %    Eosinophils % 0 (L) 1 - 4 %    Basophils % 0 0 - 2 %    Immature Granulocytes % 0 0 %    Neutrophils Absolute 14.24 (H) 1.50 - 8.10 k/uL    Lymphocytes Absolute 2.07 1.10 - 3.70 k/uL    Monocytes Absolute 1.11 0.10 - 1.20 k/uL    Eosinophils Absolute 0.07 0.00 - 0.44 k/uL    Basophils Absolute 0.07 0.00 - 0.20 k/uL    Immature Granulocytes Absolute 0.07 0.00 - 0.30 k/uL    RBC Morphology ANISOCYTOSIS PRESENT    Basic Metabolic Panel    Collection Time: 02/18/25  8:45 AM   Result Value Ref Range    Sodium 141 136 - 145 mmol/L    Potassium 4.4 3.7 - 5.3 mmol/L    Chloride 107 98 - 107 mmol/L    CO2 24 20 - 31 mmol/L    Anion Gap 10 9 - 16 mmol/L    Glucose 89 74 - 99 mg/dL    BUN 14 6 - 20 mg/dL    Creatinine 0.9 0.70 - 1.20 mg/dL    Est, Glom Filt Rate >90
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
## 2025-02-19 LAB
ANION GAP SERPL CALCULATED.3IONS-SCNC: 10 MMOL/L (ref 9–16)
BASOPHILS # BLD: 0.05 K/UL (ref 0–0.2)
BASOPHILS NFR BLD: 0 % (ref 0–2)
BUN SERPL-MCNC: 11 MG/DL (ref 6–20)
CALCIUM SERPL-MCNC: 8.9 MG/DL (ref 8.6–10.4)
CHLORIDE SERPL-SCNC: 106 MMOL/L (ref 98–107)
CO2 SERPL-SCNC: 23 MMOL/L (ref 20–31)
CREAT SERPL-MCNC: 0.8 MG/DL (ref 0.7–1.2)
EOSINOPHIL # BLD: 0.16 K/UL (ref 0–0.44)
EOSINOPHILS RELATIVE PERCENT: 1 % (ref 1–4)
ERYTHROCYTE [DISTWIDTH] IN BLOOD BY AUTOMATED COUNT: 14.8 % (ref 11.8–14.4)
GFR, ESTIMATED: >90 ML/MIN/1.73M2
GLUCOSE SERPL-MCNC: 87 MG/DL (ref 74–99)
HCT VFR BLD AUTO: 43.7 % (ref 40.7–50.3)
HGB BLD-MCNC: 14.4 G/DL (ref 13–17)
IMM GRANULOCYTES # BLD AUTO: 0.03 K/UL (ref 0–0.3)
IMM GRANULOCYTES NFR BLD: 0 %
LYMPHOCYTES NFR BLD: 2.36 K/UL (ref 1.1–3.7)
LYMPHOCYTES RELATIVE PERCENT: 20 % (ref 24–43)
MCH RBC QN AUTO: 28.5 PG (ref 25.2–33.5)
MCHC RBC AUTO-ENTMCNC: 33 G/DL (ref 28.4–34.8)
MCV RBC AUTO: 86.4 FL (ref 82.6–102.9)
MONOCYTES NFR BLD: 0.94 K/UL (ref 0.1–1.2)
MONOCYTES NFR BLD: 8 % (ref 3–12)
NEUTROPHILS NFR BLD: 71 % (ref 36–65)
NEUTS SEG NFR BLD: 8.22 K/UL (ref 1.5–8.1)
NRBC BLD-RTO: 0 PER 100 WBC
PLATELET # BLD AUTO: 262 K/UL (ref 138–453)
PMV BLD AUTO: 9.3 FL (ref 8.1–13.5)
POTASSIUM SERPL-SCNC: 4.3 MMOL/L (ref 3.7–5.3)
RBC # BLD AUTO: 5.06 M/UL (ref 4.21–5.77)
RBC # BLD: ABNORMAL 10*6/UL
SODIUM SERPL-SCNC: 138 MMOL/L (ref 136–145)
WBC OTHER # BLD: 11.8 K/UL (ref 3.5–11.3)

## 2025-02-19 PROCEDURE — 6360000002 HC RX W HCPCS: Performed by: NURSE PRACTITIONER

## 2025-02-19 PROCEDURE — 6360000002 HC RX W HCPCS: Performed by: EMERGENCY MEDICINE

## 2025-02-19 PROCEDURE — 6370000000 HC RX 637 (ALT 250 FOR IP): Performed by: NURSE PRACTITIONER

## 2025-02-19 PROCEDURE — 2500000003 HC RX 250 WO HCPCS: Performed by: NURSE PRACTITIONER

## 2025-02-19 PROCEDURE — 85025 COMPLETE CBC W/AUTO DIFF WBC: CPT

## 2025-02-19 PROCEDURE — 1200000000 HC SEMI PRIVATE

## 2025-02-19 PROCEDURE — 99232 SBSQ HOSP IP/OBS MODERATE 35: CPT | Performed by: NURSE PRACTITIONER

## 2025-02-19 PROCEDURE — 36415 COLL VENOUS BLD VENIPUNCTURE: CPT

## 2025-02-19 PROCEDURE — 80048 BASIC METABOLIC PNL TOTAL CA: CPT

## 2025-02-19 PROCEDURE — 2580000003 HC RX 258: Performed by: NURSE PRACTITIONER

## 2025-02-19 RX ADMIN — ENOXAPARIN SODIUM 30 MG: 100 INJECTION SUBCUTANEOUS at 19:44

## 2025-02-19 RX ADMIN — OXYCODONE HYDROCHLORIDE 10 MG: 5 TABLET ORAL at 11:36

## 2025-02-19 RX ADMIN — MORPHINE SULFATE 4 MG: 4 INJECTION, SOLUTION INTRAMUSCULAR; INTRAVENOUS at 09:14

## 2025-02-19 RX ADMIN — WATER 1000 MG: 1 INJECTION INTRAMUSCULAR; INTRAVENOUS; SUBCUTANEOUS at 11:22

## 2025-02-19 RX ADMIN — MORPHINE SULFATE 4 MG: 4 INJECTION, SOLUTION INTRAMUSCULAR; INTRAVENOUS at 21:31

## 2025-02-19 RX ADMIN — SODIUM CHLORIDE: 9 INJECTION, SOLUTION INTRAVENOUS at 19:35

## 2025-02-19 RX ADMIN — METRONIDAZOLE 500 MG: 500 INJECTION, SOLUTION INTRAVENOUS at 11:25

## 2025-02-19 RX ADMIN — SODIUM CHLORIDE: 9 INJECTION, SOLUTION INTRAVENOUS at 09:12

## 2025-02-19 RX ADMIN — METRONIDAZOLE 500 MG: 500 INJECTION, SOLUTION INTRAVENOUS at 03:29

## 2025-02-19 RX ADMIN — LOSARTAN POTASSIUM 50 MG: 50 TABLET, FILM COATED ORAL at 09:12

## 2025-02-19 RX ADMIN — ENOXAPARIN SODIUM 30 MG: 100 INJECTION SUBCUTANEOUS at 09:12

## 2025-02-19 RX ADMIN — POLYETHYLENE GLYCOL 3350 17 G: 17 POWDER, FOR SOLUTION ORAL at 19:44

## 2025-02-19 RX ADMIN — OXYCODONE HYDROCHLORIDE 10 MG: 5 TABLET ORAL at 18:33

## 2025-02-19 RX ADMIN — OXYCODONE HYDROCHLORIDE 10 MG: 5 TABLET ORAL at 06:05

## 2025-02-19 RX ADMIN — METRONIDAZOLE 500 MG: 500 INJECTION, SOLUTION INTRAVENOUS at 18:35

## 2025-02-19 ASSESSMENT — PAIN SCALES - GENERAL
PAINLEVEL_OUTOF10: 8
PAINLEVEL_OUTOF10: 8
PAINLEVEL_OUTOF10: 6
PAINLEVEL_OUTOF10: 5
PAINLEVEL_OUTOF10: 8
PAINLEVEL_OUTOF10: 6
PAINLEVEL_OUTOF10: 4
PAINLEVEL_OUTOF10: 6
PAINLEVEL_OUTOF10: 3
PAINLEVEL_OUTOF10: 3

## 2025-02-19 ASSESSMENT — ENCOUNTER SYMPTOMS
SINUS PAIN: 0
WHEEZING: 0
DIARRHEA: 0
NAUSEA: 1
VOMITING: 0
ABDOMINAL PAIN: 1
COUGH: 0
SHORTNESS OF BREATH: 0
CONSTIPATION: 0
SINUS PRESSURE: 0
PHOTOPHOBIA: 0

## 2025-02-19 ASSESSMENT — PAIN DESCRIPTION - DESCRIPTORS
DESCRIPTORS: ACHING;CRAMPING
DESCRIPTORS: ACHING

## 2025-02-19 ASSESSMENT — PAIN DESCRIPTION - LOCATION
LOCATION: ABDOMEN
LOCATION: ABDOMEN;BACK;GROIN
LOCATION: ABDOMEN

## 2025-02-19 ASSESSMENT — PAIN - FUNCTIONAL ASSESSMENT
PAIN_FUNCTIONAL_ASSESSMENT: ACTIVITIES ARE NOT PREVENTED

## 2025-02-19 ASSESSMENT — PAIN DESCRIPTION - ORIENTATION
ORIENTATION: RIGHT;LOWER
ORIENTATION: RIGHT
ORIENTATION: RIGHT;LEFT;LOWER

## 2025-02-19 ASSESSMENT — PAIN DESCRIPTION - FREQUENCY
FREQUENCY: CONTINUOUS
FREQUENCY: CONTINUOUS

## 2025-02-19 ASSESSMENT — PAIN DESCRIPTION - PAIN TYPE
TYPE: ACUTE PAIN
TYPE: ACUTE PAIN

## 2025-02-19 NOTE — PROGRESS NOTES
Occupational Therapy  DATE: 2025    NAME: Melvin Sierra  MRN: [id number]   : 1975    Patient not seen this date for Occupational Therapy due to:      [] Cancel by RN or physician due to:    [] Hemodialysis    [] Critical Lab Value Level     [] Blood transfusion in progress    [] Acute or unstable cardiovascular status   _MAP < 55 or more than >115  _HR < 40 or > 130    [] Acute or unstable pulmonary status   -FiO2 > 60%   _RR < 5 or >40    _O2 sats < 85%    [] Strict Bedrest    [] Off Unit for surgery or procedure    [] Off Unit for testing       [] Pending imaging to R/O fracture    [] Refusal by Patient      [] Other      [x] OT being discontinued at this time. Patient reporting he has been independent up in room, took a shower independently earlier today & feels like he is at his baseline for mobility & ADL performance. Educated pt to ask for therapy to be re-ordered if he experiences a decline in functional status. No further acute care OT needs indicated at this time.     [] OT being discontinued at this time as the patient has been transferred to hospice care. No further needs.      Marlene Ghosh, OT
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
## 2025-02-19 NOTE — PLAN OF CARE
Problem: Discharge Planning  Goal: Discharge to home or other facility with appropriate resources  Outcome: Progressing  Flowsheets  Taken 2/18/2025 1659  Discharge to home or other facility with appropriate resources:   Identify barriers to discharge with patient and caregiver   Arrange for needed discharge resources and transportation as appropriate   Identify discharge learning needs (meds, wound care, etc)  Taken 2/18/2025 1650  Discharge to home or other facility with appropriate resources:   Identify barriers to discharge with patient and caregiver   Arrange for needed discharge resources and transportation as appropriate   Arrange for interpreters to assist at discharge as needed   Refer to discharge planning if patient needs post-hospital services based on physician order or complex needs related to functional status, cognitive ability or social support system   Identify discharge learning needs (meds, wound care, etc)     Problem: Pain  Goal: Verbalizes/displays adequate comfort level or baseline comfort level  Outcome: Progressing     Problem: Gastrointestinal - Adult  Goal: Minimal or absence of nausea and vomiting  Outcome: Progressing

## 2025-02-19 NOTE — PLAN OF CARE
Problem: Discharge Planning  Goal: Discharge to home or other facility with appropriate resources  2/19/2025 0546 by Mi Hernández RN  Outcome: Progressing     Problem: Pain  Goal: Verbalizes/displays adequate comfort level or baseline comfort level  2/19/2025 0546 by Mi Hernández RN  Outcome: Progressing     Problem: Gastrointestinal - Adult  Goal: Minimal or absence of nausea and vomiting  2/19/2025 0546 by Mi Hernández RN  Outcome: Progressing

## 2025-02-19 NOTE — PLAN OF CARE
Patient continues on IV antibiotics. Patient started on clear liquid diet this shift, tolerating well.   Problem: Discharge Planning  Goal: Discharge to home or other facility with appropriate resources  2/19/2025 1650 by Jennifer Roberson RN  Outcome: Progressing  Flowsheets (Taken 2/19/2025 0801)  Discharge to home or other facility with appropriate resources:   Identify barriers to discharge with patient and caregiver   Arrange for needed discharge resources and transportation as appropriate     Problem: Pain  Goal: Verbalizes/displays adequate comfort level or baseline comfort level  2/19/2025 1650 by Jennifer Roberson RN  Outcome: Progressing  Flowsheets (Taken 2/19/2025 0800)  Verbalizes/displays adequate comfort level or baseline comfort level:   Encourage patient to monitor pain and request assistance   Assess pain using appropriate pain scale   Administer analgesics based on type and severity of pain and evaluate response   Implement non-pharmacological measures as appropriate and evaluate response

## 2025-02-19 NOTE — PROGRESS NOTES
Physical Therapy  DATE: 2025    NAME: Melvin Sierra  MRN: [id number]   : 1975    Patient not seen this date for Physical Therapy due to:      [] Cancel by RN or physician due to:    [] Hemodialysis    [] Critical Lab Value Level     [] Blood transfusion in progress    [] Acute or unstable cardiovascular status   _MAP < 55 or more than >115  _HR < 40 or > 130    [] Acute or unstable pulmonary status   -FiO2 > 60%   _RR < 5 or >40    _O2 sats < 85%    [] Strict Bedrest    [] Off Unit for surgery or procedure    [] Off Unit for testing       [] Pending imaging to R/O fracture    [] Refusal by Patient      [] Other      [x] PT being discontinued at this time. Patient independent. No further needs.     [] PT being discontinued at this time as the patient has been transferred to hospice care. No further needs.      Leydi Dumont, PT

## 2025-02-19 NOTE — CARE COORDINATION
Case Management Assessment  Initial Evaluation    Date/Time of Evaluation: 2/19/2025 12:29 PM  Assessment Completed by: Enedina Tian    If patient is discharged prior to next notation, then this note serves as note for discharge by case management.    Patient Name: Melvin Sierra                   YOB: 1975  Diagnosis: Diverticulitis of colon [K57.32]  Acute diverticulitis of intestine [K57.92]                   Date / Time: 2/18/2025  7:48 AM    Patient Admission Status: Inpatient   Readmission Risk (Low < 19, Mod (19-27), High > 27): Readmission Risk Score: 4.4    Current PCP: Ed Palafox, APRN - CNP  PCP verified by CM? (P) Yes    Chart Reviewed: Yes      History Provided by: (P) Patient  Patient Orientation: (P) Alert and Oriented    Patient Cognition: (P) Alert    Hospitalization in the last 30 days (Readmission):  No    If yes, Readmission Assessment in CM Navigator will be completed.    Advance Directives:      Code Status: Full Code   Patient's Primary Decision Maker is: (P) Legal Next of Kin      Discharge Planning:    Patient lives with: (P) Children Type of Home: (P) House  Primary Care Giver: (P) Self  Patient Support Systems include: (P) Children, Family Members   Current Financial resources: (P) Other (Comment) ([facility] plan)  Current community resources:    Current services prior to admission: (P) Durable Medical Equipment            Current DME: (P) Cane            Type of Home Care services:  (P) None    ADLS  Prior functional level: (P) Independent in ADLs/IADLs  Current functional level: (P) Independent in ADLs/IADLs    PT AM-PAC:   /24  OT AM-PAC:   /24    Family can provide assistance at DC: (P) Yes  Would you like Case Management to discuss the discharge plan with any other family members/significant others, and if so, who? (P) No  Plans to Return to Present Housing: (P) Yes  Other Identified Issues/Barriers to RETURNING to current housing: medical

## 2025-02-19 NOTE — PROGRESS NOTES
[facility]  Office: [phone]  Jason Doe DO, Melvin Green, DO, Tremaine Lam DO, Chidi Corey DO, Rosa Solano MD, Brittney Clarke MD, Melissa Ward MD, Halina Soriano MD,  Edgar Mueller MD, Neri Samson MD, Michael Lion MD,  Mumtaz Tuttle DO, Chris Beckett MD, Vinod Bridges MD, Baldo Doe DO, Nataliya Arredondo MD,  Zac Saleh DO, Brunilda Kaplan MD, Annamaria Patel MD, Roxana Chavis MD, Pato Bonilla MD,  Venu Fontanez MD, Abram Brush MD, Shaun Mann MD, Son Escoto MD, Arvin Topete MD, Bhargav Evans MD, Juno Ramírez DO, Derian Lyons MD, Mumtaz Mcdowell MD, Mohsin Reza, MD, Shirley Waterhouse, CNP,  Kristina Barajas CNP, Juno Sanchez, CNP,  Lexie Salas, DNP, Irene Acosta, CNP, Marielena Hdz, CNP, Genevieve Cao, CNP, Fauzia Barcenas CNP, Jazmyne Sampson PA-C, Ellen Le, CNP, Cricket Thapa, CNP,  Jeanne Washington, CNP, Anny Butler, CNP, Ale Patel, CNP,  Angie Garcia, CNS, Auprva Leslie CNP, Alem Ayala CNP,   Gisella Corrales, CNP         [facility]   IN-PATIENT SERVICE   [facility]    Progress Note    2/19/2025    9:58 AM    Name:   Melvin Sierra  MRN:     [id number]     Acct:      [id number]   Room:   2108/2108-01   Day:  1  Admit Date:  2/18/2025  7:48 AM    PCP:   Ed Palafox APRN - CNP  Code Status:  Full Code    Subjective:     C/C:   Chief Complaint   Patient presents with    Abdominal Pain     C/o abd pain and constipation with nausea since Sunday, last normal bowel movement was Friday or Saturday, states pain radiates into his groin, has taken medication to help but no relief    Constipation     Interval History Status: improved.     Minimal improvement in condition.  Remains on pain control, IV antibiotics, IV hydration.  Await general surgery input.    Brief History:     2/18 - Melvin Sierra is a 49 y.o. Non- / non  male who presents with Abdominal Pain (C/o abd pain and  29.0 28.5   MCHC 34.1 33.0   RDW 14.6* 14.8*    262   MPV 9.6 9.3     Chemistry:  Recent Labs     02/18/25  0845 02/19/25  0600    138   K 4.4 4.3    106   CO2 24 23   GLUCOSE 89 87   BUN 14 11   CREATININE 0.9 0.8   ANIONGAP 10 10   LABGLOM >90 >90   CALCIUM 9.0 8.9   No results for input(s): \"LABALBU\", \"LABA1C\", \"J9NQEEQ\", \"FT4\", \"TSH\", \"AST\", \"ALT\", \"LDH\", \"GGT\", \"ALKPHOS\", \"BILITOT\", \"BILIDIR\", \"AMMONIA\", \"AMYLASE\", \"LIPASE\", \"LACTATE\", \"CHOL\", \"HDL\", \"CHOLHDLRATIO\", \"TRIG\", \"VLDL\", \"BDA33PH\", \"PHENYTOIN\", \"PHENYF\", \"URICACID\", \"POCGLU\" in the last 72 hours.    Invalid input(s): \"PROT\", \"V8SHTDD\", \"LABGGT\", \"LDLCHOLESTEROL\"  ABG:No results found for: \"POCPH\", \"PHART\", \"PH\", \"POCPCO2\", \"PQX7UNT\", \"PCO2\", \"POCPO2\", \"PO2ART\", \"PO2\", \"POCHCO3\", \"AMQ0HFB\", \"HCO3\", \"NBEA\", \"PBEA\", \"BEART\", \"BE\", \"THGBART\", \"THB\", \"ADE8KJE\", \"QJFB1GST\", \"B8NLQVYT\", \"O2SAT\", \"FIO2\"  Lab Results   Component Value Date/Time    SPECIAL NOT REPORTED 08/09/2019 10:59 PM     Lab Results   Component Value Date/Time    CULTURE NORMAL SKIN TITA MODERATE GROWTH 01/10/2024 09:56 AM       Radiology:  CT ABDOMEN PELVIS W IV CONTRAST Additional Contrast? None    Result Date: 2/18/2025  1. Acute sigmoid diverticulitis with micro perforation. No focal fluid collections. 2. Complex midline anterior abdominal wall hernia containing fat. There are tiny non dependent gas bubbles within the hernia likely from micro perforated diverticulitis. 3. Cholelithiasis.       Physical Examination:        Physical Exam  Constitutional:       General: He is not in acute distress.     Appearance: Normal appearance. He is normal weight. He is not toxic-appearing.   HENT:      Head: Normocephalic and atraumatic.      Mouth/Throat:      Mouth: Mucous membranes are moist.   Eyes:      Extraocular Movements: Extraocular movements intact.      Pupils: Pupils are equal, round, and reactive to light.   Cardiovascular:      Rate and Rhythm: Normal rate and

## 2025-02-20 LAB
ANION GAP SERPL CALCULATED.3IONS-SCNC: 12 MMOL/L (ref 9–16)
BASOPHILS # BLD: 0.04 K/UL (ref 0–0.2)
BASOPHILS NFR BLD: 1 % (ref 0–2)
BUN SERPL-MCNC: 9 MG/DL (ref 6–20)
CALCIUM SERPL-MCNC: 8.7 MG/DL (ref 8.6–10.4)
CHLORIDE SERPL-SCNC: 106 MMOL/L (ref 98–107)
CO2 SERPL-SCNC: 20 MMOL/L (ref 20–31)
CREAT SERPL-MCNC: 0.8 MG/DL (ref 0.7–1.2)
EOSINOPHIL # BLD: 0.25 K/UL (ref 0–0.44)
EOSINOPHILS RELATIVE PERCENT: 3 % (ref 1–4)
ERYTHROCYTE [DISTWIDTH] IN BLOOD BY AUTOMATED COUNT: 14.5 % (ref 11.8–14.4)
GFR, ESTIMATED: >90 ML/MIN/1.73M2
GLUCOSE SERPL-MCNC: 86 MG/DL (ref 74–99)
HCT VFR BLD AUTO: 43.4 % (ref 40.7–50.3)
HGB BLD-MCNC: 14.4 G/DL (ref 13–17)
IMM GRANULOCYTES # BLD AUTO: 0.03 K/UL (ref 0–0.3)
IMM GRANULOCYTES NFR BLD: 0 %
LYMPHOCYTES NFR BLD: 1.82 K/UL (ref 1.1–3.7)
LYMPHOCYTES RELATIVE PERCENT: 22 % (ref 24–43)
MCH RBC QN AUTO: 28.6 PG (ref 25.2–33.5)
MCHC RBC AUTO-ENTMCNC: 33.2 G/DL (ref 28.4–34.8)
MCV RBC AUTO: 86.1 FL (ref 82.6–102.9)
MONOCYTES NFR BLD: 0.77 K/UL (ref 0.1–1.2)
MONOCYTES NFR BLD: 9 % (ref 3–12)
NEUTROPHILS NFR BLD: 65 % (ref 36–65)
NEUTS SEG NFR BLD: 5.47 K/UL (ref 1.5–8.1)
NRBC BLD-RTO: 0 PER 100 WBC
PLATELET # BLD AUTO: 258 K/UL (ref 138–453)
PMV BLD AUTO: 9.1 FL (ref 8.1–13.5)
POTASSIUM SERPL-SCNC: 4 MMOL/L (ref 3.7–5.3)
RBC # BLD AUTO: 5.04 M/UL (ref 4.21–5.77)
RBC # BLD: ABNORMAL 10*6/UL
SODIUM SERPL-SCNC: 138 MMOL/L (ref 136–145)
WBC OTHER # BLD: 8.4 K/UL (ref 3.5–11.3)

## 2025-02-20 PROCEDURE — 6360000002 HC RX W HCPCS: Performed by: NURSE PRACTITIONER

## 2025-02-20 PROCEDURE — 2580000003 HC RX 258: Performed by: NURSE PRACTITIONER

## 2025-02-20 PROCEDURE — 99232 SBSQ HOSP IP/OBS MODERATE 35: CPT | Performed by: NURSE PRACTITIONER

## 2025-02-20 PROCEDURE — 6370000000 HC RX 637 (ALT 250 FOR IP): Performed by: NURSE PRACTITIONER

## 2025-02-20 PROCEDURE — 80048 BASIC METABOLIC PNL TOTAL CA: CPT

## 2025-02-20 PROCEDURE — 6360000002 HC RX W HCPCS: Performed by: EMERGENCY MEDICINE

## 2025-02-20 PROCEDURE — 2500000003 HC RX 250 WO HCPCS: Performed by: NURSE PRACTITIONER

## 2025-02-20 PROCEDURE — 1200000000 HC SEMI PRIVATE

## 2025-02-20 PROCEDURE — 85025 COMPLETE CBC W/AUTO DIFF WBC: CPT

## 2025-02-20 PROCEDURE — 36415 COLL VENOUS BLD VENIPUNCTURE: CPT

## 2025-02-20 RX ORDER — LORAZEPAM 2 MG/ML
1 INJECTION INTRAMUSCULAR EVERY 4 HOURS PRN
Status: DISCONTINUED | OUTPATIENT
Start: 2025-02-20 | End: 2025-02-21 | Stop reason: HOSPADM

## 2025-02-20 RX ADMIN — METRONIDAZOLE 500 MG: 500 INJECTION, SOLUTION INTRAVENOUS at 02:16

## 2025-02-20 RX ADMIN — LORAZEPAM 1 MG: 2 INJECTION INTRAMUSCULAR; INTRAVENOUS at 22:32

## 2025-02-20 RX ADMIN — SODIUM CHLORIDE: 9 INJECTION, SOLUTION INTRAVENOUS at 05:32

## 2025-02-20 RX ADMIN — OXYCODONE HYDROCHLORIDE 10 MG: 5 TABLET ORAL at 12:22

## 2025-02-20 RX ADMIN — OXYCODONE HYDROCHLORIDE 10 MG: 5 TABLET ORAL at 00:33

## 2025-02-20 RX ADMIN — SODIUM CHLORIDE: 9 INJECTION, SOLUTION INTRAVENOUS at 12:23

## 2025-02-20 RX ADMIN — OXYCODONE HYDROCHLORIDE 10 MG: 5 TABLET ORAL at 06:55

## 2025-02-20 RX ADMIN — METRONIDAZOLE 500 MG: 500 INJECTION, SOLUTION INTRAVENOUS at 18:40

## 2025-02-20 RX ADMIN — OXYCODONE HYDROCHLORIDE 10 MG: 5 TABLET ORAL at 22:32

## 2025-02-20 RX ADMIN — MORPHINE SULFATE 4 MG: 4 INJECTION, SOLUTION INTRAMUSCULAR; INTRAVENOUS at 09:57

## 2025-02-20 RX ADMIN — LOSARTAN POTASSIUM 50 MG: 50 TABLET, FILM COATED ORAL at 10:01

## 2025-02-20 RX ADMIN — ENOXAPARIN SODIUM 30 MG: 100 INJECTION SUBCUTANEOUS at 21:24

## 2025-02-20 RX ADMIN — WATER 1000 MG: 1 INJECTION INTRAMUSCULAR; INTRAVENOUS; SUBCUTANEOUS at 10:05

## 2025-02-20 RX ADMIN — OXYCODONE HYDROCHLORIDE 10 MG: 5 TABLET ORAL at 18:43

## 2025-02-20 RX ADMIN — ENOXAPARIN SODIUM 30 MG: 100 INJECTION SUBCUTANEOUS at 10:01

## 2025-02-20 RX ADMIN — SODIUM CHLORIDE: 9 INJECTION, SOLUTION INTRAVENOUS at 22:28

## 2025-02-20 RX ADMIN — METRONIDAZOLE 500 MG: 500 INJECTION, SOLUTION INTRAVENOUS at 11:24

## 2025-02-20 RX ADMIN — MORPHINE SULFATE 4 MG: 4 INJECTION, SOLUTION INTRAMUSCULAR; INTRAVENOUS at 16:03

## 2025-02-20 ASSESSMENT — ENCOUNTER SYMPTOMS
VOMITING: 0
COUGH: 0
ABDOMINAL PAIN: 1
DIARRHEA: 0
CONSTIPATION: 0
WHEEZING: 0
SINUS PAIN: 0
PHOTOPHOBIA: 0
SINUS PRESSURE: 0
SHORTNESS OF BREATH: 0
NAUSEA: 1

## 2025-02-20 ASSESSMENT — PAIN DESCRIPTION - LOCATION
LOCATION: ABDOMEN

## 2025-02-20 ASSESSMENT — PAIN - FUNCTIONAL ASSESSMENT
PAIN_FUNCTIONAL_ASSESSMENT: ACTIVITIES ARE NOT PREVENTED

## 2025-02-20 ASSESSMENT — PAIN DESCRIPTION - FREQUENCY
FREQUENCY: CONTINUOUS

## 2025-02-20 ASSESSMENT — PAIN SCALES - GENERAL
PAINLEVEL_OUTOF10: 4
PAINLEVEL_OUTOF10: 7
PAINLEVEL_OUTOF10: 7
PAINLEVEL_OUTOF10: 5
PAINLEVEL_OUTOF10: 6
PAINLEVEL_OUTOF10: 7
PAINLEVEL_OUTOF10: 5
PAINLEVEL_OUTOF10: 5
PAINLEVEL_OUTOF10: 8
PAINLEVEL_OUTOF10: 8
PAINLEVEL_OUTOF10: 5

## 2025-02-20 ASSESSMENT — PAIN DESCRIPTION - ONSET
ONSET: ON-GOING

## 2025-02-20 ASSESSMENT — PAIN DESCRIPTION - ORIENTATION
ORIENTATION: RIGHT;LOWER
ORIENTATION: RIGHT
ORIENTATION: RIGHT;MID;LOWER

## 2025-02-20 ASSESSMENT — PAIN DESCRIPTION - DESCRIPTORS
DESCRIPTORS: ACHING;CRAMPING
DESCRIPTORS: ACHING;CRAMPING;SORE
DESCRIPTORS: CRAMPING;ACHING
DESCRIPTORS: ACHING;CRAMPING
DESCRIPTORS: ACHING;SORE
DESCRIPTORS: ACHING;CRAMPING

## 2025-02-20 ASSESSMENT — PAIN DESCRIPTION - PAIN TYPE
TYPE: ACUTE PAIN

## 2025-02-20 ASSESSMENT — PAIN DESCRIPTION - DIRECTION: RADIATING_TOWARDS: TO GROIN

## 2025-02-20 NOTE — PLAN OF CARE
Problem: Pain  Goal: Verbalizes/displays adequate comfort level or baseline comfort level  2/19/2025 2004 by Ella Morrison RN  Outcome: Not Progressing  2/19/2025 1650 by Jennifer Roberson RN  Outcome: Progressing  Flowsheets (Taken 2/19/2025 0800)  Verbalizes/displays adequate comfort level or baseline comfort level:   Encourage patient to monitor pain and request assistance   Assess pain using appropriate pain scale   Administer analgesics based on type and severity of pain and evaluate response   Implement non-pharmacological measures as appropriate and evaluate response     Problem: Pain  Goal: Verbalizes/displays adequate comfort level or baseline comfort level  2/19/2025 2004 by Ella Morrison, RN  Outcome: Not Progressing  2/19/2025 1650 by Jennifer Roberson RN  Outcome: Progressing  Flowsheets (Taken 2/19/2025 0800)  Verbalizes/displays adequate comfort level or baseline comfort level:   Encourage patient to monitor pain and request assistance   Assess pain using appropriate pain scale   Administer analgesics based on type and severity of pain and evaluate response   Implement non-pharmacological measures as appropriate and evaluate response

## 2025-02-20 NOTE — PROGRESS NOTES
General Surgery:  Daily Progress Note                 PATIENT NAME: Melvin Sierra     TODAY'S DATE: 2/20/2025, 5:44 AM    SUBJECTIVE:     Pt seen and examined at bedside.  Afebrile, hemodynamically stable.  Continues to complain of lower abdominal pain.  Denies any nausea, vomiting, fever, chills.  States he is passing small amounts of flatus, but denies any significant bowel function.  Has been ambulating in his room back and forth to the bathroom.  Tolerating clear liquids without issue.    OBJECTIVE:   VITALS:  BP (!) 142/90   Pulse 88   Temp 98.2 °F (36.8 °C) (Oral)   Resp 18   Ht 1.829 m (6')   Wt 120.2 kg (265 lb 1.6 oz)   SpO2 95%   BMI 35.95 kg/m²      INTAKE/OUTPUT:      Intake/Output Summary (Last 24 hours) at 2/20/2025 0544  Last data filed at 2/20/2025 0535  Gross per 24 hour   Intake 3335.72 ml   Output 1200 ml   Net 2135.72 ml       PHYSICAL EXAM:  General Appearance: awake, alert, oriented, in no acute distress  HEENT:  Normocephalic, atraumatic, mucus membranes moist   Heart: Heart regular rate and rhythm  Lungs: Normal respiratory effort, no wheezing or stridor  Abdomen:Soft, nondistended. Moderately tender over bilateral lower quadrants. No rebound tenderness or guarding. Umbilical hernia present.    Extremities: No cyanosis, pitting edema, rashes noted.    Skin: Skin color, texture, turgor normal. No rashes or lesions.      Data:  CBC with Differential:    Lab Results   Component Value Date/Time    WBC 11.8 02/19/2025 06:00 AM    RBC 5.06 02/19/2025 06:00 AM    RBC 4.71 11/01/2011 12:27 AM    HGB 14.4 02/19/2025 06:00 AM    HCT 43.7 02/19/2025 06:00 AM     02/19/2025 06:00 AM     11/01/2011 12:27 AM    MCV 86.4 02/19/2025 06:00 AM    MCH 28.5 02/19/2025 06:00 AM    MCHC 33.0 02/19/2025 06:00 AM    RDW 14.8 02/19/2025 06:00 AM    NRBC 1 05/19/2017 10:42 PM    LYMPHOPCT 20 02/19/2025 06:00 AM    MONOPCT 8 02/19/2025 06:00 AM    EOSPCT 1 02/19/2025 06:00 AM    BASOPCT 0

## 2025-02-20 NOTE — PROGRESS NOTES
[facility]  Office: [phone]  Jason Doe DO, Melvin Green, DO, Tremaine Lam DO, Chidi Corey DO, Rosa Solano MD, Brittney Clarke MD, Melissa Ward MD, Halina Soriano MD,  Edgar Mueller MD, Neri Samson MD, Michael Lion MD,  Mumtaz Tuttle DO, Chris Beckett MD, Vinod Bridges MD, Baldo Doe DO, Nataliya Arredondo MD,  Zac Saleh DO, Brunilda Kaplan MD, Annamaria Patel MD, Roxana Chavis MD, Pato Bonilla MD,  Venu Fontanez MD, Abram Brush MD, Shaun Mann MD, Son Escoto MD, Arvin Topete MD, Bhargav Evans MD, Juno Ramírez DO, Derian Lyons MD, Mumtaz Mcdowell MD, Mohsin Reza, MD, Shirley Waterhouse, CNP,  Kristina Barajas CNP, Juno Sanchez, CNP,  Lexie Salas, DNP, Irene Acosta, CNP, Marielena Hdz, CNP, Genevieve Cao, CNP, Fauzia Barcenas CNP, Jazmyne Sampson PA-C, Ellen Le, CNP, Cricket Thapa, CNP,  Jeanne Washington, CNP, Anny Butler, CNP, Ale Patel, CNP,  Angie Garcia, CNS, Apurva Leslie CNP, Alem Ayala CNP,   Gisella Corrales, CNP         [facility]   IN-PATIENT SERVICE   [facility]    Progress Note    2/20/2025    9:31 AM    Name:   Melvin Sierra  MRN:     [id number]     Acct:      [id number]   Room:   2108/2108-01   Day:  2  Admit Date:  2/18/2025  7:48 AM    PCP:   Ed Palafox APRN - CNP  Code Status:  Full Code    Subjective:     C/C:   Chief Complaint   Patient presents with    Abdominal Pain     C/o abd pain and constipation with nausea since Sunday, last normal bowel movement was Friday or Saturday, states pain radiates into his groin, has taken medication to help but no relief    Constipation     Interval History Status: improved.     Condition again improved overnight.  White count returned to baseline.  Surgery advised no plans for operative intervention at this time.  Slow diet advancement.  IV hydration and pain control continue.  Patient will need outpatient colonoscopy following the

## 2025-02-20 NOTE — CARE COORDINATION
RE: Plan of Care    Leslie Montana MD    Thank you for referring Sandy Barker. The following information reflects my assessment and plan of care.           Plan of Care 8/3/23   Effective from: 8/3/2023  Effective to: 8/3/2023    Plan ID: [id number]                Participants as of 8/3/2023      Name Type Comments Contact Info    Leslie Montana MD Referring Provider  [phone]    Matt Ramsay OTR/L Occupational Therapist                 Sandy Barker MRN:[id number] (:1963 60 year old F)                 Evaluation       Author: Matt Ramsay, OTR/L Status: Signed Last edited: 8/3/2023  3:30 PM         Occupational Therapy Evaluation    Visit Type: Initial Evaluation  Visit: 1  Referring Provider: Leslie Montana MD  Medical Diagnosis (from order): Diagnosis Information    Diagnosis  233.0 (ICD-9-CM) - D05.12 (ICD-10-CM) - Neoplasm of left breast, 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)       Treatment Diagnosis: LUE  Onset  - Date of onset:  10/2022  - Date of surgery: 8/10/2023  Chart reviewed at time of initial evaluation (relevant co-morbidities, allergies, tests and medications listed):   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, Stage III, s/p lobectomy,               chemo and XRT    Asthma                                                        CAD (coronary artery disease)                                   Comment: s/p CABG 18    Mitral valve regurgitation                                      Comment: s/p Mitral Valve Ring    Atrial fibrillation (CMD)                                     Abnormal carotid duplex scan                    2019    Adverse effect of beta-adrenoreceptor antagoni* 2019    Essential hypertension, benign                  2019    Chronic diastolic heart failure (CMD)           2019      Comment: due to    Nonrheumatic aortic valve insufficiency          DC Planning    CHART REVIEW     Pt on Full liquid diet, plan for OP colonoscopy 6-8 weeks. Pt has been independent per primary nurse.    01/22/2019    Paroxysmal atrial fibrillation (CMD)            01/22/2019    Pleural effusion                                01/22/2019      Comment: right side    Cardiac pacemaker                               01/22/2019    Complete heart block (CMD)                                    Esophageal reflux                                             Presence of permanent cardiac pacemaker         01/29/2019    Pericardial effusion                                          History of right heart catheterization          02/14/2020      Comment: Right heart catheterization to assess for                pulmonary pressures 02/14/2020.  Mean PA                pressure was 31 mmHg.  PA pressure was 45/7                mmHg.  Pulmonary capillary wedge pressure was                elevated at 27 mmHg.  This is consistent with                Group 2 pulmonary hypertension.    Pulmonary hypertension due to left heart disea* 04/04/2020    History of echocardiogram                       10/08/2020      Comment: Transthoracic echocardiographic study performed               10/08/2020.  LVEF 52%.  No segmentality.  Grade               2 diastolic dysfunction.  Mild to moderate                aortic insufficiency.  The mitral valve has                undergone a surgical repair with mild to                moderate mitral regurgitation remaining.                 Mildly dilated left atrium.  Trivial PI.  Mild                TR.    History of cardiac catheterization              03/24/2021      Comment: Bilateral cardiac catheterization performed                03/24/2021.  A 40% ostial LMCA lesion.  There                was a 30% ostial LAD lesion.  There was a 30%                ostial LCx lesion.  There was evidence of                competitive flow from a vein graft filling the                distal LCx.  The RCA was free of significant                disease and the coronary anatomy was right                dominant.  The LIMA to  the LAD was atretic and                poorly visualized and likely occluded di    History of cardiac catheterization              01/22/2019      Comment: Transradial and right brachial vein cardiac                catheterization performed 05/31/2018.  Mildly                elevated PAOP and MPA pressures.  V waves                evident consistent with known mitral                regurgitation.  LMCA stenosis consistent with                known history of radiation exposure.  IVUS                ostial MLA 5.9 mm² compared to 10.5 mm² in left               main.  Mean pulmonary artery occlusive pressure               17/22/14 mmHg.  Large V waves were measuring 2    At risk for stroke                              01/22/2019      Comment: [id number] score of '2' (hypertension/sex).    Elevated brain natriuretic peptide (BNP) level                History of chemotherapy                         01/22/2019      Comment: For muco-epidermoid lung cancer in her early                20s.    History of radiation therapy                    01/22/2019      Comment: Field was in the center of the chest.    History of transesophageal echocardiography (T* 05/24/2018    Hypoalbuminemia                                 05/14/2018    Malignant neoplasm of lower lobe of right lung* 01/22/2019      Comment: 1995 right lower lobe resection for a                mucoepidermal carcinoma followed by radiation                therapy    Previously treated with warfarin                01/22/2019      Comment: Patient apparently did not go for INR testing.                Subsequently presented with an INR of 17 with                subsequent pericardial effusion and multiorgan                system dysfunction.    History of echocardiogram                       04/29/2021      Comment: Transthoracic echocardiographic study performed               04/29/2021.  LVEF 55%.  Regional wall motion                abnormalities could not be  excluded.  Septal                motion was dyssynergistic.  Mild AI.  Pacemaker               leads noted in right side of the heart.  Mild                to moderate MR.  Trivial MS.  Trivial PI.  Mild               TR.    IBS (irritable bowel syndrome)                                Old MI (myocardial infarction)                                High cholesterol                                              Chronic right-sided congestive heart failure (* 08/25/2021    PETE (generalized anxiety disorder)                            Insomnia                                                      Hypothyroidism                                                BRONCHOSCOPY                                                    Comment: History of mucoepidermoid carcinoma of the                right lower lobe with the patient having                recurrence of pleural effusion.  All cytologies               have been negative.    LUNG LOBECTOMY                                  1995            Comment: Right lower lobectomy in 1995 followed by                chemoradiation.    CORONARY ARTERY BYPASS GRAFT                                    Comment: 8/17/18    CARDIAC DUAL CHAMBER PACEMAKER PLACEMENT                      CARDIAC SURGERY                                               PACEMAKER                                                     PERICARDIOCENTESIS                              04/2016       CARDIAC SURGERY                                 08/17/2018      Comment: Coronary artery bypass surgery x2 (to involve                the LIMA to the LAD/SVG to the distal LCx),                mitral valve annuloplasty with a 28 mm Physio                ring, resection of the LA appendage, LV                epicardial lead placement, and drainage of 900                mL of clear colored effusion in the right                pleural space 08/17/2018 by Marcial Wilkerson MD.    THORACENTESIS                                                    Comment: Recurrent.    MITRAL VALVE REPAIR                                           CORONARY ANGIOGRAM/POSSIBLE PTCA - CV           03/24/2021    EGD                                             11/16/2021    COLONOSCOPY                                     11/16/2021        SUBJECTIVE                                                                                                               \"I live in [place], but I've had all my doctors here.\"    Pain / Symptoms  - Patient denies pain / symptoms.    Function:   Prior Level of Function: pain free ADLs and IADLs,    Prior treatment  - Discharged from hospital, home health, or skilled nursing facility in last 30 days: no  Home Environment   - Patient lives with: alone  - Assistance available: as needed  - Denies 2 or more falls or an unexplained fall with injury in the last year.  - Feel safe at home / work / school: yes     OBJECTIVE                         UPPER EXTREMITY LANDMARK MEASUREMENTS      UE UE UE UE UE UE UE   Date 8/3/2023         Mid-hand in cm R=16.5  L=16.5 R=  L= R=  L= R=  L= R=  L= R=  L= R=  L=   Wrist crease in cm R=14.5  L=14.5 R=  L= R=  L= R=  L= R=  L= R=  L= R=  L=     L-DEX   Pre-op  1 month 3 months 6 months 9 months  12 months   Date 8/3/2023        L-Dex -1.4        Weight in kg 73.5          Chemo Start Date    Chemo End Date     Radiation Start Date    Radiation End Date                                                                                                     Hand Dominance: left-handed      Range of Motion (ROM)   (degrees unless noted; active unless noted; norms in ( ); negative=lacking to 0, positive=beyond 0)  Shoulder:    - Flexion (180):         Left:146          Right: 150    - Abduction (180):         Left: 148         Right: 160    Strength  (out of 5 unless noted, standard test position unless noted)   Shoulder:     - Flexion:          Left: 5          Right: 5     - Abduction:         Left: 4+         Right:  4+    - Internal Rotation:           Left (at 0°): 4+         Right (at 0°): 4+    - External Rotation:          Left (at 0°): 4+         Right (at 0°) :4+                      Outcome/Assessments  Outcome Measures:   Quick Disabilities of the Arm, Shoulder and Hand: QuickDash Total Score (Score will not calculate if more then 2 questions are left blank): 6.82  (scored 0-100; a higher score indicates greater disability) see flowsheet for additional documentation      Treatment     Therapeutic Exercise  Performed in order to improve left strength and ROM for increased independence with functional mobility and ADLs:    See HEP below    Skilled input: verbal instruction/cues, tactile instruction/cues, facilitation and demonstration    Writer verbally educated and received verbal consent for hand placement, positioning of patient, and techniques to be performed today from patient   Home Exercise Program  Access Code: [email or web address]  URL: [email or web address]  Date: 08/03/2023  Prepared by: Matt Ramsay    Program Notes  *While drains are in avoid raising your arms above shoulder level*Wear shirt that open in the front*Avoid lifting, pushing, pulling, or carrying heavy items*While drains are in, maintain good posture, avoid slouching*When drains are removed begin the stretches below, important to keep stretching    Exercises  - Seated Scapular Retraction  - 4 x daily - 7 x weekly - 3 sets - 10 reps  - seated ER and flexion  - 4 x daily - 7 x weekly - 1 sets - 15-20 reps  - Breast cancer supine ER and flexion  - 2 x daily - 7 x weekly - 1 sets - 15-20 reps        ASSESSMENT                                                                                                          Treatment Diagnosis:   - Involved: LUE.    At initial evaluation patient is a 59 y/o female referred to OT by Dr. Montana.  Patient plans to undergo L breast cancer mastectomy and SLNB sx on 8/10/2023 with tissue expander with  Dr. Bray.  Currently not working and patient lives alone, but will be staying with her mom for assistance.  H/o R lung cancer- s/p RLL, chemo, XRT, dx of left DCIS 10/22 but did not have treatment at that time.    Patient assessed with AROM, MMT, L-Dex with SOZO, and BUE circumferential measurements.  Patient to benefit from discharge from OT at this time and to resume OT 1 month post-op with evaluation and continued skilled OT post-op assessments at 3, 6, 9, and 12 months post-op.    Bioimpedance spectroscopy helps identify the onset of lymphedema in an arm or leg before patients experience noticeable swelling. Research has shown that the early detection of lymphedema using L-Dex combined with treatment can reduce progression to chronic lymphedema by 95% in breast cancer patients. Whenever possible, patients are tested for baseline L-Dex score before cancer treatment begins and then are reassessed during regular follow-up visits using the SOZO device. Otherwise, this can be started postoperatively and continued during regular follow-up visits. If the patient's L-Dex score increases above normal levels, that is a sign that lymphedema is developing and a referral is made to Occupational therapy for further evaluation and early compression treatment. Lymphedema assessment with the SOZO L-Dex score is recommended to be done every 3 months for the first 3 years and then every 6 months for years 4 and 5 followed by annually afterwards.      Prognosis: Patient will benefit from skilled therapy.  Rehabilitative potential is: good.  Clinical decision making: Low - Patient has few limitations (1-3), comorbidities and/or complexities, as noted in problem focused assessment noted above, that impact their occupational profile.  Resulting in few treatment options and no task modification consistent with low clinical decision making complexity.    Education:   - Present and ready to learn: patient    PLAN                                                                                                                          The following skilled interventions to be implemented to achieve goals listed below:  Neuromuscular Re-Education (69754)  Therapeutic Activity (26296)  Therapeutic Exercise (38888)  Manual Therapy (60446)    Frequency / Duration  1 times per week tapering as patient progresses for an estimated total of 1 visits    Patient involved in and agreed to plan of care and goals.    Suggestions for next session as indicated: Progress per plan of care       Therapy procedure time and total treatment time can be found documented on the Time Entry flowsheet         Current Participants as of 8/3/2023      Name Type Comments Contact Info    Leslie Montana MD Referring Provider  [phone]    Signature pending    Matt Ramsay OTR/L Occupational Therapist      Electronically signed by FADI Villalba/L at 8/3/2023 9227 CDT              Please complete the attached form to indicate your approval of the plan of care upon receipt and fax signed form to the fax number below.  Insurance compliance requires your approval be on file.  Should you have any questions, feel free to contact me.     FADI Villalba/L  [address] [address]  Phone: [phone]  Fax: [phone]                RE: Plan of Care for Sandy Barker, YOB: 1963     I certify the need for these services, furnished under this plan of treatment and while under my care.  I agree with the plan of care as stated and request that therapy proceed.        __________________________________________________________________________________  Provider Signature         Date   Time

## 2025-02-20 NOTE — PLAN OF CARE
Problem: Discharge Planning  Goal: Discharge to home or other facility with appropriate resources  Outcome: Progressing     Problem: Gastrointestinal - Adult  Goal: Minimal or absence of nausea and vomiting  Outcome: Progressing     Problem: Pain  Goal: Verbalizes/displays adequate comfort level or baseline comfort level  Outcome: Adequate for Discharge

## 2025-02-21 VITALS
RESPIRATION RATE: 16 BRPM | BODY MASS INDEX: 35.27 KG/M2 | OXYGEN SATURATION: 93 % | HEART RATE: 82 BPM | DIASTOLIC BLOOD PRESSURE: 106 MMHG | WEIGHT: 260.4 LBS | SYSTOLIC BLOOD PRESSURE: 147 MMHG | HEIGHT: 72 IN | TEMPERATURE: 97.9 F

## 2025-02-21 LAB
ANION GAP SERPL CALCULATED.3IONS-SCNC: 8 MMOL/L (ref 9–16)
BASOPHILS # BLD: 0.06 K/UL (ref 0–0.2)
BASOPHILS NFR BLD: 1 % (ref 0–2)
BUN SERPL-MCNC: 8 MG/DL (ref 6–20)
CALCIUM SERPL-MCNC: 8.9 MG/DL (ref 8.6–10.4)
CHLORIDE SERPL-SCNC: 107 MMOL/L (ref 98–107)
CO2 SERPL-SCNC: 25 MMOL/L (ref 20–31)
CREAT SERPL-MCNC: 0.8 MG/DL (ref 0.7–1.2)
EOSINOPHIL # BLD: 0.37 K/UL (ref 0–0.44)
EOSINOPHILS RELATIVE PERCENT: 5 % (ref 1–4)
ERYTHROCYTE [DISTWIDTH] IN BLOOD BY AUTOMATED COUNT: 14.3 % (ref 11.8–14.4)
GFR, ESTIMATED: >90 ML/MIN/1.73M2
GLUCOSE SERPL-MCNC: 99 MG/DL (ref 74–99)
HCT VFR BLD AUTO: 42.2 % (ref 40.7–50.3)
HGB BLD-MCNC: 14.2 G/DL (ref 13–17)
IMM GRANULOCYTES # BLD AUTO: 0.03 K/UL (ref 0–0.3)
IMM GRANULOCYTES NFR BLD: 0 %
LYMPHOCYTES NFR BLD: 2.01 K/UL (ref 1.1–3.7)
LYMPHOCYTES RELATIVE PERCENT: 27 % (ref 24–43)
MCH RBC QN AUTO: 28.7 PG (ref 25.2–33.5)
MCHC RBC AUTO-ENTMCNC: 33.6 G/DL (ref 28.4–34.8)
MCV RBC AUTO: 85.3 FL (ref 82.6–102.9)
MONOCYTES NFR BLD: 0.73 K/UL (ref 0.1–1.2)
MONOCYTES NFR BLD: 10 % (ref 3–12)
NEUTROPHILS NFR BLD: 57 % (ref 36–65)
NEUTS SEG NFR BLD: 4.33 K/UL (ref 1.5–8.1)
NRBC BLD-RTO: 0 PER 100 WBC
PLATELET # BLD AUTO: 248 K/UL (ref 138–453)
PMV BLD AUTO: 8.9 FL (ref 8.1–13.5)
POTASSIUM SERPL-SCNC: 4.3 MMOL/L (ref 3.7–5.3)
RBC # BLD AUTO: 4.95 M/UL (ref 4.21–5.77)
SODIUM SERPL-SCNC: 140 MMOL/L (ref 136–145)
WBC OTHER # BLD: 7.5 K/UL (ref 3.5–11.3)

## 2025-02-21 PROCEDURE — 6370000000 HC RX 637 (ALT 250 FOR IP): Performed by: NURSE PRACTITIONER

## 2025-02-21 PROCEDURE — 36415 COLL VENOUS BLD VENIPUNCTURE: CPT

## 2025-02-21 PROCEDURE — 99239 HOSP IP/OBS DSCHRG MGMT >30: CPT | Performed by: NURSE PRACTITIONER

## 2025-02-21 PROCEDURE — 80048 BASIC METABOLIC PNL TOTAL CA: CPT

## 2025-02-21 PROCEDURE — 6360000002 HC RX W HCPCS: Performed by: NURSE PRACTITIONER

## 2025-02-21 PROCEDURE — 2500000003 HC RX 250 WO HCPCS: Performed by: NURSE PRACTITIONER

## 2025-02-21 PROCEDURE — 6360000002 HC RX W HCPCS: Performed by: EMERGENCY MEDICINE

## 2025-02-21 PROCEDURE — 85025 COMPLETE CBC W/AUTO DIFF WBC: CPT

## 2025-02-21 RX ORDER — METRONIDAZOLE 500 MG/1
500 TABLET ORAL 3 TIMES DAILY
Qty: 30 TABLET | Refills: 0 | Status: ON HOLD | OUTPATIENT
Start: 2025-02-21 | End: 2025-03-03

## 2025-02-21 RX ORDER — OXYCODONE AND ACETAMINOPHEN 5; 325 MG/1; MG/1
1 TABLET ORAL EVERY 6 HOURS PRN
Qty: 28 TABLET | Refills: 0 | Status: ON HOLD | OUTPATIENT
Start: 2025-02-21 | End: 2025-02-28

## 2025-02-21 RX ORDER — CIPROFLOXACIN 500 MG/1
500 TABLET, FILM COATED ORAL 2 TIMES DAILY
Qty: 20 TABLET | Refills: 0 | Status: ON HOLD | OUTPATIENT
Start: 2025-02-21 | End: 2025-03-03

## 2025-02-21 RX ORDER — LORAZEPAM 1 MG/1
1 TABLET ORAL NIGHTLY PRN
Qty: 5 TABLET | Refills: 0 | Status: ON HOLD | OUTPATIENT
Start: 2025-02-21 | End: 2025-03-23

## 2025-02-21 RX ADMIN — MORPHINE SULFATE 4 MG: 4 INJECTION, SOLUTION INTRAMUSCULAR; INTRAVENOUS at 07:34

## 2025-02-21 RX ADMIN — SODIUM CHLORIDE, PRESERVATIVE FREE 10 ML: 5 INJECTION INTRAVENOUS at 07:41

## 2025-02-21 RX ADMIN — ENOXAPARIN SODIUM 30 MG: 100 INJECTION SUBCUTANEOUS at 07:38

## 2025-02-21 RX ADMIN — METRONIDAZOLE 500 MG: 500 INJECTION, SOLUTION INTRAVENOUS at 03:07

## 2025-02-21 RX ADMIN — OXYCODONE HYDROCHLORIDE 10 MG: 5 TABLET ORAL at 10:03

## 2025-02-21 RX ADMIN — WATER 2000 MG: 1 INJECTION INTRAMUSCULAR; INTRAVENOUS; SUBCUTANEOUS at 10:41

## 2025-02-21 RX ADMIN — POLYETHYLENE GLYCOL 3350 17 G: 17 POWDER, FOR SOLUTION ORAL at 07:42

## 2025-02-21 RX ADMIN — OXYCODONE HYDROCHLORIDE 10 MG: 5 TABLET ORAL at 04:58

## 2025-02-21 RX ADMIN — LOSARTAN POTASSIUM 50 MG: 50 TABLET, FILM COATED ORAL at 07:38

## 2025-02-21 RX ADMIN — METRONIDAZOLE 500 MG: 500 INJECTION, SOLUTION INTRAVENOUS at 10:48

## 2025-02-21 ASSESSMENT — PAIN DESCRIPTION - PAIN TYPE
TYPE: ACUTE PAIN

## 2025-02-21 ASSESSMENT — PAIN DESCRIPTION - DESCRIPTORS
DESCRIPTORS: ACHING
DESCRIPTORS: ACHING;CRAMPING
DESCRIPTORS: ACHING

## 2025-02-21 ASSESSMENT — PAIN DESCRIPTION - ONSET: ONSET: ON-GOING

## 2025-02-21 ASSESSMENT — PAIN - FUNCTIONAL ASSESSMENT
PAIN_FUNCTIONAL_ASSESSMENT: ACTIVITIES ARE NOT PREVENTED

## 2025-02-21 ASSESSMENT — PAIN DESCRIPTION - LOCATION
LOCATION: ABDOMEN

## 2025-02-21 ASSESSMENT — PAIN SCALES - GENERAL
PAINLEVEL_OUTOF10: 7
PAINLEVEL_OUTOF10: 7
PAINLEVEL_OUTOF10: 4
PAINLEVEL_OUTOF10: 6
PAINLEVEL_OUTOF10: 7

## 2025-02-21 ASSESSMENT — ENCOUNTER SYMPTOMS
VOMITING: 0
PHOTOPHOBIA: 0
NAUSEA: 1
COUGH: 0
SHORTNESS OF BREATH: 0
CONSTIPATION: 0
DIARRHEA: 0
SINUS PAIN: 0
ABDOMINAL PAIN: 1
WHEEZING: 0
SINUS PRESSURE: 0

## 2025-02-21 ASSESSMENT — PAIN DESCRIPTION - ORIENTATION
ORIENTATION: MID
ORIENTATION: RIGHT;LOWER

## 2025-02-21 ASSESSMENT — PAIN DESCRIPTION - DIRECTION: RADIATING_TOWARDS: TO GROIN

## 2025-02-21 ASSESSMENT — PAIN DESCRIPTION - FREQUENCY: FREQUENCY: CONTINUOUS

## 2025-02-21 NOTE — PLAN OF CARE
Pt tolerating full liquid diet; no c/o nausea.  Rt abdominal pain continues; achy, crampy pain which radiates to groin.  Prn Kanwal effective for pain.  Pt ambulating to BR; voiding; passed small BM.      Problem: Discharge Planning  Goal: Discharge to home or other facility with appropriate resources  Outcome: Progressing     Problem: Pain  Goal: Verbalizes/displays adequate comfort level or baseline comfort level  Outcome: Progressing     Problem: Gastrointestinal - Adult  Goal: Maintains or returns to baseline bowel function  Outcome: Progressing     Problem: Hematologic - Adult  Goal: Maintains hematologic stability  Outcome: Progressing

## 2025-02-21 NOTE — PROGRESS NOTES
General Surgery:  Daily Progress Note                 PATIENT NAME: Melvin Sierra     TODAY'S DATE: 2/21/2025, 5:29 AM    SUBJECTIVE:     Pt seen and examined at bedside.  Afebrile, hemodynamically stable.  Continues to complain of lower abdominal pain, however this seems to be slowly improving.  Denies any nausea, vomiting, fever, chills.  Patient reports several small bowel movements over the last 24 hours.  He is continuing to pass flatus.  Tolerating full liquids without issue.  WBC continues to downtrend.    OBJECTIVE:   VITALS:  BP (!) 144/112   Pulse 95   Temp 97.9 °F (36.6 °C) (Oral)   Resp 17   Ht 1.829 m (6')   Wt 118.1 kg (260 lb 6.4 oz)   SpO2 95%   BMI 35.32 kg/m²      INTAKE/OUTPUT:      Intake/Output Summary (Last 24 hours) at 2/21/2025 0529  Last data filed at 2/20/2025 1719  Gross per 24 hour   Intake 1539.09 ml   Output --   Net 1539.09 ml       PHYSICAL EXAM:  General Appearance: awake, alert, oriented, in no acute distress  HEENT:  Normocephalic, atraumatic, mucus membranes moist   Heart: Heart regular rate and rhythm  Lungs: Normal respiratory effort, no wheezing or stridor  Abdomen:Soft, nondistended. Mildly tender over bilateral lower quadrants. No rebound tenderness or guarding. Umbilical hernia present.    Extremities: No cyanosis, pitting edema, rashes noted.    Skin: Skin color, texture, turgor normal. No rashes or lesions.      Data:  CBC with Differential:    Lab Results   Component Value Date/Time    WBC 8.4 02/20/2025 07:15 AM    RBC 5.04 02/20/2025 07:15 AM    RBC 4.71 11/01/2011 12:27 AM    HGB 14.4 02/20/2025 07:15 AM    HCT 43.4 02/20/2025 07:15 AM     02/20/2025 07:15 AM     11/01/2011 12:27 AM    MCV 86.1 02/20/2025 07:15 AM    MCH 28.6 02/20/2025 07:15 AM    MCHC 33.2 02/20/2025 07:15 AM    RDW 14.5 02/20/2025 07:15 AM    NRBC 1 05/19/2017 10:42 PM    LYMPHOPCT 22 02/20/2025 07:15 AM    MONOPCT 9 02/20/2025 07:15 AM    EOSPCT 3 02/20/2025 07:15 AM     in 6 to 8 weeks after acute inflammation has resolved. Further discussion regarding surgical management on outpatient basis.    Electronically signed by Alon Sow DO  on 2/21/2025 at 5:29 AM    Attending Physician Statement  I have discussed the case, including pertinent history and exam findings with the resident. I agree with the assessment, plan and orders as documented by the resident.     Advance to Low Fiber diet  OK to go home on oral antibiotics  F/U in the office in 2 weeks. Will arrange putpt colonoscopy
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
## 2025-02-21 NOTE — DISCHARGE SUMMARY
[facility]  Office: [phone]  Jason Doe DO, Melvin Green DO, Tremaine Lam DO, Chidi Corey DO, Rosa Solano MD, Brittney Clarke MD, Melissa Ward MD, Halina Soriano MD,  Edgar Mueller MD, Neri Samson MD, Michael Lion MD,  Mumtaz Tuttle DO, Chris Beckett MD, Vinod Bridges MD, Baldo Doe DO, Nataliya Arredondo MD,  Zac Saleh DO, Brunilda Kaplan MD, Annamaria Patel MD, Roxana Chavis MD, Pato Bonilla MD,  Venu Fontanez MD, Abram Brush MD, Shaun Mann MD, Son Escoto MD, Arvin Topete MD, Bhargav Evans MD, Juno Ramírez DO, Derian Lyons MD, Mumtaz Mcdowell MD, Mohsin Reza, MD, Shirley Waterhouse, CNP,  Kristina Barajas [facility], Juno Sanchez, [facility],  Lexie Salas, [facility], Irene Acosta, CNP, Marielena Hdz, CNP, Genevieve Cao, CNP, Fauzia Barcenas CNP, CASEY Duarte-C, Ellen Le, [facility], Cricket Thapa, CNP,  Jeanne Washington, [facility], Anny Butler, CNP, Ale Patel, CNP,  Angie Garcia, CNS, Apurva Leslie CNP, Alem Ayala [facility],   Gisella Corrales, [facility]   IN-PATIENT SERVICE   [facility]    Discharge Summary     Patient ID: Melvin Sierra  :  1975   MRN: [id number]     ACCOUNT:  [id number]   Patient's PCP: Ed Palafox APRN - CNP  Admit Date: 2025   Discharge Date: 2025     Length of Stay: 3  Code Status:  Full Code  Admitting Physician: Melissa Ward MD  Discharge Physician: ESHA Chen NP     Active Discharge Diagnoses:     Hospital Problem Lists:  Principal Problem:    Diverticulitis  Active Problems:    HTN (hypertension)  Resolved Problems:    * No resolved hospital problems. *      Admission Condition:  poor     Discharged Condition: good    Hospital Stay:     Hospital Course:  Melvin Sierra is a 49 y.o. male who was admitted for the management of  Diverticulitis , presented to ER with Abdominal Pain (C/o abd pain and constipation with nausea since , last

## 2025-02-21 NOTE — PROGRESS NOTES
CLINICAL PHARMACY NOTE: MEDS TO BEDS    Total # of Prescriptions Filled: 4   The following medications were delivered to the patient:  Percocet 5-325  Lorazepam 1mg  Metronidazole 500mg  Ciprofloxacin 500mg    Additional Documentation:

## 2025-02-21 NOTE — PLAN OF CARE
Problem: Discharge Planning  Goal: Discharge to home or other facility with appropriate resources  2/21/2025 1141 by Amanda Pandya RN  Outcome: Completed  2/21/2025 0510 by Nirali De La Torre RN  Outcome: Progressing     Problem: Pain  Goal: Verbalizes/displays adequate comfort level or baseline comfort level  2/21/2025 1141 by Amanda Pandya RN  Outcome: Completed  2/21/2025 0510 by Nirali De La Torre RN  Outcome: Progressing     Problem: Gastrointestinal - Adult  Goal: Minimal or absence of nausea and vomiting  Outcome: Completed  Goal: Maintains or returns to baseline bowel function  2/21/2025 1141 by Amanda Pandya RN  Outcome: Completed  2/21/2025 0510 by Nirali De La Torre RN  Outcome: Progressing  Goal: Maintains adequate nutritional intake  Outcome: Completed     Problem: Cardiovascular - Adult  Goal: Maintains optimal cardiac output and hemodynamic stability  Outcome: Completed     Problem: Hematologic - Adult  Goal: Maintains hematologic stability  2/21/2025 1141 by Amanda Pandya RN  Outcome: Completed  2/21/2025 0510 by Nirali De La Torre RN  Outcome: Progressing

## 2025-02-22 ENCOUNTER — ANESTHESIA (OUTPATIENT)
Dept: OPERATING ROOM | Age: 50
End: 2025-02-22
Payer: COMMERCIAL

## 2025-02-22 ENCOUNTER — APPOINTMENT (OUTPATIENT)
Dept: GENERAL RADIOLOGY | Age: 50
DRG: 231 | End: 2025-02-22
Payer: COMMERCIAL

## 2025-02-22 ENCOUNTER — HOSPITAL ENCOUNTER (INPATIENT)
Age: 50
LOS: 1 days | Discharge: ANOTHER HOSPITAL | DRG: 231 | End: 2025-02-23
Attending: STUDENT IN AN ORGANIZED HEALTH CARE EDUCATION/TRAINING PROGRAM | Admitting: SURGERY
Payer: COMMERCIAL

## 2025-02-22 ENCOUNTER — ANESTHESIA EVENT (OUTPATIENT)
Dept: OPERATING ROOM | Age: 50
End: 2025-02-22
Payer: COMMERCIAL

## 2025-02-22 ENCOUNTER — APPOINTMENT (OUTPATIENT)
Dept: CT IMAGING | Age: 50
DRG: 231 | End: 2025-02-22
Payer: COMMERCIAL

## 2025-02-22 DIAGNOSIS — K57.32 DIVERTICULITIS OF COLON: Primary | ICD-10-CM

## 2025-02-22 DIAGNOSIS — K66.8 PNEUMOPERITONEUM: ICD-10-CM

## 2025-02-22 DIAGNOSIS — K57.92 ACUTE DIVERTICULITIS: ICD-10-CM

## 2025-02-22 LAB
ALBUMIN SERPL-MCNC: 3.9 G/DL (ref 3.5–5.2)
ALBUMIN/GLOB SERPL: 1.3 {RATIO} (ref 1–2.5)
ALP SERPL-CCNC: 34 U/L (ref 40–129)
ALT SERPL-CCNC: 20 U/L (ref 10–50)
ANION GAP SERPL CALCULATED.3IONS-SCNC: 17 MMOL/L (ref 9–16)
AST SERPL-CCNC: 27 U/L (ref 10–50)
BASOPHILS # BLD: 0.08 K/UL (ref 0–0.2)
BASOPHILS NFR BLD: 1 % (ref 0–2)
BILIRUB SERPL-MCNC: 0.4 MG/DL (ref 0–1.2)
BUN SERPL-MCNC: 11 MG/DL (ref 6–20)
CALCIUM SERPL-MCNC: 8.9 MG/DL (ref 8.6–10.4)
CHLORIDE SERPL-SCNC: 102 MMOL/L (ref 98–107)
CO2 SERPL-SCNC: 20 MMOL/L (ref 20–31)
CREAT SERPL-MCNC: 0.9 MG/DL (ref 0.7–1.2)
EOSINOPHIL # BLD: 0.15 K/UL (ref 0–0.44)
EOSINOPHILS RELATIVE PERCENT: 1 % (ref 1–4)
ERYTHROCYTE [DISTWIDTH] IN BLOOD BY AUTOMATED COUNT: 14.2 % (ref 11.8–14.4)
GFR, ESTIMATED: >90 ML/MIN/1.73M2
GLUCOSE BLD-MCNC: 151 MG/DL (ref 75–110)
GLUCOSE SERPL-MCNC: 230 MG/DL (ref 74–99)
HCT VFR BLD AUTO: 45.4 % (ref 40.7–50.3)
HGB BLD-MCNC: 15.3 G/DL (ref 13–17)
IMM GRANULOCYTES # BLD AUTO: 0.08 K/UL (ref 0–0.3)
IMM GRANULOCYTES NFR BLD: 1 %
LACTATE BLDV-SCNC: 3.9 MMOL/L (ref 0.5–1.9)
LIPASE SERPL-CCNC: 10 U/L (ref 13–60)
LYMPHOCYTES NFR BLD: 2.34 K/UL (ref 1.1–3.7)
LYMPHOCYTES RELATIVE PERCENT: 14 % (ref 24–43)
MCH RBC QN AUTO: 28.7 PG (ref 25.2–33.5)
MCHC RBC AUTO-ENTMCNC: 33.7 G/DL (ref 28.4–34.8)
MCV RBC AUTO: 85 FL (ref 82.6–102.9)
MONOCYTES NFR BLD: 0.66 K/UL (ref 0.1–1.2)
MONOCYTES NFR BLD: 4 % (ref 3–12)
NEUTROPHILS NFR BLD: 80 % (ref 36–65)
NEUTS SEG NFR BLD: 13.15 K/UL (ref 1.5–8.1)
NRBC BLD-RTO: 0 PER 100 WBC
PLATELET # BLD AUTO: 396 K/UL (ref 138–453)
PMV BLD AUTO: 9.3 FL (ref 8.1–13.5)
POTASSIUM SERPL-SCNC: 3.7 MMOL/L (ref 3.7–5.3)
PROT SERPL-MCNC: 7 G/DL (ref 6.6–8.7)
RBC # BLD AUTO: 5.34 M/UL (ref 4.21–5.77)
SODIUM SERPL-SCNC: 139 MMOL/L (ref 136–145)
WBC OTHER # BLD: 16.5 K/UL (ref 3.5–11.3)

## 2025-02-22 PROCEDURE — 2720000010 HC SURG SUPPLY STERILE: Performed by: SURGERY

## 2025-02-22 PROCEDURE — 96376 TX/PRO/DX INJ SAME DRUG ADON: CPT

## 2025-02-22 PROCEDURE — 2500000003 HC RX 250 WO HCPCS: Performed by: ANESTHESIOLOGY

## 2025-02-22 PROCEDURE — 2580000003 HC RX 258: Performed by: STUDENT IN AN ORGANIZED HEALTH CARE EDUCATION/TRAINING PROGRAM

## 2025-02-22 PROCEDURE — 82947 ASSAY GLUCOSE BLOOD QUANT: CPT

## 2025-02-22 PROCEDURE — 96375 TX/PRO/DX INJ NEW DRUG ADDON: CPT

## 2025-02-22 PROCEDURE — 2709999900 HC NON-CHARGEABLE SUPPLY: Performed by: SURGERY

## 2025-02-22 PROCEDURE — 0DN80ZZ RELEASE SMALL INTESTINE, OPEN APPROACH: ICD-10-PCS | Performed by: SURGERY

## 2025-02-22 PROCEDURE — 3700000001 HC ADD 15 MINUTES (ANESTHESIA): Performed by: SURGERY

## 2025-02-22 PROCEDURE — 6360000002 HC RX W HCPCS: Performed by: SURGERY

## 2025-02-22 PROCEDURE — 6360000002 HC RX W HCPCS: Performed by: ANESTHESIOLOGY

## 2025-02-22 PROCEDURE — 0WPF0JZ REMOVAL OF SYNTHETIC SUBSTITUTE FROM ABDOMINAL WALL, OPEN APPROACH: ICD-10-PCS | Performed by: SURGERY

## 2025-02-22 PROCEDURE — 71045 X-RAY EXAM CHEST 1 VIEW: CPT

## 2025-02-22 PROCEDURE — 96365 THER/PROPH/DIAG IV INF INIT: CPT

## 2025-02-22 PROCEDURE — 2700000000 HC OXYGEN THERAPY PER DAY

## 2025-02-22 PROCEDURE — 2060000000 HC ICU INTERMEDIATE R&B

## 2025-02-22 PROCEDURE — 80053 COMPREHEN METABOLIC PANEL: CPT

## 2025-02-22 PROCEDURE — 96367 TX/PROPH/DG ADDL SEQ IV INF: CPT

## 2025-02-22 PROCEDURE — 3600000012 HC SURGERY LEVEL 2 ADDTL 15MIN: Performed by: SURGERY

## 2025-02-22 PROCEDURE — 3600000002 HC SURGERY LEVEL 2 BASE: Performed by: SURGERY

## 2025-02-22 PROCEDURE — 2500000003 HC RX 250 WO HCPCS: Performed by: STUDENT IN AN ORGANIZED HEALTH CARE EDUCATION/TRAINING PROGRAM

## 2025-02-22 PROCEDURE — 83605 ASSAY OF LACTIC ACID: CPT

## 2025-02-22 PROCEDURE — 85025 COMPLETE CBC W/AUTO DIFF WBC: CPT

## 2025-02-22 PROCEDURE — 3700000000 HC ANESTHESIA ATTENDED CARE: Performed by: SURGERY

## 2025-02-22 PROCEDURE — 88307 TISSUE EXAM BY PATHOLOGIST: CPT

## 2025-02-22 PROCEDURE — 99285 EMERGENCY DEPT VISIT HI MDM: CPT

## 2025-02-22 PROCEDURE — 6370000000 HC RX 637 (ALT 250 FOR IP): Performed by: SURGERY

## 2025-02-22 PROCEDURE — 7100000000 HC PACU RECOVERY - FIRST 15 MIN: Performed by: SURGERY

## 2025-02-22 PROCEDURE — 88305 TISSUE EXAM BY PATHOLOGIST: CPT

## 2025-02-22 PROCEDURE — 7100000001 HC PACU RECOVERY - ADDTL 15 MIN: Performed by: SURGERY

## 2025-02-22 PROCEDURE — 0D1M0Z4 BYPASS DESCENDING COLON TO CUTANEOUS, OPEN APPROACH: ICD-10-PCS | Performed by: SURGERY

## 2025-02-22 PROCEDURE — 0DTN0ZZ RESECTION OF SIGMOID COLON, OPEN APPROACH: ICD-10-PCS | Performed by: SURGERY

## 2025-02-22 PROCEDURE — 6370000000 HC RX 637 (ALT 250 FOR IP): Performed by: STUDENT IN AN ORGANIZED HEALTH CARE EDUCATION/TRAINING PROGRAM

## 2025-02-22 PROCEDURE — 74176 CT ABD & PELVIS W/O CONTRAST: CPT

## 2025-02-22 PROCEDURE — 2580000003 HC RX 258: Performed by: ANESTHESIOLOGY

## 2025-02-22 PROCEDURE — 6360000002 HC RX W HCPCS: Performed by: STUDENT IN AN ORGANIZED HEALTH CARE EDUCATION/TRAINING PROGRAM

## 2025-02-22 PROCEDURE — 0W9G00Z DRAINAGE OF PERITONEAL CAVITY WITH DRAINAGE DEVICE, OPEN APPROACH: ICD-10-PCS | Performed by: SURGERY

## 2025-02-22 PROCEDURE — 83690 ASSAY OF LIPASE: CPT

## 2025-02-22 RX ORDER — ONDANSETRON 2 MG/ML
4 INJECTION INTRAMUSCULAR; INTRAVENOUS ONCE
Status: COMPLETED | OUTPATIENT
Start: 2025-02-22 | End: 2025-02-22

## 2025-02-22 RX ORDER — PROPOFOL 10 MG/ML
INJECTION, EMULSION INTRAVENOUS
Status: DISCONTINUED | OUTPATIENT
Start: 2025-02-22 | End: 2025-02-22 | Stop reason: SDUPTHER

## 2025-02-22 RX ORDER — ENOXAPARIN SODIUM 100 MG/ML
30 INJECTION SUBCUTANEOUS 2 TIMES DAILY
Status: CANCELLED | OUTPATIENT
Start: 2025-02-23

## 2025-02-22 RX ORDER — SODIUM CHLORIDE 0.9 % (FLUSH) 0.9 %
5-40 SYRINGE (ML) INJECTION EVERY 12 HOURS SCHEDULED
Status: DISCONTINUED | OUTPATIENT
Start: 2025-02-22 | End: 2025-02-23 | Stop reason: HOSPADM

## 2025-02-22 RX ORDER — ACETAMINOPHEN 650 MG/1
650 SUPPOSITORY RECTAL EVERY 6 HOURS PRN
Status: CANCELLED | OUTPATIENT
Start: 2025-02-22

## 2025-02-22 RX ORDER — LOSARTAN POTASSIUM 50 MG/1
50 TABLET ORAL DAILY
Status: DISCONTINUED | OUTPATIENT
Start: 2025-02-22 | End: 2025-02-23 | Stop reason: HOSPADM

## 2025-02-22 RX ORDER — SODIUM CHLORIDE, SODIUM LACTATE, POTASSIUM CHLORIDE, CALCIUM CHLORIDE 600; 310; 30; 20 MG/100ML; MG/100ML; MG/100ML; MG/100ML
INJECTION, SOLUTION INTRAVENOUS
Status: DISCONTINUED | OUTPATIENT
Start: 2025-02-22 | End: 2025-02-22 | Stop reason: SDUPTHER

## 2025-02-22 RX ORDER — SODIUM CHLORIDE 0.9 % (FLUSH) 0.9 %
10 SYRINGE (ML) INJECTION PRN
Status: CANCELLED | OUTPATIENT
Start: 2025-02-22

## 2025-02-22 RX ORDER — SODIUM CHLORIDE 9 MG/ML
INJECTION, SOLUTION INTRAVENOUS PRN
Status: DISCONTINUED | OUTPATIENT
Start: 2025-02-22 | End: 2025-02-22 | Stop reason: SDUPTHER

## 2025-02-22 RX ORDER — ACETAMINOPHEN 500 MG
1000 TABLET ORAL EVERY 8 HOURS SCHEDULED
Status: DISCONTINUED | OUTPATIENT
Start: 2025-02-22 | End: 2025-02-23 | Stop reason: HOSPADM

## 2025-02-22 RX ORDER — SODIUM CHLORIDE 9 MG/ML
INJECTION, SOLUTION INTRAVENOUS PRN
Status: DISCONTINUED | OUTPATIENT
Start: 2025-02-22 | End: 2025-02-23 | Stop reason: HOSPADM

## 2025-02-22 RX ORDER — ROCURONIUM BROMIDE 10 MG/ML
INJECTION, SOLUTION INTRAVENOUS
Status: DISCONTINUED | OUTPATIENT
Start: 2025-02-22 | End: 2025-02-22 | Stop reason: SDUPTHER

## 2025-02-22 RX ORDER — ESMOLOL HYDROCHLORIDE 10 MG/ML
INJECTION INTRAVENOUS
Status: DISCONTINUED | OUTPATIENT
Start: 2025-02-22 | End: 2025-02-22 | Stop reason: SDUPTHER

## 2025-02-22 RX ORDER — LIDOCAINE HYDROCHLORIDE 20 MG/ML
INJECTION, SOLUTION EPIDURAL; INFILTRATION; INTRACAUDAL; PERINEURAL
Status: DISCONTINUED | OUTPATIENT
Start: 2025-02-22 | End: 2025-02-22 | Stop reason: SDUPTHER

## 2025-02-22 RX ORDER — ENOXAPARIN SODIUM 100 MG/ML
30 INJECTION SUBCUTANEOUS 2 TIMES DAILY
Status: DISCONTINUED | OUTPATIENT
Start: 2025-02-22 | End: 2025-02-22

## 2025-02-22 RX ORDER — SODIUM CHLORIDE 9 MG/ML
INJECTION, SOLUTION INTRAVENOUS PRN
Status: CANCELLED | OUTPATIENT
Start: 2025-02-22

## 2025-02-22 RX ORDER — GABAPENTIN 300 MG/1
300 CAPSULE ORAL EVERY 8 HOURS
Status: DISCONTINUED | OUTPATIENT
Start: 2025-02-22 | End: 2025-02-23 | Stop reason: HOSPADM

## 2025-02-22 RX ORDER — ACETAMINOPHEN 325 MG/1
650 TABLET ORAL EVERY 4 HOURS PRN
Status: DISCONTINUED | OUTPATIENT
Start: 2025-02-22 | End: 2025-02-22

## 2025-02-22 RX ORDER — POTASSIUM CHLORIDE 1500 MG/1
40 TABLET, EXTENDED RELEASE ORAL PRN
Status: CANCELLED | OUTPATIENT
Start: 2025-02-22 | End: 2025-02-27

## 2025-02-22 RX ORDER — DIPHENHYDRAMINE HYDROCHLORIDE 50 MG/ML
INJECTION INTRAMUSCULAR; INTRAVENOUS
Status: DISCONTINUED | OUTPATIENT
Start: 2025-02-22 | End: 2025-02-22 | Stop reason: SDUPTHER

## 2025-02-22 RX ORDER — PROCHLORPERAZINE EDISYLATE 5 MG/ML
10 INJECTION INTRAMUSCULAR; INTRAVENOUS
Status: DISCONTINUED | OUTPATIENT
Start: 2025-02-22 | End: 2025-02-22

## 2025-02-22 RX ORDER — METHOCARBAMOL 750 MG/1
750 TABLET, FILM COATED ORAL EVERY 6 HOURS
Status: DISCONTINUED | OUTPATIENT
Start: 2025-02-22 | End: 2025-02-23 | Stop reason: HOSPADM

## 2025-02-22 RX ORDER — NALOXONE HYDROCHLORIDE 0.4 MG/ML
INJECTION, SOLUTION INTRAMUSCULAR; INTRAVENOUS; SUBCUTANEOUS PRN
Status: DISCONTINUED | OUTPATIENT
Start: 2025-02-22 | End: 2025-02-23 | Stop reason: HOSPADM

## 2025-02-22 RX ORDER — OXYCODONE HYDROCHLORIDE 5 MG/1
5 TABLET ORAL
Status: DISCONTINUED | OUTPATIENT
Start: 2025-02-22 | End: 2025-02-22

## 2025-02-22 RX ORDER — LABETALOL HYDROCHLORIDE 5 MG/ML
10 INJECTION, SOLUTION INTRAVENOUS ONCE
Status: COMPLETED | OUTPATIENT
Start: 2025-02-22 | End: 2025-02-22

## 2025-02-22 RX ORDER — METRONIDAZOLE 500 MG/100ML
500 INJECTION, SOLUTION INTRAVENOUS EVERY 8 HOURS
Status: DISCONTINUED | OUTPATIENT
Start: 2025-02-22 | End: 2025-02-23 | Stop reason: HOSPADM

## 2025-02-22 RX ORDER — LOSARTAN POTASSIUM 50 MG/1
50 TABLET ORAL DAILY
Status: DISCONTINUED | OUTPATIENT
Start: 2025-02-23 | End: 2025-02-22

## 2025-02-22 RX ORDER — POTASSIUM CHLORIDE 7.45 MG/ML
10 INJECTION INTRAVENOUS PRN
Status: CANCELLED | OUTPATIENT
Start: 2025-02-22 | End: 2025-02-27

## 2025-02-22 RX ORDER — SODIUM CHLORIDE, SODIUM LACTATE, POTASSIUM CHLORIDE, CALCIUM CHLORIDE 600; 310; 30; 20 MG/100ML; MG/100ML; MG/100ML; MG/100ML
INJECTION, SOLUTION INTRAVENOUS CONTINUOUS
Status: DISCONTINUED | OUTPATIENT
Start: 2025-02-22 | End: 2025-02-23 | Stop reason: HOSPADM

## 2025-02-22 RX ORDER — KETOROLAC TROMETHAMINE 15 MG/ML
15 INJECTION, SOLUTION INTRAMUSCULAR; INTRAVENOUS EVERY 6 HOURS
Status: COMPLETED | OUTPATIENT
Start: 2025-02-22 | End: 2025-02-23

## 2025-02-22 RX ORDER — FAMOTIDINE 10 MG/ML
INJECTION, SOLUTION INTRAVENOUS
Status: DISCONTINUED | OUTPATIENT
Start: 2025-02-22 | End: 2025-02-22 | Stop reason: SDUPTHER

## 2025-02-22 RX ORDER — ONDANSETRON 4 MG/1
4 TABLET, ORALLY DISINTEGRATING ORAL EVERY 8 HOURS PRN
Status: CANCELLED | OUTPATIENT
Start: 2025-02-22

## 2025-02-22 RX ORDER — ONDANSETRON 2 MG/ML
INJECTION INTRAMUSCULAR; INTRAVENOUS
Status: DISCONTINUED | OUTPATIENT
Start: 2025-02-22 | End: 2025-02-22 | Stop reason: SDUPTHER

## 2025-02-22 RX ORDER — FENTANYL CITRATE 0.05 MG/ML
25 INJECTION, SOLUTION INTRAMUSCULAR; INTRAVENOUS EVERY 5 MIN PRN
Status: DISCONTINUED | OUTPATIENT
Start: 2025-02-22 | End: 2025-02-22

## 2025-02-22 RX ORDER — ENOXAPARIN SODIUM 100 MG/ML
30 INJECTION SUBCUTANEOUS 2 TIMES DAILY
Status: DISCONTINUED | OUTPATIENT
Start: 2025-02-23 | End: 2025-02-23

## 2025-02-22 RX ORDER — HYDROMORPHONE HYDROCHLORIDE 1 MG/ML
0.5 INJECTION, SOLUTION INTRAMUSCULAR; INTRAVENOUS; SUBCUTANEOUS EVERY 5 MIN PRN
Status: COMPLETED | OUTPATIENT
Start: 2025-02-22 | End: 2025-02-22

## 2025-02-22 RX ORDER — SODIUM CHLORIDE 0.9 % (FLUSH) 0.9 %
5-40 SYRINGE (ML) INJECTION EVERY 12 HOURS SCHEDULED
Status: DISCONTINUED | OUTPATIENT
Start: 2025-02-22 | End: 2025-02-22

## 2025-02-22 RX ORDER — SODIUM CHLORIDE 9 MG/ML
INJECTION, SOLUTION INTRAVENOUS CONTINUOUS
Status: CANCELLED | OUTPATIENT
Start: 2025-02-22 | End: 2025-02-23

## 2025-02-22 RX ORDER — ACETAMINOPHEN 325 MG/1
650 TABLET ORAL EVERY 6 HOURS PRN
Status: CANCELLED | OUTPATIENT
Start: 2025-02-22

## 2025-02-22 RX ORDER — METOPROLOL TARTRATE 1 MG/ML
5 INJECTION, SOLUTION INTRAVENOUS EVERY 6 HOURS
Status: DISCONTINUED | OUTPATIENT
Start: 2025-02-22 | End: 2025-02-23 | Stop reason: HOSPADM

## 2025-02-22 RX ORDER — SODIUM CHLORIDE 9 MG/ML
INJECTION, SOLUTION INTRAVENOUS PRN
Status: DISCONTINUED | OUTPATIENT
Start: 2025-02-22 | End: 2025-02-22

## 2025-02-22 RX ORDER — MIDAZOLAM HYDROCHLORIDE 1 MG/ML
INJECTION, SOLUTION INTRAMUSCULAR; INTRAVENOUS
Status: DISCONTINUED | OUTPATIENT
Start: 2025-02-22 | End: 2025-02-22 | Stop reason: SDUPTHER

## 2025-02-22 RX ORDER — SODIUM CHLORIDE 0.9 % (FLUSH) 0.9 %
5-40 SYRINGE (ML) INJECTION PRN
Status: DISCONTINUED | OUTPATIENT
Start: 2025-02-22 | End: 2025-02-23 | Stop reason: HOSPADM

## 2025-02-22 RX ORDER — SUCCINYLCHOLINE/SOD CL,ISO/PF 100 MG/5ML
SYRINGE (ML) INTRAVENOUS
Status: DISCONTINUED | OUTPATIENT
Start: 2025-02-22 | End: 2025-02-22 | Stop reason: SDUPTHER

## 2025-02-22 RX ORDER — HYDRALAZINE HYDROCHLORIDE 20 MG/ML
10 INJECTION INTRAMUSCULAR; INTRAVENOUS EVERY 6 HOURS PRN
Status: DISCONTINUED | OUTPATIENT
Start: 2025-02-22 | End: 2025-02-23 | Stop reason: HOSPADM

## 2025-02-22 RX ORDER — HYDROMORPHONE HYDROCHLORIDE 1 MG/ML
1 INJECTION, SOLUTION INTRAMUSCULAR; INTRAVENOUS; SUBCUTANEOUS
Status: CANCELLED | OUTPATIENT
Start: 2025-02-22

## 2025-02-22 RX ORDER — DIPHENHYDRAMINE HYDROCHLORIDE 50 MG/ML
12.5 INJECTION INTRAMUSCULAR; INTRAVENOUS
Status: DISCONTINUED | OUTPATIENT
Start: 2025-02-22 | End: 2025-02-22

## 2025-02-22 RX ORDER — MORPHINE SULFATE 2 MG/ML
2 INJECTION, SOLUTION INTRAMUSCULAR; INTRAVENOUS
Status: DISCONTINUED | OUTPATIENT
Start: 2025-02-22 | End: 2025-02-22

## 2025-02-22 RX ORDER — METRONIDAZOLE 500 MG/100ML
500 INJECTION, SOLUTION INTRAVENOUS ONCE
Status: COMPLETED | OUTPATIENT
Start: 2025-02-22 | End: 2025-02-22

## 2025-02-22 RX ORDER — MORPHINE SULFATE 10 MG/ML
5 INJECTION, SOLUTION INTRAMUSCULAR; INTRAVENOUS
Status: DISCONTINUED | OUTPATIENT
Start: 2025-02-22 | End: 2025-02-22

## 2025-02-22 RX ORDER — FENTANYL CITRATE 50 UG/ML
INJECTION, SOLUTION INTRAMUSCULAR; INTRAVENOUS
Status: DISCONTINUED | OUTPATIENT
Start: 2025-02-22 | End: 2025-02-22 | Stop reason: SDUPTHER

## 2025-02-22 RX ORDER — OXYCODONE HYDROCHLORIDE 5 MG/1
5 TABLET ORAL EVERY 6 HOURS PRN
Status: DISCONTINUED | OUTPATIENT
Start: 2025-02-22 | End: 2025-02-22

## 2025-02-22 RX ORDER — SODIUM CHLORIDE 0.9 % (FLUSH) 0.9 %
5-40 SYRINGE (ML) INJECTION EVERY 12 HOURS SCHEDULED
Status: CANCELLED | OUTPATIENT
Start: 2025-02-22

## 2025-02-22 RX ORDER — SODIUM CHLORIDE 0.9 % (FLUSH) 0.9 %
5-40 SYRINGE (ML) INJECTION PRN
Status: DISCONTINUED | OUTPATIENT
Start: 2025-02-22 | End: 2025-02-22

## 2025-02-22 RX ORDER — ONDANSETRON 4 MG/1
4 TABLET, ORALLY DISINTEGRATING ORAL EVERY 8 HOURS PRN
Status: DISCONTINUED | OUTPATIENT
Start: 2025-02-22 | End: 2025-02-22 | Stop reason: SDUPTHER

## 2025-02-22 RX ORDER — MAGNESIUM SULFATE 1 G/100ML
1000 INJECTION INTRAVENOUS PRN
Status: CANCELLED | OUTPATIENT
Start: 2025-02-22

## 2025-02-22 RX ORDER — ONDANSETRON 4 MG/1
4 TABLET, ORALLY DISINTEGRATING ORAL EVERY 8 HOURS PRN
Status: DISCONTINUED | OUTPATIENT
Start: 2025-02-22 | End: 2025-02-23 | Stop reason: HOSPADM

## 2025-02-22 RX ORDER — METOCLOPRAMIDE HYDROCHLORIDE 5 MG/ML
10 INJECTION INTRAMUSCULAR; INTRAVENOUS
Status: DISCONTINUED | OUTPATIENT
Start: 2025-02-22 | End: 2025-02-22

## 2025-02-22 RX ORDER — SODIUM CHLORIDE, SODIUM LACTATE, POTASSIUM CHLORIDE, AND CALCIUM CHLORIDE .6; .31; .03; .02 G/100ML; G/100ML; G/100ML; G/100ML
1000 INJECTION, SOLUTION INTRAVENOUS ONCE
Status: DISCONTINUED | OUTPATIENT
Start: 2025-02-22 | End: 2025-02-23 | Stop reason: HOSPADM

## 2025-02-22 RX ORDER — DEXAMETHASONE SODIUM PHOSPHATE 10 MG/ML
INJECTION, SOLUTION INTRA-ARTICULAR; INTRALESIONAL; INTRAMUSCULAR; INTRAVENOUS; SOFT TISSUE
Status: DISCONTINUED | OUTPATIENT
Start: 2025-02-22 | End: 2025-02-22 | Stop reason: SDUPTHER

## 2025-02-22 RX ORDER — MEPERIDINE HYDROCHLORIDE 25 MG/ML
12.5 INJECTION INTRAMUSCULAR; INTRAVENOUS; SUBCUTANEOUS EVERY 5 MIN PRN
Status: DISCONTINUED | OUTPATIENT
Start: 2025-02-22 | End: 2025-02-22

## 2025-02-22 RX ORDER — OXYCODONE HYDROCHLORIDE 5 MG/1
5 TABLET ORAL EVERY 4 HOURS PRN
Status: DISCONTINUED | OUTPATIENT
Start: 2025-02-22 | End: 2025-02-23 | Stop reason: HOSPADM

## 2025-02-22 RX ORDER — ONDANSETRON 2 MG/ML
4 INJECTION INTRAMUSCULAR; INTRAVENOUS EVERY 6 HOURS PRN
Status: CANCELLED | OUTPATIENT
Start: 2025-02-22

## 2025-02-22 RX ORDER — ONDANSETRON 2 MG/ML
4 INJECTION INTRAMUSCULAR; INTRAVENOUS EVERY 6 HOURS PRN
Status: DISCONTINUED | OUTPATIENT
Start: 2025-02-22 | End: 2025-02-23 | Stop reason: HOSPADM

## 2025-02-22 RX ORDER — ONDANSETRON 2 MG/ML
4 INJECTION INTRAMUSCULAR; INTRAVENOUS EVERY 6 HOURS PRN
Status: DISCONTINUED | OUTPATIENT
Start: 2025-02-22 | End: 2025-02-22 | Stop reason: SDUPTHER

## 2025-02-22 RX ORDER — POLYETHYLENE GLYCOL 3350 17 G/17G
17 POWDER, FOR SOLUTION ORAL DAILY PRN
Status: CANCELLED | OUTPATIENT
Start: 2025-02-22

## 2025-02-22 RX ADMIN — METOPROLOL TARTRATE 5 MG: 5 INJECTION INTRAVENOUS at 20:42

## 2025-02-22 RX ADMIN — SODIUM CHLORIDE, PRESERVATIVE FREE 10 ML: 5 INJECTION INTRAVENOUS at 11:53

## 2025-02-22 RX ADMIN — MIDAZOLAM 2 MG: 1 INJECTION INTRAMUSCULAR; INTRAVENOUS at 08:30

## 2025-02-22 RX ADMIN — HYDROMORPHONE HYDROCHLORIDE 0.5 MG: 1 INJECTION, SOLUTION INTRAMUSCULAR; INTRAVENOUS; SUBCUTANEOUS at 05:47

## 2025-02-22 RX ADMIN — SODIUM CHLORIDE, POTASSIUM CHLORIDE, SODIUM LACTATE AND CALCIUM CHLORIDE: 600; 310; 30; 20 INJECTION, SOLUTION INTRAVENOUS at 08:16

## 2025-02-22 RX ADMIN — METRONIDAZOLE 500 MG: 500 INJECTION, SOLUTION INTRAVENOUS at 13:37

## 2025-02-22 RX ADMIN — MORPHINE SULFATE 5 MG: 10 INJECTION, SOLUTION INTRAMUSCULAR; INTRAVENOUS at 15:53

## 2025-02-22 RX ADMIN — ROCURONIUM BROMIDE 10 MG: 10 INJECTION, SOLUTION INTRAVENOUS at 08:30

## 2025-02-22 RX ADMIN — FENTANYL CITRATE 100 MCG: 50 INJECTION INTRAMUSCULAR; INTRAVENOUS at 09:08

## 2025-02-22 RX ADMIN — HYDROMORPHONE HYDROCHLORIDE 0.5 MG: 1 INJECTION, SOLUTION INTRAMUSCULAR; INTRAVENOUS; SUBCUTANEOUS at 10:46

## 2025-02-22 RX ADMIN — Medication 6 MG: at 21:37

## 2025-02-22 RX ADMIN — SODIUM CHLORIDE, POTASSIUM CHLORIDE, SODIUM LACTATE AND CALCIUM CHLORIDE: 600; 310; 30; 20 INJECTION, SOLUTION INTRAVENOUS at 12:02

## 2025-02-22 RX ADMIN — LABETALOL HYDROCHLORIDE 10 MG: 5 INJECTION INTRAVENOUS at 10:08

## 2025-02-22 RX ADMIN — OXYCODONE HYDROCHLORIDE 5 MG: 5 TABLET ORAL at 19:05

## 2025-02-22 RX ADMIN — HYDROMORPHONE HYDROCHLORIDE 0.5 MG: 1 INJECTION, SOLUTION INTRAMUSCULAR; INTRAVENOUS; SUBCUTANEOUS at 10:23

## 2025-02-22 RX ADMIN — GABAPENTIN 300 MG: 300 CAPSULE ORAL at 13:29

## 2025-02-22 RX ADMIN — SODIUM CHLORIDE, POTASSIUM CHLORIDE, SODIUM LACTATE AND CALCIUM CHLORIDE: 600; 310; 30; 20 INJECTION, SOLUTION INTRAVENOUS at 09:07

## 2025-02-22 RX ADMIN — HYDROMORPHONE HYDROCHLORIDE 0.5 MG: 1 INJECTION, SOLUTION INTRAMUSCULAR; INTRAVENOUS; SUBCUTANEOUS at 05:20

## 2025-02-22 RX ADMIN — HYDROMORPHONE HYDROCHLORIDE 1 MG: 1 INJECTION, SOLUTION INTRAMUSCULAR; INTRAVENOUS; SUBCUTANEOUS at 09:24

## 2025-02-22 RX ADMIN — SUGAMMADEX 200 MG: 100 INJECTION, SOLUTION INTRAVENOUS at 09:37

## 2025-02-22 RX ADMIN — SODIUM CHLORIDE, PRESERVATIVE FREE 10 ML: 5 INJECTION INTRAVENOUS at 20:40

## 2025-02-22 RX ADMIN — HYDROMORPHONE HYDROCHLORIDE 0.5 MG: 1 INJECTION, SOLUTION INTRAMUSCULAR; INTRAVENOUS; SUBCUTANEOUS at 10:28

## 2025-02-22 RX ADMIN — ESMOLOL HYDROCHLORIDE 50 MG: 100 INJECTION, SOLUTION INTRAVENOUS at 10:00

## 2025-02-22 RX ADMIN — HYDROMORPHONE HYDROCHLORIDE 0.5 MG: 1 INJECTION, SOLUTION INTRAMUSCULAR; INTRAVENOUS; SUBCUTANEOUS at 22:45

## 2025-02-22 RX ADMIN — SODIUM CHLORIDE, POTASSIUM CHLORIDE, SODIUM LACTATE AND CALCIUM CHLORIDE: 600; 310; 30; 20 INJECTION, SOLUTION INTRAVENOUS at 16:42

## 2025-02-22 RX ADMIN — METOPROLOL TARTRATE 5 MG: 5 INJECTION INTRAVENOUS at 15:47

## 2025-02-22 RX ADMIN — MORPHINE SULFATE 5 MG: 10 INJECTION, SOLUTION INTRAMUSCULAR; INTRAVENOUS at 18:41

## 2025-02-22 RX ADMIN — METHOCARBAMOL 750 MG: 750 TABLET ORAL at 18:41

## 2025-02-22 RX ADMIN — CEFEPIME 2000 MG: 2 INJECTION, POWDER, FOR SOLUTION INTRAVENOUS at 06:59

## 2025-02-22 RX ADMIN — MORPHINE SULFATE 5 MG: 10 INJECTION, SOLUTION INTRAMUSCULAR; INTRAVENOUS at 14:08

## 2025-02-22 RX ADMIN — PROPOFOL 200 MG: 10 INJECTION, EMULSION INTRAVENOUS at 08:30

## 2025-02-22 RX ADMIN — Medication 180 MG: at 08:30

## 2025-02-22 RX ADMIN — HYDROMORPHONE HYDROCHLORIDE 0.5 MG: 1 INJECTION, SOLUTION INTRAMUSCULAR; INTRAVENOUS; SUBCUTANEOUS at 10:33

## 2025-02-22 RX ADMIN — ONDANSETRON 4 MG: 2 INJECTION, SOLUTION INTRAMUSCULAR; INTRAVENOUS at 09:01

## 2025-02-22 RX ADMIN — GABAPENTIN 300 MG: 300 CAPSULE ORAL at 20:40

## 2025-02-22 RX ADMIN — MORPHINE SULFATE 2 MG: 2 INJECTION, SOLUTION INTRAMUSCULAR; INTRAVENOUS at 11:53

## 2025-02-22 RX ADMIN — DEXAMETHASONE SODIUM PHOSPHATE 10 MG: 10 INJECTION INTRAMUSCULAR; INTRAVENOUS at 09:01

## 2025-02-22 RX ADMIN — HYDROMORPHONE HYDROCHLORIDE 1 MG: 1 INJECTION, SOLUTION INTRAMUSCULAR; INTRAVENOUS; SUBCUTANEOUS at 09:36

## 2025-02-22 RX ADMIN — SODIUM CHLORIDE, SODIUM LACTATE, POTASSIUM CHLORIDE, AND CALCIUM CHLORIDE 1000 ML: .6; .31; .03; .02 INJECTION, SOLUTION INTRAVENOUS at 15:56

## 2025-02-22 RX ADMIN — FENTANYL CITRATE 100 MCG: 50 INJECTION INTRAMUSCULAR; INTRAVENOUS at 08:30

## 2025-02-22 RX ADMIN — ONDANSETRON 4 MG: 2 INJECTION, SOLUTION INTRAMUSCULAR; INTRAVENOUS at 11:53

## 2025-02-22 RX ADMIN — METHOCARBAMOL 750 MG: 750 TABLET ORAL at 13:28

## 2025-02-22 RX ADMIN — ESMOLOL HYDROCHLORIDE 50 MG: 100 INJECTION, SOLUTION INTRAVENOUS at 09:56

## 2025-02-22 RX ADMIN — ONDANSETRON 4 MG: 2 INJECTION, SOLUTION INTRAMUSCULAR; INTRAVENOUS at 05:20

## 2025-02-22 RX ADMIN — ROCURONIUM BROMIDE 25 MG: 10 INJECTION, SOLUTION INTRAVENOUS at 08:38

## 2025-02-22 RX ADMIN — ACETAMINOPHEN 1000 MG: 500 TABLET ORAL at 20:40

## 2025-02-22 RX ADMIN — HYDROMORPHONE HYDROCHLORIDE 0.5 MG: 1 INJECTION, SOLUTION INTRAMUSCULAR; INTRAVENOUS; SUBCUTANEOUS at 07:19

## 2025-02-22 RX ADMIN — METRONIDAZOLE 500 MG: 500 INJECTION, SOLUTION INTRAVENOUS at 22:39

## 2025-02-22 RX ADMIN — KETOROLAC TROMETHAMINE 15 MG: 15 INJECTION, SOLUTION INTRAMUSCULAR; INTRAVENOUS at 21:37

## 2025-02-22 RX ADMIN — FAMOTIDINE 20 MG: 10 INJECTION, SOLUTION INTRAVENOUS at 09:01

## 2025-02-22 RX ADMIN — OXYCODONE HYDROCHLORIDE 5 MG: 5 TABLET ORAL at 12:58

## 2025-02-22 RX ADMIN — LIDOCAINE HYDROCHLORIDE 100 MG: 20 INJECTION, SOLUTION EPIDURAL; INFILTRATION; INTRACAUDAL; PERINEURAL at 08:30

## 2025-02-22 RX ADMIN — ROCURONIUM BROMIDE 15 MG: 10 INJECTION, SOLUTION INTRAVENOUS at 08:42

## 2025-02-22 RX ADMIN — DIPHENHYDRAMINE HYDROCHLORIDE 12.5 MG: 50 INJECTION, SOLUTION INTRAMUSCULAR; INTRAVENOUS at 09:01

## 2025-02-22 RX ADMIN — METRONIDAZOLE 500 MG: 500 INJECTION, SOLUTION INTRAVENOUS at 05:59

## 2025-02-22 RX ADMIN — LOSARTAN POTASSIUM 50 MG: 50 TABLET, FILM COATED ORAL at 13:29

## 2025-02-22 RX ADMIN — CEFEPIME 2000 MG: 2 INJECTION, POWDER, FOR SOLUTION INTRAVENOUS at 18:47

## 2025-02-22 RX ADMIN — ACETAMINOPHEN 1000 MG: 500 TABLET ORAL at 13:28

## 2025-02-22 RX ADMIN — FENTANYL CITRATE 100 MCG: 50 INJECTION INTRAMUSCULAR; INTRAVENOUS at 09:47

## 2025-02-22 ASSESSMENT — PAIN SCALES - GENERAL
PAINLEVEL_OUTOF10: 7
PAINLEVEL_OUTOF10: 9
PAINLEVEL_OUTOF10: 8
PAINLEVEL_OUTOF10: 8
PAINLEVEL_OUTOF10: 7
PAINLEVEL_OUTOF10: 8
PAINLEVEL_OUTOF10: 9
PAINLEVEL_OUTOF10: 8
PAINLEVEL_OUTOF10: 8
PAINLEVEL_OUTOF10: 9
PAINLEVEL_OUTOF10: 8
PAINLEVEL_OUTOF10: 7
PAINLEVEL_OUTOF10: 10
PAINLEVEL_OUTOF10: 10
PAINLEVEL_OUTOF10: 8
PAINLEVEL_OUTOF10: 10
PAINLEVEL_OUTOF10: 8
PAINLEVEL_OUTOF10: 6
PAINLEVEL_OUTOF10: 8
PAINLEVEL_OUTOF10: 10
PAINLEVEL_OUTOF10: 9
PAINLEVEL_OUTOF10: 6
PAINLEVEL_OUTOF10: 8
PAINLEVEL_OUTOF10: 8
PAINLEVEL_OUTOF10: 10
PAINLEVEL_OUTOF10: 8

## 2025-02-22 ASSESSMENT — PAIN DESCRIPTION - DESCRIPTORS
DESCRIPTORS: ACHING;BURNING;THROBBING
DESCRIPTORS: STABBING;PRESSURE
DESCRIPTORS: STABBING;CRAMPING
DESCRIPTORS: CRAMPING;STABBING
DESCRIPTORS: SHARP;CRAMPING
DESCRIPTORS: ACHING;STABBING;PRESSURE
DESCRIPTORS: ACHING;CRAMPING
DESCRIPTORS: CRAMPING;STABBING

## 2025-02-22 ASSESSMENT — PAIN DESCRIPTION - LOCATION
LOCATION: ABDOMEN

## 2025-02-22 ASSESSMENT — PAIN DESCRIPTION - ONSET
ONSET: ON-GOING

## 2025-02-22 ASSESSMENT — PAIN DESCRIPTION - FREQUENCY
FREQUENCY: CONTINUOUS

## 2025-02-22 ASSESSMENT — PAIN DESCRIPTION - PAIN TYPE
TYPE: ACUTE PAIN;SURGICAL PAIN

## 2025-02-22 ASSESSMENT — PAIN - FUNCTIONAL ASSESSMENT
PAIN_FUNCTIONAL_ASSESSMENT: PREVENTS OR INTERFERES SOME ACTIVE ACTIVITIES AND ADLS
PAIN_FUNCTIONAL_ASSESSMENT: 0-10
PAIN_FUNCTIONAL_ASSESSMENT: PREVENTS OR INTERFERES SOME ACTIVE ACTIVITIES AND ADLS

## 2025-02-22 ASSESSMENT — PAIN DESCRIPTION - ORIENTATION
ORIENTATION: LOWER;LEFT;RIGHT
ORIENTATION: RIGHT;LEFT;LOWER;MID
ORIENTATION: MID;RIGHT
ORIENTATION: MID;LOWER;RIGHT
ORIENTATION: MID
ORIENTATION: MID;LOWER
ORIENTATION: RIGHT;LEFT;LOWER
ORIENTATION: RIGHT;LEFT;MID;LOWER
ORIENTATION: RIGHT;LEFT;LOWER;MID

## 2025-02-22 NOTE — PLAN OF CARE
Pt has been resting in bed since admission  PRN pain meds given for abdominal pain 8/10 see MAR for meds given  Wound VAC to mid lower abdomen   Hypoactive bowel sounds   3l NC on admission- now on RA sating at 94%  Continuous pulse ox   BP and HR elevated- new meds added to MAR.  IV antibiotics given along with a fluid bolus  EPC cuffs applied   Zofran given for nausea  All questions and concerns addressed.  Bed is locked and in the lowest position with the call light in reach. Safety maintained      Problem: Discharge Planning  Goal: Discharge to home or other facility with appropriate resources  Outcome: Progressing  Flowsheets  Taken 2/22/2025 1201 by Ale Funez RN  Discharge to home or other facility with appropriate resources:   Identify discharge learning needs (meds, wound care, etc)   Identify barriers to discharge with patient and caregiver  Taken 2/22/2025 1141 by Lexy Montenegro RN  Discharge to home or other facility with appropriate resources: Identify barriers to discharge with patient and caregiver     Problem: Pain  Goal: Verbalizes/displays adequate comfort level or baseline comfort level  Outcome: Progressing  Patient having pain on their abdomen and rates it a 8. Pain interventions includefrequent position changes, correct body alignment/body mechanics, relaxation techniques, medication, pillow support/positioning, regular rest periods, and medicate per physician recommendation/order. Patients goal for pain relief is 6. The need for pain and symptom management will be considered in the discharge planning process to ensure patients comfort.       Problem: Safety - Adult  Goal: Free from fall injury  Outcome: Progressing     Problem: ABCDS Injury Assessment  Goal: Absence of physical injury  Outcome: Progressing     Problem: Gastrointestinal - Adult  Goal: Minimal or absence of nausea and vomiting  Outcome: Progressing  Flowsheets  Taken 2/22/2025 1201 by Ale Funez RN  Minimal or absence of

## 2025-02-22 NOTE — ED NOTES
Patient pain after dilaudid 9/10. Patient contacted son regarding admission/procedure. O2 continues at 2L NC sats 85% d/t patient holding breath from pain. Denies O2 needs at home. Awaiting transfer to OR.

## 2025-02-22 NOTE — ED NOTES
Pt continues to moan and stays in left fetal like position. States pain is now a lower 10 from a high 12

## 2025-02-22 NOTE — H&P
Please see consult note dated 2/22/2025 for full history and physical.     Josselin Stovall, DO PGY-5

## 2025-02-22 NOTE — ED NOTES
Pt was just discharged from [facility] for flare up of diverticulitis. Pt reports feeling better when d/c'd then at home abdominal pain returned more severe than previous visit.   EMS has administered 3 doses of Fentanyl totaling 200mcg.   Pt in with yelling and guarding abdomen Pt is more comfort on left side. Pain is in lower right abdomen and radiates to back.

## 2025-02-22 NOTE — ANESTHESIA PRE PROCEDURE
Department of Anesthesiology  Preprocedure Note       Name:  Melvin Sierra   Age:  49 y.o.  :  1975                                          MRN:  [id number]         Date:  2025      Surgeon: * No surgeons listed *    Procedure: * No procedures listed *    Medications prior to admission:   Prior to Admission medications    Medication Sig Start Date End Date Taking? Authorizing Provider   ciprofloxacin (CIPRO) 500 MG tablet Take 1 tablet by mouth 2 times daily for 10 days 2/21/25 3/3/25  Juno Sanchez APRN - NP   metroNIDAZOLE (FLAGYL) 500 MG tablet Take 1 tablet by mouth 3 times daily for 10 days 2/21/25 3/3/25  Juno Sanchez APRN - LINDA   oxyCODONE-acetaminophen (PERCOCET) 5-325 MG per tablet Take 1 tablet by mouth every 6 hours as needed for Pain for up to 7 days. Intended supply: 7 days. Take lowest dose possible to manage pain Max Daily Amount: 4 tablets 25  Juno Sanchez APRN - NP   LORazepam (ATIVAN) 1 MG tablet Take 1 tablet by mouth nightly as needed (Sleep) for up to 30 days. Max Daily Amount: 1 mg 2/21/25 3/23/25  Juno Sanchez APRN - NP   losartan (COZAAR) 50 MG tablet Take 1 tablet by mouth daily 24   Ed Palafox, APRN - CNP       Current medications:    No current facility-administered medications for this encounter.     Current Outpatient Medications   Medication Sig Dispense Refill    ciprofloxacin (CIPRO) 500 MG tablet Take 1 tablet by mouth 2 times daily for 10 days 20 tablet 0    metroNIDAZOLE (FLAGYL) 500 MG tablet Take 1 tablet by mouth 3 times daily for 10 days 30 tablet 0    oxyCODONE-acetaminophen (PERCOCET) 5-325 MG per tablet Take 1 tablet by mouth every 6 hours as needed for Pain for up to 7 days. Intended supply: 7 days. Take lowest dose possible to manage pain Max Daily Amount: 4 tablets 28 tablet 0    LORazepam (ATIVAN) 1 MG tablet Take 1 tablet by mouth nightly as needed (Sleep) for up to 30 days. Max Daily Amount: 1 mg 5 tablet 0

## 2025-02-22 NOTE — FLOWSHEET NOTE
02/22/25 1641   Vital Signs   BP (!) 162/111      Dr. John made aware, at this time he would like nursing staff to continue monitoring.
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
## 2025-02-22 NOTE — PROGRESS NOTES
[x] Medication Reconciliation was completed and the patient's home medication list was verified. The Med List Status has been marked as \"Complete\". The following sources were used to assist with Medication Reconciliation:    [] Med Rec Pharmacy tech has already completed home med list  [] Patient had a list of medications which was transcribed into the EHR and verified for accuracy.(Please note method of verification below)  [] Patient provided bottles of their medications for validation  [] Home medications reviewed and confirmed with Melvin (Please list name and relationship to patient)  [] Contacted patient's pharmacy to confirm home medications  [] Contacted patient's physician office to confirm home medications  [] Medical Records from another facility (copy placed in chart)  []   was contacted by  on 2/22/2025 at the following time:        [] There are one or more home medications that need clarification before Medication Reconciliation can be completed. The Med List Status has been marked as In Progress.   To assist with Home Medication Reconciliation the following actions have been taken:    [] Family requested to bring medications into the hospital  [] Family requested to call hospital with medication list  [] Message left with physician office to call unit  [] Request for medical records made to    [] MAR from facility requested to be faxed over  [] Unable to complete due to patient condition  [] Unable to validate home medications  [] Oncoming nurse  notified of incomplete med list  [] Other       *Effective communication is essential throughout this process. It is important for the nurse to document the progress of the med rec to keep others informed about the status.  If modifications are needed it is the responsibility of the RN to communicate need for modifications to the physician.

## 2025-02-22 NOTE — ANESTHESIA POSTPROCEDURE EVALUATION
Department of Anesthesiology  Postprocedure Note    Patient: Melvin Sierra  MRN: [id number]  YOB: 1975  Date of evaluation: 2/22/2025    Procedure Summary       Date: 02/22/25 Room / Location: [address]    Anesthesia Start: 0816 Anesthesia Stop: 0955    Procedures:       LAPAROTOMY EXPLORATORY WITH SIGMOID COLON RESCETION, COLOSTOMY, extensive Lysis of adhesion Abdominal wall mesh removal AND WOUND VAC (Abdomen)      Procedure Not Yet Scheduled Diagnosis:       Acute diverticulitis      (Acute diverticulitis [K57.92])    Surgeons: Brock John MD Responsible Provider: Sadia Chakraborty MD    Anesthesia Type: general ASA Status: 4 - Emergent            Anesthesia Type: No value filed.    Scotty Phase I:      Scotty Phase II:      Anesthesia Post Evaluation    Patient location during evaluation: PACU  Patient participation: complete - patient participated  Level of consciousness: awake and alert  Airway patency: patent  Nausea & Vomiting: no nausea and no vomiting  Cardiovascular status: hemodynamically stable  Respiratory status: acceptable  Hydration status: euvolemic  Comments: Esmolol 50mg x 2 doses given in PACU with improvement of tachycardia.  HR reduced to 114 after esmolol; his BP was 130/88 at same time.  Plan to give labetolol 10mg x 1 dose in PACU.  Patient resting comfortably.    Pain management: adequate    No notable events documented.

## 2025-02-22 NOTE — OP NOTE
Operative Note      Patient: Melvin Sierra  YOB: 1975  MRN: [id number]    Date of Procedure: 2/22/2025    Pre-Op Diagnosis Codes:      * Acute diverticulitis [K57.92]    Post-Op Diagnosis: Perforated diverticulitis with feculent peritonitis and pelvic abscess       Procedure: Exploratory laparotomy with extensive lysis of adhesions, sigmoid colectomy with end colostomy, drainage of pelvic abscess, explantation of abdominal wall mesh, abdominal wound VAC application    Surgeon(s):  Brock John MD    Assistant:   Surgical Assistant: Lisbeth Jeffries  Resident: Josselin Stovall DO    Anesthesia: General    Estimated Blood Loss (mL): less than 100     Complications: None    Specimens:   ID Type Source Tests Collected by Time Destination   A : Sigmoid Colon Tissue Sigmoid Colon SURGICAL PATHOLOGY Brock John MD 2/22/2025 0857    B : Abdominal wall mesh Tissue Abdomen SURGICAL PATHOLOGY Brock John MD 2/22/2025 0912        Implants:  * No implants in log *      Drains:   Closed/Suction Drain Superior;Medial Bulb (Active)       Urinary Catheter 02/22/25 Marie (Active)       Findings:  Infection Present At Time Of Surgery (PATOS) (choose all levels that have infection present):  - Organ Space infection (below fascia) present as evidenced by abscess and feculent peritonitis  Other Findings: Feculent peritonitis with abdominal abscess and diffuse fecal soilage of the peritoneal cavity    Indications for procedure: This is a 49-year-old gentleman who presents to the emergency room after recently being discharged with suspected diverticulitis.  The patient was noted to have evidence of free intraperitoneal air with extensive inflammatory changes.  The risks and benefits of exploratory laparotomy with indicated procedures were discussed with the patient and verbal and written consent was obtained.    Detailed Description of Procedure:   After verbal and written consent, the patient was brought to the  peritoneal cavity was irrigated copiously and all visible irrigant was evacuated.  The sponge, lap, needle, and instrument count were confirmed correct.  The drain was trimmed to appropriate length and connected to bulb suction.  The midline laparotomy incision was closed with 2 running Prolene sutures.  This included closure primarily of his midline abdominal hernia.  The patient had a large piece of mesh which appeared to have herniated into the hernia sac.  This was also explanted as part of the procedure.  This was passed off separately for pathologic evaluation.  I elected secondary to the diffuse contamination of the peritoneal cavity to place a wound VAC in the subcutaneous space, above the fascia.  This was secured with appropriate drapes after again irrigating the wound with Irrisept.  This was connected to low continuous suction.  The ostomy had been tunneled through a fascial defect created in the left abdominal wall.  This was brought up to skin level with the colon appearing to be slightly congested.  The proximal staple line was excised.  The ostomy was matured with interrupted Vicryl suture.  An ostomy appliance was applied.  I did apply Nitropaste to the ostomy prior to applying the ostomy appliance to try and stimulate some increased bowel perfusion.  The patient was awakened from anesthesia and transported to the progressive care unit in guarded condition.  There were no immediate complications.  The sponge, lap, needle, and instrument count were correct at the end of the case.  The patient will be continued on antibiotics perioperatively secondary to the operative findings of diffuse feculent peritonitis with a pelvic abscess present at the time of surgery    Electronically signed by Brock John MD on 2/22/2025 at 9:43 AM

## 2025-02-22 NOTE — PROGRESS NOTES
Pt admitted to room 1004 from PACU. Admission documentation complete, vitals taken and telemetry placed. Pt oriented to room and unit routine, including hourly rounding. Call light in reach and safety maintained. Will continue to provide support and education.

## 2025-02-23 ENCOUNTER — HOSPITAL ENCOUNTER (INPATIENT)
Age: 50
LOS: 16 days | Discharge: HOME HEALTH | DRG: 169 | End: 2025-03-11
Attending: EMERGENCY MEDICINE | Admitting: STUDENT IN AN ORGANIZED HEALTH CARE EDUCATION/TRAINING PROGRAM
Payer: COMMERCIAL

## 2025-02-23 ENCOUNTER — APPOINTMENT (OUTPATIENT)
Dept: CT IMAGING | Age: 50
DRG: 231 | End: 2025-02-23
Payer: COMMERCIAL

## 2025-02-23 ENCOUNTER — APPOINTMENT (OUTPATIENT)
Age: 50
DRG: 169 | End: 2025-02-23
Attending: STUDENT IN AN ORGANIZED HEALTH CARE EDUCATION/TRAINING PROGRAM
Payer: COMMERCIAL

## 2025-02-23 ENCOUNTER — ANESTHESIA (OUTPATIENT)
Dept: OPERATING ROOM | Age: 50
End: 2025-02-23
Payer: COMMERCIAL

## 2025-02-23 ENCOUNTER — ANESTHESIA EVENT (OUTPATIENT)
Dept: OPERATING ROOM | Age: 50
End: 2025-02-23
Payer: COMMERCIAL

## 2025-02-23 VITALS
WEIGHT: 287 LBS | HEIGHT: 72 IN | OXYGEN SATURATION: 92 % | HEART RATE: 105 BPM | RESPIRATION RATE: 18 BRPM | BODY MASS INDEX: 38.87 KG/M2 | TEMPERATURE: 97.7 F | SYSTOLIC BLOOD PRESSURE: 103 MMHG | DIASTOLIC BLOOD PRESSURE: 73 MMHG

## 2025-02-23 DIAGNOSIS — I31.39 PERICARDIAL EFFUSION: ICD-10-CM

## 2025-02-23 DIAGNOSIS — K57.32 DIVERTICULITIS OF COLON: ICD-10-CM

## 2025-02-23 DIAGNOSIS — M54.16 LUMBAR RADICULOPATHY: ICD-10-CM

## 2025-02-23 DIAGNOSIS — Z93.3 STATUS POST HARTMANN PROCEDURE (HCC): ICD-10-CM

## 2025-02-23 DIAGNOSIS — I82.412 ACUTE DEEP VEIN THROMBOSIS (DVT) OF FEMORAL VEIN OF LEFT LOWER EXTREMITY (HCC): ICD-10-CM

## 2025-02-23 DIAGNOSIS — Z43.2 ILEOSTOMY CARE (HCC): ICD-10-CM

## 2025-02-23 DIAGNOSIS — I99.8 ISCHEMIC LEG: Primary | ICD-10-CM

## 2025-02-23 DIAGNOSIS — K57.92 DIVERTICULITIS: ICD-10-CM

## 2025-02-23 LAB
ACT BLD: 141 SEC (ref 79–149)
ACT BLD: 186 SEC (ref 79–149)
ACT BLD: 206 SEC (ref 79–149)
ACT BLD: 215 SEC (ref 79–149)
ACT BLD: 223 SEC (ref 79–149)
ANION GAP SERPL CALCULATED.3IONS-SCNC: 10 MMOL/L (ref 9–16)
ANION GAP SERPL CALCULATED.3IONS-SCNC: 12 MMOL/L (ref 9–16)
ANTI-XA UNFRAC HEPARIN: <0.1 IU/L
ANTI-XA UNFRAC HEPARIN: <0.1 IU/L
ARTERIAL PATENCY WRIST A: ABNORMAL
ARTERIAL PATENCY WRIST A: ABNORMAL
BASOPHILS # BLD: 0 K/UL (ref 0–0.2)
BASOPHILS # BLD: 0.13 K/UL (ref 0–0.2)
BASOPHILS NFR BLD: 0 % (ref 0–2)
BASOPHILS NFR BLD: 1 %
BODY TEMPERATURE: 32
BODY TEMPERATURE: 37
BUN SERPL-MCNC: 22 MG/DL (ref 6–20)
BUN SERPL-MCNC: 24 MG/DL (ref 6–20)
CA-I BLD-SCNC: 1.12 MMOL/L (ref 1.13–1.33)
CA-I BLD-SCNC: 1.16 MMOL/L (ref 1.13–1.33)
CALCIUM SERPL-MCNC: 8.4 MG/DL (ref 8.6–10.4)
CALCIUM SERPL-MCNC: 8.5 MG/DL (ref 8.6–10.4)
CHLORIDE SERPL-SCNC: 107 MMOL/L (ref 98–107)
CHLORIDE SERPL-SCNC: 108 MMOL/L (ref 98–107)
CHLORIDE, WHOLE BLOOD: 106 MMOL/L (ref 98–110)
CHLORIDE, WHOLE BLOOD: 108 MMOL/L (ref 98–110)
CK SERPL-CCNC: 454 U/L (ref 39–308)
CO2 SERPL-SCNC: 20 MMOL/L (ref 20–31)
CO2 SERPL-SCNC: 23 MMOL/L (ref 20–31)
COHGB MFR BLD: 0.3 % (ref 0–5)
COHGB MFR BLD: 0.4 % (ref 0–5)
CREAT SERPL-MCNC: 1.3 MG/DL (ref 0.7–1.2)
CREAT SERPL-MCNC: 1.3 MG/DL (ref 0.7–1.2)
EOSINOPHIL # BLD: 0 K/UL (ref 0–0.4)
EOSINOPHIL # BLD: 0 K/UL (ref 0–0.4)
EOSINOPHILS RELATIVE PERCENT: 0 % (ref 1–4)
EOSINOPHILS RELATIVE PERCENT: 0 % (ref 1–4)
ERYTHROCYTE [DISTWIDTH] IN BLOOD BY AUTOMATED COUNT: 14.9 % (ref 11.8–14.4)
ERYTHROCYTE [DISTWIDTH] IN BLOOD BY AUTOMATED COUNT: 15.1 % (ref 11.8–14.4)
ERYTHROCYTE [DISTWIDTH] IN BLOOD BY AUTOMATED COUNT: 15.2 % (ref 11.8–14.4)
FIO2 ON VENT: 75 %
FIO2 ON VENT: ABNORMAL %
GFR, ESTIMATED: 67 ML/MIN/1.73M2
GFR, ESTIMATED: 67 ML/MIN/1.73M2
GLUCOSE BLD-MCNC: 113 MG/DL (ref 75–110)
GLUCOSE BLD-MCNC: 116 MG/DL (ref 65–105)
GLUCOSE SERPL-MCNC: 116 MG/DL (ref 74–99)
GLUCOSE SERPL-MCNC: 117 MG/DL (ref 74–99)
HCO3 ARTERIAL: 17.8 MMOL/L (ref 22–27)
HCO3 ARTERIAL: 23 MMOL/L (ref 22–27)
HCT VFR BLD AUTO: 27.2 % (ref 40.7–50.3)
HCT VFR BLD AUTO: 43.4 % (ref 40.7–50.3)
HCT VFR BLD AUTO: 43.6 % (ref 40.7–50.3)
HCT VFR BLD CALC: 29 % (ref 40.7–50.3)
HCT VFR BLD CALC: 35 % (ref 40.7–50.3)
HEMOGLOBIN: 10 GM/DL (ref 13–17)
HEMOGLOBIN: 12 GM/DL (ref 13–17)
HGB BLD-MCNC: 14.5 G/DL (ref 13–17)
HGB BLD-MCNC: 14.6 G/DL (ref 13–17)
HGB BLD-MCNC: 9.1 G/DL (ref 13–17)
IMM GRANULOCYTES # BLD AUTO: 0.13 K/UL (ref 0–0.3)
IMM GRANULOCYTES # BLD AUTO: 1.52 K/UL (ref 0–0.3)
IMM GRANULOCYTES NFR BLD: 1 %
IMM GRANULOCYTES NFR BLD: 10 %
INR PPP: 1.7
INR PPP: 1.8
INR PPP: 1.8
LACTIC ACID, WHOLE BLOOD: 1.2 MMOL/L (ref 0.7–2.1)
LACTIC ACID, WHOLE BLOOD: 1.3 MMOL/L (ref 0.7–2.1)
LACTIC ACID, WHOLE BLOOD: 1.3 MMOL/L (ref 0.7–2.1)
LACTIC ACID, WHOLE BLOOD: 2.1 MMOL/L (ref 0.7–2.1)
LYMPHOCYTES NFR BLD: 0.76 K/UL (ref 1–4.8)
LYMPHOCYTES NFR BLD: 1.06 K/UL (ref 1–4.8)
LYMPHOCYTES RELATIVE PERCENT: 5 % (ref 24–44)
LYMPHOCYTES RELATIVE PERCENT: 8 % (ref 24–44)
MAGNESIUM SERPL-MCNC: 1.7 MG/DL (ref 1.6–2.6)
MCH RBC QN AUTO: 27.9 PG (ref 25.2–33.5)
MCH RBC QN AUTO: 28.2 PG (ref 25.2–33.5)
MCH RBC QN AUTO: 28.6 PG (ref 25.2–33.5)
MCHC RBC AUTO-ENTMCNC: 33.4 G/DL (ref 28.4–34.8)
MCHC RBC AUTO-ENTMCNC: 33.5 G/DL (ref 28.4–34.8)
MCHC RBC AUTO-ENTMCNC: 33.5 G/DL (ref 28.4–34.8)
MCV RBC AUTO: 83.2 FL (ref 82.6–102.9)
MCV RBC AUTO: 84.2 FL (ref 82.6–102.9)
MCV RBC AUTO: 85.6 FL (ref 82.6–102.9)
MONOCYTES NFR BLD: 0.8 K/UL (ref 0.2–0.8)
MONOCYTES NFR BLD: 0.91 K/UL (ref 0.1–0.8)
MONOCYTES NFR BLD: 6 % (ref 1–7)
MONOCYTES NFR BLD: 6 % (ref 1–7)
MORPHOLOGY: ABNORMAL
MORPHOLOGY: ABNORMAL
MYOGLOBIN SERPL-MCNC: 247 NG/ML (ref 28–72)
NEGATIVE BASE EXCESS, ART: 2.7 MMOL/L (ref 0–2)
NEGATIVE BASE EXCESS, ART: 8.3 MMOL/L (ref 0–2)
NEUTROPHILS NFR BLD: 79 % (ref 36–66)
NEUTROPHILS NFR BLD: 84 % (ref 36–66)
NEUTS SEG NFR BLD: 11.18 K/UL (ref 1.8–7.7)
NEUTS SEG NFR BLD: 12.01 K/UL (ref 1.8–7.7)
NRBC BLD-RTO: 0 PER 100 WBC
O2 SAT, ARTERIAL: 93.6 % (ref 94–100)
O2 SAT, ARTERIAL: 96.8 % (ref 94–100)
PARTIAL THROMBOPLASTIN TIME: 26.2 SEC (ref 23.9–33.8)
PARTIAL THROMBOPLASTIN TIME: 30.8 SEC (ref 23–36.5)
PARTIAL THROMBOPLASTIN TIME: 31.2 SEC (ref 23–36.5)
PCO2 ARTERIAL: 38.4 MMHG (ref 32–45)
PCO2 ARTERIAL: 43.7 MMHG (ref 32–45)
PCO2, ART, TEMP ADJ: 35.1 (ref 32–45)
PH ARTERIAL: 7.28 (ref 7.35–7.45)
PH ARTERIAL: 7.34 (ref 7.35–7.45)
PH, ART, TEMP ADJ: 7.41 (ref 7.35–7.45)
PHOSPHATE SERPL-MCNC: 4 MG/DL (ref 2.5–4.5)
PLATELET # BLD AUTO: 190 K/UL (ref 138–453)
PLATELET # BLD AUTO: 253 K/UL (ref 138–453)
PLATELET # BLD AUTO: 278 K/UL (ref 138–453)
PMV BLD AUTO: 9.2 FL (ref 8.1–13.5)
PMV BLD AUTO: 9.2 FL (ref 8.1–13.5)
PMV BLD AUTO: 9.4 FL (ref 8.1–13.5)
PO2 ARTERIAL: 72 MMHG (ref 75–95)
PO2 ARTERIAL: 99 MMHG (ref 75–95)
PO2, ART, TEMP ADJ: 51.4 MMHG (ref 75–95)
POTASSIUM SERPL-SCNC: 5 MMOL/L (ref 3.7–5.3)
POTASSIUM SERPL-SCNC: 5 MMOL/L (ref 3.7–5.3)
POTASSIUM, WHOLE BLOOD: 4.4 MMOL/L (ref 3.6–5)
POTASSIUM, WHOLE BLOOD: 4.4 MMOL/L (ref 3.6–5)
PROTHROMBIN TIME: 19.9 SEC (ref 11.5–14.2)
PROTHROMBIN TIME: 20.3 SEC (ref 11.7–14.9)
PROTHROMBIN TIME: 20.7 SEC (ref 11.7–14.9)
RBC # BLD AUTO: 3.23 M/UL (ref 4.21–5.77)
RBC # BLD AUTO: 5.07 M/UL (ref 4.21–5.77)
RBC # BLD AUTO: 5.24 M/UL (ref 4.21–5.77)
SODIUM SERPL-SCNC: 139 MMOL/L (ref 136–145)
SODIUM SERPL-SCNC: 140 MMOL/L (ref 136–145)
SODIUM, WHOLE BLOOD: 137 MMOL/L (ref 136–145)
SODIUM, WHOLE BLOOD: 137 MMOL/L (ref 136–145)
WBC OTHER # BLD: 13.3 K/UL (ref 3.5–11.3)
WBC OTHER # BLD: 15.2 K/UL (ref 3.5–11.3)
WBC OTHER # BLD: 9.2 K/UL (ref 3.5–11.3)

## 2025-02-23 PROCEDURE — 36415 COLL VENOUS BLD VENIPUNCTURE: CPT

## 2025-02-23 PROCEDURE — 6360000002 HC RX W HCPCS: Performed by: STUDENT IN AN ORGANIZED HEALTH CARE EDUCATION/TRAINING PROGRAM

## 2025-02-23 PROCEDURE — 2580000003 HC RX 258

## 2025-02-23 PROCEDURE — 85025 COMPLETE CBC W/AUTO DIFF WBC: CPT

## 2025-02-23 PROCEDURE — 99285 EMERGENCY DEPT VISIT HI MDM: CPT

## 2025-02-23 PROCEDURE — 83874 ASSAY OF MYOGLOBIN: CPT

## 2025-02-23 PROCEDURE — 2580000003 HC RX 258: Performed by: STUDENT IN AN ORGANIZED HEALTH CARE EDUCATION/TRAINING PROGRAM

## 2025-02-23 PROCEDURE — 85730 THROMBOPLASTIN TIME PARTIAL: CPT

## 2025-02-23 PROCEDURE — 84295 ASSAY OF SERUM SODIUM: CPT

## 2025-02-23 PROCEDURE — 04CN3ZZ EXTIRPATION OF MATTER FROM LEFT POPLITEAL ARTERY, PERCUTANEOUS APPROACH: ICD-10-PCS | Performed by: STUDENT IN AN ORGANIZED HEALTH CARE EDUCATION/TRAINING PROGRAM

## 2025-02-23 PROCEDURE — 80048 BASIC METABOLIC PNL TOTAL CA: CPT

## 2025-02-23 PROCEDURE — 37184 PRIM ART M-THRMBC 1ST VSL: CPT | Performed by: STUDENT IN AN ORGANIZED HEALTH CARE EDUCATION/TRAINING PROGRAM

## 2025-02-23 PROCEDURE — 85610 PROTHROMBIN TIME: CPT

## 2025-02-23 PROCEDURE — 3600000007 HC SURGERY HYBRID BASE: Performed by: STUDENT IN AN ORGANIZED HEALTH CARE EDUCATION/TRAINING PROGRAM

## 2025-02-23 PROCEDURE — 88304 TISSUE EXAM BY PATHOLOGIST: CPT

## 2025-02-23 PROCEDURE — 94761 N-INVAS EAR/PLS OXIMETRY MLT: CPT

## 2025-02-23 PROCEDURE — 93005 ELECTROCARDIOGRAM TRACING: CPT

## 2025-02-23 PROCEDURE — 04CN0ZZ EXTIRPATION OF MATTER FROM LEFT POPLITEAL ARTERY, OPEN APPROACH: ICD-10-PCS | Performed by: STUDENT IN AN ORGANIZED HEALTH CARE EDUCATION/TRAINING PROGRAM

## 2025-02-23 PROCEDURE — 85018 HEMOGLOBIN: CPT

## 2025-02-23 PROCEDURE — 04CQ0ZZ EXTIRPATION OF MATTER FROM LEFT ANTERIOR TIBIAL ARTERY, OPEN APPROACH: ICD-10-PCS | Performed by: STUDENT IN AN ORGANIZED HEALTH CARE EDUCATION/TRAINING PROGRAM

## 2025-02-23 PROCEDURE — C1757 CATH, THROMBECTOMY/EMBOLECT: HCPCS | Performed by: STUDENT IN AN ORGANIZED HEALTH CARE EDUCATION/TRAINING PROGRAM

## 2025-02-23 PROCEDURE — 96375 TX/PRO/DX INJ NEW DRUG ADDON: CPT

## 2025-02-23 PROCEDURE — 6370000000 HC RX 637 (ALT 250 FOR IP)

## 2025-02-23 PROCEDURE — 2500000003 HC RX 250 WO HCPCS

## 2025-02-23 PROCEDURE — 96366 THER/PROPH/DIAG IV INF ADDON: CPT

## 2025-02-23 PROCEDURE — 83735 ASSAY OF MAGNESIUM: CPT

## 2025-02-23 PROCEDURE — 0KNT0ZZ RELEASE LEFT LOWER LEG MUSCLE, OPEN APPROACH: ICD-10-PCS | Performed by: STUDENT IN AN ORGANIZED HEALTH CARE EDUCATION/TRAINING PROGRAM

## 2025-02-23 PROCEDURE — 2720000010 HC SURG SUPPLY STERILE: Performed by: STUDENT IN AN ORGANIZED HEALTH CARE EDUCATION/TRAINING PROGRAM

## 2025-02-23 PROCEDURE — 86850 RBC ANTIBODY SCREEN: CPT

## 2025-02-23 PROCEDURE — 82550 ASSAY OF CK (CPK): CPT

## 2025-02-23 PROCEDURE — 85520 HEPARIN ASSAY: CPT

## 2025-02-23 PROCEDURE — 2700000000 HC OXYGEN THERAPY PER DAY

## 2025-02-23 PROCEDURE — B41GZZZ FLUOROSCOPY OF LEFT LOWER EXTREMITY ARTERIES: ICD-10-PCS | Performed by: STUDENT IN AN ORGANIZED HEALTH CARE EDUCATION/TRAINING PROGRAM

## 2025-02-23 PROCEDURE — 6360000002 HC RX W HCPCS: Performed by: NURSE ANESTHETIST, CERTIFIED REGISTERED

## 2025-02-23 PROCEDURE — 96365 THER/PROPH/DIAG IV INF INIT: CPT

## 2025-02-23 PROCEDURE — C1887 CATHETER, GUIDING: HCPCS | Performed by: STUDENT IN AN ORGANIZED HEALTH CARE EDUCATION/TRAINING PROGRAM

## 2025-02-23 PROCEDURE — 2580000003 HC RX 258: Performed by: NURSE ANESTHETIST, CERTIFIED REGISTERED

## 2025-02-23 PROCEDURE — 0JCP0ZZ EXTIRPATION OF MATTER FROM LEFT LOWER LEG SUBCUTANEOUS TISSUE AND FASCIA, OPEN APPROACH: ICD-10-PCS | Performed by: STUDENT IN AN ORGANIZED HEALTH CARE EDUCATION/TRAINING PROGRAM

## 2025-02-23 PROCEDURE — 82962 GLUCOSE BLOOD TEST: CPT

## 2025-02-23 PROCEDURE — 6360000002 HC RX W HCPCS

## 2025-02-23 PROCEDURE — 86920 COMPATIBILITY TEST SPIN: CPT

## 2025-02-23 PROCEDURE — 6360000004 HC RX CONTRAST MEDICATION: Performed by: STUDENT IN AN ORGANIZED HEALTH CARE EDUCATION/TRAINING PROGRAM

## 2025-02-23 PROCEDURE — 82435 ASSAY OF BLOOD CHLORIDE: CPT

## 2025-02-23 PROCEDURE — 99253 IP/OBS CNSLTJ NEW/EST LOW 45: CPT | Performed by: SURGERY

## 2025-02-23 PROCEDURE — C1768 GRAFT, VASCULAR: HCPCS | Performed by: STUDENT IN AN ORGANIZED HEALTH CARE EDUCATION/TRAINING PROGRAM

## 2025-02-23 PROCEDURE — 85347 COAGULATION TIME ACTIVATED: CPT

## 2025-02-23 PROCEDURE — 2500000003 HC RX 250 WO HCPCS: Performed by: STUDENT IN AN ORGANIZED HEALTH CARE EDUCATION/TRAINING PROGRAM

## 2025-02-23 PROCEDURE — 34203 REMOVAL OF LEG ARTERY CLOT: CPT | Performed by: STUDENT IN AN ORGANIZED HEALTH CARE EDUCATION/TRAINING PROGRAM

## 2025-02-23 PROCEDURE — P9041 ALBUMIN (HUMAN),5%, 50ML: HCPCS | Performed by: NURSE ANESTHETIST, CERTIFIED REGISTERED

## 2025-02-23 PROCEDURE — 82330 ASSAY OF CALCIUM: CPT

## 2025-02-23 PROCEDURE — 04CS0ZZ EXTIRPATION OF MATTER FROM LEFT POSTERIOR TIBIAL ARTERY, OPEN APPROACH: ICD-10-PCS | Performed by: STUDENT IN AN ORGANIZED HEALTH CARE EDUCATION/TRAINING PROGRAM

## 2025-02-23 PROCEDURE — 6370000000 HC RX 637 (ALT 250 FOR IP): Performed by: STUDENT IN AN ORGANIZED HEALTH CARE EDUCATION/TRAINING PROGRAM

## 2025-02-23 PROCEDURE — C1894 INTRO/SHEATH, NON-LASER: HCPCS | Performed by: STUDENT IN AN ORGANIZED HEALTH CARE EDUCATION/TRAINING PROGRAM

## 2025-02-23 PROCEDURE — 2709999900 HC NON-CHARGEABLE SUPPLY: Performed by: STUDENT IN AN ORGANIZED HEALTH CARE EDUCATION/TRAINING PROGRAM

## 2025-02-23 PROCEDURE — 85027 COMPLETE CBC AUTOMATED: CPT

## 2025-02-23 PROCEDURE — C1725 CATH, TRANSLUMIN NON-LASER: HCPCS | Performed by: STUDENT IN AN ORGANIZED HEALTH CARE EDUCATION/TRAINING PROGRAM

## 2025-02-23 PROCEDURE — 3700000000 HC ANESTHESIA ATTENDED CARE: Performed by: STUDENT IN AN ORGANIZED HEALTH CARE EDUCATION/TRAINING PROGRAM

## 2025-02-23 PROCEDURE — 2500000003 HC RX 250 WO HCPCS: Performed by: NURSE ANESTHETIST, CERTIFIED REGISTERED

## 2025-02-23 PROCEDURE — 75635 CT ANGIO ABDOMINAL ARTERIES: CPT

## 2025-02-23 PROCEDURE — 3700000001 HC ADD 15 MINUTES (ANESTHESIA): Performed by: STUDENT IN AN ORGANIZED HEALTH CARE EDUCATION/TRAINING PROGRAM

## 2025-02-23 PROCEDURE — 047K3ZZ DILATION OF RIGHT FEMORAL ARTERY, PERCUTANEOUS APPROACH: ICD-10-PCS | Performed by: STUDENT IN AN ORGANIZED HEALTH CARE EDUCATION/TRAINING PROGRAM

## 2025-02-23 PROCEDURE — 04C03ZZ EXTIRPATION OF MATTER FROM ABDOMINAL AORTA, PERCUTANEOUS APPROACH: ICD-10-PCS | Performed by: STUDENT IN AN ORGANIZED HEALTH CARE EDUCATION/TRAINING PROGRAM

## 2025-02-23 PROCEDURE — 84100 ASSAY OF PHOSPHORUS: CPT

## 2025-02-23 PROCEDURE — 37185 PRIM ART M-THRMBC SBSQ VSL: CPT | Performed by: STUDENT IN AN ORGANIZED HEALTH CARE EDUCATION/TRAINING PROGRAM

## 2025-02-23 PROCEDURE — C1892 INTRO/SHEATH,FIXED,PEEL-AWAY: HCPCS | Performed by: STUDENT IN AN ORGANIZED HEALTH CARE EDUCATION/TRAINING PROGRAM

## 2025-02-23 PROCEDURE — 27602 DECOMPRESSION OF LOWER LEG: CPT | Performed by: STUDENT IN AN ORGANIZED HEALTH CARE EDUCATION/TRAINING PROGRAM

## 2025-02-23 PROCEDURE — 86901 BLOOD TYPING SEROLOGIC RH(D): CPT

## 2025-02-23 PROCEDURE — 84132 ASSAY OF SERUM POTASSIUM: CPT

## 2025-02-23 PROCEDURE — 04CU0ZZ EXTIRPATION OF MATTER FROM LEFT PERONEAL ARTERY, OPEN APPROACH: ICD-10-PCS | Performed by: STUDENT IN AN ORGANIZED HEALTH CARE EDUCATION/TRAINING PROGRAM

## 2025-02-23 PROCEDURE — 85014 HEMATOCRIT: CPT

## 2025-02-23 PROCEDURE — 86900 BLOOD TYPING SEROLOGIC ABO: CPT

## 2025-02-23 PROCEDURE — 2000000000 HC ICU R&B

## 2025-02-23 PROCEDURE — C1760 CLOSURE DEV, VASC: HCPCS | Performed by: STUDENT IN AN ORGANIZED HEALTH CARE EDUCATION/TRAINING PROGRAM

## 2025-02-23 PROCEDURE — 04UN0KZ SUPPLEMENT LEFT POPLITEAL ARTERY WITH NONAUTOLOGOUS TISSUE SUBSTITUTE, OPEN APPROACH: ICD-10-PCS | Performed by: STUDENT IN AN ORGANIZED HEALTH CARE EDUCATION/TRAINING PROGRAM

## 2025-02-23 PROCEDURE — 82805 BLOOD GASES W/O2 SATURATION: CPT

## 2025-02-23 PROCEDURE — 6360000002 HC RX W HCPCS: Performed by: EMERGENCY MEDICINE

## 2025-02-23 PROCEDURE — 3600000017 HC SURGERY HYBRID ADDL 15MIN: Performed by: STUDENT IN AN ORGANIZED HEALTH CARE EDUCATION/TRAINING PROGRAM

## 2025-02-23 PROCEDURE — C1769 GUIDE WIRE: HCPCS | Performed by: STUDENT IN AN ORGANIZED HEALTH CARE EDUCATION/TRAINING PROGRAM

## 2025-02-23 PROCEDURE — 83605 ASSAY OF LACTIC ACID: CPT

## 2025-02-23 DEVICE — XENOSURE BIOLOGIC PATCH, 0.8CM X 8CM, EIFU
Type: IMPLANTABLE DEVICE | Site: POPLITEAL | Status: FUNCTIONAL
Brand: XENOSURE BIOLOGIC PATCH

## 2025-02-23 RX ORDER — MIDAZOLAM HYDROCHLORIDE 1 MG/ML
INJECTION, SOLUTION INTRAMUSCULAR; INTRAVENOUS
Status: DISCONTINUED | OUTPATIENT
Start: 2025-02-23 | End: 2025-02-23 | Stop reason: SDUPTHER

## 2025-02-23 RX ORDER — ROCURONIUM BROMIDE 10 MG/ML
INJECTION, SOLUTION INTRAVENOUS
Status: DISCONTINUED | OUTPATIENT
Start: 2025-02-23 | End: 2025-02-23 | Stop reason: SDUPTHER

## 2025-02-23 RX ORDER — CALCIUM CHLORIDE 100 MG/ML
INJECTION INTRAVENOUS; INTRAVENTRICULAR
Status: DISCONTINUED | OUTPATIENT
Start: 2025-02-23 | End: 2025-02-23 | Stop reason: SDUPTHER

## 2025-02-23 RX ORDER — NOREPINEPHRINE BITARTRATE 0.06 MG/ML
1-100 INJECTION, SOLUTION INTRAVENOUS CONTINUOUS
Status: DISCONTINUED | OUTPATIENT
Start: 2025-02-23 | End: 2025-02-24

## 2025-02-23 RX ORDER — HEPARIN SODIUM 1000 [USP'U]/ML
10000 INJECTION, SOLUTION INTRAVENOUS; SUBCUTANEOUS ONCE
Status: COMPLETED | OUTPATIENT
Start: 2025-02-23 | End: 2025-02-23

## 2025-02-23 RX ORDER — IOPAMIDOL 755 MG/ML
125 INJECTION, SOLUTION INTRAVASCULAR
Status: COMPLETED | OUTPATIENT
Start: 2025-02-23 | End: 2025-02-23

## 2025-02-23 RX ORDER — HEPARIN SODIUM 1000 [USP'U]/ML
5000 INJECTION, SOLUTION INTRAVENOUS; SUBCUTANEOUS PRN
Status: DISCONTINUED | OUTPATIENT
Start: 2025-02-23 | End: 2025-02-23 | Stop reason: HOSPADM

## 2025-02-23 RX ORDER — ONDANSETRON 4 MG/1
4 TABLET, ORALLY DISINTEGRATING ORAL EVERY 8 HOURS PRN
Status: DISCONTINUED | OUTPATIENT
Start: 2025-02-23 | End: 2025-03-12 | Stop reason: HOSPADM

## 2025-02-23 RX ORDER — HEPARIN SODIUM 1000 [USP'U]/ML
10000 INJECTION, SOLUTION INTRAVENOUS; SUBCUTANEOUS PRN
Status: DISCONTINUED | OUTPATIENT
Start: 2025-02-23 | End: 2025-02-27

## 2025-02-23 RX ORDER — ACETAMINOPHEN 500 MG
1000 TABLET ORAL EVERY 8 HOURS SCHEDULED
Status: DISCONTINUED | OUTPATIENT
Start: 2025-02-23 | End: 2025-03-02

## 2025-02-23 RX ORDER — CIPROFLOXACIN 2 MG/ML
400 INJECTION, SOLUTION INTRAVENOUS EVERY 12 HOURS
Status: COMPLETED | OUTPATIENT
Start: 2025-02-23 | End: 2025-03-08

## 2025-02-23 RX ORDER — PROPOFOL 10 MG/ML
INJECTION, EMULSION INTRAVENOUS
Status: DISCONTINUED | OUTPATIENT
Start: 2025-02-23 | End: 2025-02-23 | Stop reason: SDUPTHER

## 2025-02-23 RX ORDER — SODIUM CHLORIDE, SODIUM LACTATE, POTASSIUM CHLORIDE, AND CALCIUM CHLORIDE .6; .31; .03; .02 G/100ML; G/100ML; G/100ML; G/100ML
1000 INJECTION, SOLUTION INTRAVENOUS ONCE
Status: COMPLETED | OUTPATIENT
Start: 2025-02-23 | End: 2025-02-23

## 2025-02-23 RX ORDER — POLYETHYLENE GLYCOL 3350 17 G/17G
17 POWDER, FOR SOLUTION ORAL DAILY PRN
Status: DISCONTINUED | OUTPATIENT
Start: 2025-02-23 | End: 2025-03-12 | Stop reason: HOSPADM

## 2025-02-23 RX ORDER — ALBUMIN HUMAN 50 G/1000ML
SOLUTION INTRAVENOUS
Status: DISCONTINUED | OUTPATIENT
Start: 2025-02-23 | End: 2025-02-23 | Stop reason: SDUPTHER

## 2025-02-23 RX ORDER — DOCUSATE SODIUM 100 MG/1
100 CAPSULE, LIQUID FILLED ORAL DAILY
Status: DISCONTINUED | OUTPATIENT
Start: 2025-02-23 | End: 2025-03-12 | Stop reason: HOSPADM

## 2025-02-23 RX ORDER — OXYCODONE HYDROCHLORIDE 5 MG/1
5 TABLET ORAL EVERY 4 HOURS PRN
Status: DISCONTINUED | OUTPATIENT
Start: 2025-02-23 | End: 2025-03-01

## 2025-02-23 RX ORDER — SODIUM CHLORIDE 9 MG/ML
INJECTION, SOLUTION INTRAVENOUS PRN
Status: DISCONTINUED | OUTPATIENT
Start: 2025-02-23 | End: 2025-03-12 | Stop reason: HOSPADM

## 2025-02-23 RX ORDER — IODIXANOL 320 MG/ML
INJECTION, SOLUTION INTRAVASCULAR PRN
Status: DISCONTINUED | OUTPATIENT
Start: 2025-02-23 | End: 2025-02-23 | Stop reason: ALTCHOICE

## 2025-02-23 RX ORDER — CEFAZOLIN SODIUM 1 G/3ML
INJECTION, POWDER, FOR SOLUTION INTRAMUSCULAR; INTRAVENOUS
Status: DISCONTINUED | OUTPATIENT
Start: 2025-02-23 | End: 2025-02-23 | Stop reason: SDUPTHER

## 2025-02-23 RX ORDER — SODIUM CHLORIDE, SODIUM LACTATE, POTASSIUM CHLORIDE, AND CALCIUM CHLORIDE .6; .31; .03; .02 G/100ML; G/100ML; G/100ML; G/100ML
500 INJECTION, SOLUTION INTRAVENOUS ONCE
Status: COMPLETED | OUTPATIENT
Start: 2025-02-23 | End: 2025-02-23

## 2025-02-23 RX ORDER — HEPARIN SODIUM 1000 [USP'U]/ML
10000 INJECTION, SOLUTION INTRAVENOUS; SUBCUTANEOUS ONCE
Status: DISCONTINUED | OUTPATIENT
Start: 2025-02-23 | End: 2025-02-23 | Stop reason: DRUGHIGH

## 2025-02-23 RX ORDER — SODIUM CHLORIDE 0.9 % (FLUSH) 0.9 %
10 SYRINGE (ML) INJECTION PRN
Status: DISCONTINUED | OUTPATIENT
Start: 2025-02-23 | End: 2025-02-23 | Stop reason: HOSPADM

## 2025-02-23 RX ORDER — METHOCARBAMOL 750 MG/1
750 TABLET, FILM COATED ORAL EVERY 6 HOURS
Status: DISCONTINUED | OUTPATIENT
Start: 2025-02-23 | End: 2025-03-12 | Stop reason: HOSPADM

## 2025-02-23 RX ORDER — LIDOCAINE HYDROCHLORIDE 10 MG/ML
INJECTION, SOLUTION INFILTRATION; PERINEURAL
Status: DISPENSED
Start: 2025-02-23 | End: 2025-02-23

## 2025-02-23 RX ORDER — IODIXANOL 320 MG/ML
INJECTION, SOLUTION INTRAVASCULAR
Status: DISPENSED
Start: 2025-02-23 | End: 2025-02-24

## 2025-02-23 RX ORDER — VASOPRESSIN 20 [USP'U]/ML
INJECTION, SOLUTION INTRAMUSCULAR; SUBCUTANEOUS
Status: DISCONTINUED | OUTPATIENT
Start: 2025-02-23 | End: 2025-02-23 | Stop reason: SDUPTHER

## 2025-02-23 RX ORDER — HEPARIN SODIUM 1000 [USP'U]/ML
60 INJECTION, SOLUTION INTRAVENOUS; SUBCUTANEOUS PRN
Status: DISCONTINUED | OUTPATIENT
Start: 2025-02-23 | End: 2025-02-23

## 2025-02-23 RX ORDER — ALBUMIN HUMAN 50 G/1000ML
SOLUTION INTRAVENOUS
Status: DISPENSED
Start: 2025-02-23 | End: 2025-02-24

## 2025-02-23 RX ORDER — GABAPENTIN 300 MG/1
300 CAPSULE ORAL EVERY 8 HOURS SCHEDULED
Status: DISCONTINUED | OUTPATIENT
Start: 2025-02-23 | End: 2025-02-24

## 2025-02-23 RX ORDER — HEPARIN SODIUM 1000 [USP'U]/ML
30 INJECTION, SOLUTION INTRAVENOUS; SUBCUTANEOUS PRN
Status: DISCONTINUED | OUTPATIENT
Start: 2025-02-23 | End: 2025-02-23

## 2025-02-23 RX ORDER — HEPARIN SODIUM 1000 [USP'U]/ML
5000 INJECTION, SOLUTION INTRAVENOUS; SUBCUTANEOUS PRN
Status: DISCONTINUED | OUTPATIENT
Start: 2025-02-23 | End: 2025-02-27

## 2025-02-23 RX ORDER — FENTANYL CITRATE 50 UG/ML
INJECTION, SOLUTION INTRAMUSCULAR; INTRAVENOUS
Status: DISCONTINUED | OUTPATIENT
Start: 2025-02-23 | End: 2025-02-23 | Stop reason: SDUPTHER

## 2025-02-23 RX ORDER — METRONIDAZOLE 500 MG/100ML
500 INJECTION, SOLUTION INTRAVENOUS EVERY 8 HOURS
Status: DISPENSED | OUTPATIENT
Start: 2025-02-23 | End: 2025-03-09

## 2025-02-23 RX ORDER — HEPARIN SODIUM 10000 [USP'U]/100ML
5-30 INJECTION, SOLUTION INTRAVENOUS CONTINUOUS
Status: DISCONTINUED | OUTPATIENT
Start: 2025-02-23 | End: 2025-02-23 | Stop reason: HOSPADM

## 2025-02-23 RX ORDER — SODIUM CHLORIDE, SODIUM LACTATE, POTASSIUM CHLORIDE, CALCIUM CHLORIDE 600; 310; 30; 20 MG/100ML; MG/100ML; MG/100ML; MG/100ML
INJECTION, SOLUTION INTRAVENOUS CONTINUOUS
Status: DISCONTINUED | OUTPATIENT
Start: 2025-02-23 | End: 2025-02-26

## 2025-02-23 RX ORDER — 0.9 % SODIUM CHLORIDE 0.9 %
80 INTRAVENOUS SOLUTION INTRAVENOUS ONCE
Status: COMPLETED | OUTPATIENT
Start: 2025-02-23 | End: 2025-02-23

## 2025-02-23 RX ORDER — ONDANSETRON 2 MG/ML
4 INJECTION INTRAMUSCULAR; INTRAVENOUS EVERY 6 HOURS PRN
Status: DISCONTINUED | OUTPATIENT
Start: 2025-02-23 | End: 2025-03-12 | Stop reason: HOSPADM

## 2025-02-23 RX ORDER — SODIUM CHLORIDE 0.9 % (FLUSH) 0.9 %
5-40 SYRINGE (ML) INJECTION PRN
Status: DISCONTINUED | OUTPATIENT
Start: 2025-02-23 | End: 2025-03-12 | Stop reason: HOSPADM

## 2025-02-23 RX ORDER — SODIUM CHLORIDE, SODIUM LACTATE, POTASSIUM CHLORIDE, CALCIUM CHLORIDE 600; 310; 30; 20 MG/100ML; MG/100ML; MG/100ML; MG/100ML
INJECTION, SOLUTION INTRAVENOUS
Status: DISCONTINUED | OUTPATIENT
Start: 2025-02-23 | End: 2025-02-23 | Stop reason: SDUPTHER

## 2025-02-23 RX ORDER — HEPARIN SODIUM 1000 [USP'U]/ML
INJECTION, SOLUTION INTRAVENOUS; SUBCUTANEOUS
Status: DISCONTINUED | OUTPATIENT
Start: 2025-02-23 | End: 2025-02-23 | Stop reason: SDUPTHER

## 2025-02-23 RX ORDER — SODIUM CHLORIDE 0.9 % (FLUSH) 0.9 %
5-40 SYRINGE (ML) INJECTION EVERY 12 HOURS SCHEDULED
Status: DISCONTINUED | OUTPATIENT
Start: 2025-02-23 | End: 2025-03-12 | Stop reason: HOSPADM

## 2025-02-23 RX ORDER — LIDOCAINE HYDROCHLORIDE 10 MG/ML
INJECTION, SOLUTION EPIDURAL; INFILTRATION; INTRACAUDAL; PERINEURAL
Status: DISCONTINUED | OUTPATIENT
Start: 2025-02-23 | End: 2025-02-23 | Stop reason: SDUPTHER

## 2025-02-23 RX ORDER — HEPARIN SODIUM 10000 [USP'U]/100ML
5-30 INJECTION, SOLUTION INTRAVENOUS CONTINUOUS
Status: DISCONTINUED | OUTPATIENT
Start: 2025-02-23 | End: 2025-02-26

## 2025-02-23 RX ORDER — MORPHINE SULFATE 4 MG/ML
4 INJECTION INTRAVENOUS ONCE
Status: COMPLETED | OUTPATIENT
Start: 2025-02-23 | End: 2025-02-23

## 2025-02-23 RX ORDER — ONDANSETRON 2 MG/ML
INJECTION INTRAMUSCULAR; INTRAVENOUS
Status: DISCONTINUED | OUTPATIENT
Start: 2025-02-23 | End: 2025-02-23 | Stop reason: SDUPTHER

## 2025-02-23 RX ORDER — HEPARIN SODIUM 10000 [USP'U]/100ML
5-30 INJECTION, SOLUTION INTRAVENOUS CONTINUOUS
Status: DISCONTINUED | OUTPATIENT
Start: 2025-02-23 | End: 2025-02-23

## 2025-02-23 RX ORDER — LIDOCAINE HYDROCHLORIDE 10 MG/ML
INJECTION, SOLUTION INFILTRATION; PERINEURAL PRN
Status: DISCONTINUED | OUTPATIENT
Start: 2025-02-23 | End: 2025-02-23 | Stop reason: ALTCHOICE

## 2025-02-23 RX ORDER — HEPARIN SODIUM 1000 [USP'U]/ML
10000 INJECTION, SOLUTION INTRAVENOUS; SUBCUTANEOUS PRN
Status: DISCONTINUED | OUTPATIENT
Start: 2025-02-23 | End: 2025-02-23 | Stop reason: HOSPADM

## 2025-02-23 RX ORDER — IODIXANOL 320 MG/ML
INJECTION, SOLUTION INTRAVASCULAR
Status: DISPENSED
Start: 2025-02-23 | End: 2025-02-23

## 2025-02-23 RX ADMIN — ROCURONIUM BROMIDE 20 MG: 10 INJECTION, SOLUTION INTRAVENOUS at 12:39

## 2025-02-23 RX ADMIN — VASOPRESSIN 0.2 UNITS: 20 INJECTION, SOLUTION INTRAVENOUS at 12:54

## 2025-02-23 RX ADMIN — LIDOCAINE HYDROCHLORIDE 50 MG: 10 INJECTION, SOLUTION EPIDURAL; INFILTRATION; INTRACAUDAL; PERINEURAL at 10:27

## 2025-02-23 RX ADMIN — VASOPRESSIN 1 UNITS: 20 INJECTION, SOLUTION INTRAVENOUS at 11:08

## 2025-02-23 RX ADMIN — OXYCODONE HYDROCHLORIDE 5 MG: 5 TABLET ORAL at 06:21

## 2025-02-23 RX ADMIN — HEPARIN SODIUM 13000 UNITS: 1000 INJECTION INTRAVENOUS; SUBCUTANEOUS at 11:13

## 2025-02-23 RX ADMIN — METRONIDAZOLE 500 MG: 500 INJECTION, SOLUTION INTRAVENOUS at 23:28

## 2025-02-23 RX ADMIN — HEPARIN SODIUM 5000 UNITS: 1000 INJECTION INTRAVENOUS; SUBCUTANEOUS at 13:55

## 2025-02-23 RX ADMIN — ROCURONIUM BROMIDE 20 MG: 10 INJECTION, SOLUTION INTRAVENOUS at 15:44

## 2025-02-23 RX ADMIN — HEPARIN SODIUM 4000 UNITS: 1000 INJECTION INTRAVENOUS; SUBCUTANEOUS at 12:28

## 2025-02-23 RX ADMIN — VASOPRESSIN 0.2 UNITS: 20 INJECTION, SOLUTION INTRAVENOUS at 11:50

## 2025-02-23 RX ADMIN — HEPARIN SODIUM 10000 UNITS: 1000 INJECTION INTRAVENOUS; SUBCUTANEOUS at 21:08

## 2025-02-23 RX ADMIN — HEPARIN SODIUM 16 UNITS/KG/HR: 10000 INJECTION, SOLUTION INTRAVENOUS at 07:58

## 2025-02-23 RX ADMIN — VASOPRESSIN 1 UNITS: 20 INJECTION, SOLUTION INTRAVENOUS at 10:38

## 2025-02-23 RX ADMIN — HYDROMORPHONE HYDROCHLORIDE 0.5 MG: 1 INJECTION, SOLUTION INTRAMUSCULAR; INTRAVENOUS; SUBCUTANEOUS at 04:18

## 2025-02-23 RX ADMIN — HEPARIN SODIUM 5000 UNITS: 1000 INJECTION INTRAVENOUS; SUBCUTANEOUS at 14:47

## 2025-02-23 RX ADMIN — Medication 3000 MG: at 16:28

## 2025-02-23 RX ADMIN — ROCURONIUM BROMIDE 50 MG: 10 INJECTION, SOLUTION INTRAVENOUS at 11:04

## 2025-02-23 RX ADMIN — ALBUMIN (HUMAN) 25 G: 12.5 INJECTION, SOLUTION INTRAVENOUS at 13:19

## 2025-02-23 RX ADMIN — VASOPRESSIN 0.2 UNITS: 20 INJECTION, SOLUTION INTRAVENOUS at 12:04

## 2025-02-23 RX ADMIN — ACETAMINOPHEN 1000 MG: 500 TABLET ORAL at 21:03

## 2025-02-23 RX ADMIN — ALBUMIN (HUMAN) 12.5 G: 12.5 INJECTION, SOLUTION INTRAVENOUS at 12:59

## 2025-02-23 RX ADMIN — FENTANYL CITRATE 25 MCG: 50 INJECTION, SOLUTION INTRAMUSCULAR; INTRAVENOUS at 16:02

## 2025-02-23 RX ADMIN — CALCIUM CHLORIDE 0.3 G: 100 INJECTION INTRAVENOUS; INTRAVENTRICULAR at 13:30

## 2025-02-23 RX ADMIN — FENTANYL CITRATE 100 MCG: 50 INJECTION, SOLUTION INTRAMUSCULAR; INTRAVENOUS at 10:27

## 2025-02-23 RX ADMIN — SUGAMMADEX 50 MG: 100 INJECTION, SOLUTION INTRAVENOUS at 16:41

## 2025-02-23 RX ADMIN — ALBUMIN (HUMAN) 25 G: 12.5 INJECTION, SOLUTION INTRAVENOUS at 10:43

## 2025-02-23 RX ADMIN — HYDROMORPHONE HYDROCHLORIDE 1 MG: 1 INJECTION, SOLUTION INTRAMUSCULAR; INTRAVENOUS; SUBCUTANEOUS at 19:18

## 2025-02-23 RX ADMIN — PHENYLEPHRINE HYDROCHLORIDE 100 MCG: 10 INJECTION INTRAVENOUS at 10:30

## 2025-02-23 RX ADMIN — FENTANYL CITRATE 50 MCG: 50 INJECTION, SOLUTION INTRAMUSCULAR; INTRAVENOUS at 17:18

## 2025-02-23 RX ADMIN — CALCIUM CHLORIDE 0.2 G: 100 INJECTION INTRAVENOUS; INTRAVENTRICULAR at 13:33

## 2025-02-23 RX ADMIN — VASOPRESSIN 0.5 UNITS: 20 INJECTION, SOLUTION INTRAVENOUS at 11:14

## 2025-02-23 RX ADMIN — ROCURONIUM BROMIDE 50 MG: 10 INJECTION, SOLUTION INTRAVENOUS at 10:27

## 2025-02-23 RX ADMIN — METRONIDAZOLE 500 MG: 500 INJECTION, SOLUTION INTRAVENOUS at 05:25

## 2025-02-23 RX ADMIN — SODIUM CHLORIDE, POTASSIUM CHLORIDE, SODIUM LACTATE AND CALCIUM CHLORIDE 1000 ML: 600; 310; 30; 20 INJECTION, SOLUTION INTRAVENOUS at 22:03

## 2025-02-23 RX ADMIN — ROCURONIUM BROMIDE 10 MG: 10 INJECTION, SOLUTION INTRAVENOUS at 13:51

## 2025-02-23 RX ADMIN — HYDROMORPHONE HYDROCHLORIDE 0.5 MG: 1 INJECTION, SOLUTION INTRAMUSCULAR; INTRAVENOUS; SUBCUTANEOUS at 07:29

## 2025-02-23 RX ADMIN — VASOPRESSIN 0.4 UNITS: 20 INJECTION, SOLUTION INTRAVENOUS at 12:23

## 2025-02-23 RX ADMIN — SODIUM CHLORIDE, POTASSIUM CHLORIDE, SODIUM LACTATE AND CALCIUM CHLORIDE 500 ML: 600; 310; 30; 20 INJECTION, SOLUTION INTRAVENOUS at 04:19

## 2025-02-23 RX ADMIN — OXYCODONE 5 MG: 5 TABLET ORAL at 18:00

## 2025-02-23 RX ADMIN — SODIUM CHLORIDE, POTASSIUM CHLORIDE, SODIUM LACTATE AND CALCIUM CHLORIDE: 600; 310; 30; 20 INJECTION, SOLUTION INTRAVENOUS at 12:33

## 2025-02-23 RX ADMIN — SODIUM CHLORIDE 0.03 MCG/KG/MIN: 9 INJECTION, SOLUTION INTRAVENOUS at 10:54

## 2025-02-23 RX ADMIN — SODIUM CHLORIDE, POTASSIUM CHLORIDE, SODIUM LACTATE AND CALCIUM CHLORIDE: 600; 310; 30; 20 INJECTION, SOLUTION INTRAVENOUS at 15:03

## 2025-02-23 RX ADMIN — VASOPRESSIN 0.25 UNITS: 20 INJECTION, SOLUTION INTRAVENOUS at 11:31

## 2025-02-23 RX ADMIN — SODIUM CHLORIDE 80 ML: 9 INJECTION, SOLUTION INTRAVENOUS at 04:04

## 2025-02-23 RX ADMIN — ROCURONIUM BROMIDE 20 MG: 10 INJECTION, SOLUTION INTRAVENOUS at 11:59

## 2025-02-23 RX ADMIN — ACETAMINOPHEN 1000 MG: 500 TABLET ORAL at 06:59

## 2025-02-23 RX ADMIN — ONDANSETRON 4 MG: 2 INJECTION INTRAMUSCULAR; INTRAVENOUS at 16:32

## 2025-02-23 RX ADMIN — ALBUMIN (HUMAN) 25 G: 12.5 INJECTION, SOLUTION INTRAVENOUS at 13:10

## 2025-02-23 RX ADMIN — VASOPRESSIN 1 UNITS: 20 INJECTION, SOLUTION INTRAVENOUS at 10:41

## 2025-02-23 RX ADMIN — VASOPRESSIN 0.2 UNITS: 20 INJECTION, SOLUTION INTRAVENOUS at 12:14

## 2025-02-23 RX ADMIN — PHENYLEPHRINE HYDROCHLORIDE 100 MCG: 10 INJECTION INTRAVENOUS at 10:36

## 2025-02-23 RX ADMIN — CEFAZOLIN 1 G: 1 INJECTION, POWDER, FOR SOLUTION INTRAMUSCULAR; INTRAVENOUS at 11:20

## 2025-02-23 RX ADMIN — KETOROLAC TROMETHAMINE 15 MG: 15 INJECTION, SOLUTION INTRAMUSCULAR; INTRAVENOUS at 03:26

## 2025-02-23 RX ADMIN — GABAPENTIN 300 MG: 300 CAPSULE ORAL at 21:03

## 2025-02-23 RX ADMIN — HEPARIN SODIUM 16 UNITS/KG/HR: 10000 INJECTION, SOLUTION INTRAVENOUS at 21:12

## 2025-02-23 RX ADMIN — CIPROFLOXACIN 400 MG: 2 INJECTION, SOLUTION INTRAVENOUS at 23:20

## 2025-02-23 RX ADMIN — SODIUM CHLORIDE, PRESERVATIVE FREE 10 ML: 5 INJECTION INTRAVENOUS at 21:04

## 2025-02-23 RX ADMIN — SODIUM CHLORIDE, POTASSIUM CHLORIDE, SODIUM LACTATE AND CALCIUM CHLORIDE: 600; 310; 30; 20 INJECTION, SOLUTION INTRAVENOUS at 01:05

## 2025-02-23 RX ADMIN — HYDROMORPHONE HYDROCHLORIDE 0.25 MG: 1 INJECTION, SOLUTION INTRAMUSCULAR; INTRAVENOUS; SUBCUTANEOUS at 17:48

## 2025-02-23 RX ADMIN — PHENYLEPHRINE HYDROCHLORIDE 200 MCG: 10 INJECTION INTRAVENOUS at 10:34

## 2025-02-23 RX ADMIN — SODIUM CHLORIDE, POTASSIUM CHLORIDE, SODIUM LACTATE AND CALCIUM CHLORIDE: 600; 310; 30; 20 INJECTION, SOLUTION INTRAVENOUS at 13:56

## 2025-02-23 RX ADMIN — PHENYLEPHRINE HYDROCHLORIDE 200 MCG: 10 INJECTION INTRAVENOUS at 10:42

## 2025-02-23 RX ADMIN — VASOPRESSIN 0.2 UNITS: 20 INJECTION, SOLUTION INTRAVENOUS at 11:43

## 2025-02-23 RX ADMIN — GABAPENTIN 300 MG: 300 CAPSULE ORAL at 03:26

## 2025-02-23 RX ADMIN — ALBUMIN (HUMAN) 12.5 G: 12.5 INJECTION, SOLUTION INTRAVENOUS at 12:45

## 2025-02-23 RX ADMIN — MIDAZOLAM 2 MG: 1 INJECTION INTRAMUSCULAR; INTRAVENOUS at 10:25

## 2025-02-23 RX ADMIN — PROPOFOL 30 MG: 10 INJECTION, EMULSION INTRAVENOUS at 16:46

## 2025-02-23 RX ADMIN — METHOCARBAMOL TABLETS 750 MG: 750 TABLET, COATED ORAL at 23:13

## 2025-02-23 RX ADMIN — VASOPRESSIN 0.2 UNITS: 20 INJECTION, SOLUTION INTRAVENOUS at 11:37

## 2025-02-23 RX ADMIN — OXYCODONE HYDROCHLORIDE 5 MG: 5 TABLET ORAL at 01:21

## 2025-02-23 RX ADMIN — ROCURONIUM BROMIDE 20 MG: 10 INJECTION, SOLUTION INTRAVENOUS at 14:27

## 2025-02-23 RX ADMIN — FENTANYL CITRATE 25 MCG: 50 INJECTION, SOLUTION INTRAMUSCULAR; INTRAVENOUS at 16:46

## 2025-02-23 RX ADMIN — SODIUM BICARBONATE 50 MEQ: 84 INJECTION, SOLUTION INTRAVENOUS at 13:24

## 2025-02-23 RX ADMIN — ROCURONIUM BROMIDE 20 MG: 10 INJECTION, SOLUTION INTRAVENOUS at 14:10

## 2025-02-23 RX ADMIN — SODIUM CHLORIDE, POTASSIUM CHLORIDE, SODIUM LACTATE AND CALCIUM CHLORIDE: 600; 310; 30; 20 INJECTION, SOLUTION INTRAVENOUS at 19:16

## 2025-02-23 RX ADMIN — VASOPRESSIN 0.25 UNITS: 20 INJECTION, SOLUTION INTRAVENOUS at 11:26

## 2025-02-23 RX ADMIN — HEPARIN SODIUM 16 UNITS/KG/HR: 10000 INJECTION, SOLUTION INTRAVENOUS at 07:19

## 2025-02-23 RX ADMIN — ROCURONIUM BROMIDE 10 MG: 10 INJECTION, SOLUTION INTRAVENOUS at 13:12

## 2025-02-23 RX ADMIN — METHOCARBAMOL 750 MG: 750 TABLET ORAL at 01:04

## 2025-02-23 RX ADMIN — SUGAMMADEX 150 MG: 100 INJECTION, SOLUTION INTRAVENOUS at 17:00

## 2025-02-23 RX ADMIN — OXYCODONE 5 MG: 5 TABLET ORAL at 23:13

## 2025-02-23 RX ADMIN — CALCIUM CHLORIDE 0.3 G: 100 INJECTION INTRAVENOUS; INTRAVENTRICULAR at 13:35

## 2025-02-23 RX ADMIN — SODIUM CHLORIDE, PRESERVATIVE FREE 10 ML: 5 INJECTION INTRAVENOUS at 04:04

## 2025-02-23 RX ADMIN — SODIUM CHLORIDE, POTASSIUM CHLORIDE, SODIUM LACTATE AND CALCIUM CHLORIDE: 600; 310; 30; 20 INJECTION, SOLUTION INTRAVENOUS at 11:29

## 2025-02-23 RX ADMIN — PROPOFOL 200 MG: 10 INJECTION, EMULSION INTRAVENOUS at 10:27

## 2025-02-23 RX ADMIN — HEPARIN SODIUM 4000 UNITS: 1000 INJECTION INTRAVENOUS; SUBCUTANEOUS at 11:34

## 2025-02-23 RX ADMIN — MORPHINE SULFATE 4 MG: 4 INJECTION INTRAVENOUS at 08:10

## 2025-02-23 RX ADMIN — SODIUM CHLORIDE, POTASSIUM CHLORIDE, SODIUM LACTATE AND CALCIUM CHLORIDE: 600; 310; 30; 20 INJECTION, SOLUTION INTRAVENOUS at 10:24

## 2025-02-23 RX ADMIN — VASOPRESSIN 1 UNITS: 20 INJECTION, SOLUTION INTRAVENOUS at 10:48

## 2025-02-23 RX ADMIN — HEPARIN SODIUM 5000 UNITS: 1000 INJECTION INTRAVENOUS; SUBCUTANEOUS at 13:32

## 2025-02-23 RX ADMIN — METHOCARBAMOL 750 MG: 750 TABLET ORAL at 06:21

## 2025-02-23 RX ADMIN — CALCIUM CHLORIDE 0.2 G: 100 INJECTION INTRAVENOUS; INTRAVENTRICULAR at 13:32

## 2025-02-23 RX ADMIN — Medication 2 G: at 11:10

## 2025-02-23 RX ADMIN — METHOCARBAMOL TABLETS 750 MG: 750 TABLET, COATED ORAL at 18:01

## 2025-02-23 RX ADMIN — SODIUM CHLORIDE, POTASSIUM CHLORIDE, SODIUM LACTATE AND CALCIUM CHLORIDE: 600; 310; 30; 20 INJECTION, SOLUTION INTRAVENOUS at 06:27

## 2025-02-23 RX ADMIN — IOPAMIDOL 125 ML: 755 INJECTION, SOLUTION INTRAVENOUS at 04:03

## 2025-02-23 RX ADMIN — CEFEPIME 2000 MG: 2 INJECTION, POWDER, FOR SOLUTION INTRAVENOUS at 06:23

## 2025-02-23 ASSESSMENT — PAIN SCALES - GENERAL
PAINLEVEL_OUTOF10: 10
PAINLEVEL_OUTOF10: 7
PAINLEVEL_OUTOF10: 7
PAINLEVEL_OUTOF10: 8
PAINLEVEL_OUTOF10: 6
PAINLEVEL_OUTOF10: 7
PAINLEVEL_OUTOF10: 10
PAINLEVEL_OUTOF10: 8

## 2025-02-23 ASSESSMENT — PAIN - FUNCTIONAL ASSESSMENT
PAIN_FUNCTIONAL_ASSESSMENT: 0-10
PAIN_FUNCTIONAL_ASSESSMENT: PREVENTS OR INTERFERES SOME ACTIVE ACTIVITIES AND ADLS
PAIN_FUNCTIONAL_ASSESSMENT: ACTIVITIES ARE NOT PREVENTED
PAIN_FUNCTIONAL_ASSESSMENT: PREVENTS OR INTERFERES SOME ACTIVE ACTIVITIES AND ADLS

## 2025-02-23 ASSESSMENT — PAIN DESCRIPTION - ONSET
ONSET: GRADUAL
ONSET: GRADUAL

## 2025-02-23 ASSESSMENT — PAIN DESCRIPTION - LOCATION
LOCATION: ABDOMEN;LEG
LOCATION: ABDOMEN;FOOT
LOCATION: ABDOMEN
LOCATION: ABDOMEN;LEG
LOCATION: ABDOMEN
LOCATION: GENERALIZED;ABDOMEN;LEG
LOCATION: ABDOMEN
LOCATION: ABDOMEN
LOCATION: ABDOMEN;LEG

## 2025-02-23 ASSESSMENT — PAIN DESCRIPTION - DESCRIPTORS
DESCRIPTORS: DISCOMFORT;SHARP;PRESSURE
DESCRIPTORS: ACHING
DESCRIPTORS: ACHING;BURNING;CRAMPING;THROBBING
DESCRIPTORS: ACHING;DISCOMFORT
DESCRIPTORS: ACHING;SQUEEZING
DESCRIPTORS: ACHING;THROBBING

## 2025-02-23 ASSESSMENT — PAIN DESCRIPTION - ORIENTATION
ORIENTATION: MID;LOWER
ORIENTATION: MID;LOWER
ORIENTATION: RIGHT
ORIENTATION: RIGHT
ORIENTATION: LEFT
ORIENTATION: MID

## 2025-02-23 ASSESSMENT — PAIN DESCRIPTION - PAIN TYPE
TYPE: ACUTE PAIN;SURGICAL PAIN
TYPE: ACUTE PAIN
TYPE: ACUTE PAIN;SURGICAL PAIN

## 2025-02-23 ASSESSMENT — PAIN DESCRIPTION - FREQUENCY
FREQUENCY: INTERMITTENT
FREQUENCY: CONTINUOUS

## 2025-02-23 NOTE — ANESTHESIA PRE PROCEDURE
APPENDECTOMY     • BACK SURGERY      with hardware   • COLONOSCOPY  08/27/2019    polypectomy and biopsy with dr. agustin   • COLONOSCOPY N/A 8/27/2019    COLONOSCOPY POLYPECTOMY SNARE/COLD BIOPSY performed by Lexi Agustin MD at [facility] OR   • LAPAROSCOPIC APPENDECTOMY  04/26/2017   • LAPAROSCOPIC APPENDECTOMY N/A 4/26/2017    APPENDECTOMY LAPAROSCOPIC performed by Delfin Dickinson MD at [facility] OR   • TESTICLE TORSION REDUCTION Bilateral    • UMBILICAL HERNIA REPAIR N/A 1/10/2024    HERNIA UMBILICAL REPAIR LAPAROSCOPIC ROBOTIC XI performed by Martinez Santacruz IV, DO at [facility] OR       Social History:    Social History     Tobacco Use   • Smoking status: Former     Current packs/day: 0.00     Average packs/day: 0.5 packs/day for 28.0 years (14.0 ttl pk-yrs)     Types: Cigarettes     Start date: 1987     Quit date: 2015     Years since quitting: 10.1   • Smokeless tobacco: Never   Substance Use Topics   • Alcohol use: Yes     Comment: social                                Counseling given: Not Answered      Vital Signs (Current):   Vitals:    02/23/25 0804 02/23/25 0827 02/23/25 0840 02/23/25 0910   BP:   130/82 (!) 145/88   Pulse:  (!) 128 (!) 128 (!) 136   Resp: 22 22 26 20   Temp:       TempSrc:       SpO2:  93% 92% 92%                                              BP Readings from Last 3 Encounters:   02/23/25 (!) 145/88   02/23/25 103/73   02/21/25 (!) 147/106       NPO Status:                                                                                 BMI:   Wt Readings from Last 3 Encounters:   02/23/25 130.2 kg (287 lb)   02/20/25 118.1 kg (260 lb 6.4 oz)   11/14/24 122.7 kg (270 lb 6.4 oz)     There is no height or weight on file to calculate BMI.    CBC:   Lab Results   Component Value Date/Time    WBC 15.2 02/23/2025 08:16 AM    RBC 5.24 02/23/2025 08:16 AM    RBC 4.71 11/01/2011 12:27 AM    HGB 14.6 02/23/2025 08:16 AM    HCT 43.6 02/23/2025 08:16 AM    MCV 83.2 02/23/2025 08:16 AM    RDW 15.2

## 2025-02-23 NOTE — DISCHARGE SUMMARY
Physician Discharge Summary     Patient ID:  Melvin Sierra  [id number]  49 y.o.  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  The patient was noted to have evidence of perforated diverticulitis.  The patient was recently discharged from the hospital with an episode of diverticulitis.  The patient was taken emergently to the operating room for an exploratory laparotomy with Guido's procedure.  The patient underwent an exploratory laparotomy with extensive lysis of adhesions, explantation of prior abdominal mesh, sigmoid colectomy, drainage of an abdominal abscess, irrigation of fecal peritonitis, creation of an end colostomy, and wound VAC application.  The patient had perioperative hypertension.  This was eventually controlled with his pain regimen as well as IV medications.  The patient acutely developed pain in his left foot.  A subsequent escalation by nursing prompted physician evaluation.  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  Vascular surgery was consulted.  Urgent transfer to [facility] was arranged.  The patient was ordered to be started on a heparin drip with no bolus prior to discharge from the facility.  Vascular surgery was made aware of the CT scan findings.    Consults: vascular surgery    Significant Diagnostic Studies: CT angiogram

## 2025-02-23 NOTE — ED NOTES
Melvin Barriga  [birth year]    Spoke to Dr. John surgeon over at [facility].  Hartmanns procedure done yesterday.  About 2 AM this morning had a cold foot.  Spoke to Dr. Samson to get a CTA, has a new thrombus in his aorta and popiliteal and occlusion.  Transfer to do angiogram and intervention.   Given that we have no inpatient beds, accepted to ED as patient needs acute intervention for limb threatening etiology

## 2025-02-23 NOTE — PLAN OF CARE
Pt A&O x4  Complaints of abdominal pain, see MAR. Patient having pain on their abdomen and rates it a 8. Pain interventions includefrequent position changes, correct body alignment/body mechanics, relaxation techniques, and medication. Patients goal for pain relief is 1. The need for pain and symptom management will be considered in the discharge planning process to ensure patients comfort.    Wound van in place  Marie in place with 200mL output  ELENA in place with 60mL output  Waiting CTA results  HR remains elevated, attending aware.   Safety maintained   Problem: Discharge Planning  Goal: Discharge to home or other facility with appropriate resources  2/23/2025 0512 by Princess Beal RN  Outcome: Progressing     Problem: Pain  Goal: Verbalizes/displays adequate comfort level or baseline comfort level  2/23/2025 0512 by Princess Beal RN  Outcome: Progressing     Problem: Safety - Adult  Goal: Free from fall injury  2/23/2025 0512 by Princess Beal RN  Outcome: Progressing     Problem: ABCDS Injury Assessment  Goal: Absence of physical injury  2/23/2025 0512 by Princess Beal RN  Outcome: Progressing     Problem: Gastrointestinal - Adult  Goal: Minimal or absence of nausea and vomiting  2/23/2025 0512 by Princess Beal RN  Outcome: Progressing     Problem: Gastrointestinal - Adult  Goal: Maintains adequate nutritional intake  2/23/2025 0512 by Princess Beal RN  Outcome: Progressing     Problem: Gastrointestinal - Adult  Goal: Establish and maintain optimal ostomy function  2/23/2025 0512 by Princess Beal RN  Outcome: Progressing

## 2025-02-23 NOTE — ED PROVIDER NOTES
[facility]     Emergency Department     Faculty Note/ Attestation      Pt Name: Melvin Sierra                                       MRN: [id number]  Birthdate 1975  Date of evaluation: 2/23/2025    Patients PCP:    Ed Palafox, ESHA - CHNITAN    Note Started: 7:58 AM EST      Attestation  I performed a history and physical examination of the patient and discussed management with the resident. I reviewed the resident’s note and agree with the documented findings and plan of care. Any areas of disagreement are noted on the chart. I was personally present for the key portions of any procedures. I have documented in the chart those procedures where I was not present during the key portions. I have reviewed the emergency nurses triage note. I agree with the chief complaint, past medical history, past surgical history, allergies, medications, social and family history as documented unless otherwise noted below.    For Physician Assistant/ Nurse Practitioner cases/documentation I have personally evaluated this patient and have completed at least one if not all key elements of the E/M (history, physical exam, and MDM). Additional findings are as noted.      Initial Screens:             Vitals:  There were no vitals filed for this visit.    CHIEF COMPLAINT       Chief Complaint   Patient presents with    Foot Pain    Abdominal Pain             DIAGNOSTIC RESULTS             RADIOLOGY:   No orders to display         LABS:  Labs Reviewed - No data to display      EMERGENCY DEPARTMENT COURSE:     -------------------------   ,  ,  ,        Comments  Jason Barriga  [birth year]    Spoke to Dr. John surgeon over at [facility].  Hartmanns procedure done yesterday.  About 2 AM this morning had a cold foot.  Spoke to Dr. Samson to get a CTA, has a new thrombus in his aorta and popiliteal and occlusion.  Transfer to do angiogram and intervention.   Given that we have no inpatient beds,

## 2025-02-23 NOTE — PROGRESS NOTES
Patient notified writer his left foot felt cold. On assessment, left foot was cold and pale compared to right foot. Unable to find a pulse with doppler. Attending notified.     Vascular consulted, CTA ordered, awaiting results

## 2025-02-23 NOTE — PROGRESS NOTES
Surgical Progress Note    POD: 1    Chief complaint: Patient with better hypertension control this morning.  His pain seems to also be better controlled with the Dilaudid.  During the night, the patient had foot pain.  He does have a history of left lower extremity DVT.  He was noted by nursing to have decreased pulses in his left foot.  A subsequent vascular consult was placed and CT angiogram was performed.  The CT angiogram demonstrates thrombus within the aorta just above the bifurcation with evidence of an abrupt cut off in the popliteal vessels likely accounting for the clinical changes.  The patient's ELENA drain is serosanguineous.  He does still have some discomfort.  He is still mildly tachycardic.  He does still have sensation in his left foot and motor function.  He has no palpable pulses in his left foot.  He does currently have a palpable femoral pulse.  The patient's ostomy created yesterday was under significant tension secondary to his extensive intra-abdominal adhesions from his multiple previous surgeries.  We were fairly limited on what we are able to bring up from an end colostomy as a result of his extensive intra-abdominal adhesions.  We were able to bring this up to skin level although it does appear to be  dusky this morning.  There is no ostomy output.  His hemoglobin appears stable.    Patient doing fair    Vitals:    02/23/25 0620   BP: 103/73   Pulse: (!) 105   Resp:    Temp:    SpO2:         Review of Systems - History obtained from chart review and the patient    Exam: General Appearance: alert and oriented to person, place and time and well-developed and well nourished  Skin: Duskiness of the left foot noted compared to the right with a disparity in temperature, no palpable pedal pulses on the left  Head: normocephalic and atraumatic  Eyes: pupils equal, round, and reactive to light, extraocular eye movements intact, conjunctivae normal  ENT: hearing grossly normal bilaterally  Neck:  neck supple and non tender without mass, no thyromegaly or thyroid nodules, no cervical lymphadenopathy   Pulmonary/Chest: Equal breath sounds, mildly diminished at bases  Cardiovascular: normal rate, normal S1 and S2, and no gallops  Abdomen: Soft, appropriately tender, surgical incision with wound VAC in place, ELENA with serosanguineous drainage, ostomy with congested appearance and some darkening  Genitourinary: genitals normal without hernia or inguinal adenopathy, indwelling Marie catheter in place  Extremities: Left foot mild cyanosis, temperature disparity between the left foot and the right, no palpable pedal pulses on the left  Musculoskeletal: no swollen joints and no joint deformity  Neurologic: no cranial nerve deficit and speech normal    I/O last 3 completed shifts:  In: 1700 [I.V.:1600; IV Piggyback:100]  Out: 810 [Urine:710; Drains:100]    Hemoglobin   Date Value Ref Range Status   02/23/2025 14.5 13.0 - 17.0 g/dL Final     Hematocrit   Date Value Ref Range Status   02/23/2025 43.4 40.7 - 50.3 % Final     WBC   Date Value Ref Range Status   02/23/2025 13.3 (H) 3.5 - 11.3 k/uL Final     Sodium   Date Value Ref Range Status   02/23/2025 139 136 - 145 mmol/L Final     Potassium   Date Value Ref Range Status   02/23/2025 5.0 3.7 - 5.3 mmol/L Final     Chloride   Date Value Ref Range Status   02/23/2025 107 98 - 107 mmol/L Final     CO2   Date Value Ref Range Status   02/23/2025 23 20 - 31 mmol/L Final     Glucose   Date Value Ref Range Status   02/23/2025 116 (H) 74 - 99 mg/dL Final   11/01/2011 128 (H) 74 - 106 mg/dL Final       Assessment/Plan: Status post Guido's procedure with exploratory laparotomy, extensive lysis of adhesions, sigmoid colectomy, drainage of pelvic abscess, wound VAC application, explantation of prior abdominal mesh, and end colostomy creation.  The patient has had an acute change overnight with a CT angiogram demonstrating evidence of aortic thrombus likely with an embolus to his

## 2025-02-23 NOTE — ED NOTES
Patient to ed from [facility]. ICU transfer from inpatient unit. Patient originally went into the hospital earlier this week for abdominal pain. Found to have \"micro perf\" and treated with antibiotics. Patient was D/C on Friday told to follow up outpatient. Patient made it at home 12 hours at home then came back for severe abdominal pain, shaking. Patient went to hospital at [facility], went into emergency surgery for bowel perforation. Patient had resection, now has LLQ colostomy that has red blood inside, pt reports no stool output. Appears retracted. Patient has wound vac midline umbilicus placed.  At approx 0300 today at hospital patient requested socks due to cold feet, mostly left foot. Patient nurse noted no pulses, physicians were called, no doppler or pulse to left DP or PT. Popliteal noted to left side. Patient was tx here due to thrombus. Patient was placed this morning at [facility] on heparin drip. This drip was continued here at [place]. Patient arrived AOX4, non labored RR, denies chest pain or sob. Patient is tachycardic, reports sensation in feet still, able to wiggle toes.

## 2025-02-23 NOTE — ED NOTES
Patient to CVOR with writer on monitor.   Along with patient best friend and brother. Patient is aox4, non labored RR.  Report given bedside to CVOR RN.

## 2025-02-23 NOTE — ED NOTES
Pt updated on plan of care, no distress noted, non labored RR. Denies needing pain meds at this time. AOx4. Awaiting CVOR.

## 2025-02-23 NOTE — PROGRESS NOTES
Patient seen and examined at bedside. Patient had reported to nursing that his left foot was feeling cold. Upon their evaluation, left foot was cooler and more pale then the right. They were unable to obtain doppler signals to the left foot. Upon my evaluation, left foot is pale in comparison, and cooler to touch with some dusky appearance to the left toes. Patient denies paresthesias or pain. Motor and sensation intact. Doppler signals present right DP/PT and bilateral femoral and popliteal. Very faint, intermittent PT on the left. No DP on the left. Patient without any history of claudication. States he did have a left femoral DVT in 2018 after an illness for which he was anticoagulated for six months. No other vascular history. Attending updated and vascular surgery consult placed. Spoke to vascular attending who recommended CTA with bilateral runoff and heparin drip when able. Will order CTA. Will hold off on heparin drip at the moment given POD #1 s/p Alesha.           Josselin Stovall, DO PGY-5

## 2025-02-24 ENCOUNTER — PREP FOR PROCEDURE (OUTPATIENT)
Dept: VASCULAR SURGERY | Age: 50
End: 2025-02-24

## 2025-02-24 PROBLEM — S81.802A LEG WOUND, LEFT: Status: ACTIVE | Noted: 2025-02-24

## 2025-02-24 LAB
ABO/RH: NORMAL
ANION GAP SERPL CALCULATED.3IONS-SCNC: 8 MMOL/L (ref 9–16)
ANTI-XA UNFRAC HEPARIN: 0.11 IU/L
ANTI-XA UNFRAC HEPARIN: 0.14 IU/L
ANTI-XA UNFRAC HEPARIN: 0.18 IU/L
ANTI-XA UNFRAC HEPARIN: 0.19 IU/L
ANTIBODY SCREEN: NEGATIVE
ARM BAND NUMBER: NORMAL
BASOPHILS # BLD: 0 K/UL (ref 0–0.2)
BASOPHILS NFR BLD: 0 % (ref 0–2)
BLOOD BANK DISPENSE STATUS: NORMAL
BLOOD BANK DISPENSE STATUS: NORMAL
BLOOD BANK SAMPLE EXPIRATION: NORMAL
BPU ID: NORMAL
BPU ID: NORMAL
BUN SERPL-MCNC: 18 MG/DL (ref 6–20)
CALCIUM SERPL-MCNC: 8.2 MG/DL (ref 8.6–10.4)
CHLORIDE SERPL-SCNC: 107 MMOL/L (ref 98–107)
CO2 SERPL-SCNC: 24 MMOL/L (ref 20–31)
COMPONENT: NORMAL
COMPONENT: NORMAL
CREAT SERPL-MCNC: 1 MG/DL (ref 0.7–1.2)
CROSSMATCH RESULT: NORMAL
CROSSMATCH RESULT: NORMAL
EKG ATRIAL RATE: 133 BPM
EKG P AXIS: 53 DEGREES
EKG P-R INTERVAL: 114 MS
EKG Q-T INTERVAL: 296 MS
EKG QRS DURATION: 74 MS
EKG QTC CALCULATION (BAZETT): 440 MS
EKG R AXIS: -19 DEGREES
EKG T AXIS: 45 DEGREES
EKG VENTRICULAR RATE: 133 BPM
EOSINOPHIL # BLD: 0 K/UL (ref 0–0.4)
EOSINOPHILS RELATIVE PERCENT: 0 % (ref 1–4)
ERYTHROCYTE [DISTWIDTH] IN BLOOD BY AUTOMATED COUNT: 15.6 % (ref 11.8–14.4)
GFR, ESTIMATED: >90 ML/MIN/1.73M2
GLUCOSE SERPL-MCNC: 101 MG/DL (ref 74–99)
HCT VFR BLD AUTO: 24.6 % (ref 40.7–50.3)
HCT VFR BLD AUTO: 25.6 % (ref 40.7–50.3)
HGB BLD-MCNC: 8.2 G/DL (ref 13–17)
HGB BLD-MCNC: 8.4 G/DL (ref 13–17)
IMM GRANULOCYTES # BLD AUTO: 0 K/UL (ref 0–0.3)
IMM GRANULOCYTES NFR BLD: 0 %
LYMPHOCYTES NFR BLD: 0.52 K/UL (ref 1–4.8)
LYMPHOCYTES RELATIVE PERCENT: 6 % (ref 24–44)
MAGNESIUM SERPL-MCNC: 1.7 MG/DL (ref 1.6–2.6)
MCH RBC QN AUTO: 28.3 PG (ref 25.2–33.5)
MCHC RBC AUTO-ENTMCNC: 33.3 G/DL (ref 28.4–34.8)
MCV RBC AUTO: 84.8 FL (ref 82.6–102.9)
MONOCYTES NFR BLD: 0.26 K/UL (ref 0.1–0.8)
MONOCYTES NFR BLD: 3 % (ref 1–7)
MORPHOLOGY: ABNORMAL
NEUTROPHILS NFR BLD: 91 % (ref 36–66)
NEUTS SEG NFR BLD: 7.82 K/UL (ref 1.8–7.7)
NRBC BLD-RTO: 0 PER 100 WBC
PHOSPHATE SERPL-MCNC: 2.3 MG/DL (ref 2.5–4.5)
PLATELET # BLD AUTO: 184 K/UL (ref 138–453)
PLATELET, FLUORESCENCE: 184 K/UL (ref 138–453)
PMV BLD AUTO: 9.5 FL (ref 8.1–13.5)
POTASSIUM SERPL-SCNC: 4 MMOL/L (ref 3.7–5.3)
RBC # BLD AUTO: 2.9 M/UL (ref 4.21–5.77)
SODIUM SERPL-SCNC: 139 MMOL/L (ref 136–145)
TRANSFUSION STATUS: NORMAL
TRANSFUSION STATUS: NORMAL
UNIT DIVISION: 0
UNIT DIVISION: 0
WBC OTHER # BLD: 8.6 K/UL (ref 3.5–11.3)

## 2025-02-24 PROCEDURE — 6360000002 HC RX W HCPCS

## 2025-02-24 PROCEDURE — 6370000000 HC RX 637 (ALT 250 FOR IP)

## 2025-02-24 PROCEDURE — 85014 HEMATOCRIT: CPT

## 2025-02-24 PROCEDURE — 93005 ELECTROCARDIOGRAM TRACING: CPT

## 2025-02-24 PROCEDURE — 83735 ASSAY OF MAGNESIUM: CPT

## 2025-02-24 PROCEDURE — 2580000003 HC RX 258

## 2025-02-24 PROCEDURE — 84100 ASSAY OF PHOSPHORUS: CPT

## 2025-02-24 PROCEDURE — 85025 COMPLETE CBC W/AUTO DIFF WBC: CPT

## 2025-02-24 PROCEDURE — 2500000003 HC RX 250 WO HCPCS

## 2025-02-24 PROCEDURE — 85520 HEPARIN ASSAY: CPT

## 2025-02-24 PROCEDURE — 93010 ELECTROCARDIOGRAM REPORT: CPT | Performed by: INTERNAL MEDICINE

## 2025-02-24 PROCEDURE — 99231 SBSQ HOSP IP/OBS SF/LOW 25: CPT | Performed by: SURGERY

## 2025-02-24 PROCEDURE — 85018 HEMOGLOBIN: CPT

## 2025-02-24 PROCEDURE — 6360000002 HC RX W HCPCS: Performed by: STUDENT IN AN ORGANIZED HEALTH CARE EDUCATION/TRAINING PROGRAM

## 2025-02-24 PROCEDURE — 2000000000 HC ICU R&B

## 2025-02-24 PROCEDURE — P9041 ALBUMIN (HUMAN),5%, 50ML: HCPCS

## 2025-02-24 PROCEDURE — 30233J1 TRANSFUSION OF NONAUTOLOGOUS SERUM ALBUMIN INTO PERIPHERAL VEIN, PERCUTANEOUS APPROACH: ICD-10-PCS

## 2025-02-24 PROCEDURE — 2500000003 HC RX 250 WO HCPCS: Performed by: STUDENT IN AN ORGANIZED HEALTH CARE EDUCATION/TRAINING PROGRAM

## 2025-02-24 PROCEDURE — 80048 BASIC METABOLIC PNL TOTAL CA: CPT

## 2025-02-24 RX ORDER — AMLODIPINE BESYLATE 5 MG/1
5 TABLET ORAL DAILY
Status: DISCONTINUED | OUTPATIENT
Start: 2025-02-24 | End: 2025-03-12 | Stop reason: HOSPADM

## 2025-02-24 RX ORDER — GABAPENTIN 300 MG/1
600 CAPSULE ORAL EVERY 8 HOURS SCHEDULED
Status: DISCONTINUED | OUTPATIENT
Start: 2025-02-24 | End: 2025-03-04

## 2025-02-24 RX ORDER — KETOROLAC TROMETHAMINE 15 MG/ML
15 INJECTION, SOLUTION INTRAMUSCULAR; INTRAVENOUS EVERY 6 HOURS
Status: DISPENSED | OUTPATIENT
Start: 2025-02-24 | End: 2025-02-27

## 2025-02-24 RX ORDER — ALBUMIN HUMAN 50 G/1000ML
25 SOLUTION INTRAVENOUS ONCE
Status: COMPLETED | OUTPATIENT
Start: 2025-02-24 | End: 2025-02-24

## 2025-02-24 RX ORDER — FENTANYL CITRATE 50 UG/ML
50 INJECTION, SOLUTION INTRAMUSCULAR; INTRAVENOUS ONCE
Status: COMPLETED | OUTPATIENT
Start: 2025-02-24 | End: 2025-02-24

## 2025-02-24 RX ORDER — METOPROLOL TARTRATE 1 MG/ML
5 INJECTION, SOLUTION INTRAVENOUS ONCE
Status: COMPLETED | OUTPATIENT
Start: 2025-02-24 | End: 2025-02-24

## 2025-02-24 RX ORDER — MAGNESIUM SULFATE IN WATER 40 MG/ML
2000 INJECTION, SOLUTION INTRAVENOUS ONCE
Status: COMPLETED | OUTPATIENT
Start: 2025-02-24 | End: 2025-02-24

## 2025-02-24 RX ORDER — SODIUM CHLORIDE, SODIUM LACTATE, POTASSIUM CHLORIDE, AND CALCIUM CHLORIDE .6; .31; .03; .02 G/100ML; G/100ML; G/100ML; G/100ML
500 INJECTION, SOLUTION INTRAVENOUS ONCE
Status: DISCONTINUED | OUTPATIENT
Start: 2025-02-24 | End: 2025-02-24

## 2025-02-24 RX ADMIN — SODIUM CHLORIDE, POTASSIUM CHLORIDE, SODIUM LACTATE AND CALCIUM CHLORIDE: 600; 310; 30; 20 INJECTION, SOLUTION INTRAVENOUS at 09:12

## 2025-02-24 RX ADMIN — SODIUM CHLORIDE, PRESERVATIVE FREE 10 ML: 5 INJECTION INTRAVENOUS at 20:37

## 2025-02-24 RX ADMIN — KETOROLAC TROMETHAMINE 15 MG: 15 INJECTION, SOLUTION INTRAMUSCULAR; INTRAVENOUS at 14:15

## 2025-02-24 RX ADMIN — HYDROMORPHONE HYDROCHLORIDE 1 MG: 1 INJECTION, SOLUTION INTRAMUSCULAR; INTRAVENOUS; SUBCUTANEOUS at 07:52

## 2025-02-24 RX ADMIN — HYDROMORPHONE HYDROCHLORIDE 1 MG: 1 INJECTION, SOLUTION INTRAMUSCULAR; INTRAVENOUS; SUBCUTANEOUS at 17:16

## 2025-02-24 RX ADMIN — HYDROMORPHONE HYDROCHLORIDE 1 MG: 1 INJECTION, SOLUTION INTRAMUSCULAR; INTRAVENOUS; SUBCUTANEOUS at 10:43

## 2025-02-24 RX ADMIN — ALBUMIN (HUMAN) 25 G: 12.5 INJECTION, SOLUTION INTRAVENOUS at 00:36

## 2025-02-24 RX ADMIN — GABAPENTIN 600 MG: 300 CAPSULE ORAL at 20:35

## 2025-02-24 RX ADMIN — HYDROMORPHONE HYDROCHLORIDE 1 MG: 1 INJECTION, SOLUTION INTRAMUSCULAR; INTRAVENOUS; SUBCUTANEOUS at 05:01

## 2025-02-24 RX ADMIN — HEPARIN SODIUM 5000 UNITS: 1000 INJECTION INTRAVENOUS; SUBCUTANEOUS at 21:48

## 2025-02-24 RX ADMIN — ACETAMINOPHEN 1000 MG: 500 TABLET ORAL at 20:35

## 2025-02-24 RX ADMIN — HYDROMORPHONE HYDROCHLORIDE 1 MG: 1 INJECTION, SOLUTION INTRAMUSCULAR; INTRAVENOUS; SUBCUTANEOUS at 19:40

## 2025-02-24 RX ADMIN — SODIUM CHLORIDE, POTASSIUM CHLORIDE, SODIUM LACTATE AND CALCIUM CHLORIDE: 600; 310; 30; 20 INJECTION, SOLUTION INTRAVENOUS at 15:17

## 2025-02-24 RX ADMIN — HEPARIN SODIUM 5000 UNITS: 1000 INJECTION INTRAVENOUS; SUBCUTANEOUS at 17:17

## 2025-02-24 RX ADMIN — METOPROLOL TARTRATE 5 MG: 5 INJECTION INTRAVENOUS at 20:36

## 2025-02-24 RX ADMIN — HEPARIN SODIUM 5000 UNITS: 1000 INJECTION INTRAVENOUS; SUBCUTANEOUS at 10:48

## 2025-02-24 RX ADMIN — FENTANYL CITRATE 50 MCG: 50 INJECTION INTRAMUSCULAR; INTRAVENOUS at 18:16

## 2025-02-24 RX ADMIN — SODIUM CHLORIDE, SODIUM LACTATE, POTASSIUM CHLORIDE, AND CALCIUM CHLORIDE 500 ML: .6; .31; .03; .02 INJECTION, SOLUTION INTRAVENOUS at 02:18

## 2025-02-24 RX ADMIN — METHOCARBAMOL TABLETS 750 MG: 750 TABLET, COATED ORAL at 17:16

## 2025-02-24 RX ADMIN — KETOROLAC TROMETHAMINE 15 MG: 15 INJECTION, SOLUTION INTRAMUSCULAR; INTRAVENOUS at 09:28

## 2025-02-24 RX ADMIN — SODIUM CHLORIDE, PRESERVATIVE FREE 10 ML: 5 INJECTION INTRAVENOUS at 07:52

## 2025-02-24 RX ADMIN — CIPROFLOXACIN 400 MG: 2 INJECTION, SOLUTION INTRAVENOUS at 22:54

## 2025-02-24 RX ADMIN — SODIUM CHLORIDE, POTASSIUM CHLORIDE, SODIUM LACTATE AND CALCIUM CHLORIDE: 600; 310; 30; 20 INJECTION, SOLUTION INTRAVENOUS at 22:00

## 2025-02-24 RX ADMIN — OXYCODONE 5 MG: 5 TABLET ORAL at 17:47

## 2025-02-24 RX ADMIN — AMLODIPINE BESYLATE 5 MG: 5 TABLET ORAL at 20:41

## 2025-02-24 RX ADMIN — POTASSIUM PHOSPHATE, MONOBASIC POTASSIUM PHOSPHATE, DIBASIC 15 MMOL: 224; 236 INJECTION, SOLUTION, CONCENTRATE INTRAVENOUS at 10:45

## 2025-02-24 RX ADMIN — KETOROLAC TROMETHAMINE 15 MG: 15 INJECTION, SOLUTION INTRAMUSCULAR; INTRAVENOUS at 20:37

## 2025-02-24 RX ADMIN — HEPARIN SODIUM 22 UNITS/KG/HR: 10000 INJECTION, SOLUTION INTRAVENOUS at 17:19

## 2025-02-24 RX ADMIN — METRONIDAZOLE 500 MG: 500 INJECTION, SOLUTION INTRAVENOUS at 07:57

## 2025-02-24 RX ADMIN — METRONIDAZOLE 500 MG: 500 INJECTION, SOLUTION INTRAVENOUS at 15:08

## 2025-02-24 RX ADMIN — GABAPENTIN 600 MG: 300 CAPSULE ORAL at 14:16

## 2025-02-24 RX ADMIN — HYDROMORPHONE HYDROCHLORIDE 1 MG: 1 INJECTION, SOLUTION INTRAMUSCULAR; INTRAVENOUS; SUBCUTANEOUS at 12:46

## 2025-02-24 RX ADMIN — GABAPENTIN 300 MG: 300 CAPSULE ORAL at 05:39

## 2025-02-24 RX ADMIN — HEPARIN SODIUM 18 UNITS/KG/HR: 10000 INJECTION, SOLUTION INTRAVENOUS at 06:55

## 2025-02-24 RX ADMIN — METHOCARBAMOL TABLETS 750 MG: 750 TABLET, COATED ORAL at 11:08

## 2025-02-24 RX ADMIN — METHOCARBAMOL TABLETS 750 MG: 750 TABLET, COATED ORAL at 05:39

## 2025-02-24 RX ADMIN — DOCUSATE SODIUM 100 MG: 100 CAPSULE, LIQUID FILLED ORAL at 07:52

## 2025-02-24 RX ADMIN — ACETAMINOPHEN 1000 MG: 500 TABLET ORAL at 12:46

## 2025-02-24 RX ADMIN — OXYCODONE 5 MG: 5 TABLET ORAL at 11:08

## 2025-02-24 RX ADMIN — MAGNESIUM SULFATE HEPTAHYDRATE 2000 MG: 40 INJECTION, SOLUTION INTRAVENOUS at 09:36

## 2025-02-24 RX ADMIN — ACETAMINOPHEN 1000 MG: 500 TABLET ORAL at 05:39

## 2025-02-24 RX ADMIN — SODIUM CHLORIDE, POTASSIUM CHLORIDE, SODIUM LACTATE AND CALCIUM CHLORIDE: 600; 310; 30; 20 INJECTION, SOLUTION INTRAVENOUS at 04:21

## 2025-02-24 RX ADMIN — CIPROFLOXACIN 400 MG: 2 INJECTION, SOLUTION INTRAVENOUS at 09:24

## 2025-02-24 RX ADMIN — HEPARIN SODIUM 5000 UNITS: 1000 INJECTION INTRAVENOUS; SUBCUTANEOUS at 04:10

## 2025-02-24 ASSESSMENT — PAIN SCALES - GENERAL
PAINLEVEL_OUTOF10: 9
PAINLEVEL_OUTOF10: 9
PAINLEVEL_OUTOF10: 10
PAINLEVEL_OUTOF10: 8
PAINLEVEL_OUTOF10: 8
PAINLEVEL_OUTOF10: 10
PAINLEVEL_OUTOF10: 9
PAINLEVEL_OUTOF10: 8
PAINLEVEL_OUTOF10: 9
PAINLEVEL_OUTOF10: 10
PAINLEVEL_OUTOF10: 9

## 2025-02-24 ASSESSMENT — PAIN DESCRIPTION - PAIN TYPE
TYPE: ACUTE PAIN;SURGICAL PAIN
TYPE: ACUTE PAIN;SURGICAL PAIN

## 2025-02-24 ASSESSMENT — PAIN DESCRIPTION - DESCRIPTORS
DESCRIPTORS: ACHING;DULL;DISCOMFORT
DESCRIPTORS: ACHING;DISCOMFORT

## 2025-02-24 ASSESSMENT — PAIN - FUNCTIONAL ASSESSMENT
PAIN_FUNCTIONAL_ASSESSMENT: PREVENTS OR INTERFERES SOME ACTIVE ACTIVITIES AND ADLS
PAIN_FUNCTIONAL_ASSESSMENT: PREVENTS OR INTERFERES SOME ACTIVE ACTIVITIES AND ADLS

## 2025-02-24 ASSESSMENT — PAIN DESCRIPTION - FREQUENCY: FREQUENCY: INTERMITTENT

## 2025-02-24 ASSESSMENT — PAIN DESCRIPTION - LOCATION
LOCATION: ABDOMEN;LEG

## 2025-02-24 ASSESSMENT — PAIN DESCRIPTION - ONSET
ONSET: GRADUAL
ONSET: GRADUAL

## 2025-02-24 ASSESSMENT — PAIN DESCRIPTION - ORIENTATION
ORIENTATION: LOWER;LEFT
ORIENTATION: MID;LOWER

## 2025-02-24 NOTE — CARE COORDINATION
Case Management Assessment  Initial Evaluation    Date/Time of Evaluation: 2/24/2025 1:05 PM  Assessment Completed by: MILAGROS WOOD RN    If patient is discharged prior to next notation, then this note serves as note for discharge by case management.    Patient Name: Melvin Sierra                   YOB: 1975  Diagnosis: Diverticulitis [K57.92]  Ischemic leg [I99.8]  Acute deep vein thrombosis (DVT) of femoral vein of left lower extremity (HCC) [I82.412]  Status post Vianney procedure (HCC) [Z93.3]  Acute lower limb ischemia [I99.8]                   Date / Time: 2/23/2025  7:56 AM    Patient Admission Status: Inpatient   Readmission Risk (Low < 19, Mod (19-27), High > 27): Readmission Risk Score: 18.2    Current PCP: Ed Palafox APRN - CNP  PCP verified by CM? (P) Yes    Chart Reviewed: Yes      History Provided by: (P) Patient  Patient Orientation: (P) Alert and Oriented    Patient Cognition: (P) Alert    Hospitalization in the last 30 days (Readmission):  Yes    If yes, Readmission Assessment in CM Navigator will be completed.    Advance Directives:      Code Status: Full Code   Patient's Primary Decision Maker is: (P) Legal Next of Kin      Discharge Planning:    Patient lives with: (P) Children Type of Home: (P) House  Primary Care Giver: (P) Self  Patient Support Systems include: (P) Children   Current Financial resources:    Current community resources:    Current services prior to admission: (P) None            Current DME:              Type of Home Care services:  (P) None    ADLS  Prior functional level: (P) Independent in ADLs/IADLs  Current functional level: (P) Other (see comment) (Await PT/OT evals)    PT AM-PAC:   /24  OT AM-PAC:   /24    Family can provide assistance at DC: (P) Yes  Would you like Case Management to discuss the discharge plan with any other family members/significant others, and if so, who? (P) No  Plans to Return to Present Housing: (P) Unknown at

## 2025-02-24 NOTE — CARE COORDINATION
02/24/25 1303   Readmission Assessment   Number of Days since last admission? 1-7 days   Previous Disposition Home with Family   Who is being Interviewed Patient   What was the patient's/caregiver's perception as to why they think they needed to return back to the hospital? Other (Comment)  (Abd pain)   Did you visit your Primary Care Physician after you left the hospital, before you returned this time? No   Why weren't you able to visit your PCP? Did not have an appointment   Did you see a specialist, such as Cardiac, Pulmonary, Orthopedic Physician, etc. after you left the hospital? No   Who advised the patient to return to the hospital? Self-referral   Does the patient report anything that got in the way of taking their medications? No   In our efforts to provide the best possible care to you and others like you, can you think of anything that we could have done to help you after you left the hospital the first time, so that you might not have needed to return so soon? Other (Comment)  (None)

## 2025-02-24 NOTE — ANESTHESIA POSTPROCEDURE EVALUATION
Department of Anesthesiology  Postprocedure Note    Patient: Melvin Sierra  MRN: [id number]  YOB: 1975  Date of evaluation: 2/24/2025    Procedure Summary       Date: 02/23/25 Room / Location: [place] [address] / [facility]    Anesthesia Start: 1017 Anesthesia Stop: 1730    Procedure: PERCUTANEOUS THROMBECTOMY OF AORTA USING PENUMBRA, OPEN MECHANICAL THROMBECTOMY OF POPLITEAL ARTERY WITH JAKE EMBOLECTOMY CATHETER, POPLITEAL ARTERY PATCH WITH XENOSURE PATCH, LEFT LOWER EXTREMITY FASCIOTOMY (Left: Leg Lower) Diagnosis: Ischemic leg    Surgeons: Kyle Samson MD Responsible Provider: Ahsan Arevalo MD    Anesthesia Type: General ASA Status: 3            Anesthesia Type: General    Scotty Phase I:      Scotty Phase II:      Anesthesia Post Evaluation    Patient location during evaluation: PACU  Patient participation: complete - patient participated  Level of consciousness: awake and alert  Airway patency: patent  Nausea & Vomiting: no nausea and no vomiting  Cardiovascular status: blood pressure returned to baseline  Respiratory status: acceptable  Hydration status: euvolemic  Pain management: adequate    No notable events documented.

## 2025-02-25 LAB
ANION GAP SERPL CALCULATED.3IONS-SCNC: 7 MMOL/L (ref 9–16)
ANTI-XA UNFRAC HEPARIN: 0.22 IU/L
ANTI-XA UNFRAC HEPARIN: 0.23 IU/L
ANTI-XA UNFRAC HEPARIN: 0.23 IU/L
BASOPHILS # BLD: 0 K/UL (ref 0–0.2)
BASOPHILS NFR BLD: 0 % (ref 0–2)
BUN SERPL-MCNC: 14 MG/DL (ref 6–20)
CA-I BLD-SCNC: 1.07 MMOL/L (ref 1.13–1.33)
CALCIUM SERPL-MCNC: 7.9 MG/DL (ref 8.6–10.4)
CHLORIDE SERPL-SCNC: 108 MMOL/L (ref 98–107)
CO2 SERPL-SCNC: 27 MMOL/L (ref 20–31)
CREAT SERPL-MCNC: 0.7 MG/DL (ref 0.7–1.2)
EKG ATRIAL RATE: 108 BPM
EKG P AXIS: 50 DEGREES
EKG P-R INTERVAL: 120 MS
EKG Q-T INTERVAL: 324 MS
EKG QRS DURATION: 86 MS
EKG QTC CALCULATION (BAZETT): 434 MS
EKG R AXIS: -25 DEGREES
EKG T AXIS: 10 DEGREES
EKG VENTRICULAR RATE: 108 BPM
EOSINOPHIL # BLD: 0.1 K/UL (ref 0–0.44)
EOSINOPHILS RELATIVE PERCENT: 1 % (ref 1–4)
ERYTHROCYTE [DISTWIDTH] IN BLOOD BY AUTOMATED COUNT: 15.6 % (ref 11.8–14.4)
GFR, ESTIMATED: >90 ML/MIN/1.73M2
GLUCOSE SERPL-MCNC: 141 MG/DL (ref 74–99)
HCT VFR BLD AUTO: 24.8 % (ref 40.7–50.3)
HGB BLD-MCNC: 7.9 G/DL (ref 13–17)
IMM GRANULOCYTES # BLD AUTO: 0.1 K/UL (ref 0–0.3)
IMM GRANULOCYTES NFR BLD: 1 %
LYMPHOCYTES NFR BLD: 0.93 K/UL (ref 1.1–3.7)
LYMPHOCYTES RELATIVE PERCENT: 9 % (ref 24–43)
MAGNESIUM SERPL-MCNC: 2.2 MG/DL (ref 1.6–2.6)
MCH RBC QN AUTO: 27.4 PG (ref 25.2–33.5)
MCHC RBC AUTO-ENTMCNC: 31.9 G/DL (ref 28.4–34.8)
MCV RBC AUTO: 86.1 FL (ref 82.6–102.9)
MONOCYTES NFR BLD: 0.62 K/UL (ref 0.1–1.2)
MONOCYTES NFR BLD: 6 % (ref 3–12)
MORPHOLOGY: ABNORMAL
NEUTROPHILS NFR BLD: 83 % (ref 36–65)
NEUTS SEG NFR BLD: 8.55 K/UL (ref 1.5–8.1)
NRBC BLD-RTO: 0 PER 100 WBC
PHOSPHATE SERPL-MCNC: 2 MG/DL (ref 2.5–4.5)
PLATELET # BLD AUTO: 212 K/UL (ref 138–453)
PMV BLD AUTO: 9.6 FL (ref 8.1–13.5)
POTASSIUM SERPL-SCNC: 4.2 MMOL/L (ref 3.7–5.3)
RBC # BLD AUTO: 2.88 M/UL (ref 4.21–5.77)
SODIUM SERPL-SCNC: 142 MMOL/L (ref 136–145)
SURGICAL PATHOLOGY REPORT: NORMAL
SURGICAL PATHOLOGY REPORT: NORMAL
WBC OTHER # BLD: 10.3 K/UL (ref 3.5–11.3)

## 2025-02-25 PROCEDURE — 6360000002 HC RX W HCPCS

## 2025-02-25 PROCEDURE — 84100 ASSAY OF PHOSPHORUS: CPT

## 2025-02-25 PROCEDURE — 82330 ASSAY OF CALCIUM: CPT

## 2025-02-25 PROCEDURE — 99212 OFFICE O/P EST SF 10 MIN: CPT

## 2025-02-25 PROCEDURE — 80048 BASIC METABOLIC PNL TOTAL CA: CPT

## 2025-02-25 PROCEDURE — 85520 HEPARIN ASSAY: CPT

## 2025-02-25 PROCEDURE — 83735 ASSAY OF MAGNESIUM: CPT

## 2025-02-25 PROCEDURE — 36415 COLL VENOUS BLD VENIPUNCTURE: CPT

## 2025-02-25 PROCEDURE — 6360000002 HC RX W HCPCS: Performed by: STUDENT IN AN ORGANIZED HEALTH CARE EDUCATION/TRAINING PROGRAM

## 2025-02-25 PROCEDURE — 6370000000 HC RX 637 (ALT 250 FOR IP)

## 2025-02-25 PROCEDURE — 99231 SBSQ HOSP IP/OBS SF/LOW 25: CPT | Performed by: SURGERY

## 2025-02-25 PROCEDURE — 2000000000 HC ICU R&B

## 2025-02-25 PROCEDURE — 6370000000 HC RX 637 (ALT 250 FOR IP): Performed by: STUDENT IN AN ORGANIZED HEALTH CARE EDUCATION/TRAINING PROGRAM

## 2025-02-25 PROCEDURE — 2500000003 HC RX 250 WO HCPCS: Performed by: STUDENT IN AN ORGANIZED HEALTH CARE EDUCATION/TRAINING PROGRAM

## 2025-02-25 PROCEDURE — 85025 COMPLETE CBC W/AUTO DIFF WBC: CPT

## 2025-02-25 PROCEDURE — 2580000003 HC RX 258

## 2025-02-25 RX ORDER — SODIUM CHLORIDE 0.9 % (FLUSH) 0.9 %
5-40 SYRINGE (ML) INJECTION PRN
Status: DISCONTINUED | OUTPATIENT
Start: 2025-02-25 | End: 2025-02-25

## 2025-02-25 RX ORDER — SODIUM CHLORIDE 9 MG/ML
INJECTION, SOLUTION INTRAVENOUS PRN
Status: DISCONTINUED | OUTPATIENT
Start: 2025-02-25 | End: 2025-02-25

## 2025-02-25 RX ORDER — LABETALOL HYDROCHLORIDE 5 MG/ML
10 INJECTION, SOLUTION INTRAVENOUS EVERY 6 HOURS PRN
Status: DISCONTINUED | OUTPATIENT
Start: 2025-02-25 | End: 2025-03-12 | Stop reason: HOSPADM

## 2025-02-25 RX ORDER — HYDRALAZINE HYDROCHLORIDE 20 MG/ML
10 INJECTION INTRAMUSCULAR; INTRAVENOUS
Status: DISCONTINUED | OUTPATIENT
Start: 2025-02-25 | End: 2025-02-26 | Stop reason: HOSPADM

## 2025-02-25 RX ORDER — LABETALOL HYDROCHLORIDE 5 MG/ML
10 INJECTION, SOLUTION INTRAVENOUS
Status: DISCONTINUED | OUTPATIENT
Start: 2025-02-25 | End: 2025-02-26 | Stop reason: HOSPADM

## 2025-02-25 RX ORDER — DROPERIDOL 2.5 MG/ML
0.62 INJECTION, SOLUTION INTRAMUSCULAR; INTRAVENOUS
Status: DISCONTINUED | OUTPATIENT
Start: 2025-02-25 | End: 2025-02-25

## 2025-02-25 RX ORDER — DIPHENHYDRAMINE HYDROCHLORIDE 50 MG/ML
12.5 INJECTION INTRAMUSCULAR; INTRAVENOUS
Status: DISCONTINUED | OUTPATIENT
Start: 2025-02-25 | End: 2025-02-25

## 2025-02-25 RX ORDER — PROCHLORPERAZINE EDISYLATE 5 MG/ML
5 INJECTION INTRAMUSCULAR; INTRAVENOUS
Status: DISCONTINUED | OUTPATIENT
Start: 2025-02-25 | End: 2025-02-25

## 2025-02-25 RX ORDER — NALOXONE HYDROCHLORIDE 0.4 MG/ML
INJECTION, SOLUTION INTRAMUSCULAR; INTRAVENOUS; SUBCUTANEOUS PRN
Status: DISCONTINUED | OUTPATIENT
Start: 2025-02-25 | End: 2025-02-25

## 2025-02-25 RX ORDER — OXYCODONE HYDROCHLORIDE 5 MG/1
10 TABLET ORAL PRN
Status: DISCONTINUED | OUTPATIENT
Start: 2025-02-25 | End: 2025-02-25

## 2025-02-25 RX ORDER — LORAZEPAM 2 MG/ML
0.5 INJECTION INTRAMUSCULAR
Status: DISCONTINUED | OUTPATIENT
Start: 2025-02-25 | End: 2025-02-25

## 2025-02-25 RX ORDER — MORPHINE SULFATE 2 MG/ML
2 INJECTION, SOLUTION INTRAMUSCULAR; INTRAVENOUS ONCE
Status: COMPLETED | OUTPATIENT
Start: 2025-02-25 | End: 2025-02-25

## 2025-02-25 RX ORDER — SODIUM CHLORIDE 0.9 % (FLUSH) 0.9 %
5-40 SYRINGE (ML) INJECTION EVERY 12 HOURS SCHEDULED
Status: DISCONTINUED | OUTPATIENT
Start: 2025-02-25 | End: 2025-02-26 | Stop reason: HOSPADM

## 2025-02-25 RX ORDER — FENTANYL CITRATE 50 UG/ML
25 INJECTION, SOLUTION INTRAMUSCULAR; INTRAVENOUS EVERY 5 MIN PRN
Status: DISCONTINUED | OUTPATIENT
Start: 2025-02-25 | End: 2025-02-25

## 2025-02-25 RX ORDER — CALCIUM GLUCONATE 20 MG/ML
2000 INJECTION, SOLUTION INTRAVENOUS ONCE
Status: COMPLETED | OUTPATIENT
Start: 2025-02-25 | End: 2025-02-25

## 2025-02-25 RX ORDER — OXYCODONE HYDROCHLORIDE 5 MG/1
5 TABLET ORAL PRN
Status: DISCONTINUED | OUTPATIENT
Start: 2025-02-25 | End: 2025-02-25

## 2025-02-25 RX ADMIN — METRONIDAZOLE 500 MG: 500 INJECTION, SOLUTION INTRAVENOUS at 22:17

## 2025-02-25 RX ADMIN — HYDROMORPHONE HYDROCHLORIDE 1 MG: 1 INJECTION, SOLUTION INTRAMUSCULAR; INTRAVENOUS; SUBCUTANEOUS at 06:21

## 2025-02-25 RX ADMIN — HEPARIN SODIUM 28 UNITS/KG/HR: 10000 INJECTION, SOLUTION INTRAVENOUS at 18:17

## 2025-02-25 RX ADMIN — METHOCARBAMOL TABLETS 750 MG: 750 TABLET, COATED ORAL at 18:16

## 2025-02-25 RX ADMIN — CIPROFLOXACIN 400 MG: 2 INJECTION, SOLUTION INTRAVENOUS at 10:53

## 2025-02-25 RX ADMIN — HEPARIN SODIUM 24 UNITS/KG/HR: 10000 INJECTION, SOLUTION INTRAVENOUS at 01:45

## 2025-02-25 RX ADMIN — METHOCARBAMOL TABLETS 750 MG: 750 TABLET, COATED ORAL at 04:48

## 2025-02-25 RX ADMIN — OXYCODONE 5 MG: 5 TABLET ORAL at 08:32

## 2025-02-25 RX ADMIN — METRONIDAZOLE 500 MG: 500 INJECTION, SOLUTION INTRAVENOUS at 16:12

## 2025-02-25 RX ADMIN — METHOCARBAMOL TABLETS 750 MG: 750 TABLET, COATED ORAL at 12:44

## 2025-02-25 RX ADMIN — ACETAMINOPHEN 1000 MG: 500 TABLET ORAL at 04:48

## 2025-02-25 RX ADMIN — HYDROMORPHONE HYDROCHLORIDE 1 MG: 1 INJECTION, SOLUTION INTRAMUSCULAR; INTRAVENOUS; SUBCUTANEOUS at 21:04

## 2025-02-25 RX ADMIN — AMLODIPINE BESYLATE 5 MG: 5 TABLET ORAL at 07:40

## 2025-02-25 RX ADMIN — OXYCODONE 5 MG: 5 TABLET ORAL at 12:49

## 2025-02-25 RX ADMIN — SODIUM CHLORIDE, PRESERVATIVE FREE 10 ML: 5 INJECTION INTRAVENOUS at 19:54

## 2025-02-25 RX ADMIN — CIPROFLOXACIN 400 MG: 2 INJECTION, SOLUTION INTRAVENOUS at 22:20

## 2025-02-25 RX ADMIN — HEPARIN SODIUM 5000 UNITS: 1000 INJECTION INTRAVENOUS; SUBCUTANEOUS at 10:50

## 2025-02-25 RX ADMIN — KETOROLAC TROMETHAMINE 15 MG: 15 INJECTION, SOLUTION INTRAMUSCULAR; INTRAVENOUS at 19:54

## 2025-02-25 RX ADMIN — HYDROMORPHONE HYDROCHLORIDE 1 MG: 1 INJECTION, SOLUTION INTRAMUSCULAR; INTRAVENOUS; SUBCUTANEOUS at 17:15

## 2025-02-25 RX ADMIN — KETOROLAC TROMETHAMINE 15 MG: 15 INJECTION, SOLUTION INTRAMUSCULAR; INTRAVENOUS at 07:40

## 2025-02-25 RX ADMIN — METRONIDAZOLE 500 MG: 500 INJECTION, SOLUTION INTRAVENOUS at 08:30

## 2025-02-25 RX ADMIN — GABAPENTIN 600 MG: 300 CAPSULE ORAL at 15:36

## 2025-02-25 RX ADMIN — HYDROMORPHONE HYDROCHLORIDE 1 MG: 1 INJECTION, SOLUTION INTRAMUSCULAR; INTRAVENOUS; SUBCUTANEOUS at 03:58

## 2025-02-25 RX ADMIN — MORPHINE SULFATE 2 MG: 2 INJECTION, SOLUTION INTRAMUSCULAR; INTRAVENOUS at 09:41

## 2025-02-25 RX ADMIN — SODIUM CHLORIDE, POTASSIUM CHLORIDE, SODIUM LACTATE AND CALCIUM CHLORIDE: 600; 310; 30; 20 INJECTION, SOLUTION INTRAVENOUS at 05:06

## 2025-02-25 RX ADMIN — GABAPENTIN 600 MG: 300 CAPSULE ORAL at 04:48

## 2025-02-25 RX ADMIN — METRONIDAZOLE 500 MG: 500 INJECTION, SOLUTION INTRAVENOUS at 00:01

## 2025-02-25 RX ADMIN — OXYCODONE 5 MG: 5 TABLET ORAL at 17:15

## 2025-02-25 RX ADMIN — OXYCODONE 5 MG: 5 TABLET ORAL at 04:48

## 2025-02-25 RX ADMIN — ONDANSETRON 4 MG: 2 INJECTION INTRAMUSCULAR; INTRAVENOUS at 09:44

## 2025-02-25 RX ADMIN — KETOROLAC TROMETHAMINE 15 MG: 15 INJECTION, SOLUTION INTRAMUSCULAR; INTRAVENOUS at 01:45

## 2025-02-25 RX ADMIN — GABAPENTIN 600 MG: 300 CAPSULE ORAL at 22:15

## 2025-02-25 RX ADMIN — HEPARIN SODIUM 5000 UNITS: 1000 INJECTION INTRAVENOUS; SUBCUTANEOUS at 05:16

## 2025-02-25 RX ADMIN — ACETAMINOPHEN 1000 MG: 500 TABLET ORAL at 22:15

## 2025-02-25 RX ADMIN — HYDROMORPHONE HYDROCHLORIDE 1 MG: 1 INJECTION, SOLUTION INTRAMUSCULAR; INTRAVENOUS; SUBCUTANEOUS at 08:30

## 2025-02-25 RX ADMIN — METHOCARBAMOL TABLETS 750 MG: 750 TABLET, COATED ORAL at 00:02

## 2025-02-25 RX ADMIN — CALCIUM GLUCONATE 2000 MG: 20 INJECTION, SOLUTION INTRAVENOUS at 06:08

## 2025-02-25 RX ADMIN — Medication 10 MG: at 04:48

## 2025-02-25 RX ADMIN — DOCUSATE SODIUM 100 MG: 100 CAPSULE, LIQUID FILLED ORAL at 07:40

## 2025-02-25 RX ADMIN — Medication 10 MG: at 16:08

## 2025-02-25 RX ADMIN — OXYCODONE 5 MG: 5 TABLET ORAL at 00:03

## 2025-02-25 RX ADMIN — HEPARIN SODIUM 5000 UNITS: 1000 INJECTION INTRAVENOUS; SUBCUTANEOUS at 18:51

## 2025-02-25 ASSESSMENT — PAIN DESCRIPTION - PAIN TYPE
TYPE: ACUTE PAIN;SURGICAL PAIN
TYPE: ACUTE PAIN;SURGICAL PAIN

## 2025-02-25 ASSESSMENT — PAIN SCALES - GENERAL
PAINLEVEL_OUTOF10: 9
PAINLEVEL_OUTOF10: 9
PAINLEVEL_OUTOF10: 4
PAINLEVEL_OUTOF10: 8
PAINLEVEL_OUTOF10: 10
PAINLEVEL_OUTOF10: 8
PAINLEVEL_OUTOF10: 10
PAINLEVEL_OUTOF10: 9

## 2025-02-25 ASSESSMENT — PAIN DESCRIPTION - FREQUENCY
FREQUENCY: INTERMITTENT
FREQUENCY: INTERMITTENT

## 2025-02-25 ASSESSMENT — PAIN DESCRIPTION - DESCRIPTORS
DESCRIPTORS: DULL;DISCOMFORT
DESCRIPTORS: ACHING;PRESSURE
DESCRIPTORS: BURNING;CRAMPING;SHARP

## 2025-02-25 ASSESSMENT — PAIN DESCRIPTION - ORIENTATION
ORIENTATION: LEFT
ORIENTATION: LOWER;LEFT
ORIENTATION: LOWER;LEFT

## 2025-02-25 ASSESSMENT — PAIN DESCRIPTION - LOCATION
LOCATION: ABDOMEN;LEG
LOCATION: ABDOMEN;LEG
LOCATION: ABDOMEN
LOCATION: LEG
LOCATION: ABDOMEN;LEG
LOCATION: ABDOMEN

## 2025-02-25 ASSESSMENT — PAIN DESCRIPTION - ONSET
ONSET: GRADUAL
ONSET: GRADUAL

## 2025-02-25 ASSESSMENT — PAIN - FUNCTIONAL ASSESSMENT
PAIN_FUNCTIONAL_ASSESSMENT: ACTIVITIES ARE NOT PREVENTED
PAIN_FUNCTIONAL_ASSESSMENT: PREVENTS OR INTERFERES SOME ACTIVE ACTIVITIES AND ADLS

## 2025-02-26 ENCOUNTER — ANESTHESIA EVENT (OUTPATIENT)
Dept: OPERATING ROOM | Age: 50
DRG: 169 | End: 2025-02-26
Payer: COMMERCIAL

## 2025-02-26 ENCOUNTER — ANESTHESIA (OUTPATIENT)
Dept: OPERATING ROOM | Age: 50
DRG: 169 | End: 2025-02-26
Payer: COMMERCIAL

## 2025-02-26 LAB
ANION GAP SERPL CALCULATED.3IONS-SCNC: 10 MMOL/L (ref 9–16)
ANTI-XA UNFRAC HEPARIN: 0.32 IU/L
ANTI-XA UNFRAC HEPARIN: 0.34 IU/L
BASOPHILS # BLD: 0 K/UL (ref 0–0.2)
BASOPHILS NFR BLD: 0 % (ref 0–2)
BUN SERPL-MCNC: 15 MG/DL (ref 6–20)
CA-I BLD-SCNC: 1.09 MMOL/L (ref 1.13–1.33)
CALCIUM SERPL-MCNC: 8.3 MG/DL (ref 8.6–10.4)
CHLORIDE SERPL-SCNC: 103 MMOL/L (ref 98–107)
CO2 SERPL-SCNC: 27 MMOL/L (ref 20–31)
CREAT SERPL-MCNC: 0.7 MG/DL (ref 0.7–1.2)
EOSINOPHIL # BLD: 0.12 K/UL (ref 0–0.4)
EOSINOPHILS RELATIVE PERCENT: 1 % (ref 1–4)
ERYTHROCYTE [DISTWIDTH] IN BLOOD BY AUTOMATED COUNT: 15.5 % (ref 11.8–14.4)
GFR, ESTIMATED: >90 ML/MIN/1.73M2
GLUCOSE SERPL-MCNC: 121 MG/DL (ref 74–99)
HCT VFR BLD AUTO: 27.2 % (ref 40.7–50.3)
HGB BLD-MCNC: 8.7 G/DL (ref 13–17)
IMM GRANULOCYTES # BLD AUTO: 0 K/UL (ref 0–0.3)
IMM GRANULOCYTES NFR BLD: 0 %
LYMPHOCYTES NFR BLD: 2.09 K/UL (ref 1–4.8)
LYMPHOCYTES RELATIVE PERCENT: 17 % (ref 24–44)
MCH RBC QN AUTO: 27.2 PG (ref 25.2–33.5)
MCHC RBC AUTO-ENTMCNC: 32 G/DL (ref 28.4–34.8)
MCV RBC AUTO: 85 FL (ref 82.6–102.9)
MONOCYTES NFR BLD: 0.86 K/UL (ref 0.1–0.8)
MONOCYTES NFR BLD: 7 % (ref 1–7)
MORPHOLOGY: ABNORMAL
NEUTROPHILS NFR BLD: 75 % (ref 36–66)
NEUTS SEG NFR BLD: 9.23 K/UL (ref 1.8–7.7)
NRBC BLD-RTO: 0 PER 100 WBC
PHOSPHATE SERPL-MCNC: 2.3 MG/DL (ref 2.5–4.5)
PLATELET # BLD AUTO: 321 K/UL (ref 138–453)
PMV BLD AUTO: 9.3 FL (ref 8.1–13.5)
POTASSIUM SERPL-SCNC: 4.2 MMOL/L (ref 3.7–5.3)
RBC # BLD AUTO: 3.2 M/UL (ref 4.21–5.77)
SODIUM SERPL-SCNC: 140 MMOL/L (ref 136–145)
WBC OTHER # BLD: 12.3 K/UL (ref 3.5–11.3)

## 2025-02-26 PROCEDURE — 2700000000 HC OXYGEN THERAPY PER DAY

## 2025-02-26 PROCEDURE — 6370000000 HC RX 637 (ALT 250 FOR IP)

## 2025-02-26 PROCEDURE — 6370000000 HC RX 637 (ALT 250 FOR IP): Performed by: STUDENT IN AN ORGANIZED HEALTH CARE EDUCATION/TRAINING PROGRAM

## 2025-02-26 PROCEDURE — 85520 HEPARIN ASSAY: CPT

## 2025-02-26 PROCEDURE — 85025 COMPLETE CBC W/AUTO DIFF WBC: CPT

## 2025-02-26 PROCEDURE — 6360000002 HC RX W HCPCS

## 2025-02-26 PROCEDURE — 84100 ASSAY OF PHOSPHORUS: CPT

## 2025-02-26 PROCEDURE — 2500000003 HC RX 250 WO HCPCS

## 2025-02-26 PROCEDURE — 2060000000 HC ICU INTERMEDIATE R&B

## 2025-02-26 PROCEDURE — 80048 BASIC METABOLIC PNL TOTAL CA: CPT

## 2025-02-26 PROCEDURE — 99231 SBSQ HOSP IP/OBS SF/LOW 25: CPT | Performed by: SURGERY

## 2025-02-26 PROCEDURE — 82330 ASSAY OF CALCIUM: CPT

## 2025-02-26 PROCEDURE — 6360000002 HC RX W HCPCS: Performed by: STUDENT IN AN ORGANIZED HEALTH CARE EDUCATION/TRAINING PROGRAM

## 2025-02-26 PROCEDURE — 3600000014 HC SURGERY LEVEL 4 ADDTL 15MIN: Performed by: STUDENT IN AN ORGANIZED HEALTH CARE EDUCATION/TRAINING PROGRAM

## 2025-02-26 PROCEDURE — 3700000000 HC ANESTHESIA ATTENDED CARE: Performed by: STUDENT IN AN ORGANIZED HEALTH CARE EDUCATION/TRAINING PROGRAM

## 2025-02-26 PROCEDURE — 3600000004 HC SURGERY LEVEL 4 BASE: Performed by: STUDENT IN AN ORGANIZED HEALTH CARE EDUCATION/TRAINING PROGRAM

## 2025-02-26 PROCEDURE — 2580000003 HC RX 258

## 2025-02-26 PROCEDURE — 97606 NEG PRS WND THER DME>50 SQCM: CPT

## 2025-02-26 PROCEDURE — 94761 N-INVAS EAR/PLS OXIMETRY MLT: CPT

## 2025-02-26 PROCEDURE — 7100000001 HC PACU RECOVERY - ADDTL 15 MIN: Performed by: STUDENT IN AN ORGANIZED HEALTH CARE EDUCATION/TRAINING PROGRAM

## 2025-02-26 PROCEDURE — 6360000002 HC RX W HCPCS: Performed by: ANESTHESIOLOGY

## 2025-02-26 PROCEDURE — 7100000000 HC PACU RECOVERY - FIRST 15 MIN: Performed by: STUDENT IN AN ORGANIZED HEALTH CARE EDUCATION/TRAINING PROGRAM

## 2025-02-26 PROCEDURE — 2709999900 HC NON-CHARGEABLE SUPPLY: Performed by: STUDENT IN AN ORGANIZED HEALTH CARE EDUCATION/TRAINING PROGRAM

## 2025-02-26 PROCEDURE — 2580000003 HC RX 258: Performed by: STUDENT IN AN ORGANIZED HEALTH CARE EDUCATION/TRAINING PROGRAM

## 2025-02-26 PROCEDURE — 3700000001 HC ADD 15 MINUTES (ANESTHESIA): Performed by: STUDENT IN AN ORGANIZED HEALTH CARE EDUCATION/TRAINING PROGRAM

## 2025-02-26 PROCEDURE — 36415 COLL VENOUS BLD VENIPUNCTURE: CPT

## 2025-02-26 PROCEDURE — 0JQP0ZZ REPAIR LEFT LOWER LEG SUBCUTANEOUS TISSUE AND FASCIA, OPEN APPROACH: ICD-10-PCS | Performed by: STUDENT IN AN ORGANIZED HEALTH CARE EDUCATION/TRAINING PROGRAM

## 2025-02-26 PROCEDURE — 13160 SEC CLSR SURG WND/DEHSN XTN: CPT | Performed by: STUDENT IN AN ORGANIZED HEALTH CARE EDUCATION/TRAINING PROGRAM

## 2025-02-26 RX ORDER — PROPOFOL 10 MG/ML
INJECTION, EMULSION INTRAVENOUS
Status: DISCONTINUED | OUTPATIENT
Start: 2025-02-26 | End: 2025-02-26 | Stop reason: SDUPTHER

## 2025-02-26 RX ORDER — LIDOCAINE HYDROCHLORIDE 10 MG/ML
INJECTION, SOLUTION EPIDURAL; INFILTRATION; INTRACAUDAL; PERINEURAL
Status: DISCONTINUED | OUTPATIENT
Start: 2025-02-26 | End: 2025-02-26 | Stop reason: SDUPTHER

## 2025-02-26 RX ORDER — ONDANSETRON 2 MG/ML
INJECTION INTRAMUSCULAR; INTRAVENOUS
Status: DISCONTINUED | OUTPATIENT
Start: 2025-02-26 | End: 2025-02-26 | Stop reason: SDUPTHER

## 2025-02-26 RX ORDER — ROCURONIUM BROMIDE 10 MG/ML
INJECTION, SOLUTION INTRAVENOUS
Status: DISCONTINUED | OUTPATIENT
Start: 2025-02-26 | End: 2025-02-26 | Stop reason: SDUPTHER

## 2025-02-26 RX ORDER — HEPARIN SODIUM 10000 [USP'U]/100ML
5-30 INJECTION, SOLUTION INTRAVENOUS CONTINUOUS
Status: DISCONTINUED | OUTPATIENT
Start: 2025-02-26 | End: 2025-02-27

## 2025-02-26 RX ORDER — DIPHENHYDRAMINE HYDROCHLORIDE 50 MG/ML
12.5 INJECTION INTRAMUSCULAR; INTRAVENOUS
Status: DISCONTINUED | OUTPATIENT
Start: 2025-02-26 | End: 2025-02-26 | Stop reason: HOSPADM

## 2025-02-26 RX ORDER — CALCIUM GLUCONATE 20 MG/ML
2000 INJECTION, SOLUTION INTRAVENOUS ONCE
Status: COMPLETED | OUTPATIENT
Start: 2025-02-26 | End: 2025-02-26

## 2025-02-26 RX ORDER — SODIUM CHLORIDE, SODIUM LACTATE, POTASSIUM CHLORIDE, CALCIUM CHLORIDE 600; 310; 30; 20 MG/100ML; MG/100ML; MG/100ML; MG/100ML
INJECTION, SOLUTION INTRAVENOUS
Status: DISCONTINUED | OUTPATIENT
Start: 2025-02-26 | End: 2025-02-26 | Stop reason: SDUPTHER

## 2025-02-26 RX ORDER — HYDRALAZINE HYDROCHLORIDE 20 MG/ML
10 INJECTION INTRAMUSCULAR; INTRAVENOUS EVERY 6 HOURS PRN
Status: DISCONTINUED | OUTPATIENT
Start: 2025-02-26 | End: 2025-03-12 | Stop reason: HOSPADM

## 2025-02-26 RX ORDER — SODIUM CHLORIDE 9 MG/ML
INJECTION, SOLUTION INTRAVENOUS PRN
Status: DISCONTINUED | OUTPATIENT
Start: 2025-02-26 | End: 2025-02-26 | Stop reason: HOSPADM

## 2025-02-26 RX ORDER — ALBUTEROL SULFATE 90 UG/1
INHALANT RESPIRATORY (INHALATION)
Status: DISCONTINUED | OUTPATIENT
Start: 2025-02-26 | End: 2025-02-26 | Stop reason: SDUPTHER

## 2025-02-26 RX ORDER — SODIUM CHLORIDE 0.9 % (FLUSH) 0.9 %
5-40 SYRINGE (ML) INJECTION EVERY 12 HOURS SCHEDULED
Status: DISCONTINUED | OUTPATIENT
Start: 2025-02-26 | End: 2025-02-26 | Stop reason: HOSPADM

## 2025-02-26 RX ORDER — FENTANYL CITRATE 50 UG/ML
INJECTION, SOLUTION INTRAMUSCULAR; INTRAVENOUS
Status: DISCONTINUED | OUTPATIENT
Start: 2025-02-26 | End: 2025-02-26 | Stop reason: SDUPTHER

## 2025-02-26 RX ORDER — NALOXONE HYDROCHLORIDE 0.4 MG/ML
INJECTION, SOLUTION INTRAMUSCULAR; INTRAVENOUS; SUBCUTANEOUS PRN
Status: DISCONTINUED | OUTPATIENT
Start: 2025-02-26 | End: 2025-02-26 | Stop reason: HOSPADM

## 2025-02-26 RX ORDER — SODIUM CHLORIDE 0.9 % (FLUSH) 0.9 %
5-40 SYRINGE (ML) INJECTION PRN
Status: DISCONTINUED | OUTPATIENT
Start: 2025-02-26 | End: 2025-02-26 | Stop reason: HOSPADM

## 2025-02-26 RX ORDER — FENTANYL CITRATE 50 UG/ML
25 INJECTION, SOLUTION INTRAMUSCULAR; INTRAVENOUS EVERY 5 MIN PRN
Status: DISCONTINUED | OUTPATIENT
Start: 2025-02-26 | End: 2025-02-26 | Stop reason: HOSPADM

## 2025-02-26 RX ORDER — ONDANSETRON 2 MG/ML
4 INJECTION INTRAMUSCULAR; INTRAVENOUS
Status: DISCONTINUED | OUTPATIENT
Start: 2025-02-26 | End: 2025-02-26 | Stop reason: HOSPADM

## 2025-02-26 RX ORDER — FENTANYL CITRATE 50 UG/ML
50 INJECTION, SOLUTION INTRAMUSCULAR; INTRAVENOUS EVERY 5 MIN PRN
Status: DISCONTINUED | OUTPATIENT
Start: 2025-02-26 | End: 2025-02-26 | Stop reason: HOSPADM

## 2025-02-26 RX ORDER — MIDAZOLAM HYDROCHLORIDE 1 MG/ML
INJECTION, SOLUTION INTRAMUSCULAR; INTRAVENOUS
Status: DISCONTINUED | OUTPATIENT
Start: 2025-02-26 | End: 2025-02-26 | Stop reason: SDUPTHER

## 2025-02-26 RX ORDER — VANCOMYCIN HYDROCHLORIDE 1 G/20ML
INJECTION, POWDER, LYOPHILIZED, FOR SOLUTION INTRAVENOUS
Status: DISPENSED
Start: 2025-02-26 | End: 2025-02-27

## 2025-02-26 RX ADMIN — FENTANYL CITRATE 50 MCG: 50 INJECTION, SOLUTION INTRAMUSCULAR; INTRAVENOUS at 19:19

## 2025-02-26 RX ADMIN — ACETAMINOPHEN 1000 MG: 500 TABLET ORAL at 21:51

## 2025-02-26 RX ADMIN — LIDOCAINE HYDROCHLORIDE 50 MG: 10 INJECTION, SOLUTION EPIDURAL; INFILTRATION; INTRACAUDAL; PERINEURAL at 17:36

## 2025-02-26 RX ADMIN — ONDANSETRON 4 MG: 2 INJECTION INTRAMUSCULAR; INTRAVENOUS at 18:20

## 2025-02-26 RX ADMIN — METHOCARBAMOL TABLETS 750 MG: 750 TABLET, COATED ORAL at 22:28

## 2025-02-26 RX ADMIN — DOCUSATE SODIUM 100 MG: 100 CAPSULE, LIQUID FILLED ORAL at 08:05

## 2025-02-26 RX ADMIN — GABAPENTIN 600 MG: 300 CAPSULE ORAL at 13:27

## 2025-02-26 RX ADMIN — METHOCARBAMOL TABLETS 750 MG: 750 TABLET, COATED ORAL at 00:54

## 2025-02-26 RX ADMIN — METRONIDAZOLE 500 MG: 500 INJECTION, SOLUTION INTRAVENOUS at 06:37

## 2025-02-26 RX ADMIN — DIBASIC SODIUM PHOSPHATE, MONOBASIC POTASSIUM PHOSPHATE AND MONOBASIC SODIUM PHOSPHATE 2 TABLET: 852; 155; 130 TABLET ORAL at 11:41

## 2025-02-26 RX ADMIN — MIDAZOLAM 2 MG: 1 INJECTION INTRAMUSCULAR; INTRAVENOUS at 17:32

## 2025-02-26 RX ADMIN — DIBASIC SODIUM PHOSPHATE, MONOBASIC POTASSIUM PHOSPHATE AND MONOBASIC SODIUM PHOSPHATE 2 TABLET: 852; 155; 130 TABLET ORAL at 08:05

## 2025-02-26 RX ADMIN — OXYCODONE 5 MG: 5 TABLET ORAL at 05:47

## 2025-02-26 RX ADMIN — PHENYLEPHRINE HYDROCHLORIDE 100 MCG: 10 INJECTION INTRAVENOUS at 17:56

## 2025-02-26 RX ADMIN — HYDROMORPHONE HYDROCHLORIDE 1 MG: 1 INJECTION, SOLUTION INTRAMUSCULAR; INTRAVENOUS; SUBCUTANEOUS at 15:52

## 2025-02-26 RX ADMIN — Medication 10 MG: at 22:28

## 2025-02-26 RX ADMIN — FENTANYL CITRATE 50 MCG: 50 INJECTION, SOLUTION INTRAMUSCULAR; INTRAVENOUS at 19:26

## 2025-02-26 RX ADMIN — KETOROLAC TROMETHAMINE 15 MG: 15 INJECTION, SOLUTION INTRAMUSCULAR; INTRAVENOUS at 21:52

## 2025-02-26 RX ADMIN — METRONIDAZOLE 500 MG: 500 INJECTION, SOLUTION INTRAVENOUS at 16:43

## 2025-02-26 RX ADMIN — CIPROFLOXACIN 400 MG: 2 INJECTION, SOLUTION INTRAVENOUS at 11:33

## 2025-02-26 RX ADMIN — GABAPENTIN 600 MG: 300 CAPSULE ORAL at 21:51

## 2025-02-26 RX ADMIN — OXYCODONE 5 MG: 5 TABLET ORAL at 22:43

## 2025-02-26 RX ADMIN — SODIUM CHLORIDE, PRESERVATIVE FREE 10 ML: 5 INJECTION INTRAVENOUS at 22:02

## 2025-02-26 RX ADMIN — METHOCARBAMOL TABLETS 750 MG: 750 TABLET, COATED ORAL at 05:48

## 2025-02-26 RX ADMIN — KETOROLAC TROMETHAMINE 15 MG: 15 INJECTION, SOLUTION INTRAMUSCULAR; INTRAVENOUS at 00:54

## 2025-02-26 RX ADMIN — ACETAMINOPHEN 1000 MG: 500 TABLET ORAL at 05:48

## 2025-02-26 RX ADMIN — ONDANSETRON 4 MG: 2 INJECTION INTRAMUSCULAR; INTRAVENOUS at 13:27

## 2025-02-26 RX ADMIN — KETOROLAC TROMETHAMINE 15 MG: 15 INJECTION, SOLUTION INTRAMUSCULAR; INTRAVENOUS at 13:27

## 2025-02-26 RX ADMIN — ALBUTEROL SULFATE 8 PUFF: 90 AEROSOL, METERED RESPIRATORY (INHALATION) at 17:55

## 2025-02-26 RX ADMIN — AMLODIPINE BESYLATE 5 MG: 5 TABLET ORAL at 08:05

## 2025-02-26 RX ADMIN — SUGAMMADEX 200 MG: 100 INJECTION, SOLUTION INTRAVENOUS at 18:31

## 2025-02-26 RX ADMIN — ONDANSETRON 4 MG: 2 INJECTION INTRAMUSCULAR; INTRAVENOUS at 05:48

## 2025-02-26 RX ADMIN — GABAPENTIN 600 MG: 300 CAPSULE ORAL at 05:48

## 2025-02-26 RX ADMIN — SODIUM CHLORIDE: 0.9 INJECTION, SOLUTION INTRAVENOUS at 06:31

## 2025-02-26 RX ADMIN — HYDROMORPHONE HYDROCHLORIDE 1 MG: 1 INJECTION, SOLUTION INTRAMUSCULAR; INTRAVENOUS; SUBCUTANEOUS at 08:05

## 2025-02-26 RX ADMIN — PROPOFOL 200 MG: 10 INJECTION, EMULSION INTRAVENOUS at 17:36

## 2025-02-26 RX ADMIN — OXYCODONE 5 MG: 5 TABLET ORAL at 11:41

## 2025-02-26 RX ADMIN — FENTANYL CITRATE 50 MCG: 50 INJECTION, SOLUTION INTRAMUSCULAR; INTRAVENOUS at 17:28

## 2025-02-26 RX ADMIN — KETOROLAC TROMETHAMINE 15 MG: 15 INJECTION, SOLUTION INTRAMUSCULAR; INTRAVENOUS at 08:05

## 2025-02-26 RX ADMIN — METHOCARBAMOL TABLETS 750 MG: 750 TABLET, COATED ORAL at 11:41

## 2025-02-26 RX ADMIN — FENTANYL CITRATE 50 MCG: 50 INJECTION, SOLUTION INTRAMUSCULAR; INTRAVENOUS at 17:31

## 2025-02-26 RX ADMIN — CALCIUM GLUCONATE 2000 MG: 20 INJECTION, SOLUTION INTRAVENOUS at 08:14

## 2025-02-26 RX ADMIN — SODIUM CHLORIDE, POTASSIUM CHLORIDE, SODIUM LACTATE AND CALCIUM CHLORIDE: 600; 310; 30; 20 INJECTION, SOLUTION INTRAVENOUS at 17:27

## 2025-02-26 RX ADMIN — ROCURONIUM BROMIDE 5 MG: 10 INJECTION, SOLUTION INTRAVENOUS at 17:36

## 2025-02-26 RX ADMIN — HEPARIN SODIUM 30 UNITS/KG/HR: 10000 INJECTION, SOLUTION INTRAVENOUS at 01:01

## 2025-02-26 ASSESSMENT — PAIN SCALES - GENERAL
PAINLEVEL_OUTOF10: 9
PAINLEVEL_OUTOF10: 10
PAINLEVEL_OUTOF10: 8
PAINLEVEL_OUTOF10: 7
PAINLEVEL_OUTOF10: 8
PAINLEVEL_OUTOF10: 9
PAINLEVEL_OUTOF10: 8
PAINLEVEL_OUTOF10: 10
PAINLEVEL_OUTOF10: 10
PAINLEVEL_OUTOF10: 4
PAINLEVEL_OUTOF10: 9

## 2025-02-26 ASSESSMENT — PAIN DESCRIPTION - LOCATION
LOCATION: ABDOMEN
LOCATION: LEG

## 2025-02-26 ASSESSMENT — PAIN SCALES - WONG BAKER: WONGBAKER_NUMERICALRESPONSE: NO HURT

## 2025-02-26 ASSESSMENT — PAIN DESCRIPTION - DESCRIPTORS: DESCRIPTORS: DISCOMFORT;BURNING

## 2025-02-26 ASSESSMENT — PAIN DESCRIPTION - ORIENTATION: ORIENTATION: LEFT

## 2025-02-26 NOTE — CARE COORDINATION
Dx: Diverticulitis [K57.92]  Ischemic leg [I99.8]  Acute deep vein thrombosis (DVT) of femoral vein of left lower extremity (HCC) [I82.412]  Status post Vianney procedure (HCC) [Z93.3]  Acute lower limb ischemia [I99.8]    Admit date: 2/23/2025  GMLOS : 1.5  LOS : 3    [facility] RISK OF UNPLANNED READMISSION 2.0             19 Total Score        ______________________________________      Patients goal/ Transitional Planning : OR today for lower extremity fasciotomy, washout, closure.       PT/OT recommendations : N/A        Barriers to discharge : OR today,

## 2025-02-26 NOTE — OP NOTE
Operative Note      Patient: Melvin Sierra  YOB: 1975  MRN: [id number]    Date of Procedure: 2/26/2025    Pre-Op Diagnosis: Left leg 4 compartment fasciotomy wounds    Post-Op Diagnosis: Same       Procedure: Closure of left leg medial and lateral fasciotomy wounds    Surgeon(s):  Klye Samson MD    Assistant: Duglas Bauer DPM (Resident)    Anesthesia: General    Estimated Blood Loss (mL): 40 mL    Complications: None    Specimens: None    Implants: None      Drains:   Closed/Suction Drain Superior;Medial Bulb (Active)   Site Description Healing 02/26/25 1600   Dressing Status Dry;Intact 02/26/25 1600   Drainage Appearance Serosanguinous 02/26/25 1600   Drain Status To bulb suction 02/26/25 1600   Output (ml) 25 ml 02/26/25 1600       Negative Pressure Wound Therapy Abdomen Lower;Medial;Right (Active)   Dressing Status Intact w/seal 02/26/25 1600   Canister changed? No 02/26/25 1600   Output (ml) 0 ml 02/26/25 0800   Unit Type VAC [place] 02/26/25 1330   Mode Continuous 02/26/25 1600   Target Pressure (mmHg) 125 02/26/25 1600   $$ Dressing Changed & Charged $ Standard NPWT >50 sq cm PER TX 02/26/25 1330   Number of pieces placed 2 02/26/25 1330   Dressing Type White foam;Black foam 02/26/25 1330   Dressing Change Due 02/28/25 02/26/25 1330       Colostomy LLQ (Active)   Stomal Appliance 1 piece 02/26/25 1600   Stoma  Assessment [place] 02/26/25 1600   Peristomal Assessment Intact 02/26/25 1600   Mucocutaneous Junction Intact 02/26/25 1600   Treatment Site care;Liquid skin barrier;Barrier ring;Pouch change 02/26/25 1330   Stool Appearance Bloody 02/26/25 1600   Stool Color Red 02/26/25 1600   Stool Amount Small 02/26/25 1600   Output (mL) 0 ml 02/26/25 1600       Urinary Catheter 02/22/25 Marie (Active)   Catheter Indications Perioperative use for selected surgical procedures 02/26/25 1600   Site Assessment No urethral drainage 02/26/25 1600   Urine Color Yellow 02/26/25 1600   Urine Appearance 
Appearance Bloody 02/23/25 0539   Output (ml) 60 ml 02/23/25 0539       Negative Pressure Wound Therapy Abdomen Lower;Medial;Right (Active)   Dressing Status Intact w/seal 02/22/25 2000   Canister changed? No 02/22/25 2000   Mode Continuous 02/22/25 2000   Target Pressure (mmHg) 125 02/22/25 2000       Colostomy LLQ (Active)   Stoma  Assessment [place] 02/22/25 2000   Peristomal Assessment Clean, dry & intact 02/22/25 2000   Mucocutaneous Junction Intact 02/22/25 2000   Stool Appearance Other (Comment) 02/22/25 2000       Urinary Catheter 02/22/25 Marie (Active)   Catheter Indications Perioperative use for selected surgical procedures 02/23/25 0600   Site Assessment No urethral drainage 02/23/25 0600   Urine Color Ale 02/23/25 0600   Urine Appearance Clear 02/23/25 0600   Collection Container Standard 02/23/25 0600   Securement Method Securing device (Describe) 02/23/25 0600   Catheter Best Practices  Drainage tube clipped to bed;Tamper seal intact;Bag below bladder;Bag not on floor;Drainage bag less than half full 02/23/25 0600   Status Draining 02/23/25 0600   Output (mL) 300 mL 02/23/25 0539       Findings:  Infection Present At Time Of Surgery (PATOS) (choose all levels that have infection present):  No infection present  Other Findings: Angiogram of aorta showed patent aorta and bilateral common and external iliac arteries with lucency seen at the distal aorta where aortic thrombus was seen on CAT scan.  Percutaneous mechanical thrombectomy was successful in retrieving aortic thrombus.  Left leg angiogram showed patent left common femoral and profunda arteries.  Left SFA was patent.  The left popliteal was patent until it occluded below the knee.  No distal vessels were seen.  Percutaneous mechanical thrombectomy of left popliteal thrombus was not successful.  Then left leg incision was made below the knee and the popliteal, anterior tibial, peroneal and posterior tibial arteries were exposed.  Tibial arteries

## 2025-02-26 NOTE — ANESTHESIA PRE PROCEDURE
Department of Anesthesiology  Preprocedure Note       Name:  Melvin Sierra   Age:  49 y.o.  :  1975                                          MRN:  [id number]         Date:  2025      Surgeon: Surgeon(s):  Kyle Samson MD    Procedure: Procedure(s):  LOWER EXTREMITY FASCIOTOMY, WASHOUT, CLOSURE    Medications prior to admission:   Prior to Admission medications    Medication Sig Start Date End Date Taking? Authorizing Provider   ciprofloxacin (CIPRO) 500 MG tablet Take 1 tablet by mouth 2 times daily for 10 days 2/21/25 3/3/25  Juno Sanchez APRN - NP   metroNIDAZOLE (FLAGYL) 500 MG tablet Take 1 tablet by mouth 3 times daily for 10 days 2/21/25 3/3/25  Juno Sanchez APRN - LINDA   oxyCODONE-acetaminophen (PERCOCET) 5-325 MG per tablet Take 1 tablet by mouth every 6 hours as needed for Pain for up to 7 days. Intended supply: 7 days. Take lowest dose possible to manage pain Max Daily Amount: 4 tablets 25  Juno Sanchez APRN - NP   LORazepam (ATIVAN) 1 MG tablet Take 1 tablet by mouth nightly as needed (Sleep) for up to 30 days. Max Daily Amount: 1 mg 2/21/25 3/23/25  Juno Sanchez APRN - NP   losartan (COZAAR) 50 MG tablet Take 1 tablet by mouth daily  Patient not taking: Reported on 24   Ed Palafox, APRN - CNP       Current medications:    Current Facility-Administered Medications   Medication Dose Route Frequency Provider Last Rate Last Admin    hydrALAZINE (APRESOLINE) injection 10 mg  10 mg IntraVENous Q6H PRN Pollo Garrett, DO        calcium gluconate 2,000 mg in sodium chloride 100 mL  2,000 mg IntraVENous Once Pollo Garrett DO        phosphorus (K PHOS NEUTRAL) 2 tablet  500 mg Oral Q4H Pollo Garrett,         sodium chloride flush 0.9 % injection 5-40 mL  5-40 mL IntraVENous 2 times per day Ahsan Arevalo MD        labetalol (NORMODYNE;TRANDATE) injection 10 mg  10 mg IntraVENous Q15 Min PRN Ahsan Arevalo MD        Or

## 2025-02-27 LAB
ANTI-XA UNFRAC HEPARIN: <0.1 IU/L

## 2025-02-27 PROCEDURE — 6370000000 HC RX 637 (ALT 250 FOR IP)

## 2025-02-27 PROCEDURE — 6360000002 HC RX W HCPCS

## 2025-02-27 PROCEDURE — 99024 POSTOP FOLLOW-UP VISIT: CPT

## 2025-02-27 PROCEDURE — 2500000003 HC RX 250 WO HCPCS

## 2025-02-27 PROCEDURE — 2060000000 HC ICU INTERMEDIATE R&B

## 2025-02-27 PROCEDURE — 36415 COLL VENOUS BLD VENIPUNCTURE: CPT

## 2025-02-27 PROCEDURE — 85520 HEPARIN ASSAY: CPT

## 2025-02-27 RX ORDER — FAMOTIDINE 20 MG/1
20 TABLET, FILM COATED ORAL 2 TIMES DAILY
Status: DISCONTINUED | OUTPATIENT
Start: 2025-02-27 | End: 2025-03-12 | Stop reason: HOSPADM

## 2025-02-27 RX ADMIN — HYDROMORPHONE HYDROCHLORIDE 1 MG: 1 INJECTION, SOLUTION INTRAMUSCULAR; INTRAVENOUS; SUBCUTANEOUS at 10:44

## 2025-02-27 RX ADMIN — GABAPENTIN 600 MG: 300 CAPSULE ORAL at 20:22

## 2025-02-27 RX ADMIN — GABAPENTIN 600 MG: 300 CAPSULE ORAL at 14:25

## 2025-02-27 RX ADMIN — ONDANSETRON 4 MG: 2 INJECTION INTRAMUSCULAR; INTRAVENOUS at 03:29

## 2025-02-27 RX ADMIN — Medication 10 MG: at 04:25

## 2025-02-27 RX ADMIN — HYDROMORPHONE HYDROCHLORIDE 1 MG: 1 INJECTION, SOLUTION INTRAMUSCULAR; INTRAVENOUS; SUBCUTANEOUS at 17:29

## 2025-02-27 RX ADMIN — ONDANSETRON 4 MG: 4 TABLET, ORALLY DISINTEGRATING ORAL at 08:18

## 2025-02-27 RX ADMIN — KETOROLAC TROMETHAMINE 15 MG: 15 INJECTION, SOLUTION INTRAMUSCULAR; INTRAVENOUS at 03:20

## 2025-02-27 RX ADMIN — HYDROMORPHONE HYDROCHLORIDE 1 MG: 1 INJECTION, SOLUTION INTRAMUSCULAR; INTRAVENOUS; SUBCUTANEOUS at 06:29

## 2025-02-27 RX ADMIN — SODIUM CHLORIDE, PRESERVATIVE FREE 10 ML: 5 INJECTION INTRAVENOUS at 20:24

## 2025-02-27 RX ADMIN — METHOCARBAMOL TABLETS 750 MG: 750 TABLET, COATED ORAL at 23:48

## 2025-02-27 RX ADMIN — OXYCODONE 5 MG: 5 TABLET ORAL at 15:04

## 2025-02-27 RX ADMIN — OXYCODONE 5 MG: 5 TABLET ORAL at 20:22

## 2025-02-27 RX ADMIN — METRONIDAZOLE 500 MG: 500 INJECTION, SOLUTION INTRAVENOUS at 23:51

## 2025-02-27 RX ADMIN — ACETAMINOPHEN 1000 MG: 500 TABLET ORAL at 05:49

## 2025-02-27 RX ADMIN — HEPARIN SODIUM 20 UNITS/KG/HR: 10000 INJECTION, SOLUTION INTRAVENOUS at 12:24

## 2025-02-27 RX ADMIN — FAMOTIDINE 20 MG: 20 TABLET, FILM COATED ORAL at 08:02

## 2025-02-27 RX ADMIN — GABAPENTIN 600 MG: 300 CAPSULE ORAL at 05:49

## 2025-02-27 RX ADMIN — METRONIDAZOLE 500 MG: 500 INJECTION, SOLUTION INTRAVENOUS at 00:46

## 2025-02-27 RX ADMIN — METHOCARBAMOL TABLETS 750 MG: 750 TABLET, COATED ORAL at 05:49

## 2025-02-27 RX ADMIN — CIPROFLOXACIN 400 MG: 2 INJECTION, SOLUTION INTRAVENOUS at 10:48

## 2025-02-27 RX ADMIN — CIPROFLOXACIN 400 MG: 2 INJECTION, SOLUTION INTRAVENOUS at 00:52

## 2025-02-27 RX ADMIN — HYDROMORPHONE HYDROCHLORIDE 1 MG: 1 INJECTION, SOLUTION INTRAMUSCULAR; INTRAVENOUS; SUBCUTANEOUS at 12:09

## 2025-02-27 RX ADMIN — DOCUSATE SODIUM 100 MG: 100 CAPSULE, LIQUID FILLED ORAL at 08:03

## 2025-02-27 RX ADMIN — OXYCODONE 5 MG: 5 TABLET ORAL at 08:02

## 2025-02-27 RX ADMIN — APIXABAN 5 MG: 5 TABLET, FILM COATED ORAL at 20:23

## 2025-02-27 RX ADMIN — AMLODIPINE BESYLATE 5 MG: 5 TABLET ORAL at 08:02

## 2025-02-27 RX ADMIN — ACETAMINOPHEN 1000 MG: 500 TABLET ORAL at 14:25

## 2025-02-27 RX ADMIN — METHOCARBAMOL TABLETS 750 MG: 750 TABLET, COATED ORAL at 10:45

## 2025-02-27 RX ADMIN — HYDROMORPHONE HYDROCHLORIDE 1 MG: 1 INJECTION, SOLUTION INTRAMUSCULAR; INTRAVENOUS; SUBCUTANEOUS at 22:04

## 2025-02-27 RX ADMIN — OXYCODONE 5 MG: 5 TABLET ORAL at 03:29

## 2025-02-27 RX ADMIN — FAMOTIDINE 20 MG: 20 TABLET, FILM COATED ORAL at 20:23

## 2025-02-27 RX ADMIN — METRONIDAZOLE 500 MG: 500 INJECTION, SOLUTION INTRAVENOUS at 08:08

## 2025-02-27 RX ADMIN — HEPARIN SODIUM 10000 UNITS: 1000 INJECTION INTRAVENOUS; SUBCUTANEOUS at 06:03

## 2025-02-27 RX ADMIN — SODIUM CHLORIDE, PRESERVATIVE FREE 5 ML: 5 INJECTION INTRAVENOUS at 08:25

## 2025-02-27 RX ADMIN — METHOCARBAMOL TABLETS 750 MG: 750 TABLET, COATED ORAL at 17:20

## 2025-02-27 RX ADMIN — ACETAMINOPHEN 1000 MG: 500 TABLET ORAL at 20:22

## 2025-02-27 RX ADMIN — Medication 10 MG: at 17:20

## 2025-02-27 RX ADMIN — METRONIDAZOLE 500 MG: 500 INJECTION, SOLUTION INTRAVENOUS at 15:58

## 2025-02-27 RX ADMIN — CIPROFLOXACIN 400 MG: 2 INJECTION, SOLUTION INTRAVENOUS at 22:45

## 2025-02-27 ASSESSMENT — PAIN DESCRIPTION - ONSET: ONSET: ON-GOING

## 2025-02-27 ASSESSMENT — PAIN SCALES - GENERAL
PAINLEVEL_OUTOF10: 0
PAINLEVEL_OUTOF10: 3
PAINLEVEL_OUTOF10: 7
PAINLEVEL_OUTOF10: 10
PAINLEVEL_OUTOF10: 6
PAINLEVEL_OUTOF10: 7
PAINLEVEL_OUTOF10: 3
PAINLEVEL_OUTOF10: 0
PAINLEVEL_OUTOF10: 8
PAINLEVEL_OUTOF10: 7
PAINLEVEL_OUTOF10: 9
PAINLEVEL_OUTOF10: 7
PAINLEVEL_OUTOF10: 3
PAINLEVEL_OUTOF10: 3
PAINLEVEL_OUTOF10: 5
PAINLEVEL_OUTOF10: 9
PAINLEVEL_OUTOF10: 8
PAINLEVEL_OUTOF10: 9

## 2025-02-27 ASSESSMENT — PAIN DESCRIPTION - LOCATION
LOCATION: ABDOMEN
LOCATION: LEG
LOCATION: ABDOMEN

## 2025-02-27 ASSESSMENT — PAIN DESCRIPTION - DESCRIPTORS
DESCRIPTORS: PRESSURE
DESCRIPTORS: PRESSURE

## 2025-02-27 ASSESSMENT — PAIN DESCRIPTION - FREQUENCY: FREQUENCY: CONTINUOUS

## 2025-02-27 ASSESSMENT — PAIN DESCRIPTION - ORIENTATION
ORIENTATION: LEFT
ORIENTATION: MID
ORIENTATION: OTHER (COMMENT)
ORIENTATION: MID

## 2025-02-27 ASSESSMENT — PAIN - FUNCTIONAL ASSESSMENT: PAIN_FUNCTIONAL_ASSESSMENT: PREVENTS OR INTERFERES WITH MANY ACTIVE NOT PASSIVE ACTIVITIES

## 2025-02-27 ASSESSMENT — PAIN SCALES - WONG BAKER: WONGBAKER_NUMERICALRESPONSE: NO HURT

## 2025-02-27 ASSESSMENT — PAIN DESCRIPTION - PAIN TYPE: TYPE: ACUTE PAIN;SURGICAL PAIN

## 2025-02-27 NOTE — CARE COORDINATION
Dx: Diverticulitis [K57.92]  Ischemic leg [I99.8]  Acute deep vein thrombosis (DVT) of femoral vein of left lower extremity (HCC) [I82.412]  Status post Vianney procedure (HCC) [Z93.3]  Acute lower limb ischemia [I99.8]    Admit date: 2/23/2025  GMLOS :1.5  LOS :4    [facility] RISK OF UNPLANNED READMISSION 2.0             19.6 Total Score        ______________________________________      Patients goal/ Transitional Planning : Spoke with patient, discussed LTAC, freedom of choice list given, pt choice is [facility], referral placed      PT/OT recommendations : SNF        Barriers to discharge : LTAC vs SNF-                      Post Acute Facility/Agency List     Provided patient with the following list, the list includes the overall star ratings obtained from CMS per the Medicare Web site (www.Medicare.gov):     [x] Long Term Acute Care Facilities  [] Acute Inpatient Rehabilitation Facilities  [x] Skilled Nursing Facilities  [] Hospice Facilities  [] Home Care    Provided verbal instructions on how to utilize the QR Code to obtain additional detailed star ratings from www.Medicare.gov     offered to print and provide the detailed list:    []Accepted   [x]Declined        1630 Spoke with Sandra, wound vac and IV drips off, pt no longer qualifies for LTAC, SNF list reviewed

## 2025-02-28 LAB
ANION GAP SERPL CALCULATED.3IONS-SCNC: 9 MMOL/L (ref 9–16)
ATYPICAL LYMPHOCYTE ABSOLUTE COUNT: 0.42 K/UL
ATYPICAL LYMPHOCYTES: 3 %
BASOPHILS # BLD: 0 K/UL (ref 0–0.2)
BASOPHILS NFR BLD: 0 % (ref 0–2)
BUN SERPL-MCNC: 12 MG/DL (ref 6–20)
CALCIUM SERPL-MCNC: 7.8 MG/DL (ref 8.6–10.4)
CHLORIDE SERPL-SCNC: 105 MMOL/L (ref 98–107)
CO2 SERPL-SCNC: 25 MMOL/L (ref 20–31)
CREAT SERPL-MCNC: 0.6 MG/DL (ref 0.7–1.2)
EOSINOPHIL # BLD: 0 K/UL (ref 0–0.4)
EOSINOPHILS RELATIVE PERCENT: 0 % (ref 1–4)
ERYTHROCYTE [DISTWIDTH] IN BLOOD BY AUTOMATED COUNT: 15.6 % (ref 11.8–14.4)
GFR, ESTIMATED: >90 ML/MIN/1.73M2
GLUCOSE SERPL-MCNC: 100 MG/DL (ref 74–99)
HCT VFR BLD AUTO: 28.1 % (ref 40.7–50.3)
HGB BLD-MCNC: 9.1 G/DL (ref 13–17)
IMM GRANULOCYTES # BLD AUTO: 2.26 K/UL (ref 0–0.3)
IMM GRANULOCYTES NFR BLD: 16 %
LYMPHOCYTES NFR BLD: 1.55 K/UL (ref 1–4.8)
LYMPHOCYTES RELATIVE PERCENT: 11 % (ref 24–44)
MCH RBC QN AUTO: 28 PG (ref 25.2–33.5)
MCHC RBC AUTO-ENTMCNC: 32.4 G/DL (ref 28.4–34.8)
MCV RBC AUTO: 86.5 FL (ref 82.6–102.9)
MONOCYTES NFR BLD: 0.99 K/UL (ref 0.1–0.8)
MONOCYTES NFR BLD: 7 % (ref 1–7)
MORPHOLOGY: ABNORMAL
NEUTROPHILS NFR BLD: 63 % (ref 36–66)
NEUTS SEG NFR BLD: 8.88 K/UL (ref 1.8–7.7)
NRBC BLD-RTO: 0.6 PER 100 WBC
PLATELET # BLD AUTO: 441 K/UL (ref 138–453)
PLATELET, FLUORESCENCE: 441 K/UL (ref 138–453)
PMV BLD AUTO: 9 FL (ref 8.1–13.5)
POTASSIUM SERPL-SCNC: 4.2 MMOL/L (ref 3.7–5.3)
RBC # BLD AUTO: 3.25 M/UL (ref 4.21–5.77)
SODIUM SERPL-SCNC: 139 MMOL/L (ref 136–145)
WBC OTHER # BLD: 14.1 K/UL (ref 3.5–11.3)

## 2025-02-28 PROCEDURE — 6370000000 HC RX 637 (ALT 250 FOR IP)

## 2025-02-28 PROCEDURE — 6360000002 HC RX W HCPCS

## 2025-02-28 PROCEDURE — 6370000000 HC RX 637 (ALT 250 FOR IP): Performed by: STUDENT IN AN ORGANIZED HEALTH CARE EDUCATION/TRAINING PROGRAM

## 2025-02-28 PROCEDURE — 2500000003 HC RX 250 WO HCPCS

## 2025-02-28 PROCEDURE — 97535 SELF CARE MNGMENT TRAINING: CPT

## 2025-02-28 PROCEDURE — 85025 COMPLETE CBC W/AUTO DIFF WBC: CPT

## 2025-02-28 PROCEDURE — 36415 COLL VENOUS BLD VENIPUNCTURE: CPT

## 2025-02-28 PROCEDURE — 80048 BASIC METABOLIC PNL TOTAL CA: CPT

## 2025-02-28 PROCEDURE — 97606 NEG PRS WND THER DME>50 SQCM: CPT

## 2025-02-28 PROCEDURE — 97530 THERAPEUTIC ACTIVITIES: CPT

## 2025-02-28 PROCEDURE — 2060000000 HC ICU INTERMEDIATE R&B

## 2025-02-28 PROCEDURE — 97162 PT EVAL MOD COMPLEX 30 MIN: CPT

## 2025-02-28 PROCEDURE — 97166 OT EVAL MOD COMPLEX 45 MIN: CPT

## 2025-02-28 RX ORDER — ATORVASTATIN CALCIUM 40 MG/1
40 TABLET, FILM COATED ORAL NIGHTLY
Status: DISCONTINUED | OUTPATIENT
Start: 2025-02-28 | End: 2025-03-12 | Stop reason: HOSPADM

## 2025-02-28 RX ORDER — ASPIRIN 81 MG/1
81 TABLET ORAL DAILY
Status: DISCONTINUED | OUTPATIENT
Start: 2025-02-28 | End: 2025-03-12 | Stop reason: HOSPADM

## 2025-02-28 RX ADMIN — HYDROMORPHONE HYDROCHLORIDE 1 MG: 1 INJECTION, SOLUTION INTRAMUSCULAR; INTRAVENOUS; SUBCUTANEOUS at 07:40

## 2025-02-28 RX ADMIN — METRONIDAZOLE 500 MG: 500 INJECTION, SOLUTION INTRAVENOUS at 16:38

## 2025-02-28 RX ADMIN — Medication 10 MG: at 12:17

## 2025-02-28 RX ADMIN — AMLODIPINE BESYLATE 5 MG: 5 TABLET ORAL at 08:27

## 2025-02-28 RX ADMIN — GABAPENTIN 600 MG: 300 CAPSULE ORAL at 06:30

## 2025-02-28 RX ADMIN — METHOCARBAMOL TABLETS 750 MG: 750 TABLET, COATED ORAL at 10:51

## 2025-02-28 RX ADMIN — OXYCODONE 5 MG: 5 TABLET ORAL at 02:29

## 2025-02-28 RX ADMIN — DOCUSATE SODIUM 100 MG: 100 CAPSULE, LIQUID FILLED ORAL at 08:27

## 2025-02-28 RX ADMIN — HYDROMORPHONE HYDROCHLORIDE 1 MG: 1 INJECTION, SOLUTION INTRAMUSCULAR; INTRAVENOUS; SUBCUTANEOUS at 04:25

## 2025-02-28 RX ADMIN — FAMOTIDINE 20 MG: 20 TABLET, FILM COATED ORAL at 08:27

## 2025-02-28 RX ADMIN — METHOCARBAMOL TABLETS 750 MG: 750 TABLET, COATED ORAL at 06:30

## 2025-02-28 RX ADMIN — HYDROMORPHONE HYDROCHLORIDE 1 MG: 1 INJECTION, SOLUTION INTRAMUSCULAR; INTRAVENOUS; SUBCUTANEOUS at 21:48

## 2025-02-28 RX ADMIN — CIPROFLOXACIN 400 MG: 2 INJECTION, SOLUTION INTRAVENOUS at 21:56

## 2025-02-28 RX ADMIN — ASPIRIN 81 MG: 81 TABLET, COATED ORAL at 08:32

## 2025-02-28 RX ADMIN — ACETAMINOPHEN 1000 MG: 500 TABLET ORAL at 06:30

## 2025-02-28 RX ADMIN — OXYCODONE 5 MG: 5 TABLET ORAL at 09:33

## 2025-02-28 RX ADMIN — GABAPENTIN 600 MG: 300 CAPSULE ORAL at 12:17

## 2025-02-28 RX ADMIN — OXYCODONE 5 MG: 5 TABLET ORAL at 16:34

## 2025-02-28 RX ADMIN — APIXABAN 5 MG: 5 TABLET, FILM COATED ORAL at 21:49

## 2025-02-28 RX ADMIN — FAMOTIDINE 20 MG: 20 TABLET, FILM COATED ORAL at 21:49

## 2025-02-28 RX ADMIN — SODIUM CHLORIDE, PRESERVATIVE FREE 10 ML: 5 INJECTION INTRAVENOUS at 21:49

## 2025-02-28 RX ADMIN — METHOCARBAMOL TABLETS 750 MG: 750 TABLET, COATED ORAL at 19:02

## 2025-02-28 RX ADMIN — APIXABAN 5 MG: 5 TABLET, FILM COATED ORAL at 08:27

## 2025-02-28 RX ADMIN — ACETAMINOPHEN 1000 MG: 500 TABLET ORAL at 21:49

## 2025-02-28 RX ADMIN — ATORVASTATIN CALCIUM 40 MG: 40 TABLET, FILM COATED ORAL at 21:49

## 2025-02-28 RX ADMIN — ACETAMINOPHEN 1000 MG: 500 TABLET ORAL at 12:16

## 2025-02-28 RX ADMIN — GABAPENTIN 600 MG: 300 CAPSULE ORAL at 21:49

## 2025-02-28 RX ADMIN — HYDRALAZINE HYDROCHLORIDE 10 MG: 20 INJECTION INTRAMUSCULAR; INTRAVENOUS at 10:55

## 2025-02-28 RX ADMIN — ONDANSETRON 4 MG: 2 INJECTION INTRAMUSCULAR; INTRAVENOUS at 21:48

## 2025-02-28 RX ADMIN — CIPROFLOXACIN 400 MG: 2 INJECTION, SOLUTION INTRAVENOUS at 10:45

## 2025-02-28 RX ADMIN — HYDROMORPHONE HYDROCHLORIDE 1 MG: 1 INJECTION, SOLUTION INTRAMUSCULAR; INTRAVENOUS; SUBCUTANEOUS at 13:26

## 2025-02-28 RX ADMIN — HYDROMORPHONE HYDROCHLORIDE 1 MG: 1 INJECTION, SOLUTION INTRAMUSCULAR; INTRAVENOUS; SUBCUTANEOUS at 18:45

## 2025-02-28 RX ADMIN — METRONIDAZOLE 500 MG: 500 INJECTION, SOLUTION INTRAVENOUS at 08:24

## 2025-02-28 ASSESSMENT — PAIN SCALES - GENERAL
PAINLEVEL_OUTOF10: 6
PAINLEVEL_OUTOF10: 10
PAINLEVEL_OUTOF10: 4
PAINLEVEL_OUTOF10: 3
PAINLEVEL_OUTOF10: 8
PAINLEVEL_OUTOF10: 7
PAINLEVEL_OUTOF10: 2
PAINLEVEL_OUTOF10: 8
PAINLEVEL_OUTOF10: 2
PAINLEVEL_OUTOF10: 3
PAINLEVEL_OUTOF10: 8
PAINLEVEL_OUTOF10: 5
PAINLEVEL_OUTOF10: 5
PAINLEVEL_OUTOF10: 7
PAINLEVEL_OUTOF10: 0
PAINLEVEL_OUTOF10: 5
PAINLEVEL_OUTOF10: 7

## 2025-02-28 ASSESSMENT — PAIN DESCRIPTION - LOCATION
LOCATION: ABDOMEN
LOCATION: ABDOMEN;LEG

## 2025-02-28 ASSESSMENT — PAIN DESCRIPTION - ORIENTATION
ORIENTATION: LEFT
ORIENTATION: MID
ORIENTATION: MID

## 2025-02-28 ASSESSMENT — PAIN - FUNCTIONAL ASSESSMENT: PAIN_FUNCTIONAL_ASSESSMENT: PREVENTS OR INTERFERES SOME ACTIVE ACTIVITIES AND ADLS

## 2025-02-28 ASSESSMENT — PAIN DESCRIPTION - DESCRIPTORS
DESCRIPTORS: SHARP;SHOOTING
DESCRIPTORS: PRESSURE

## 2025-02-28 NOTE — CARE COORDINATION
Met with patient to discuss transitional planning. He is planning on going to SNF. Discussed with him that d/t insurance, he will have to go to SNF in [place]. He prefers [facility] as it is very close to his house. He is agreeable to go to other facilities if [facility] cannot accept. Referrals sent to [facility], [facility]    0850 [facility] They are out of network with insurance. Referral sent to [facility]    1055 spoke to Delfino from [facility]. He received referral and will review. He cannot view H&P-faxed    1129 received call from Delfino from [facility]. They are not able to accept d/t no bed available. Referral sent to [facility] ([facility])    1207 voicemail left for admissions at [facility] and [facility] requesting return call to follow up on referral    1538 received call from Nirali from [facility]. They are not in network with insurance. Referrals sent to [facility] and [facility] ([facility]). Voicemail left for admissions at [facility] requesting return call to follow up on referral    1553 received call from Yady from [facility] requesting clinicals. Faxed    1621 received call from Yady. She received clinicals. The clinical team will review    1819 patient updated

## 2025-03-01 ENCOUNTER — APPOINTMENT (OUTPATIENT)
Age: 50
DRG: 169 | End: 2025-03-01
Attending: STUDENT IN AN ORGANIZED HEALTH CARE EDUCATION/TRAINING PROGRAM
Payer: COMMERCIAL

## 2025-03-01 ENCOUNTER — APPOINTMENT (OUTPATIENT)
Dept: GENERAL RADIOLOGY | Age: 50
DRG: 169 | End: 2025-03-01
Payer: COMMERCIAL

## 2025-03-01 LAB
ANION GAP SERPL CALCULATED.3IONS-SCNC: 9 MMOL/L (ref 9–16)
BASOPHILS # BLD: 0 K/UL (ref 0–0.2)
BASOPHILS NFR BLD: 0 % (ref 0–2)
BILIRUB UR QL STRIP: NEGATIVE
BUN SERPL-MCNC: 10 MG/DL (ref 6–20)
CALCIUM SERPL-MCNC: 8 MG/DL (ref 8.6–10.4)
CHLORIDE SERPL-SCNC: 103 MMOL/L (ref 98–107)
CLARITY UR: CLEAR
CO2 SERPL-SCNC: 26 MMOL/L (ref 20–31)
COLOR UR: YELLOW
COMMENT: NORMAL
CREAT SERPL-MCNC: 0.6 MG/DL (ref 0.7–1.2)
ECHO AO ASC DIAM: 3.4 CM
ECHO AO ROOT DIAM: 3.4 CM
ECHO AV AREA PEAK VELOCITY: 4.7 CM2
ECHO AV AREA VTI: 4.1 CM2
ECHO AV MEAN GRADIENT: 4 MMHG
ECHO AV MEAN VELOCITY: 1 M/S
ECHO AV PEAK GRADIENT: 9 MMHG
ECHO AV PEAK VELOCITY: 1.5 M/S
ECHO AV VELOCITY RATIO: 0.8
ECHO AV VTI: 24.8 CM
ECHO LA AREA 2C: 17.7 CM2
ECHO LA AREA 4C: 17.8 CM2
ECHO LA DIAMETER: 4.3 CM
ECHO LA MAJOR AXIS: 4.7 CM
ECHO LA MINOR AXIS: 4.7 CM
ECHO LA TO AORTIC ROOT RATIO: 1.26
ECHO LA VOL BP: 55 ML (ref 18–58)
ECHO LA VOL MOD A2C: 55 ML (ref 18–58)
ECHO LA VOL MOD A4C: 55 ML (ref 18–58)
ECHO LV E' LATERAL VELOCITY: 10.7 CM/S
ECHO LV E' SEPTAL VELOCITY: 10.2 CM/S
ECHO LV EDV A2C: 204 ML
ECHO LV EDV A4C: 191 ML
ECHO LV EF PHYSICIAN: 55 %
ECHO LV EJECTION FRACTION A2C: 51 %
ECHO LV EJECTION FRACTION A4C: 63 %
ECHO LV EJECTION FRACTION BIPLANE: 55 % (ref 55–100)
ECHO LV ESV A2C: 101 ML
ECHO LV ESV A4C: 72 ML
ECHO LV FRACTIONAL SHORTENING: 15 % (ref 28–44)
ECHO LV INTERNAL DIMENSION DIASTOLIC: 5.4 CM (ref 4.2–5.9)
ECHO LV INTERNAL DIMENSION SYSTOLIC: 4.6 CM
ECHO LV IVSD: 1.1 CM (ref 0.6–1)
ECHO LV MASS 2D: 234.8 G (ref 88–224)
ECHO LV POSTERIOR WALL DIASTOLIC: 1.1 CM (ref 0.6–1)
ECHO LV RELATIVE WALL THICKNESS RATIO: 0.41
ECHO LVOT AREA: 5.7 CM2
ECHO LVOT AV VTI INDEX: 0.72
ECHO LVOT DIAM: 2.7 CM
ECHO LVOT MEAN GRADIENT: 2 MMHG
ECHO LVOT PEAK GRADIENT: 6 MMHG
ECHO LVOT PEAK VELOCITY: 1.2 M/S
ECHO LVOT SV: 101.9 ML
ECHO LVOT VTI: 17.8 CM
ECHO MV A VELOCITY: 0.59 M/S
ECHO MV AREA VTI: 3.8 CM2
ECHO MV E DECELERATION TIME (DT): 157 MS
ECHO MV E VELOCITY: 0.85 M/S
ECHO MV E/A RATIO: 1.44
ECHO MV E/E' LATERAL: 7.94
ECHO MV E/E' RATIO (AVERAGED): 8.14
ECHO MV E/E' SEPTAL: 8.33
ECHO MV LVOT VTI INDEX: 1.5
ECHO MV MAX VELOCITY: 1 M/S
ECHO MV MEAN GRADIENT: 2 MMHG
ECHO MV MEAN VELOCITY: 0.7 M/S
ECHO MV PEAK GRADIENT: 4 MMHG
ECHO MV VTI: 26.7 CM
ECHO RV FREE WALL PEAK S': 19.6 CM/S
ECHO RV TAPSE: 3 CM (ref 1.7–?)
EOSINOPHIL # BLD: 0.16 K/UL (ref 0–0.4)
EOSINOPHILS RELATIVE PERCENT: 1 % (ref 1–4)
ERYTHROCYTE [DISTWIDTH] IN BLOOD BY AUTOMATED COUNT: 16 % (ref 11.8–14.4)
GFR, ESTIMATED: >90 ML/MIN/1.73M2
GLUCOSE SERPL-MCNC: 109 MG/DL (ref 74–99)
GLUCOSE UR STRIP-MCNC: NEGATIVE MG/DL
HCT VFR BLD AUTO: 30.1 % (ref 40.7–50.3)
HGB BLD-MCNC: 9.5 G/DL (ref 13–17)
HGB UR QL STRIP.AUTO: NEGATIVE
IMM GRANULOCYTES # BLD AUTO: 2.13 K/UL (ref 0–0.3)
IMM GRANULOCYTES NFR BLD: 13 %
KETONES UR STRIP-MCNC: NEGATIVE MG/DL
LEUKOCYTE ESTERASE UR QL STRIP: NEGATIVE
LYMPHOCYTES NFR BLD: 1.64 K/UL (ref 1–4.8)
LYMPHOCYTES RELATIVE PERCENT: 10 % (ref 24–44)
MCH RBC QN AUTO: 27.5 PG (ref 25.2–33.5)
MCHC RBC AUTO-ENTMCNC: 31.6 G/DL (ref 28.4–34.8)
MCV RBC AUTO: 87.2 FL (ref 82.6–102.9)
MONOCYTES NFR BLD: 0.82 K/UL (ref 0.1–0.8)
MONOCYTES NFR BLD: 5 % (ref 1–7)
MORPHOLOGY: ABNORMAL
NEUTROPHILS NFR BLD: 71 % (ref 36–66)
NEUTS SEG NFR BLD: 11.65 K/UL (ref 1.8–7.7)
NITRITE UR QL STRIP: NEGATIVE
NRBC BLD-RTO: 1.2 PER 100 WBC
NUCLEATED RED BLOOD CELLS: 2 PER 100 WBC
PH UR STRIP: 7 [PH] (ref 5–8)
PLATELET # BLD AUTO: 541 K/UL (ref 138–453)
PMV BLD AUTO: 8.7 FL (ref 8.1–13.5)
POTASSIUM SERPL-SCNC: 3.8 MMOL/L (ref 3.7–5.3)
PROT UR STRIP-MCNC: NEGATIVE MG/DL
RBC # BLD AUTO: 3.45 M/UL (ref 4.21–5.77)
SODIUM SERPL-SCNC: 138 MMOL/L (ref 136–145)
SP GR UR STRIP: 1.02 (ref 1–1.03)
UROBILINOGEN UR STRIP-ACNC: NORMAL EU/DL (ref 0–1)
WBC OTHER # BLD: 16.4 K/UL (ref 3.5–11.3)

## 2025-03-01 PROCEDURE — 6370000000 HC RX 637 (ALT 250 FOR IP)

## 2025-03-01 PROCEDURE — C8929 TTE W OR WO FOL WCON,DOPPLER: HCPCS

## 2025-03-01 PROCEDURE — 80048 BASIC METABOLIC PNL TOTAL CA: CPT

## 2025-03-01 PROCEDURE — 6360000002 HC RX W HCPCS

## 2025-03-01 PROCEDURE — 81003 URINALYSIS AUTO W/O SCOPE: CPT

## 2025-03-01 PROCEDURE — 85025 COMPLETE CBC W/AUTO DIFF WBC: CPT

## 2025-03-01 PROCEDURE — 6360000004 HC RX CONTRAST MEDICATION: Performed by: STUDENT IN AN ORGANIZED HEALTH CARE EDUCATION/TRAINING PROGRAM

## 2025-03-01 PROCEDURE — 93306 TTE W/DOPPLER COMPLETE: CPT | Performed by: INTERNAL MEDICINE

## 2025-03-01 PROCEDURE — 71045 X-RAY EXAM CHEST 1 VIEW: CPT

## 2025-03-01 PROCEDURE — 2060000000 HC ICU INTERMEDIATE R&B

## 2025-03-01 PROCEDURE — 6370000000 HC RX 637 (ALT 250 FOR IP): Performed by: STUDENT IN AN ORGANIZED HEALTH CARE EDUCATION/TRAINING PROGRAM

## 2025-03-01 PROCEDURE — 2500000003 HC RX 250 WO HCPCS

## 2025-03-01 PROCEDURE — 36415 COLL VENOUS BLD VENIPUNCTURE: CPT

## 2025-03-01 RX ORDER — OXYCODONE HYDROCHLORIDE 5 MG/1
5 TABLET ORAL EVERY 6 HOURS PRN
Status: DISCONTINUED | OUTPATIENT
Start: 2025-03-01 | End: 2025-03-07

## 2025-03-01 RX ADMIN — ACETAMINOPHEN 1000 MG: 500 TABLET ORAL at 21:06

## 2025-03-01 RX ADMIN — CIPROFLOXACIN 400 MG: 2 INJECTION, SOLUTION INTRAVENOUS at 10:37

## 2025-03-01 RX ADMIN — METRONIDAZOLE 500 MG: 500 INJECTION, SOLUTION INTRAVENOUS at 23:21

## 2025-03-01 RX ADMIN — METRONIDAZOLE 500 MG: 500 INJECTION, SOLUTION INTRAVENOUS at 08:07

## 2025-03-01 RX ADMIN — HYDROMORPHONE HYDROCHLORIDE 1 MG: 1 INJECTION, SOLUTION INTRAMUSCULAR; INTRAVENOUS; SUBCUTANEOUS at 08:01

## 2025-03-01 RX ADMIN — METHOCARBAMOL TABLETS 750 MG: 750 TABLET, COATED ORAL at 10:31

## 2025-03-01 RX ADMIN — APIXABAN 5 MG: 5 TABLET, FILM COATED ORAL at 21:07

## 2025-03-01 RX ADMIN — SODIUM CHLORIDE, PRESERVATIVE FREE 10 ML: 5 INJECTION INTRAVENOUS at 21:08

## 2025-03-01 RX ADMIN — METHOCARBAMOL TABLETS 750 MG: 750 TABLET, COATED ORAL at 04:38

## 2025-03-01 RX ADMIN — ATORVASTATIN CALCIUM 40 MG: 40 TABLET, FILM COATED ORAL at 21:07

## 2025-03-01 RX ADMIN — HYDROMORPHONE HYDROCHLORIDE 1 MG: 1 INJECTION, SOLUTION INTRAMUSCULAR; INTRAVENOUS; SUBCUTANEOUS at 12:12

## 2025-03-01 RX ADMIN — APIXABAN 5 MG: 5 TABLET, FILM COATED ORAL at 08:01

## 2025-03-01 RX ADMIN — HYDROMORPHONE HYDROCHLORIDE 1 MG: 1 INJECTION, SOLUTION INTRAMUSCULAR; INTRAVENOUS; SUBCUTANEOUS at 02:08

## 2025-03-01 RX ADMIN — GABAPENTIN 600 MG: 300 CAPSULE ORAL at 04:38

## 2025-03-01 RX ADMIN — ACETAMINOPHEN 1000 MG: 500 TABLET ORAL at 04:38

## 2025-03-01 RX ADMIN — HYDROMORPHONE HYDROCHLORIDE 1 MG: 1 INJECTION, SOLUTION INTRAMUSCULAR; INTRAVENOUS; SUBCUTANEOUS at 21:11

## 2025-03-01 RX ADMIN — METHOCARBAMOL TABLETS 750 MG: 750 TABLET, COATED ORAL at 17:38

## 2025-03-01 RX ADMIN — DOCUSATE SODIUM 100 MG: 100 CAPSULE, LIQUID FILLED ORAL at 08:01

## 2025-03-01 RX ADMIN — CIPROFLOXACIN 400 MG: 2 INJECTION, SOLUTION INTRAVENOUS at 22:02

## 2025-03-01 RX ADMIN — HYDROMORPHONE HYDROCHLORIDE 1 MG: 1 INJECTION, SOLUTION INTRAMUSCULAR; INTRAVENOUS; SUBCUTANEOUS at 19:15

## 2025-03-01 RX ADMIN — METHOCARBAMOL TABLETS 750 MG: 750 TABLET, COATED ORAL at 00:16

## 2025-03-01 RX ADMIN — OXYCODONE 5 MG: 5 TABLET ORAL at 00:16

## 2025-03-01 RX ADMIN — SULFUR HEXAFLUORIDE 2 ML: KIT at 10:29

## 2025-03-01 RX ADMIN — SODIUM CHLORIDE, PRESERVATIVE FREE 10 ML: 5 INJECTION INTRAVENOUS at 08:08

## 2025-03-01 RX ADMIN — HYDROMORPHONE HYDROCHLORIDE 1 MG: 1 INJECTION, SOLUTION INTRAMUSCULAR; INTRAVENOUS; SUBCUTANEOUS at 17:38

## 2025-03-01 RX ADMIN — METRONIDAZOLE 500 MG: 500 INJECTION, SOLUTION INTRAVENOUS at 14:59

## 2025-03-01 RX ADMIN — ASPIRIN 81 MG: 81 TABLET, COATED ORAL at 08:01

## 2025-03-01 RX ADMIN — FAMOTIDINE 20 MG: 20 TABLET, FILM COATED ORAL at 08:01

## 2025-03-01 RX ADMIN — METHOCARBAMOL TABLETS 750 MG: 750 TABLET, COATED ORAL at 23:29

## 2025-03-01 RX ADMIN — OXYCODONE 5 MG: 5 TABLET ORAL at 23:29

## 2025-03-01 RX ADMIN — AMLODIPINE BESYLATE 5 MG: 5 TABLET ORAL at 08:01

## 2025-03-01 RX ADMIN — HYDROMORPHONE HYDROCHLORIDE 1 MG: 1 INJECTION, SOLUTION INTRAMUSCULAR; INTRAVENOUS; SUBCUTANEOUS at 14:35

## 2025-03-01 RX ADMIN — ACETAMINOPHEN 1000 MG: 500 TABLET ORAL at 14:36

## 2025-03-01 RX ADMIN — OXYCODONE 5 MG: 5 TABLET ORAL at 09:24

## 2025-03-01 RX ADMIN — FAMOTIDINE 20 MG: 20 TABLET, FILM COATED ORAL at 21:07

## 2025-03-01 RX ADMIN — GABAPENTIN 600 MG: 300 CAPSULE ORAL at 21:07

## 2025-03-01 RX ADMIN — GABAPENTIN 600 MG: 300 CAPSULE ORAL at 14:36

## 2025-03-01 RX ADMIN — METRONIDAZOLE 500 MG: 500 INJECTION, SOLUTION INTRAVENOUS at 00:13

## 2025-03-01 RX ADMIN — HYDROMORPHONE HYDROCHLORIDE 1 MG: 1 INJECTION, SOLUTION INTRAMUSCULAR; INTRAVENOUS; SUBCUTANEOUS at 10:32

## 2025-03-01 RX ADMIN — Medication 10 MG: at 12:12

## 2025-03-01 ASSESSMENT — PAIN SCALES - GENERAL
PAINLEVEL_OUTOF10: 7
PAINLEVEL_OUTOF10: 8
PAINLEVEL_OUTOF10: 7
PAINLEVEL_OUTOF10: 8
PAINLEVEL_OUTOF10: 9
PAINLEVEL_OUTOF10: 8
PAINLEVEL_OUTOF10: 7
PAINLEVEL_OUTOF10: 6

## 2025-03-01 ASSESSMENT — PAIN DESCRIPTION - LOCATION
LOCATION: ABDOMEN

## 2025-03-01 ASSESSMENT — PAIN DESCRIPTION - ORIENTATION
ORIENTATION: MID;ANTERIOR
ORIENTATION: MID;ANTERIOR

## 2025-03-01 ASSESSMENT — PAIN DESCRIPTION - PAIN TYPE: TYPE: ACUTE PAIN

## 2025-03-01 ASSESSMENT — PAIN DESCRIPTION - DESCRIPTORS
DESCRIPTORS: PRESSURE
DESCRIPTORS: CRAMPING;PRESSURE;STABBING

## 2025-03-01 NOTE — CARE COORDINATION
Dx: Diverticulitis [K57.92]  Ischemic leg [I99.8]  Acute deep vein thrombosis (DVT) of femoral vein of left lower extremity (HCC) [I82.412]  Status post Vianney procedure (HCC) [Z93.3]  Acute lower limb ischemia [I99.8]    Admit date: 2/23/2025  GMLOS :  LOS :    [facility] RISK OF UNPLANNED READMISSION 2.0             19.4 Total Score        ______________________________________      Patients goal/ Transitional Planning : Plan is for SNF, Call made to [facility], Admissions dept not available over weekend, [facility] left, Call made to [facility] left,  Call made to [facility] left.      PT/OT recommendations : IPR vs SNF        Barriers to discharge : SNF- will need MI placement.

## 2025-03-02 ENCOUNTER — APPOINTMENT (OUTPATIENT)
Dept: CT IMAGING | Age: 50
DRG: 169 | End: 2025-03-02
Payer: COMMERCIAL

## 2025-03-02 ENCOUNTER — APPOINTMENT (OUTPATIENT)
Dept: GENERAL RADIOLOGY | Age: 50
DRG: 169 | End: 2025-03-02
Payer: COMMERCIAL

## 2025-03-02 ENCOUNTER — APPOINTMENT (OUTPATIENT)
Dept: ULTRASOUND IMAGING | Age: 50
DRG: 169 | End: 2025-03-02
Payer: COMMERCIAL

## 2025-03-02 PROBLEM — I31.39 PERICARDIAL EFFUSION: Status: ACTIVE | Noted: 2025-03-02

## 2025-03-02 LAB
ABO + RH BLD: NORMAL
ALBUMIN SERPL-MCNC: 2.9 G/DL (ref 3.5–5.2)
ALBUMIN/GLOB SERPL: 1.1 {RATIO} (ref 1–2.5)
ALP SERPL-CCNC: 34 U/L (ref 40–129)
ALT SERPL-CCNC: 48 U/L (ref 10–50)
AMMONIA PLAS-SCNC: 31 UMOL/L (ref 16–60)
ANION GAP SERPL CALCULATED.3IONS-SCNC: 9 MMOL/L (ref 9–16)
ARM BAND NUMBER: NORMAL
AST SERPL-CCNC: 85 U/L (ref 10–50)
BASOPHILS # BLD: 0 K/UL (ref 0–0.2)
BASOPHILS NFR BLD: 0 % (ref 0–2)
BILIRUB DIRECT SERPL-MCNC: 0.2 MG/DL (ref 0–0.2)
BILIRUB INDIRECT SERPL-MCNC: 0.1 MG/DL (ref 0–1)
BILIRUB SERPL-MCNC: 0.3 MG/DL (ref 0–1.2)
BLOOD BANK SAMPLE EXPIRATION: NORMAL
BLOOD GROUP ANTIBODIES SERPL: NEGATIVE
BUN SERPL-MCNC: 11 MG/DL (ref 6–20)
CALCIUM SERPL-MCNC: 8.1 MG/DL (ref 8.6–10.4)
CHLORIDE SERPL-SCNC: 104 MMOL/L (ref 98–107)
CO2 SERPL-SCNC: 23 MMOL/L (ref 20–31)
CREAT SERPL-MCNC: 0.6 MG/DL (ref 0.7–1.2)
EKG ATRIAL RATE: 111 BPM
EKG P AXIS: 41 DEGREES
EKG P-R INTERVAL: 114 MS
EKG Q-T INTERVAL: 330 MS
EKG QRS DURATION: 76 MS
EKG QTC CALCULATION (BAZETT): 448 MS
EKG R AXIS: -24 DEGREES
EKG T AXIS: 32 DEGREES
EKG VENTRICULAR RATE: 111 BPM
EOSINOPHIL # BLD: 0 K/UL (ref 0–0.4)
EOSINOPHILS RELATIVE PERCENT: 0 % (ref 1–4)
ERYTHROCYTE [DISTWIDTH] IN BLOOD BY AUTOMATED COUNT: 16.2 % (ref 11.8–14.4)
ERYTHROCYTE [DISTWIDTH] IN BLOOD BY AUTOMATED COUNT: 16.5 % (ref 11.8–14.4)
GFR, ESTIMATED: >90 ML/MIN/1.73M2
GLOBULIN SER CALC-MCNC: 2.7 G/DL
GLUCOSE SERPL-MCNC: 117 MG/DL (ref 74–99)
HCT VFR BLD AUTO: 29.3 % (ref 40.7–50.3)
HCT VFR BLD AUTO: 31.9 % (ref 40.7–50.3)
HGB BLD-MCNC: 9.2 G/DL (ref 13–17)
HGB BLD-MCNC: 9.3 G/DL (ref 13–17)
IMM GRANULOCYTES # BLD AUTO: 0.91 K/UL (ref 0–0.3)
IMM GRANULOCYTES NFR BLD: 5 %
INR PPP: 1.4
LACTIC ACID, WHOLE BLOOD: 1.1 MMOL/L (ref 0.7–2.1)
LACTIC ACID, WHOLE BLOOD: 1.1 MMOL/L (ref 0.7–2.1)
LIPASE SERPL-CCNC: 183 U/L (ref 13–60)
LYMPHOCYTES NFR BLD: 2.9 K/UL (ref 1–4.8)
LYMPHOCYTES RELATIVE PERCENT: 16 % (ref 24–44)
MCH RBC QN AUTO: 26.7 PG (ref 25.2–33.5)
MCH RBC QN AUTO: 26.9 PG (ref 25.2–33.5)
MCHC RBC AUTO-ENTMCNC: 29.2 G/DL (ref 28.4–34.8)
MCHC RBC AUTO-ENTMCNC: 31.4 G/DL (ref 28.4–34.8)
MCV RBC AUTO: 85.7 FL (ref 82.6–102.9)
MCV RBC AUTO: 91.7 FL (ref 82.6–102.9)
MONOCYTES NFR BLD: 1.27 K/UL (ref 0.1–0.8)
MONOCYTES NFR BLD: 7 % (ref 1–7)
MORPHOLOGY: ABNORMAL
NEUTROPHILS NFR BLD: 72 % (ref 36–66)
NEUTS SEG NFR BLD: 13.02 K/UL (ref 1.8–7.7)
NRBC BLD-RTO: 0.4 PER 100 WBC
NRBC BLD-RTO: 0.5 PER 100 WBC
NUCLEATED RED BLOOD CELLS: 1 PER 100 WBC
PARTIAL THROMBOPLASTIN TIME: 106.5 SEC (ref 23–36.5)
PARTIAL THROMBOPLASTIN TIME: 46.7 SEC (ref 23–36.5)
PARTIAL THROMBOPLASTIN TIME: 66.7 SEC (ref 23–36.5)
PHOSPHATE SERPL-MCNC: 2.9 MG/DL (ref 2.5–4.5)
PLATELET # BLD AUTO: 571 K/UL (ref 138–453)
PLATELET # BLD AUTO: 603 K/UL (ref 138–453)
PMV BLD AUTO: 8.7 FL (ref 8.1–13.5)
PMV BLD AUTO: 8.9 FL (ref 8.1–13.5)
POTASSIUM SERPL-SCNC: 4.2 MMOL/L (ref 3.7–5.3)
PROCALCITONIN SERPL-MCNC: 0.86 NG/ML (ref 0–0.09)
PROT SERPL-MCNC: 5.6 G/DL (ref 6.6–8.7)
PROTHROMBIN TIME: 17.2 SEC (ref 11.7–14.9)
RBC # BLD AUTO: 3.42 M/UL (ref 4.21–5.77)
RBC # BLD AUTO: 3.48 M/UL (ref 4.21–5.77)
SODIUM SERPL-SCNC: 136 MMOL/L (ref 136–145)
TRIGL SERPL-MCNC: 117 MG/DL
TROPONIN I SERPL HS-MCNC: 25 NG/L (ref 0–22)
TROPONIN I SERPL HS-MCNC: 30 NG/L (ref 0–22)
TROPONIN I SERPL HS-MCNC: 36 NG/L (ref 0–22)
TROPONIN I SERPL HS-MCNC: 39 NG/L (ref 0–22)
WBC OTHER # BLD: 18.1 K/UL (ref 3.5–11.3)
WBC OTHER # BLD: 19.2 K/UL (ref 3.5–11.3)

## 2025-03-02 PROCEDURE — 6360000002 HC RX W HCPCS

## 2025-03-02 PROCEDURE — 6370000000 HC RX 637 (ALT 250 FOR IP)

## 2025-03-02 PROCEDURE — 86901 BLOOD TYPING SEROLOGIC RH(D): CPT

## 2025-03-02 PROCEDURE — 99223 1ST HOSP IP/OBS HIGH 75: CPT | Performed by: INTERNAL MEDICINE

## 2025-03-02 PROCEDURE — 86900 BLOOD TYPING SEROLOGIC ABO: CPT

## 2025-03-02 PROCEDURE — 36415 COLL VENOUS BLD VENIPUNCTURE: CPT

## 2025-03-02 PROCEDURE — 85025 COMPLETE CBC W/AUTO DIFF WBC: CPT

## 2025-03-02 PROCEDURE — 76705 ECHO EXAM OF ABDOMEN: CPT

## 2025-03-02 PROCEDURE — 2500000003 HC RX 250 WO HCPCS

## 2025-03-02 PROCEDURE — 85730 THROMBOPLASTIN TIME PARTIAL: CPT

## 2025-03-02 PROCEDURE — 85610 PROTHROMBIN TIME: CPT

## 2025-03-02 PROCEDURE — 83690 ASSAY OF LIPASE: CPT

## 2025-03-02 PROCEDURE — 71275 CT ANGIOGRAPHY CHEST: CPT

## 2025-03-02 PROCEDURE — 2060000000 HC ICU INTERMEDIATE R&B

## 2025-03-02 PROCEDURE — 84100 ASSAY OF PHOSPHORUS: CPT

## 2025-03-02 PROCEDURE — 84478 ASSAY OF TRIGLYCERIDES: CPT

## 2025-03-02 PROCEDURE — 80076 HEPATIC FUNCTION PANEL: CPT

## 2025-03-02 PROCEDURE — 84484 ASSAY OF TROPONIN QUANT: CPT

## 2025-03-02 PROCEDURE — 85027 COMPLETE CBC AUTOMATED: CPT

## 2025-03-02 PROCEDURE — 6360000004 HC RX CONTRAST MEDICATION

## 2025-03-02 PROCEDURE — 80048 BASIC METABOLIC PNL TOTAL CA: CPT

## 2025-03-02 PROCEDURE — 93005 ELECTROCARDIOGRAM TRACING: CPT

## 2025-03-02 PROCEDURE — 86850 RBC ANTIBODY SCREEN: CPT

## 2025-03-02 PROCEDURE — 6370000000 HC RX 637 (ALT 250 FOR IP): Performed by: STUDENT IN AN ORGANIZED HEALTH CARE EDUCATION/TRAINING PROGRAM

## 2025-03-02 PROCEDURE — 84145 PROCALCITONIN (PCT): CPT

## 2025-03-02 PROCEDURE — 71045 X-RAY EXAM CHEST 1 VIEW: CPT

## 2025-03-02 PROCEDURE — 93010 ELECTROCARDIOGRAM REPORT: CPT | Performed by: INTERNAL MEDICINE

## 2025-03-02 PROCEDURE — 82140 ASSAY OF AMMONIA: CPT

## 2025-03-02 PROCEDURE — 94640 AIRWAY INHALATION TREATMENT: CPT

## 2025-03-02 PROCEDURE — 83605 ASSAY OF LACTIC ACID: CPT

## 2025-03-02 RX ORDER — DEXTROSE MONOHYDRATE, SODIUM CHLORIDE, AND POTASSIUM CHLORIDE 50; 1.49; 4.5 G/1000ML; G/1000ML; G/1000ML
INJECTION, SOLUTION INTRAVENOUS CONTINUOUS
Status: DISCONTINUED | OUTPATIENT
Start: 2025-03-02 | End: 2025-03-04

## 2025-03-02 RX ORDER — HEPARIN SODIUM 1000 [USP'U]/ML
10000 INJECTION, SOLUTION INTRAVENOUS; SUBCUTANEOUS ONCE
Status: DISCONTINUED | OUTPATIENT
Start: 2025-03-02 | End: 2025-03-02

## 2025-03-02 RX ORDER — IPRATROPIUM BROMIDE AND ALBUTEROL SULFATE 2.5; .5 MG/3ML; MG/3ML
1 SOLUTION RESPIRATORY (INHALATION) EVERY 4 HOURS PRN
Status: DISCONTINUED | OUTPATIENT
Start: 2025-03-02 | End: 2025-03-12 | Stop reason: HOSPADM

## 2025-03-02 RX ORDER — FENTANYL CITRATE 50 UG/ML
50 INJECTION, SOLUTION INTRAMUSCULAR; INTRAVENOUS ONCE
Status: COMPLETED | OUTPATIENT
Start: 2025-03-02 | End: 2025-03-02

## 2025-03-02 RX ORDER — IPRATROPIUM BROMIDE AND ALBUTEROL SULFATE 2.5; .5 MG/3ML; MG/3ML
1 SOLUTION RESPIRATORY (INHALATION) 3 TIMES DAILY
Status: DISCONTINUED | OUTPATIENT
Start: 2025-03-02 | End: 2025-03-02

## 2025-03-02 RX ORDER — HEPARIN SODIUM 1000 [USP'U]/ML
5000 INJECTION, SOLUTION INTRAVENOUS; SUBCUTANEOUS PRN
Status: DISCONTINUED | OUTPATIENT
Start: 2025-03-02 | End: 2025-03-07

## 2025-03-02 RX ORDER — ACETAMINOPHEN 325 MG/1
650 TABLET ORAL EVERY 4 HOURS PRN
Status: DISCONTINUED | OUTPATIENT
Start: 2025-03-02 | End: 2025-03-03

## 2025-03-02 RX ORDER — HEPARIN SODIUM 1000 [USP'U]/ML
10000 INJECTION, SOLUTION INTRAVENOUS; SUBCUTANEOUS PRN
Status: DISCONTINUED | OUTPATIENT
Start: 2025-03-02 | End: 2025-03-07

## 2025-03-02 RX ORDER — HEPARIN SODIUM 1000 [USP'U]/ML
10000 INJECTION, SOLUTION INTRAVENOUS; SUBCUTANEOUS ONCE
Status: COMPLETED | OUTPATIENT
Start: 2025-03-02 | End: 2025-03-02

## 2025-03-02 RX ORDER — IOPAMIDOL 755 MG/ML
100 INJECTION, SOLUTION INTRAVASCULAR
Status: COMPLETED | OUTPATIENT
Start: 2025-03-02 | End: 2025-03-02

## 2025-03-02 RX ORDER — IPRATROPIUM BROMIDE AND ALBUTEROL SULFATE 2.5; .5 MG/3ML; MG/3ML
1 SOLUTION RESPIRATORY (INHALATION)
Status: DISCONTINUED | OUTPATIENT
Start: 2025-03-02 | End: 2025-03-02

## 2025-03-02 RX ORDER — HEPARIN SODIUM 10000 [USP'U]/100ML
5-30 INJECTION, SOLUTION INTRAVENOUS CONTINUOUS
Status: DISPENSED | OUTPATIENT
Start: 2025-03-02 | End: 2025-03-04

## 2025-03-02 RX ORDER — FENTANYL CITRATE 50 UG/ML
50 INJECTION, SOLUTION INTRAMUSCULAR; INTRAVENOUS
Status: COMPLETED | OUTPATIENT
Start: 2025-03-02 | End: 2025-03-02

## 2025-03-02 RX ORDER — HEPARIN SODIUM 10000 [USP'U]/100ML
5-30 INJECTION, SOLUTION INTRAVENOUS CONTINUOUS
Status: DISCONTINUED | OUTPATIENT
Start: 2025-03-02 | End: 2025-03-02

## 2025-03-02 RX ORDER — HEPARIN SODIUM 1000 [USP'U]/ML
5000 INJECTION, SOLUTION INTRAVENOUS; SUBCUTANEOUS PRN
Status: DISCONTINUED | OUTPATIENT
Start: 2025-03-02 | End: 2025-03-02

## 2025-03-02 RX ORDER — HEPARIN SODIUM 1000 [USP'U]/ML
10000 INJECTION, SOLUTION INTRAVENOUS; SUBCUTANEOUS PRN
Status: DISCONTINUED | OUTPATIENT
Start: 2025-03-02 | End: 2025-03-02

## 2025-03-02 RX ORDER — IPRATROPIUM BROMIDE AND ALBUTEROL SULFATE 2.5; .5 MG/3ML; MG/3ML
1 SOLUTION RESPIRATORY (INHALATION) 2 TIMES DAILY
Status: DISCONTINUED | OUTPATIENT
Start: 2025-03-02 | End: 2025-03-04

## 2025-03-02 RX ADMIN — FENTANYL CITRATE 50 MCG: 50 INJECTION, SOLUTION INTRAMUSCULAR; INTRAVENOUS at 18:25

## 2025-03-02 RX ADMIN — HYDROMORPHONE HYDROCHLORIDE 1 MG: 1 INJECTION, SOLUTION INTRAMUSCULAR; INTRAVENOUS; SUBCUTANEOUS at 08:46

## 2025-03-02 RX ADMIN — METRONIDAZOLE 500 MG: 500 INJECTION, SOLUTION INTRAVENOUS at 09:09

## 2025-03-02 RX ADMIN — ATORVASTATIN CALCIUM 40 MG: 40 TABLET, FILM COATED ORAL at 20:45

## 2025-03-02 RX ADMIN — HEPARIN SODIUM 10000 UNITS: 1000 INJECTION INTRAVENOUS; SUBCUTANEOUS at 17:26

## 2025-03-02 RX ADMIN — METHOCARBAMOL TABLETS 750 MG: 750 TABLET, COATED ORAL at 17:19

## 2025-03-02 RX ADMIN — HEPARIN SODIUM 10000 UNITS: 1000 INJECTION INTRAVENOUS; SUBCUTANEOUS at 10:18

## 2025-03-02 RX ADMIN — HYDROMORPHONE HYDROCHLORIDE 1 MG: 1 INJECTION, SOLUTION INTRAMUSCULAR; INTRAVENOUS; SUBCUTANEOUS at 03:21

## 2025-03-02 RX ADMIN — DOCUSATE SODIUM 100 MG: 100 CAPSULE, LIQUID FILLED ORAL at 10:12

## 2025-03-02 RX ADMIN — OXYCODONE 5 MG: 5 TABLET ORAL at 17:19

## 2025-03-02 RX ADMIN — METHOCARBAMOL TABLETS 750 MG: 750 TABLET, COATED ORAL at 06:19

## 2025-03-02 RX ADMIN — FAMOTIDINE 20 MG: 20 TABLET, FILM COATED ORAL at 10:12

## 2025-03-02 RX ADMIN — IPRATROPIUM BROMIDE AND ALBUTEROL SULFATE 1 DOSE: .5; 2.5 SOLUTION RESPIRATORY (INHALATION) at 15:14

## 2025-03-02 RX ADMIN — ACETAMINOPHEN 1000 MG: 500 TABLET ORAL at 06:19

## 2025-03-02 RX ADMIN — HYDROMORPHONE HYDROCHLORIDE 1 MG: 1 INJECTION, SOLUTION INTRAMUSCULAR; INTRAVENOUS; SUBCUTANEOUS at 00:39

## 2025-03-02 RX ADMIN — GABAPENTIN 600 MG: 300 CAPSULE ORAL at 13:49

## 2025-03-02 RX ADMIN — METHOCARBAMOL TABLETS 750 MG: 750 TABLET, COATED ORAL at 10:12

## 2025-03-02 RX ADMIN — METRONIDAZOLE 500 MG: 500 INJECTION, SOLUTION INTRAVENOUS at 17:18

## 2025-03-02 RX ADMIN — HYDROMORPHONE HYDROCHLORIDE 1 MG: 1 INJECTION, SOLUTION INTRAMUSCULAR; INTRAVENOUS; SUBCUTANEOUS at 22:12

## 2025-03-02 RX ADMIN — AMLODIPINE BESYLATE 5 MG: 5 TABLET ORAL at 10:12

## 2025-03-02 RX ADMIN — HYDROMORPHONE HYDROCHLORIDE 1 MG: 1 INJECTION, SOLUTION INTRAMUSCULAR; INTRAVENOUS; SUBCUTANEOUS at 07:49

## 2025-03-02 RX ADMIN — ACETAMINOPHEN 650 MG: 325 TABLET ORAL at 21:03

## 2025-03-02 RX ADMIN — ACETAMINOPHEN 1000 MG: 500 TABLET ORAL at 13:49

## 2025-03-02 RX ADMIN — HYDROMORPHONE HYDROCHLORIDE 1 MG: 1 INJECTION, SOLUTION INTRAMUSCULAR; INTRAVENOUS; SUBCUTANEOUS at 11:20

## 2025-03-02 RX ADMIN — GABAPENTIN 600 MG: 300 CAPSULE ORAL at 20:45

## 2025-03-02 RX ADMIN — SODIUM CHLORIDE, PRESERVATIVE FREE 10 ML: 5 INJECTION INTRAVENOUS at 20:45

## 2025-03-02 RX ADMIN — CIPROFLOXACIN 400 MG: 2 INJECTION, SOLUTION INTRAVENOUS at 10:24

## 2025-03-02 RX ADMIN — CIPROFLOXACIN 400 MG: 2 INJECTION, SOLUTION INTRAVENOUS at 22:17

## 2025-03-02 RX ADMIN — IPRATROPIUM BROMIDE AND ALBUTEROL SULFATE 1 DOSE: .5; 2.5 SOLUTION RESPIRATORY (INHALATION) at 20:03

## 2025-03-02 RX ADMIN — HYDROMORPHONE HYDROCHLORIDE 1 MG: 1 INJECTION, SOLUTION INTRAMUSCULAR; INTRAVENOUS; SUBCUTANEOUS at 13:34

## 2025-03-02 RX ADMIN — HEPARIN SODIUM 20 UNITS/KG/HR: 10000 INJECTION, SOLUTION INTRAVENOUS at 23:47

## 2025-03-02 RX ADMIN — HEPARIN SODIUM 20 UNITS/KG/HR: 10000 INJECTION, SOLUTION INTRAVENOUS at 21:54

## 2025-03-02 RX ADMIN — OXYCODONE 5 MG: 5 TABLET ORAL at 10:12

## 2025-03-02 RX ADMIN — FENTANYL CITRATE 50 MCG: 50 INJECTION, SOLUTION INTRAMUSCULAR; INTRAVENOUS at 20:44

## 2025-03-02 RX ADMIN — DEXTROSE, SODIUM CHLORIDE, AND POTASSIUM CHLORIDE: 5; .45; .15 INJECTION INTRAVENOUS at 13:55

## 2025-03-02 RX ADMIN — FAMOTIDINE 20 MG: 20 TABLET, FILM COATED ORAL at 20:45

## 2025-03-02 RX ADMIN — GABAPENTIN 600 MG: 300 CAPSULE ORAL at 06:19

## 2025-03-02 RX ADMIN — IOPAMIDOL 100 ML: 755 INJECTION, SOLUTION INTRAVENOUS at 08:19

## 2025-03-02 RX ADMIN — HYDROMORPHONE HYDROCHLORIDE 1 MG: 1 INJECTION, SOLUTION INTRAMUSCULAR; INTRAVENOUS; SUBCUTANEOUS at 15:53

## 2025-03-02 RX ADMIN — IPRATROPIUM BROMIDE AND ALBUTEROL SULFATE 1 DOSE: .5; 2.5 SOLUTION RESPIRATORY (INHALATION) at 06:00

## 2025-03-02 RX ADMIN — HEPARIN SODIUM 16 UNITS/KG/HR: 10000 INJECTION, SOLUTION INTRAVENOUS at 10:17

## 2025-03-02 ASSESSMENT — PAIN DESCRIPTION - LOCATION
LOCATION: ABDOMEN
LOCATION: ABDOMEN;CHEST
LOCATION: ABDOMEN
LOCATION: ABDOMEN;CHEST
LOCATION: ABDOMEN
LOCATION: ABDOMEN;CHEST

## 2025-03-02 ASSESSMENT — PAIN SCALES - GENERAL
PAINLEVEL_OUTOF10: 7
PAINLEVEL_OUTOF10: 8
PAINLEVEL_OUTOF10: 10
PAINLEVEL_OUTOF10: 8
PAINLEVEL_OUTOF10: 7
PAINLEVEL_OUTOF10: 8
PAINLEVEL_OUTOF10: 8
PAINLEVEL_OUTOF10: 7
PAINLEVEL_OUTOF10: 6
PAINLEVEL_OUTOF10: 9
PAINLEVEL_OUTOF10: 8
PAINLEVEL_OUTOF10: 10
PAINLEVEL_OUTOF10: 8
PAINLEVEL_OUTOF10: 9
PAINLEVEL_OUTOF10: 7
PAINLEVEL_OUTOF10: 7
PAINLEVEL_OUTOF10: 6
PAINLEVEL_OUTOF10: 7
PAINLEVEL_OUTOF10: 8
PAINLEVEL_OUTOF10: 7

## 2025-03-02 ASSESSMENT — PAIN DESCRIPTION - ORIENTATION
ORIENTATION: MID
ORIENTATION: RIGHT
ORIENTATION: RIGHT
ORIENTATION: MID;RIGHT
ORIENTATION: MID

## 2025-03-02 ASSESSMENT — PAIN DESCRIPTION - PAIN TYPE: TYPE: ACUTE PAIN

## 2025-03-02 ASSESSMENT — PAIN DESCRIPTION - DESCRIPTORS
DESCRIPTORS: PRESSURE
DESCRIPTORS: CRAMPING;STABBING
DESCRIPTORS: CRAMPING;PRESSURE
DESCRIPTORS: PRESSURE;CRAMPING
DESCRIPTORS: SPASM;STABBING

## 2025-03-02 ASSESSMENT — PAIN - FUNCTIONAL ASSESSMENT
PAIN_FUNCTIONAL_ASSESSMENT: ACTIVITIES ARE NOT PREVENTED
PAIN_FUNCTIONAL_ASSESSMENT: ACTIVITIES ARE NOT PREVENTED

## 2025-03-02 NOTE — CONSULTS
Vascular Surgery    Discussed with surgical resident. Urgent transfer to [place] [facility] due to new on set rest pain, concern for ischemic leg after wray's procedure post op. CTA reviewed, has aortic thrombus and left popliteal occlusion with distal reconstitution. Will need left leg angiogram, possible open thrombectomy and fasciotomy.    Electronically signed by MASON BRENNAN MD

## 2025-03-02 NOTE — RT PROTOCOL NOTE
RT Inhaler-Nebulizer Bronchodilator Protocol Note    There is a bronchodilator order in the chart from a provider indicating to follow the RT Bronchodilator Protocol and there is an “Initiate RT Inhaler-Nebulizer Bronchodilator Protocol” order as well (see protocol at bottom of note).      The findings from the last RT Protocol Assessment were as follows:   History Pulmonary Disease: None or smoker <15 pack years  Respiratory Pattern: Mild dyspnea at rest, irregular pattern, or RR 21-25 bpm  Breath Sounds: Slightly diminished and/or crackles  Cough: Strong, spontaneous, non-productive  Indication for Bronchodilator Therapy: Decreased or absent breath sounds  Bronchodilator Assessment Score: 6    Aerosolized bronchodilator medication orders have been revised according to the RT Inhaler-Nebulizer Bronchodilator Protocol below.    Respiratory Therapist to perform RT Therapy Protocol Assessment initially then follow the protocol.  Repeat RT Therapy Protocol Assessment PRN for score 0-3 or on second treatment, BID, and PRN for scores above 3.    No Indications - adjust the frequency to every 6 hours PRN wheezing or bronchospasm, if no treatments needed after 48 hours then discontinue using Per Protocol order mode.     If indication present, adjust the RT bronchodilator orders based on the Bronchodilator Assessment Score as indicated below.  Use Inhaler orders unless patient has one or more of the following: on home nebulizer, not able to hold breath for 10 seconds, is not alert and oriented, cannot activate and use MDI correctly, or respiratory rate 25 breaths per minute or more, then use the equivalent nebulizer order(s) with same Frequency and PRN reasons based on the score.  If a patient is on this medication at home then do not decrease Frequency below that used at home.    0-3 - enter or revise RT bronchodilator order(s) to equivalent RT Bronchodilator order with Frequency of every 4 hours PRN for wheezing or

## 2025-03-03 ENCOUNTER — APPOINTMENT (OUTPATIENT)
Age: 50
DRG: 169 | End: 2025-03-03
Payer: COMMERCIAL

## 2025-03-03 ENCOUNTER — APPOINTMENT (OUTPATIENT)
Dept: GENERAL RADIOLOGY | Age: 50
DRG: 169 | End: 2025-03-03
Payer: COMMERCIAL

## 2025-03-03 ENCOUNTER — APPOINTMENT (OUTPATIENT)
Dept: NUCLEAR MEDICINE | Age: 50
DRG: 169 | End: 2025-03-03
Payer: COMMERCIAL

## 2025-03-03 PROBLEM — Z93.3 STATUS POST HARTMANN PROCEDURE (HCC): Status: ACTIVE | Noted: 2025-03-03

## 2025-03-03 LAB
ANION GAP SERPL CALCULATED.3IONS-SCNC: 7 MMOL/L (ref 9–16)
BASOPHILS # BLD: 0 K/UL (ref 0–0.2)
BASOPHILS NFR BLD: 0 % (ref 0–2)
BODY TEMPERATURE: 37
BUN SERPL-MCNC: 9 MG/DL (ref 6–20)
CALCIUM SERPL-MCNC: 7.9 MG/DL (ref 8.6–10.4)
CHLORIDE SERPL-SCNC: 103 MMOL/L (ref 98–107)
CO2 SERPL-SCNC: 25 MMOL/L (ref 20–31)
COHGB MFR BLD: 0.2 % (ref 0–5)
CREAT SERPL-MCNC: 0.5 MG/DL (ref 0.7–1.2)
ECHO LV EF PHYSICIAN: 55 %
EOSINOPHIL # BLD: 0.19 K/UL (ref 0–0.4)
EOSINOPHILS RELATIVE PERCENT: 1 % (ref 1–4)
ERYTHROCYTE [DISTWIDTH] IN BLOOD BY AUTOMATED COUNT: 16.2 % (ref 11.8–14.4)
FIO2 ON VENT: ABNORMAL %
GFR, ESTIMATED: >90 ML/MIN/1.73M2
GLUCOSE SERPL-MCNC: 127 MG/DL (ref 74–99)
HCO3 VENOUS: 26.7 MMOL/L (ref 24–30)
HCT VFR BLD AUTO: 27.6 % (ref 40.7–50.3)
HGB BLD-MCNC: 8.8 G/DL (ref 13–17)
IMM GRANULOCYTES # BLD AUTO: 1.33 K/UL (ref 0–0.3)
IMM GRANULOCYTES NFR BLD: 7 %
INR PPP: 1.3
LYMPHOCYTES NFR BLD: 1.71 K/UL (ref 1–4.8)
LYMPHOCYTES RELATIVE PERCENT: 9 % (ref 24–44)
MCH RBC QN AUTO: 27.2 PG (ref 25.2–33.5)
MCHC RBC AUTO-ENTMCNC: 31.9 G/DL (ref 28.4–34.8)
MCV RBC AUTO: 85.2 FL (ref 82.6–102.9)
MONOCYTES NFR BLD: 1.52 K/UL (ref 0.1–0.8)
MONOCYTES NFR BLD: 8 % (ref 1–7)
MORPHOLOGY: ABNORMAL
MORPHOLOGY: ABNORMAL
NEUTROPHILS NFR BLD: 75 % (ref 36–66)
NEUTS SEG NFR BLD: 14.25 K/UL (ref 1.8–7.7)
NRBC BLD-RTO: 0.2 PER 100 WBC
O2 SAT, VEN: 93.9 % (ref 60–85)
PARTIAL THROMBOPLASTIN TIME: 44.4 SEC (ref 23–36.5)
PARTIAL THROMBOPLASTIN TIME: 57.5 SEC (ref 23–36.5)
PCO2 VENOUS: 40.7 MM HG (ref 39–55)
PH VENOUS: 7.43 (ref 7.32–7.42)
PLATELET # BLD AUTO: 640 K/UL (ref 138–453)
PMV BLD AUTO: 9.3 FL (ref 8.1–13.5)
PO2 VENOUS: 67 MM HG (ref 30–50)
POSITIVE BASE EXCESS, VEN: 2.3 MMOL/L (ref 0–2)
POTASSIUM SERPL-SCNC: 4.4 MMOL/L (ref 3.7–5.3)
PROTHROMBIN TIME: 16.7 SEC (ref 11.7–14.9)
RBC # BLD AUTO: 3.24 M/UL (ref 4.21–5.77)
SODIUM SERPL-SCNC: 135 MMOL/L (ref 136–145)
TROPONIN I SERPL HS-MCNC: 28 NG/L (ref 0–22)
WBC OTHER # BLD: 19 K/UL (ref 3.5–11.3)

## 2025-03-03 PROCEDURE — 97530 THERAPEUTIC ACTIVITIES: CPT

## 2025-03-03 PROCEDURE — 2500000003 HC RX 250 WO HCPCS

## 2025-03-03 PROCEDURE — 97110 THERAPEUTIC EXERCISES: CPT

## 2025-03-03 PROCEDURE — 97606 NEG PRS WND THER DME>50 SQCM: CPT

## 2025-03-03 PROCEDURE — 6370000000 HC RX 637 (ALT 250 FOR IP)

## 2025-03-03 PROCEDURE — 99231 SBSQ HOSP IP/OBS SF/LOW 25: CPT | Performed by: SURGERY

## 2025-03-03 PROCEDURE — 85025 COMPLETE CBC W/AUTO DIFF WBC: CPT

## 2025-03-03 PROCEDURE — 85610 PROTHROMBIN TIME: CPT

## 2025-03-03 PROCEDURE — 94761 N-INVAS EAR/PLS OXIMETRY MLT: CPT

## 2025-03-03 PROCEDURE — 6360000002 HC RX W HCPCS

## 2025-03-03 PROCEDURE — 93308 TTE F-UP OR LMTD: CPT | Performed by: INTERNAL MEDICINE

## 2025-03-03 PROCEDURE — 71045 X-RAY EXAM CHEST 1 VIEW: CPT

## 2025-03-03 PROCEDURE — 87040 BLOOD CULTURE FOR BACTERIA: CPT

## 2025-03-03 PROCEDURE — 97535 SELF CARE MNGMENT TRAINING: CPT

## 2025-03-03 PROCEDURE — 93308 TTE F-UP OR LMTD: CPT

## 2025-03-03 PROCEDURE — 2060000000 HC ICU INTERMEDIATE R&B

## 2025-03-03 PROCEDURE — 99222 1ST HOSP IP/OBS MODERATE 55: CPT | Performed by: STUDENT IN AN ORGANIZED HEALTH CARE EDUCATION/TRAINING PROGRAM

## 2025-03-03 PROCEDURE — 82805 BLOOD GASES W/O2 SATURATION: CPT

## 2025-03-03 PROCEDURE — 99233 SBSQ HOSP IP/OBS HIGH 50: CPT | Performed by: NURSE PRACTITIONER

## 2025-03-03 PROCEDURE — 6360000002 HC RX W HCPCS: Performed by: STUDENT IN AN ORGANIZED HEALTH CARE EDUCATION/TRAINING PROGRAM

## 2025-03-03 PROCEDURE — 84484 ASSAY OF TROPONIN QUANT: CPT

## 2025-03-03 PROCEDURE — 6370000000 HC RX 637 (ALT 250 FOR IP): Performed by: STUDENT IN AN ORGANIZED HEALTH CARE EDUCATION/TRAINING PROGRAM

## 2025-03-03 PROCEDURE — 80048 BASIC METABOLIC PNL TOTAL CA: CPT

## 2025-03-03 PROCEDURE — 36415 COLL VENOUS BLD VENIPUNCTURE: CPT

## 2025-03-03 PROCEDURE — 2700000000 HC OXYGEN THERAPY PER DAY

## 2025-03-03 PROCEDURE — 85730 THROMBOPLASTIN TIME PARTIAL: CPT

## 2025-03-03 PROCEDURE — 94640 AIRWAY INHALATION TREATMENT: CPT

## 2025-03-03 RX ORDER — LIDOCAINE 4 G/G
1 PATCH TOPICAL DAILY
Status: DISCONTINUED | OUTPATIENT
Start: 2025-03-03 | End: 2025-03-12 | Stop reason: HOSPADM

## 2025-03-03 RX ORDER — IBUPROFEN 400 MG/1
400 TABLET, FILM COATED ORAL EVERY 4 HOURS PRN
Status: DISCONTINUED | OUTPATIENT
Start: 2025-03-03 | End: 2025-03-05

## 2025-03-03 RX ORDER — SIMETHICONE 80 MG
80 TABLET,CHEWABLE ORAL EVERY 6 HOURS PRN
Status: DISCONTINUED | OUTPATIENT
Start: 2025-03-03 | End: 2025-03-12 | Stop reason: HOSPADM

## 2025-03-03 RX ORDER — ACETAMINOPHEN 500 MG
1000 TABLET ORAL EVERY 8 HOURS SCHEDULED
Status: DISCONTINUED | OUTPATIENT
Start: 2025-03-03 | End: 2025-03-07

## 2025-03-03 RX ORDER — IBUPROFEN 400 MG/1
400 TABLET, FILM COATED ORAL EVERY 6 HOURS PRN
Status: DISCONTINUED | OUTPATIENT
Start: 2025-03-03 | End: 2025-03-03

## 2025-03-03 RX ADMIN — HYDROMORPHONE HYDROCHLORIDE 1 MG: 1 INJECTION, SOLUTION INTRAMUSCULAR; INTRAVENOUS; SUBCUTANEOUS at 14:40

## 2025-03-03 RX ADMIN — HYDROMORPHONE HYDROCHLORIDE 1 MG: 1 INJECTION, SOLUTION INTRAMUSCULAR; INTRAVENOUS; SUBCUTANEOUS at 18:52

## 2025-03-03 RX ADMIN — ACETAMINOPHEN 1000 MG: 500 TABLET ORAL at 05:26

## 2025-03-03 RX ADMIN — METHOCARBAMOL TABLETS 750 MG: 750 TABLET, COATED ORAL at 23:27

## 2025-03-03 RX ADMIN — DEXTROSE, SODIUM CHLORIDE, AND POTASSIUM CHLORIDE: 5; .45; .15 INJECTION INTRAVENOUS at 02:21

## 2025-03-03 RX ADMIN — DEXTROSE, SODIUM CHLORIDE, AND POTASSIUM CHLORIDE: 5; .45; .15 INJECTION INTRAVENOUS at 17:26

## 2025-03-03 RX ADMIN — GABAPENTIN 600 MG: 300 CAPSULE ORAL at 14:40

## 2025-03-03 RX ADMIN — HEPARIN SODIUM 26 UNITS/KG/HR: 10000 INJECTION, SOLUTION INTRAVENOUS at 17:25

## 2025-03-03 RX ADMIN — GABAPENTIN 600 MG: 300 CAPSULE ORAL at 05:26

## 2025-03-03 RX ADMIN — ACETAMINOPHEN 1000 MG: 500 TABLET ORAL at 20:57

## 2025-03-03 RX ADMIN — ACETAMINOPHEN 1000 MG: 500 TABLET ORAL at 14:39

## 2025-03-03 RX ADMIN — OXYCODONE 5 MG: 5 TABLET ORAL at 22:01

## 2025-03-03 RX ADMIN — HYDROMORPHONE HYDROCHLORIDE 1 MG: 1 INJECTION, SOLUTION INTRAMUSCULAR; INTRAVENOUS; SUBCUTANEOUS at 00:59

## 2025-03-03 RX ADMIN — CIPROFLOXACIN 400 MG: 2 INJECTION, SOLUTION INTRAVENOUS at 22:03

## 2025-03-03 RX ADMIN — HEPARIN SODIUM 22 UNITS/KG/HR: 10000 INJECTION, SOLUTION INTRAVENOUS at 09:18

## 2025-03-03 RX ADMIN — OXYCODONE 5 MG: 5 TABLET ORAL at 17:14

## 2025-03-03 RX ADMIN — IBUPROFEN 400 MG: 400 TABLET, FILM COATED ORAL at 09:13

## 2025-03-03 RX ADMIN — HYDROMORPHONE HYDROCHLORIDE 1 MG: 1 INJECTION, SOLUTION INTRAMUSCULAR; INTRAVENOUS; SUBCUTANEOUS at 20:58

## 2025-03-03 RX ADMIN — ATORVASTATIN CALCIUM 40 MG: 40 TABLET, FILM COATED ORAL at 20:57

## 2025-03-03 RX ADMIN — METHOCARBAMOL TABLETS 750 MG: 750 TABLET, COATED ORAL at 01:01

## 2025-03-03 RX ADMIN — CIPROFLOXACIN 400 MG: 2 INJECTION, SOLUTION INTRAVENOUS at 12:12

## 2025-03-03 RX ADMIN — IPRATROPIUM BROMIDE AND ALBUTEROL SULFATE 1 DOSE: .5; 2.5 SOLUTION RESPIRATORY (INHALATION) at 20:27

## 2025-03-03 RX ADMIN — AMLODIPINE BESYLATE 5 MG: 5 TABLET ORAL at 09:12

## 2025-03-03 RX ADMIN — METHOCARBAMOL TABLETS 750 MG: 750 TABLET, COATED ORAL at 12:07

## 2025-03-03 RX ADMIN — FAMOTIDINE 20 MG: 20 TABLET, FILM COATED ORAL at 09:12

## 2025-03-03 RX ADMIN — HYDROMORPHONE HYDROCHLORIDE 1 MG: 1 INJECTION, SOLUTION INTRAMUSCULAR; INTRAVENOUS; SUBCUTANEOUS at 05:25

## 2025-03-03 RX ADMIN — DOCUSATE SODIUM 100 MG: 100 CAPSULE, LIQUID FILLED ORAL at 09:13

## 2025-03-03 RX ADMIN — SODIUM CHLORIDE, PRESERVATIVE FREE 10 ML: 5 INJECTION INTRAVENOUS at 21:00

## 2025-03-03 RX ADMIN — METHOCARBAMOL TABLETS 750 MG: 750 TABLET, COATED ORAL at 17:14

## 2025-03-03 RX ADMIN — METRONIDAZOLE 500 MG: 500 INJECTION, SOLUTION INTRAVENOUS at 17:22

## 2025-03-03 RX ADMIN — HEPARIN SODIUM 5000 UNITS: 1000 INJECTION INTRAVENOUS; SUBCUTANEOUS at 07:29

## 2025-03-03 RX ADMIN — HYDROMORPHONE HYDROCHLORIDE 1 MG: 1 INJECTION, SOLUTION INTRAMUSCULAR; INTRAVENOUS; SUBCUTANEOUS at 10:04

## 2025-03-03 RX ADMIN — FAMOTIDINE 20 MG: 20 TABLET, FILM COATED ORAL at 21:00

## 2025-03-03 RX ADMIN — METHOCARBAMOL TABLETS 750 MG: 750 TABLET, COATED ORAL at 05:26

## 2025-03-03 RX ADMIN — METRONIDAZOLE 500 MG: 500 INJECTION, SOLUTION INTRAVENOUS at 01:09

## 2025-03-03 RX ADMIN — GABAPENTIN 600 MG: 300 CAPSULE ORAL at 20:57

## 2025-03-03 RX ADMIN — OXYCODONE 5 MG: 5 TABLET ORAL at 09:12

## 2025-03-03 RX ADMIN — METRONIDAZOLE 500 MG: 500 INJECTION, SOLUTION INTRAVENOUS at 10:04

## 2025-03-03 RX ADMIN — HYDROMORPHONE HYDROCHLORIDE 1 MG: 1 INJECTION, SOLUTION INTRAMUSCULAR; INTRAVENOUS; SUBCUTANEOUS at 12:07

## 2025-03-03 RX ADMIN — HEPARIN SODIUM 10000 UNITS: 1000 INJECTION INTRAVENOUS; SUBCUTANEOUS at 17:17

## 2025-03-03 ASSESSMENT — PAIN DESCRIPTION - ORIENTATION
ORIENTATION: RIGHT
ORIENTATION: RIGHT
ORIENTATION: LEFT
ORIENTATION: RIGHT;LEFT;MID;LOWER
ORIENTATION: RIGHT

## 2025-03-03 ASSESSMENT — PAIN SCALES - GENERAL
PAINLEVEL_OUTOF10: 7
PAINLEVEL_OUTOF10: 7
PAINLEVEL_OUTOF10: 6
PAINLEVEL_OUTOF10: 7
PAINLEVEL_OUTOF10: 8
PAINLEVEL_OUTOF10: 7
PAINLEVEL_OUTOF10: 8
PAINLEVEL_OUTOF10: 6
PAINLEVEL_OUTOF10: 5
PAINLEVEL_OUTOF10: 5
PAINLEVEL_OUTOF10: 7
PAINLEVEL_OUTOF10: 7
PAINLEVEL_OUTOF10: 5
PAINLEVEL_OUTOF10: 0
PAINLEVEL_OUTOF10: 7
PAINLEVEL_OUTOF10: 8
PAINLEVEL_OUTOF10: 6
PAINLEVEL_OUTOF10: 7
PAINLEVEL_OUTOF10: 7
PAINLEVEL_OUTOF10: 6
PAINLEVEL_OUTOF10: 5
PAINLEVEL_OUTOF10: 7

## 2025-03-03 ASSESSMENT — PAIN DESCRIPTION - DESCRIPTORS
DESCRIPTORS: ACHING;PRESSURE;SHARP
DESCRIPTORS: PRESSURE;DISCOMFORT
DESCRIPTORS: ACHING;DISCOMFORT
DESCRIPTORS: STABBING
DESCRIPTORS: ACHING

## 2025-03-03 ASSESSMENT — PAIN DESCRIPTION - LOCATION
LOCATION: ABDOMEN;BACK
LOCATION: ABDOMEN
LOCATION: LEG
LOCATION: ABDOMEN;BACK
LOCATION: ABDOMEN
LOCATION: ABDOMEN
LOCATION: ABDOMEN;BACK;LEG
LOCATION: ABDOMEN;BACK;LEG
LOCATION: BACK
LOCATION: ABDOMEN;BACK

## 2025-03-03 ASSESSMENT — PAIN - FUNCTIONAL ASSESSMENT: PAIN_FUNCTIONAL_ASSESSMENT: PREVENTS OR INTERFERES SOME ACTIVE ACTIVITIES AND ADLS

## 2025-03-03 NOTE — CARE COORDINATION
Transitional planning    VM from Yady [phone] at [facility] that they are able to accept.     0823 Spoke to Yady and [facility] is [facility]    0825 Unable to talk to patient at this time. Echo being done at bedside    0837 doctors at bedside making rounds    0840 medicine team says patient is not ready for discharge.     0841 PS to Ofe Stahl with primary team that patient is not ready for discharge and requested d/c order be removed    0845 Spoke to patient and told him [facility]/ [facility] is able to accept him. It is not his first choice, but if that is who can accept him , he will go. He relates he was told that he needs to have surgery today.     0847 called [facility] and spoke to Yady. Not ready for discharge today. Patient will need pre cert.     1113 Brenda with [facility] called. She needs clinicals faxed to [phone] if patient still needs SNF. ARTEM told her he is already accepted to a SNF.

## 2025-03-04 ENCOUNTER — APPOINTMENT (OUTPATIENT)
Dept: GENERAL RADIOLOGY | Age: 50
DRG: 169 | End: 2025-03-04
Payer: COMMERCIAL

## 2025-03-04 ENCOUNTER — APPOINTMENT (OUTPATIENT)
Dept: ULTRASOUND IMAGING | Age: 50
DRG: 169 | End: 2025-03-04
Payer: COMMERCIAL

## 2025-03-04 ENCOUNTER — APPOINTMENT (OUTPATIENT)
Dept: CT IMAGING | Age: 50
DRG: 169 | End: 2025-03-04
Payer: COMMERCIAL

## 2025-03-04 PROBLEM — R18.8 PERIHEPATIC FLUID COLLECTION: Status: ACTIVE | Noted: 2025-03-04

## 2025-03-04 PROBLEM — J96.01 ACUTE RESPIRATORY FAILURE WITH HYPOXIA (HCC): Status: ACTIVE | Noted: 2025-03-04

## 2025-03-04 PROBLEM — J90 PLEURAL EFFUSION ON RIGHT: Status: ACTIVE | Noted: 2025-03-04

## 2025-03-04 LAB
ALBUMIN SERPL-MCNC: 2.5 G/DL (ref 3.5–5.2)
ALBUMIN/GLOB SERPL: 0.8 {RATIO} (ref 1–2.5)
ALP SERPL-CCNC: 31 U/L (ref 40–129)
ALT SERPL-CCNC: 43 U/L (ref 10–50)
ANION GAP SERPL CALCULATED.3IONS-SCNC: 8 MMOL/L (ref 9–16)
AST SERPL-CCNC: 47 U/L (ref 10–50)
BASOPHILS # BLD: 0 K/UL (ref 0–0.2)
BASOPHILS NFR BLD: 0 % (ref 0–2)
BILIRUB SERPL-MCNC: 0.3 MG/DL (ref 0–1.2)
BUN SERPL-MCNC: 8 MG/DL (ref 6–20)
CALCIUM SERPL-MCNC: 8 MG/DL (ref 8.6–10.4)
CHLORIDE SERPL-SCNC: 106 MMOL/L (ref 98–107)
CO2 SERPL-SCNC: 22 MMOL/L (ref 20–31)
CREAT SERPL-MCNC: 0.5 MG/DL (ref 0.7–1.2)
EOSINOPHIL # BLD: 0 K/UL (ref 0–0.4)
EOSINOPHILS RELATIVE PERCENT: 0 % (ref 1–4)
ERYTHROCYTE [DISTWIDTH] IN BLOOD BY AUTOMATED COUNT: 16.3 % (ref 11.8–14.4)
GFR, ESTIMATED: >90 ML/MIN/1.73M2
GLUCOSE SERPL-MCNC: 108 MG/DL (ref 74–99)
HCT VFR BLD AUTO: 27 % (ref 40.7–50.3)
HGB BLD-MCNC: 8.2 G/DL (ref 13–17)
IMM GRANULOCYTES # BLD AUTO: 0.17 K/UL (ref 0–0.3)
IMM GRANULOCYTES NFR BLD: 1 %
LYMPHOCYTES NFR BLD: 0.84 K/UL (ref 1–4.8)
LYMPHOCYTES RELATIVE PERCENT: 5 % (ref 24–44)
MCH RBC QN AUTO: 26.9 PG (ref 25.2–33.5)
MCHC RBC AUTO-ENTMCNC: 30.4 G/DL (ref 28.4–34.8)
MCV RBC AUTO: 88.5 FL (ref 82.6–102.9)
MONOCYTES NFR BLD: 1 K/UL (ref 0.1–0.8)
MONOCYTES NFR BLD: 6 % (ref 1–7)
MORPHOLOGY: ABNORMAL
NEUTROPHILS NFR BLD: 88 % (ref 36–66)
NEUTS SEG NFR BLD: 14.69 K/UL (ref 1.8–7.7)
NRBC BLD-RTO: 0 PER 100 WBC
PARTIAL THROMBOPLASTIN TIME: 35.5 SEC (ref 23–36.5)
PLATELET # BLD AUTO: 689 K/UL (ref 138–453)
PMV BLD AUTO: 9.2 FL (ref 8.1–13.5)
POTASSIUM SERPL-SCNC: 4.6 MMOL/L (ref 3.7–5.3)
PROT SERPL-MCNC: 5.6 G/DL (ref 6.6–8.7)
RBC # BLD AUTO: 3.05 M/UL (ref 4.21–5.77)
SODIUM SERPL-SCNC: 136 MMOL/L (ref 136–145)
WBC OTHER # BLD: 16.7 K/UL (ref 3.5–11.3)

## 2025-03-04 PROCEDURE — 6370000000 HC RX 637 (ALT 250 FOR IP)

## 2025-03-04 PROCEDURE — 6360000002 HC RX W HCPCS: Performed by: PHYSICIAN ASSISTANT

## 2025-03-04 PROCEDURE — 2060000000 HC ICU INTERMEDIATE R&B

## 2025-03-04 PROCEDURE — 85730 THROMBOPLASTIN TIME PARTIAL: CPT

## 2025-03-04 PROCEDURE — 71046 X-RAY EXAM CHEST 2 VIEWS: CPT

## 2025-03-04 PROCEDURE — 85025 COMPLETE CBC W/AUTO DIFF WBC: CPT

## 2025-03-04 PROCEDURE — 36415 COLL VENOUS BLD VENIPUNCTURE: CPT

## 2025-03-04 PROCEDURE — 99231 SBSQ HOSP IP/OBS SF/LOW 25: CPT | Performed by: PHYSICIAN ASSISTANT

## 2025-03-04 PROCEDURE — 6360000002 HC RX W HCPCS

## 2025-03-04 PROCEDURE — 6370000000 HC RX 637 (ALT 250 FOR IP): Performed by: STUDENT IN AN ORGANIZED HEALTH CARE EDUCATION/TRAINING PROGRAM

## 2025-03-04 PROCEDURE — 74150 CT ABDOMEN W/O CONTRAST: CPT

## 2025-03-04 PROCEDURE — 2500000003 HC RX 250 WO HCPCS

## 2025-03-04 PROCEDURE — 99233 SBSQ HOSP IP/OBS HIGH 50: CPT | Performed by: INTERNAL MEDICINE

## 2025-03-04 PROCEDURE — 94761 N-INVAS EAR/PLS OXIMETRY MLT: CPT

## 2025-03-04 PROCEDURE — 6360000002 HC RX W HCPCS: Performed by: STUDENT IN AN ORGANIZED HEALTH CARE EDUCATION/TRAINING PROGRAM

## 2025-03-04 PROCEDURE — 99231 SBSQ HOSP IP/OBS SF/LOW 25: CPT | Performed by: SURGERY

## 2025-03-04 PROCEDURE — 80053 COMPREHEN METABOLIC PANEL: CPT

## 2025-03-04 RX ORDER — HEPARIN SODIUM 10000 [USP'U]/100ML
5-30 INJECTION, SOLUTION INTRAVENOUS CONTINUOUS
Status: DISCONTINUED | OUTPATIENT
Start: 2025-03-04 | End: 2025-03-07

## 2025-03-04 RX ORDER — OXYCODONE HYDROCHLORIDE 5 MG/1
10 TABLET ORAL ONCE
Status: COMPLETED | OUTPATIENT
Start: 2025-03-04 | End: 2025-03-04

## 2025-03-04 RX ORDER — HEPARIN SODIUM 1000 [USP'U]/ML
10000 INJECTION, SOLUTION INTRAVENOUS; SUBCUTANEOUS ONCE
Status: DISCONTINUED | OUTPATIENT
Start: 2025-03-04 | End: 2025-03-04

## 2025-03-04 RX ORDER — GABAPENTIN 400 MG/1
400 CAPSULE ORAL EVERY 8 HOURS SCHEDULED
Status: DISCONTINUED | OUTPATIENT
Start: 2025-03-04 | End: 2025-03-09

## 2025-03-04 RX ORDER — HEPARIN SODIUM 10000 [USP'U]/100ML
5-30 INJECTION, SOLUTION INTRAVENOUS CONTINUOUS
Status: DISCONTINUED | OUTPATIENT
Start: 2025-03-04 | End: 2025-03-04

## 2025-03-04 RX ADMIN — METHOCARBAMOL TABLETS 750 MG: 750 TABLET, COATED ORAL at 23:54

## 2025-03-04 RX ADMIN — SODIUM CHLORIDE, PRESERVATIVE FREE 10 ML: 5 INJECTION INTRAVENOUS at 08:54

## 2025-03-04 RX ADMIN — OXYCODONE 5 MG: 5 TABLET ORAL at 06:11

## 2025-03-04 RX ADMIN — HYDROMORPHONE HYDROCHLORIDE 0.5 MG: 1 INJECTION, SOLUTION INTRAMUSCULAR; INTRAVENOUS; SUBCUTANEOUS at 08:53

## 2025-03-04 RX ADMIN — OXYCODONE 10 MG: 5 TABLET ORAL at 16:49

## 2025-03-04 RX ADMIN — ACETAMINOPHEN 1000 MG: 500 TABLET ORAL at 12:45

## 2025-03-04 RX ADMIN — METHOCARBAMOL TABLETS 750 MG: 750 TABLET, COATED ORAL at 05:54

## 2025-03-04 RX ADMIN — HYDROMORPHONE HYDROCHLORIDE 1 MG: 1 INJECTION, SOLUTION INTRAMUSCULAR; INTRAVENOUS; SUBCUTANEOUS at 00:30

## 2025-03-04 RX ADMIN — METRONIDAZOLE 500 MG: 500 INJECTION, SOLUTION INTRAVENOUS at 09:02

## 2025-03-04 RX ADMIN — METRONIDAZOLE 500 MG: 500 INJECTION, SOLUTION INTRAVENOUS at 18:19

## 2025-03-04 RX ADMIN — ACETAMINOPHEN 1000 MG: 500 TABLET ORAL at 21:16

## 2025-03-04 RX ADMIN — ATORVASTATIN CALCIUM 40 MG: 40 TABLET, FILM COATED ORAL at 21:16

## 2025-03-04 RX ADMIN — GABAPENTIN 400 MG: 400 CAPSULE ORAL at 15:23

## 2025-03-04 RX ADMIN — HYDROMORPHONE HYDROCHLORIDE 0.5 MG: 1 INJECTION, SOLUTION INTRAMUSCULAR; INTRAVENOUS; SUBCUTANEOUS at 18:38

## 2025-03-04 RX ADMIN — HYDROMORPHONE HYDROCHLORIDE 1 MG: 1 INJECTION, SOLUTION INTRAMUSCULAR; INTRAVENOUS; SUBCUTANEOUS at 03:29

## 2025-03-04 RX ADMIN — HEPARIN SODIUM 16 UNITS/KG/HR: 10000 INJECTION, SOLUTION INTRAVENOUS at 13:02

## 2025-03-04 RX ADMIN — HYDROMORPHONE HYDROCHLORIDE 0.5 MG: 1 INJECTION, SOLUTION INTRAMUSCULAR; INTRAVENOUS; SUBCUTANEOUS at 15:26

## 2025-03-04 RX ADMIN — METHOCARBAMOL TABLETS 750 MG: 750 TABLET, COATED ORAL at 18:19

## 2025-03-04 RX ADMIN — HEPARIN SODIUM 10000 UNITS: 1000 INJECTION INTRAVENOUS; SUBCUTANEOUS at 21:08

## 2025-03-04 RX ADMIN — GABAPENTIN 600 MG: 300 CAPSULE ORAL at 05:54

## 2025-03-04 RX ADMIN — DOCUSATE SODIUM 100 MG: 100 CAPSULE, LIQUID FILLED ORAL at 08:48

## 2025-03-04 RX ADMIN — AMLODIPINE BESYLATE 5 MG: 5 TABLET ORAL at 08:48

## 2025-03-04 RX ADMIN — ACETAMINOPHEN 1000 MG: 500 TABLET ORAL at 05:54

## 2025-03-04 RX ADMIN — FAMOTIDINE 20 MG: 20 TABLET, FILM COATED ORAL at 08:48

## 2025-03-04 RX ADMIN — CIPROFLOXACIN 400 MG: 2 INJECTION, SOLUTION INTRAVENOUS at 12:51

## 2025-03-04 RX ADMIN — HYDROMORPHONE HYDROCHLORIDE 0.5 MG: 1 INJECTION, SOLUTION INTRAMUSCULAR; INTRAVENOUS; SUBCUTANEOUS at 22:23

## 2025-03-04 RX ADMIN — GABAPENTIN 400 MG: 400 CAPSULE ORAL at 21:17

## 2025-03-04 RX ADMIN — FAMOTIDINE 20 MG: 20 TABLET, FILM COATED ORAL at 21:17

## 2025-03-04 RX ADMIN — OXYCODONE 5 MG: 5 TABLET ORAL at 12:45

## 2025-03-04 RX ADMIN — SODIUM CHLORIDE, PRESERVATIVE FREE 10 ML: 5 INJECTION INTRAVENOUS at 21:19

## 2025-03-04 RX ADMIN — CIPROFLOXACIN 400 MG: 2 INJECTION, SOLUTION INTRAVENOUS at 22:19

## 2025-03-04 RX ADMIN — METRONIDAZOLE 500 MG: 500 INJECTION, SOLUTION INTRAVENOUS at 00:34

## 2025-03-04 RX ADMIN — METHOCARBAMOL TABLETS 750 MG: 750 TABLET, COATED ORAL at 12:45

## 2025-03-04 RX ADMIN — OXYCODONE 5 MG: 5 TABLET ORAL at 21:16

## 2025-03-04 ASSESSMENT — PAIN DESCRIPTION - LOCATION
LOCATION: ABDOMEN;BACK

## 2025-03-04 ASSESSMENT — PAIN DESCRIPTION - DESCRIPTORS
DESCRIPTORS: ACHING

## 2025-03-04 ASSESSMENT — PAIN SCALES - GENERAL
PAINLEVEL_OUTOF10: 6
PAINLEVEL_OUTOF10: 6
PAINLEVEL_OUTOF10: 8
PAINLEVEL_OUTOF10: 6
PAINLEVEL_OUTOF10: 7
PAINLEVEL_OUTOF10: 0
PAINLEVEL_OUTOF10: 5
PAINLEVEL_OUTOF10: 0
PAINLEVEL_OUTOF10: 6
PAINLEVEL_OUTOF10: 8
PAINLEVEL_OUTOF10: 5
PAINLEVEL_OUTOF10: 0
PAINLEVEL_OUTOF10: 6
PAINLEVEL_OUTOF10: 7

## 2025-03-04 ASSESSMENT — PAIN SCALES - WONG BAKER
WONGBAKER_NUMERICALRESPONSE: NO HURT
WONGBAKER_NUMERICALRESPONSE: HURTS A LITTLE BIT
WONGBAKER_NUMERICALRESPONSE: NO HURT

## 2025-03-04 ASSESSMENT — PAIN DESCRIPTION - ORIENTATION
ORIENTATION: RIGHT

## 2025-03-04 NOTE — CARE COORDINATION
Voicemail left for admissions at [facility] requesting precert to be started

## 2025-03-04 NOTE — OR NURSING
Fluid collection appears the same at this time. Patient would like to hold off at this time after talking with Dr Howard.   Dr Howard to talk with resident. Patient back on to stretcher. Report called. Patient to go back to room

## 2025-03-04 NOTE — RT PROTOCOL NOTE
RT Inhaler-Nebulizer Bronchodilator Protocol Note    There is a bronchodilator order in the chart from a provider indicating to follow the RT Bronchodilator Protocol and there is an “Initiate RT Inhaler-Nebulizer Bronchodilator Protocol” order as well (see protocol at bottom of note).    CXR Findings:  XR CHEST PORTABLE    Result Date: 3/3/2025  Right pleural effusion appears increased in the interval, which may in part be due to layering from positional differences.       The findings from the last RT Protocol Assessment were as follows:   History Pulmonary Disease: None or smoker <15 pack years  Respiratory Pattern: Regular pattern and RR 12-20 bpm  Breath Sounds: Slightly diminished and/or crackles  Cough: Strong, spontaneous, non-productive  Indication for Bronchodilator Therapy: Decreased or absent breath sounds  Bronchodilator Assessment Score: 2    Aerosolized bronchodilator medication orders have been revised according to the RT Inhaler-Nebulizer Bronchodilator Protocol below.    Respiratory Therapist to perform RT Therapy Protocol Assessment initially then follow the protocol.  Repeat RT Therapy Protocol Assessment PRN for score 0-3 or on second treatment, BID, and PRN for scores above 3.    No Indications - adjust the frequency to every 6 hours PRN wheezing or bronchospasm, if no treatments needed after 48 hours then discontinue using Per Protocol order mode.     If indication present, adjust the RT bronchodilator orders based on the Bronchodilator Assessment Score as indicated below.  Use Inhaler orders unless patient has one or more of the following: on home nebulizer, not able to hold breath for 10 seconds, is not alert and oriented, cannot activate and use MDI correctly, or respiratory rate 25 breaths per minute or more, then use the equivalent nebulizer order(s) with same Frequency and PRN reasons based on the score.  If a patient is on this medication at home then do not decrease Frequency below that

## 2025-03-05 ENCOUNTER — APPOINTMENT (OUTPATIENT)
Dept: GENERAL RADIOLOGY | Age: 50
DRG: 169 | End: 2025-03-05
Payer: COMMERCIAL

## 2025-03-05 ENCOUNTER — APPOINTMENT (OUTPATIENT)
Dept: INTERVENTIONAL RADIOLOGY/VASCULAR | Age: 50
DRG: 169 | End: 2025-03-05
Payer: COMMERCIAL

## 2025-03-05 LAB
ANION GAP SERPL CALCULATED.3IONS-SCNC: 8 MMOL/L (ref 9–16)
ANTI-XA UNFRAC HEPARIN: 0.17 IU/L
ANTI-XA UNFRAC HEPARIN: 0.3 IU/L
ANTI-XA UNFRAC HEPARIN: <0.1 IU/L
ANTI-XA UNFRAC HEPARIN: <0.1 IU/L
BACTERIA URNS QL MICRO: NORMAL
BASOPHILS # BLD: 0 K/UL (ref 0–0.2)
BASOPHILS NFR BLD: 0 % (ref 0–2)
BILIRUB UR QL STRIP: NEGATIVE
BUN SERPL-MCNC: 8 MG/DL (ref 6–20)
CALCIUM SERPL-MCNC: 8 MG/DL (ref 8.6–10.4)
CASTS #/AREA URNS LPF: NORMAL /LPF (ref 0–8)
CHLORIDE SERPL-SCNC: 104 MMOL/L (ref 98–107)
CLARITY UR: CLEAR
CO2 SERPL-SCNC: 24 MMOL/L (ref 20–31)
COLOR UR: YELLOW
CREAT SERPL-MCNC: 0.6 MG/DL (ref 0.7–1.2)
EOSINOPHIL # BLD: 0.19 K/UL (ref 0–0.44)
EOSINOPHILS RELATIVE PERCENT: 1 % (ref 1–4)
EPI CELLS #/AREA URNS HPF: NORMAL /HPF (ref 0–5)
ERYTHROCYTE [DISTWIDTH] IN BLOOD BY AUTOMATED COUNT: 15.9 % (ref 11.8–14.4)
GFR, ESTIMATED: >90 ML/MIN/1.73M2
GLUCOSE FLD-MCNC: 103 MG/DL
GLUCOSE SERPL-MCNC: 134 MG/DL (ref 74–99)
GLUCOSE UR STRIP-MCNC: NEGATIVE MG/DL
HCT VFR BLD AUTO: 26.7 % (ref 40.7–50.3)
HGB BLD-MCNC: 8.3 G/DL (ref 13–17)
HGB UR QL STRIP.AUTO: NEGATIVE
IMM GRANULOCYTES # BLD AUTO: 0.38 K/UL (ref 0–0.3)
IMM GRANULOCYTES NFR BLD: 2 %
KETONES UR STRIP-MCNC: NEGATIVE MG/DL
LDH FLD L TO P-CCNC: 530 U/L
LDH SERPL-CCNC: 307 U/L (ref 135–225)
LEUKOCYTE ESTERASE UR QL STRIP: NEGATIVE
LYMPHOCYTES NFR BLD: 1.89 K/UL (ref 1.1–3.7)
LYMPHOCYTES RELATIVE PERCENT: 10 % (ref 24–43)
MCH RBC QN AUTO: 27 PG (ref 25.2–33.5)
MCHC RBC AUTO-ENTMCNC: 31.1 G/DL (ref 28.4–34.8)
MCV RBC AUTO: 87 FL (ref 82.6–102.9)
MONOCYTES NFR BLD: 1.51 K/UL (ref 0.1–1.2)
MONOCYTES NFR BLD: 8 % (ref 3–12)
MORPHOLOGY: ABNORMAL
NEUTROPHILS NFR BLD: 79 % (ref 36–65)
NEUTS SEG NFR BLD: 14.93 K/UL (ref 1.5–8.1)
NITRITE UR QL STRIP: NEGATIVE
NRBC BLD-RTO: 0 PER 100 WBC
PH FLUID: 8
PH UR STRIP: 7.5 [PH] (ref 5–8)
PLATELET # BLD AUTO: 772 K/UL (ref 138–453)
PMV BLD AUTO: 9 FL (ref 8.1–13.5)
POTASSIUM SERPL-SCNC: 4 MMOL/L (ref 3.7–5.3)
PROCALCITONIN SERPL-MCNC: 0.39 NG/ML (ref 0–0.09)
PROT FLD-MCNC: 3.6 G/DL
PROT SERPL-MCNC: 6.1 G/DL (ref 6.6–8.7)
PROT UR STRIP-MCNC: NEGATIVE MG/DL
RBC # BLD AUTO: 3.07 M/UL (ref 4.21–5.77)
RBC #/AREA URNS HPF: NORMAL /HPF (ref 0–4)
SODIUM SERPL-SCNC: 136 MMOL/L (ref 136–145)
SP GR UR STRIP: 1.01 (ref 1–1.03)
SPECIMEN TYPE: NORMAL
UROBILINOGEN UR STRIP-ACNC: NORMAL EU/DL (ref 0–1)
WBC #/AREA URNS HPF: NORMAL /HPF (ref 0–5)
WBC OTHER # BLD: 18.9 K/UL (ref 3.5–11.3)

## 2025-03-05 PROCEDURE — 2060000000 HC ICU INTERMEDIATE R&B

## 2025-03-05 PROCEDURE — 81001 URINALYSIS AUTO W/SCOPE: CPT

## 2025-03-05 PROCEDURE — 2500000003 HC RX 250 WO HCPCS

## 2025-03-05 PROCEDURE — 97606 NEG PRS WND THER DME>50 SQCM: CPT

## 2025-03-05 PROCEDURE — 6360000002 HC RX W HCPCS

## 2025-03-05 PROCEDURE — 87205 SMEAR GRAM STAIN: CPT

## 2025-03-05 PROCEDURE — 6370000000 HC RX 637 (ALT 250 FOR IP)

## 2025-03-05 PROCEDURE — 6370000000 HC RX 637 (ALT 250 FOR IP): Performed by: STUDENT IN AN ORGANIZED HEALTH CARE EDUCATION/TRAINING PROGRAM

## 2025-03-05 PROCEDURE — 80048 BASIC METABOLIC PNL TOTAL CA: CPT

## 2025-03-05 PROCEDURE — 84157 ASSAY OF PROTEIN OTHER: CPT

## 2025-03-05 PROCEDURE — 36415 COLL VENOUS BLD VENIPUNCTURE: CPT

## 2025-03-05 PROCEDURE — 32555 ASPIRATE PLEURA W/ IMAGING: CPT

## 2025-03-05 PROCEDURE — 88112 CYTOPATH CELL ENHANCE TECH: CPT

## 2025-03-05 PROCEDURE — 87075 CULTR BACTERIA EXCEPT BLOOD: CPT

## 2025-03-05 PROCEDURE — 89051 BODY FLUID CELL COUNT: CPT

## 2025-03-05 PROCEDURE — 88305 TISSUE EXAM BY PATHOLOGIST: CPT

## 2025-03-05 PROCEDURE — 6370000000 HC RX 637 (ALT 250 FOR IP): Performed by: INTERNAL MEDICINE

## 2025-03-05 PROCEDURE — 83615 LACTATE (LD) (LDH) ENZYME: CPT

## 2025-03-05 PROCEDURE — 71045 X-RAY EXAM CHEST 1 VIEW: CPT

## 2025-03-05 PROCEDURE — 85025 COMPLETE CBC W/AUTO DIFF WBC: CPT

## 2025-03-05 PROCEDURE — 84155 ASSAY OF PROTEIN SERUM: CPT

## 2025-03-05 PROCEDURE — 84145 PROCALCITONIN (PCT): CPT

## 2025-03-05 PROCEDURE — 99232 SBSQ HOSP IP/OBS MODERATE 35: CPT | Performed by: STUDENT IN AN ORGANIZED HEALTH CARE EDUCATION/TRAINING PROGRAM

## 2025-03-05 PROCEDURE — 83986 ASSAY PH BODY FLUID NOS: CPT

## 2025-03-05 PROCEDURE — 97530 THERAPEUTIC ACTIVITIES: CPT

## 2025-03-05 PROCEDURE — 97535 SELF CARE MNGMENT TRAINING: CPT

## 2025-03-05 PROCEDURE — 99232 SBSQ HOSP IP/OBS MODERATE 35: CPT | Performed by: SURGERY

## 2025-03-05 PROCEDURE — 82945 GLUCOSE OTHER FLUID: CPT

## 2025-03-05 PROCEDURE — 6360000002 HC RX W HCPCS: Performed by: PHYSICIAN ASSISTANT

## 2025-03-05 PROCEDURE — 85520 HEPARIN ASSAY: CPT

## 2025-03-05 PROCEDURE — 87070 CULTURE OTHR SPECIMN AEROBIC: CPT

## 2025-03-05 RX ORDER — IBUPROFEN 400 MG/1
400 TABLET, FILM COATED ORAL EVERY 4 HOURS PRN
Status: DISCONTINUED | OUTPATIENT
Start: 2025-03-05 | End: 2025-03-12 | Stop reason: HOSPADM

## 2025-03-05 RX ADMIN — Medication 5 MG: at 21:53

## 2025-03-05 RX ADMIN — FAMOTIDINE 20 MG: 20 TABLET, FILM COATED ORAL at 08:55

## 2025-03-05 RX ADMIN — IBUPROFEN 400 MG: 400 TABLET, FILM COATED ORAL at 13:05

## 2025-03-05 RX ADMIN — METRONIDAZOLE 500 MG: 500 INJECTION, SOLUTION INTRAVENOUS at 18:53

## 2025-03-05 RX ADMIN — HEPARIN SODIUM 20 UNITS/KG/HR: 10000 INJECTION, SOLUTION INTRAVENOUS at 00:39

## 2025-03-05 RX ADMIN — GABAPENTIN 400 MG: 400 CAPSULE ORAL at 21:10

## 2025-03-05 RX ADMIN — OXYCODONE 5 MG: 5 TABLET ORAL at 14:59

## 2025-03-05 RX ADMIN — ACETAMINOPHEN 1000 MG: 500 TABLET ORAL at 21:10

## 2025-03-05 RX ADMIN — HYDROMORPHONE HYDROCHLORIDE 0.5 MG: 1 INJECTION, SOLUTION INTRAMUSCULAR; INTRAVENOUS; SUBCUTANEOUS at 00:47

## 2025-03-05 RX ADMIN — GABAPENTIN 400 MG: 400 CAPSULE ORAL at 13:06

## 2025-03-05 RX ADMIN — HEPARIN SODIUM 20 UNITS/KG/HR: 10000 INJECTION, SOLUTION INTRAVENOUS at 09:10

## 2025-03-05 RX ADMIN — FAMOTIDINE 20 MG: 20 TABLET, FILM COATED ORAL at 21:10

## 2025-03-05 RX ADMIN — METHOCARBAMOL TABLETS 750 MG: 750 TABLET, COATED ORAL at 18:47

## 2025-03-05 RX ADMIN — CIPROFLOXACIN 400 MG: 2 INJECTION, SOLUTION INTRAVENOUS at 13:11

## 2025-03-05 RX ADMIN — ATORVASTATIN CALCIUM 40 MG: 40 TABLET, FILM COATED ORAL at 21:09

## 2025-03-05 RX ADMIN — OXYCODONE 5 MG: 5 TABLET ORAL at 08:54

## 2025-03-05 RX ADMIN — ACETAMINOPHEN 1000 MG: 500 TABLET ORAL at 13:06

## 2025-03-05 RX ADMIN — METHOCARBAMOL TABLETS 750 MG: 750 TABLET, COATED ORAL at 06:23

## 2025-03-05 RX ADMIN — IBUPROFEN 400 MG: 400 TABLET, FILM COATED ORAL at 18:47

## 2025-03-05 RX ADMIN — HYDROMORPHONE HYDROCHLORIDE 0.5 MG: 1 INJECTION, SOLUTION INTRAMUSCULAR; INTRAVENOUS; SUBCUTANEOUS at 10:30

## 2025-03-05 RX ADMIN — SODIUM CHLORIDE, PRESERVATIVE FREE 10 ML: 5 INJECTION INTRAVENOUS at 08:55

## 2025-03-05 RX ADMIN — CIPROFLOXACIN 400 MG: 2 INJECTION, SOLUTION INTRAVENOUS at 22:12

## 2025-03-05 RX ADMIN — METRONIDAZOLE 500 MG: 500 INJECTION, SOLUTION INTRAVENOUS at 00:44

## 2025-03-05 RX ADMIN — HYDROMORPHONE HYDROCHLORIDE 0.5 MG: 1 INJECTION, SOLUTION INTRAMUSCULAR; INTRAVENOUS; SUBCUTANEOUS at 20:45

## 2025-03-05 RX ADMIN — ACETAMINOPHEN 1000 MG: 500 TABLET ORAL at 06:23

## 2025-03-05 RX ADMIN — METHOCARBAMOL TABLETS 750 MG: 750 TABLET, COATED ORAL at 13:06

## 2025-03-05 RX ADMIN — HYDROMORPHONE HYDROCHLORIDE 0.5 MG: 1 INJECTION, SOLUTION INTRAMUSCULAR; INTRAVENOUS; SUBCUTANEOUS at 05:00

## 2025-03-05 RX ADMIN — SIMETHICONE 80 MG: 80 TABLET, CHEWABLE ORAL at 21:10

## 2025-03-05 RX ADMIN — METRONIDAZOLE 500 MG: 500 INJECTION, SOLUTION INTRAVENOUS at 09:16

## 2025-03-05 RX ADMIN — HYDROMORPHONE HYDROCHLORIDE 0.5 MG: 1 INJECTION, SOLUTION INTRAMUSCULAR; INTRAVENOUS; SUBCUTANEOUS at 16:24

## 2025-03-05 RX ADMIN — GABAPENTIN 400 MG: 400 CAPSULE ORAL at 06:23

## 2025-03-05 RX ADMIN — DOCUSATE SODIUM 100 MG: 100 CAPSULE, LIQUID FILLED ORAL at 08:54

## 2025-03-05 RX ADMIN — OXYCODONE 5 MG: 5 TABLET ORAL at 23:05

## 2025-03-05 RX ADMIN — SODIUM CHLORIDE, PRESERVATIVE FREE 10 ML: 5 INJECTION INTRAVENOUS at 21:10

## 2025-03-05 RX ADMIN — AMLODIPINE BESYLATE 5 MG: 5 TABLET ORAL at 08:54

## 2025-03-05 ASSESSMENT — PAIN DESCRIPTION - LOCATION
LOCATION: ABDOMEN;BACK
LOCATION: ABDOMEN
LOCATION: ABDOMEN;BACK
LOCATION: ABDOMEN;BACK
LOCATION: CHEST
LOCATION: ABDOMEN;BACK
LOCATION: BACK;ABDOMEN;LEG
LOCATION: ABDOMEN;BACK

## 2025-03-05 ASSESSMENT — PAIN SCALES - GENERAL
PAINLEVEL_OUTOF10: 8
PAINLEVEL_OUTOF10: 6
PAINLEVEL_OUTOF10: 0
PAINLEVEL_OUTOF10: 0
PAINLEVEL_OUTOF10: 8
PAINLEVEL_OUTOF10: 7
PAINLEVEL_OUTOF10: 6
PAINLEVEL_OUTOF10: 7
PAINLEVEL_OUTOF10: 6
PAINLEVEL_OUTOF10: 0
PAINLEVEL_OUTOF10: 5
PAINLEVEL_OUTOF10: 8
PAINLEVEL_OUTOF10: 8
PAINLEVEL_OUTOF10: 6
PAINLEVEL_OUTOF10: 6
PAINLEVEL_OUTOF10: 8
PAINLEVEL_OUTOF10: 8
PAINLEVEL_OUTOF10: 9
PAINLEVEL_OUTOF10: 7
PAINLEVEL_OUTOF10: 6
PAINLEVEL_OUTOF10: 9
PAINLEVEL_OUTOF10: 4

## 2025-03-05 ASSESSMENT — PAIN DESCRIPTION - DESCRIPTORS
DESCRIPTORS: ACHING
DESCRIPTORS: ACHING
DESCRIPTORS: SHARP
DESCRIPTORS: ACHING
DESCRIPTORS: ACHING;DISCOMFORT

## 2025-03-05 ASSESSMENT — PAIN DESCRIPTION - ORIENTATION
ORIENTATION: RIGHT
ORIENTATION: MID;LEFT;RIGHT
ORIENTATION: RIGHT

## 2025-03-05 NOTE — CARE COORDINATION
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]  Acute lower limb ischemia [I99.8]                       GMLOS: 1.5 days  Length of Stay: 10  days    Anticipated Discharge Date: Two or more days before discharge    Readmission Risk (Low < 19, Mod (19-27), High > 27): Readmission Risk Score: 19.8      Patient Transitional Goal: Skilled nursing facility    Current Transitional Plan    [] Home Independently    [] Home with HC    [x] Skilled Nursing Facility    [] Acute Rehabilitation    [] Long Term Acute Care (LTAC)    [] Other:     Plan for the Stay (Medical Management): thoracentesis today      Workflow Continuation (Additional Notes): spoke to Yady at [facility] She will submit precert    1320 patient updated        Misty Mxa RN  March 5, 2025

## 2025-03-05 NOTE — BRIEF OP NOTE
Brief Postoperative Note for Thoracentesis    Melvin Sierra  YOB: 1975  [id number]    Pre-operative Diagnosis: right pleural effusion      Post-operative Diagnosis: Same    Procedure: Ultrasound guided thoracentesis     Anesthesia: 1% Lidocaine     Surgeons/Assistants: Brett Chaudhry PA-C    Complications: none    EBL: Minimal    Specimens: Were obtained    Ultrasound guided right thoracentesis performed. 700 ml katalina/red tinged fluid obtained. Dressing applied.      Electronically signed by CASEY Riggs on 3/5/2025 at 2:29 PM

## 2025-03-06 LAB
ANION GAP SERPL CALCULATED.3IONS-SCNC: 8 MMOL/L (ref 9–16)
ANTI-XA UNFRAC HEPARIN: 0.17 IU/L
ANTI-XA UNFRAC HEPARIN: 0.26 IU/L
ANTI-XA UNFRAC HEPARIN: 0.46 IU/L
ANTI-XA UNFRAC HEPARIN: 0.66 IU/L
APPEARANCE FLD: ABNORMAL
BASOPHILS # BLD: 0 K/UL (ref 0–0.2)
BASOPHILS NFR BLD: 0 % (ref 0–2)
BLASTS NFR FLD: 0 %
BODY FLD TYPE: ABNORMAL
BUN SERPL-MCNC: 8 MG/DL (ref 6–20)
CALCIUM SERPL-MCNC: 8.3 MG/DL (ref 8.6–10.4)
CASE NUMBER:: NORMAL
CHLORIDE SERPL-SCNC: 105 MMOL/L (ref 98–107)
CLOT CHECK: ABNORMAL
CO2 SERPL-SCNC: 25 MMOL/L (ref 20–31)
COLOR FLD: ABNORMAL
CREAT SERPL-MCNC: 0.6 MG/DL (ref 0.7–1.2)
EOSINOPHIL # BLD: 0 K/UL (ref 0–0.4)
EOSINOPHIL NFR FLD: 1 %
EOSINOPHILS RELATIVE PERCENT: 0 % (ref 1–4)
ERYTHROCYTE [DISTWIDTH] IN BLOOD BY AUTOMATED COUNT: 15.9 % (ref 11.8–14.4)
GFR, ESTIMATED: >90 ML/MIN/1.73M2
GLUCOSE SERPL-MCNC: 124 MG/DL (ref 74–99)
HCT VFR BLD AUTO: 27.2 % (ref 40.7–50.3)
HGB BLD-MCNC: 8.7 G/DL (ref 13–17)
IMM GRANULOCYTES # BLD AUTO: 0.17 K/UL (ref 0–0.3)
IMM GRANULOCYTES NFR BLD: 1 %
LYMPHOCYTES NFR BLD: 2.35 K/UL (ref 1–4.8)
LYMPHOCYTES NFR FLD: 67 %
LYMPHOCYTES RELATIVE PERCENT: 14 % (ref 24–44)
MCH RBC QN AUTO: 27.4 PG (ref 25.2–33.5)
MCHC RBC AUTO-ENTMCNC: 32 G/DL (ref 28.4–34.8)
MCV RBC AUTO: 85.8 FL (ref 82.6–102.9)
MESOTHELIAL CELLS BODY FLUID: 5 %
MONOCYTES NFR BLD: 1.34 K/UL (ref 0.1–0.8)
MONOCYTES NFR BLD: 8 % (ref 1–7)
MONOCYTES NFR FLD: 18 %
MORPHOLOGY: ABNORMAL
NEUTROPHILS NFR BLD: 77 % (ref 36–66)
NEUTROPHILS NFR FLD: 9 %
NEUTS SEG NFR BLD: 12.94 K/UL (ref 1.8–7.7)
NRBC BLD-RTO: 0 PER 100 WBC
NUC CELL # FLD: 3121 CELLS/UL
PLATELET # BLD AUTO: 892 K/UL (ref 138–453)
PMV BLD AUTO: 8.6 FL (ref 8.1–13.5)
POTASSIUM SERPL-SCNC: 4.4 MMOL/L (ref 3.7–5.3)
RBC # BLD AUTO: 3.17 M/UL (ref 4.21–5.77)
RBC # FLD: ABNORMAL CELLS/UL
SODIUM SERPL-SCNC: 138 MMOL/L (ref 136–145)
UNIDENT CELLS NFR FLD: 0 %
WBC OTHER # BLD: 16.8 K/UL (ref 3.5–11.3)

## 2025-03-06 PROCEDURE — 97116 GAIT TRAINING THERAPY: CPT

## 2025-03-06 PROCEDURE — 6360000002 HC RX W HCPCS

## 2025-03-06 PROCEDURE — 36415 COLL VENOUS BLD VENIPUNCTURE: CPT

## 2025-03-06 PROCEDURE — 6370000000 HC RX 637 (ALT 250 FOR IP): Performed by: STUDENT IN AN ORGANIZED HEALTH CARE EDUCATION/TRAINING PROGRAM

## 2025-03-06 PROCEDURE — 97110 THERAPEUTIC EXERCISES: CPT

## 2025-03-06 PROCEDURE — 6370000000 HC RX 637 (ALT 250 FOR IP)

## 2025-03-06 PROCEDURE — 80048 BASIC METABOLIC PNL TOTAL CA: CPT

## 2025-03-06 PROCEDURE — 2060000000 HC ICU INTERMEDIATE R&B

## 2025-03-06 PROCEDURE — 99211 OFF/OP EST MAY X REQ PHY/QHP: CPT

## 2025-03-06 PROCEDURE — 2500000003 HC RX 250 WO HCPCS

## 2025-03-06 PROCEDURE — 6360000002 HC RX W HCPCS: Performed by: STUDENT IN AN ORGANIZED HEALTH CARE EDUCATION/TRAINING PROGRAM

## 2025-03-06 PROCEDURE — 85025 COMPLETE CBC W/AUTO DIFF WBC: CPT

## 2025-03-06 PROCEDURE — 85520 HEPARIN ASSAY: CPT

## 2025-03-06 PROCEDURE — 99232 SBSQ HOSP IP/OBS MODERATE 35: CPT | Performed by: STUDENT IN AN ORGANIZED HEALTH CARE EDUCATION/TRAINING PROGRAM

## 2025-03-06 PROCEDURE — 99024 POSTOP FOLLOW-UP VISIT: CPT

## 2025-03-06 PROCEDURE — 97606 NEG PRS WND THER DME>50 SQCM: CPT

## 2025-03-06 RX ORDER — LIDOCAINE 4 G/G
1 PATCH TOPICAL DAILY
Status: DISCONTINUED | OUTPATIENT
Start: 2025-03-06 | End: 2025-03-12 | Stop reason: HOSPADM

## 2025-03-06 RX ORDER — ACETAMINOPHEN 325 MG/1
650 TABLET ORAL EVERY 4 HOURS PRN
Status: DISCONTINUED | OUTPATIENT
Start: 2025-03-06 | End: 2025-03-12 | Stop reason: HOSPADM

## 2025-03-06 RX ADMIN — SODIUM CHLORIDE, PRESERVATIVE FREE 5 ML: 5 INJECTION INTRAVENOUS at 08:27

## 2025-03-06 RX ADMIN — ACETAMINOPHEN 1000 MG: 500 TABLET ORAL at 20:00

## 2025-03-06 RX ADMIN — HYDROMORPHONE HYDROCHLORIDE 0.5 MG: 1 INJECTION, SOLUTION INTRAMUSCULAR; INTRAVENOUS; SUBCUTANEOUS at 19:53

## 2025-03-06 RX ADMIN — METHOCARBAMOL TABLETS 750 MG: 750 TABLET, COATED ORAL at 23:28

## 2025-03-06 RX ADMIN — METRONIDAZOLE 500 MG: 500 INJECTION, SOLUTION INTRAVENOUS at 00:13

## 2025-03-06 RX ADMIN — HEPARIN SODIUM 24 UNITS/KG/HR: 10000 INJECTION, SOLUTION INTRAVENOUS at 16:17

## 2025-03-06 RX ADMIN — CIPROFLOXACIN 400 MG: 2 INJECTION, SOLUTION INTRAVENOUS at 10:57

## 2025-03-06 RX ADMIN — HYDROMORPHONE HYDROCHLORIDE 0.5 MG: 1 INJECTION, SOLUTION INTRAMUSCULAR; INTRAVENOUS; SUBCUTANEOUS at 03:12

## 2025-03-06 RX ADMIN — METHOCARBAMOL TABLETS 750 MG: 750 TABLET, COATED ORAL at 17:56

## 2025-03-06 RX ADMIN — ACETAMINOPHEN 1000 MG: 500 TABLET ORAL at 14:12

## 2025-03-06 RX ADMIN — METRONIDAZOLE 500 MG: 500 INJECTION, SOLUTION INTRAVENOUS at 18:00

## 2025-03-06 RX ADMIN — GABAPENTIN 400 MG: 400 CAPSULE ORAL at 14:13

## 2025-03-06 RX ADMIN — HYDROMORPHONE HYDROCHLORIDE 0.5 MG: 1 INJECTION, SOLUTION INTRAMUSCULAR; INTRAVENOUS; SUBCUTANEOUS at 12:09

## 2025-03-06 RX ADMIN — CIPROFLOXACIN 400 MG: 2 INJECTION, SOLUTION INTRAVENOUS at 22:17

## 2025-03-06 RX ADMIN — GABAPENTIN 400 MG: 400 CAPSULE ORAL at 20:00

## 2025-03-06 RX ADMIN — HYDROMORPHONE HYDROCHLORIDE 0.5 MG: 1 INJECTION, SOLUTION INTRAMUSCULAR; INTRAVENOUS; SUBCUTANEOUS at 16:17

## 2025-03-06 RX ADMIN — HEPARIN SODIUM 5000 UNITS: 1000 INJECTION INTRAVENOUS; SUBCUTANEOUS at 04:04

## 2025-03-06 RX ADMIN — OXYCODONE 5 MG: 5 TABLET ORAL at 09:58

## 2025-03-06 RX ADMIN — ACETAMINOPHEN 1000 MG: 500 TABLET ORAL at 05:43

## 2025-03-06 RX ADMIN — ATORVASTATIN CALCIUM 40 MG: 40 TABLET, FILM COATED ORAL at 19:58

## 2025-03-06 RX ADMIN — HEPARIN SODIUM 5000 UNITS: 1000 INJECTION INTRAVENOUS; SUBCUTANEOUS at 14:59

## 2025-03-06 RX ADMIN — HEPARIN SODIUM 22 UNITS/KG/HR: 10000 INJECTION, SOLUTION INTRAVENOUS at 06:43

## 2025-03-06 RX ADMIN — METRONIDAZOLE 500 MG: 500 INJECTION, SOLUTION INTRAVENOUS at 08:30

## 2025-03-06 RX ADMIN — OXYCODONE 5 MG: 5 TABLET ORAL at 18:13

## 2025-03-06 RX ADMIN — SODIUM CHLORIDE, PRESERVATIVE FREE 10 ML: 5 INJECTION INTRAVENOUS at 19:58

## 2025-03-06 RX ADMIN — METHOCARBAMOL TABLETS 750 MG: 750 TABLET, COATED ORAL at 00:11

## 2025-03-06 RX ADMIN — DOCUSATE SODIUM 100 MG: 100 CAPSULE, LIQUID FILLED ORAL at 08:35

## 2025-03-06 RX ADMIN — FAMOTIDINE 20 MG: 20 TABLET, FILM COATED ORAL at 19:58

## 2025-03-06 RX ADMIN — AMLODIPINE BESYLATE 5 MG: 5 TABLET ORAL at 08:26

## 2025-03-06 RX ADMIN — METHOCARBAMOL TABLETS 750 MG: 750 TABLET, COATED ORAL at 12:09

## 2025-03-06 RX ADMIN — Medication 6 MG: at 22:21

## 2025-03-06 RX ADMIN — FAMOTIDINE 20 MG: 20 TABLET, FILM COATED ORAL at 08:26

## 2025-03-06 RX ADMIN — HYDROMORPHONE HYDROCHLORIDE 0.5 MG: 1 INJECTION, SOLUTION INTRAMUSCULAR; INTRAVENOUS; SUBCUTANEOUS at 08:24

## 2025-03-06 RX ADMIN — GABAPENTIN 400 MG: 400 CAPSULE ORAL at 05:43

## 2025-03-06 RX ADMIN — METHOCARBAMOL TABLETS 750 MG: 750 TABLET, COATED ORAL at 05:43

## 2025-03-06 ASSESSMENT — PAIN SCALES - GENERAL
PAINLEVEL_OUTOF10: 8
PAINLEVEL_OUTOF10: 5
PAINLEVEL_OUTOF10: 7
PAINLEVEL_OUTOF10: 4
PAINLEVEL_OUTOF10: 9
PAINLEVEL_OUTOF10: 6
PAINLEVEL_OUTOF10: 8
PAINLEVEL_OUTOF10: 8
PAINLEVEL_OUTOF10: 7
PAINLEVEL_OUTOF10: 2
PAINLEVEL_OUTOF10: 10
PAINLEVEL_OUTOF10: 2
PAINLEVEL_OUTOF10: 4
PAINLEVEL_OUTOF10: 0
PAINLEVEL_OUTOF10: 3

## 2025-03-06 ASSESSMENT — PAIN DESCRIPTION - ORIENTATION
ORIENTATION: RIGHT

## 2025-03-06 ASSESSMENT — PAIN DESCRIPTION - LOCATION
LOCATION: ABDOMEN;BACK
LOCATION: ABDOMEN;LEG;RIB CAGE
LOCATION: ABDOMEN;BACK
LOCATION: RIB CAGE;ABDOMEN

## 2025-03-06 ASSESSMENT — PAIN DESCRIPTION - DESCRIPTORS
DESCRIPTORS: SHARP
DESCRIPTORS: ACHING;DISCOMFORT
DESCRIPTORS: ACHING;DISCOMFORT
DESCRIPTORS: SHARP

## 2025-03-06 NOTE — CARE COORDINATION
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]  Acute lower limb ischemia [I99.8]                       GMLOS: 6 days  Length of Stay: 11  days    Anticipated Discharge Date: One day until discharge    Readmission Risk (Low < 19, Mod (19-27), High > 27): Readmission Risk Score: 19.1      Patient Transitional Goal: Skilled nursing facility    Current Transitional Plan    [] Home Independently    [] Home with HC    [x] Skilled Nursing Facility    [] Acute Rehabilitation    [] Long Term Acute Care (LTAC)    [] Other:     Plan for the Stay (Medical Management): IR to drain perihepatic fluid, heparin gtt      Workflow Continuation (Additional Notes): spoke to Yady from [facility]. Precert is pending        Misty Max RN  March 6, 2025

## 2025-03-07 PROBLEM — D75.839 THROMBOCYTOSIS: Status: ACTIVE | Noted: 2025-03-07

## 2025-03-07 LAB
ANION GAP SERPL CALCULATED.3IONS-SCNC: 10 MMOL/L (ref 9–16)
ANTI-XA UNFRAC HEPARIN: 0.4 IU/L
BASOPHILS # BLD: 0 K/UL (ref 0–0.2)
BASOPHILS NFR BLD: 0 % (ref 0–2)
BUN SERPL-MCNC: 9 MG/DL (ref 6–20)
CALCIUM SERPL-MCNC: 8.1 MG/DL (ref 8.6–10.4)
CHLORIDE SERPL-SCNC: 101 MMOL/L (ref 98–107)
CO2 SERPL-SCNC: 24 MMOL/L (ref 20–31)
CREAT SERPL-MCNC: 0.6 MG/DL (ref 0.7–1.2)
EOSINOPHIL # BLD: 0.16 K/UL (ref 0–0.44)
EOSINOPHILS RELATIVE PERCENT: 1 % (ref 1–4)
ERYTHROCYTE [DISTWIDTH] IN BLOOD BY AUTOMATED COUNT: 16.1 % (ref 11.8–14.4)
FERRITIN SERPL-MCNC: 278 NG/ML (ref 30–400)
GFR, ESTIMATED: >90 ML/MIN/1.73M2
GLUCOSE SERPL-MCNC: 118 MG/DL (ref 74–99)
HCT VFR BLD AUTO: 27 % (ref 40.7–50.3)
HGB BLD-MCNC: 8.6 G/DL (ref 13–17)
IMM GRANULOCYTES # BLD AUTO: 0.32 K/UL (ref 0–0.3)
IMM GRANULOCYTES NFR BLD: 2 %
IMM RETICS NFR: 30.9 % (ref 2.7–18.3)
IRON SATN MFR SERPL: 7 % (ref 20–55)
IRON SERPL-MCNC: 11 UG/DL (ref 61–157)
LYMPHOCYTES NFR BLD: 2.08 K/UL (ref 1.1–3.7)
LYMPHOCYTES RELATIVE PERCENT: 13 % (ref 24–43)
MCH RBC QN AUTO: 27.3 PG (ref 25.2–33.5)
MCHC RBC AUTO-ENTMCNC: 31.9 G/DL (ref 28.4–34.8)
MCV RBC AUTO: 85.7 FL (ref 82.6–102.9)
MONOCYTES NFR BLD: 1.76 K/UL (ref 0.1–1.2)
MONOCYTES NFR BLD: 11 % (ref 3–12)
MORPHOLOGY: ABNORMAL
NEUTROPHILS NFR BLD: 73 % (ref 36–65)
NEUTS SEG NFR BLD: 11.68 K/UL (ref 1.5–8.1)
NRBC BLD-RTO: 0 PER 100 WBC
PLATELET # BLD AUTO: ABNORMAL K/UL (ref 138–453)
PLATELET, FLUORESCENCE: 1154 K/UL (ref 138–453)
PLATELETS.RETICULATED NFR BLD AUTO: 0.8 % (ref 1.1–10.3)
POTASSIUM SERPL-SCNC: 4.4 MMOL/L (ref 3.7–5.3)
RBC # BLD AUTO: 3.15 M/UL (ref 4.21–5.77)
RETIC HEMOGLOBIN: 25.7 PG (ref 28.2–35.7)
RETICS # AUTO: 0.08 M/UL (ref 0.03–0.08)
RETICS/RBC NFR AUTO: 2.5 % (ref 0.5–1.9)
SODIUM SERPL-SCNC: 135 MMOL/L (ref 136–145)
SURGICAL PATHOLOGY REPORT: NORMAL
TIBC SERPL-MCNC: 151 UG/DL (ref 250–450)
UNSATURATED IRON BINDING CAPACITY: 140 UG/DL (ref 112–347)
WBC OTHER # BLD: 16 K/UL (ref 3.5–11.3)

## 2025-03-07 PROCEDURE — 6360000002 HC RX W HCPCS

## 2025-03-07 PROCEDURE — 99255 IP/OBS CONSLTJ NEW/EST HI 80: CPT | Performed by: INTERNAL MEDICINE

## 2025-03-07 PROCEDURE — 97606 NEG PRS WND THER DME>50 SQCM: CPT

## 2025-03-07 PROCEDURE — 85055 RETICULATED PLATELET ASSAY: CPT

## 2025-03-07 PROCEDURE — 36415 COLL VENOUS BLD VENIPUNCTURE: CPT

## 2025-03-07 PROCEDURE — 85025 COMPLETE CBC W/AUTO DIFF WBC: CPT

## 2025-03-07 PROCEDURE — 83550 IRON BINDING TEST: CPT

## 2025-03-07 PROCEDURE — 80048 BASIC METABOLIC PNL TOTAL CA: CPT

## 2025-03-07 PROCEDURE — 6370000000 HC RX 637 (ALT 250 FOR IP)

## 2025-03-07 PROCEDURE — 83540 ASSAY OF IRON: CPT

## 2025-03-07 PROCEDURE — 82728 ASSAY OF FERRITIN: CPT

## 2025-03-07 PROCEDURE — 85045 AUTOMATED RETICULOCYTE COUNT: CPT

## 2025-03-07 PROCEDURE — 2500000003 HC RX 250 WO HCPCS

## 2025-03-07 PROCEDURE — 2060000000 HC ICU INTERMEDIATE R&B

## 2025-03-07 PROCEDURE — 6370000000 HC RX 637 (ALT 250 FOR IP): Performed by: STUDENT IN AN ORGANIZED HEALTH CARE EDUCATION/TRAINING PROGRAM

## 2025-03-07 PROCEDURE — 85520 HEPARIN ASSAY: CPT

## 2025-03-07 PROCEDURE — 99232 SBSQ HOSP IP/OBS MODERATE 35: CPT | Performed by: STUDENT IN AN ORGANIZED HEALTH CARE EDUCATION/TRAINING PROGRAM

## 2025-03-07 RX ORDER — OXYCODONE HYDROCHLORIDE 5 MG/1
10 TABLET ORAL EVERY 6 HOURS PRN
Status: DISCONTINUED | OUTPATIENT
Start: 2025-03-07 | End: 2025-03-07

## 2025-03-07 RX ORDER — OXYCODONE AND ACETAMINOPHEN 5; 325 MG/1; MG/1
2 TABLET ORAL EVERY 6 HOURS
Status: DISCONTINUED | OUTPATIENT
Start: 2025-03-07 | End: 2025-03-07

## 2025-03-07 RX ORDER — OXYCODONE HYDROCHLORIDE 5 MG/1
5 TABLET ORAL EVERY 6 HOURS PRN
Status: DISCONTINUED | OUTPATIENT
Start: 2025-03-07 | End: 2025-03-07

## 2025-03-07 RX ORDER — MIDODRINE HYDROCHLORIDE 5 MG/1
10 TABLET ORAL ONCE
Status: DISCONTINUED | OUTPATIENT
Start: 2025-03-07 | End: 2025-03-07

## 2025-03-07 RX ORDER — OXYCODONE AND ACETAMINOPHEN 5; 325 MG/1; MG/1
2 TABLET ORAL EVERY 6 HOURS
Status: DISCONTINUED | OUTPATIENT
Start: 2025-03-07 | End: 2025-03-12 | Stop reason: HOSPADM

## 2025-03-07 RX ADMIN — OXYCODONE HYDROCHLORIDE AND ACETAMINOPHEN 2 TABLET: 5; 325 TABLET ORAL at 23:17

## 2025-03-07 RX ADMIN — OXYCODONE 5 MG: 5 TABLET ORAL at 06:36

## 2025-03-07 RX ADMIN — DOCUSATE SODIUM 100 MG: 100 CAPSULE, LIQUID FILLED ORAL at 08:45

## 2025-03-07 RX ADMIN — GABAPENTIN 400 MG: 400 CAPSULE ORAL at 22:02

## 2025-03-07 RX ADMIN — METHOCARBAMOL TABLETS 750 MG: 750 TABLET, COATED ORAL at 17:28

## 2025-03-07 RX ADMIN — GABAPENTIN 400 MG: 400 CAPSULE ORAL at 14:00

## 2025-03-07 RX ADMIN — HEPARIN SODIUM 24 UNITS/KG/HR: 10000 INJECTION, SOLUTION INTRAVENOUS at 00:34

## 2025-03-07 RX ADMIN — HYDROMORPHONE HYDROCHLORIDE 0.5 MG: 1 INJECTION, SOLUTION INTRAMUSCULAR; INTRAVENOUS; SUBCUTANEOUS at 08:44

## 2025-03-07 RX ADMIN — SODIUM CHLORIDE, PRESERVATIVE FREE 10 ML: 5 INJECTION INTRAVENOUS at 22:03

## 2025-03-07 RX ADMIN — HYDROMORPHONE HYDROCHLORIDE 0.5 MG: 1 INJECTION, SOLUTION INTRAMUSCULAR; INTRAVENOUS; SUBCUTANEOUS at 13:08

## 2025-03-07 RX ADMIN — AMLODIPINE BESYLATE 5 MG: 5 TABLET ORAL at 08:45

## 2025-03-07 RX ADMIN — GABAPENTIN 400 MG: 400 CAPSULE ORAL at 06:36

## 2025-03-07 RX ADMIN — METHOCARBAMOL TABLETS 750 MG: 750 TABLET, COATED ORAL at 23:17

## 2025-03-07 RX ADMIN — CIPROFLOXACIN 400 MG: 2 INJECTION, SOLUTION INTRAVENOUS at 09:56

## 2025-03-07 RX ADMIN — HYDROMORPHONE HYDROCHLORIDE 0.5 MG: 1 INJECTION, SOLUTION INTRAMUSCULAR; INTRAVENOUS; SUBCUTANEOUS at 03:41

## 2025-03-07 RX ADMIN — HYDROMORPHONE HYDROCHLORIDE 0.5 MG: 1 INJECTION, SOLUTION INTRAMUSCULAR; INTRAVENOUS; SUBCUTANEOUS at 22:02

## 2025-03-07 RX ADMIN — FAMOTIDINE 20 MG: 20 TABLET, FILM COATED ORAL at 08:45

## 2025-03-07 RX ADMIN — METHOCARBAMOL TABLETS 750 MG: 750 TABLET, COATED ORAL at 11:41

## 2025-03-07 RX ADMIN — METHOCARBAMOL TABLETS 750 MG: 750 TABLET, COATED ORAL at 06:36

## 2025-03-07 RX ADMIN — OXYCODONE HYDROCHLORIDE AND ACETAMINOPHEN 2 TABLET: 5; 325 TABLET ORAL at 17:28

## 2025-03-07 RX ADMIN — METRONIDAZOLE 500 MG: 500 INJECTION, SOLUTION INTRAVENOUS at 17:27

## 2025-03-07 RX ADMIN — HEPARIN SODIUM 24 UNITS/KG/HR: 10000 INJECTION, SOLUTION INTRAVENOUS at 09:59

## 2025-03-07 RX ADMIN — METRONIDAZOLE 500 MG: 500 INJECTION, SOLUTION INTRAVENOUS at 00:49

## 2025-03-07 RX ADMIN — OXYCODONE 5 MG: 5 TABLET ORAL at 00:31

## 2025-03-07 RX ADMIN — METRONIDAZOLE 500 MG: 500 INJECTION, SOLUTION INTRAVENOUS at 08:50

## 2025-03-07 RX ADMIN — ATORVASTATIN CALCIUM 40 MG: 40 TABLET, FILM COATED ORAL at 22:03

## 2025-03-07 RX ADMIN — APIXABAN 5 MG: 5 TABLET, FILM COATED ORAL at 17:31

## 2025-03-07 RX ADMIN — CIPROFLOXACIN 400 MG: 2 INJECTION, SOLUTION INTRAVENOUS at 22:07

## 2025-03-07 RX ADMIN — OXYCODONE HYDROCHLORIDE AND ACETAMINOPHEN 2 TABLET: 5; 325 TABLET ORAL at 11:47

## 2025-03-07 RX ADMIN — FAMOTIDINE 20 MG: 20 TABLET, FILM COATED ORAL at 22:03

## 2025-03-07 RX ADMIN — SODIUM CHLORIDE, PRESERVATIVE FREE 10 ML: 5 INJECTION INTRAVENOUS at 08:45

## 2025-03-07 RX ADMIN — ACETAMINOPHEN 1000 MG: 500 TABLET ORAL at 06:36

## 2025-03-07 ASSESSMENT — PAIN SCALES - WONG BAKER
WONGBAKER_NUMERICALRESPONSE: HURTS LITTLE MORE
WONGBAKER_NUMERICALRESPONSE: NO HURT
WONGBAKER_NUMERICALRESPONSE: NO HURT
WONGBAKER_NUMERICALRESPONSE: HURTS LITTLE MORE

## 2025-03-07 ASSESSMENT — PAIN DESCRIPTION - LOCATION
LOCATION: ABDOMEN
LOCATION: ABDOMEN;BACK;CHEST;RIB CAGE
LOCATION: BACK;ABDOMEN
LOCATION: ABDOMEN;BACK
LOCATION: BACK;CHEST
LOCATION: BACK;CHEST
LOCATION: ABDOMEN;BACK

## 2025-03-07 ASSESSMENT — PAIN SCALES - GENERAL
PAINLEVEL_OUTOF10: 5
PAINLEVEL_OUTOF10: 5
PAINLEVEL_OUTOF10: 7
PAINLEVEL_OUTOF10: 7
PAINLEVEL_OUTOF10: 5
PAINLEVEL_OUTOF10: 6
PAINLEVEL_OUTOF10: 8
PAINLEVEL_OUTOF10: 3
PAINLEVEL_OUTOF10: 7
PAINLEVEL_OUTOF10: 7
PAINLEVEL_OUTOF10: 5
PAINLEVEL_OUTOF10: 7
PAINLEVEL_OUTOF10: 6
PAINLEVEL_OUTOF10: 4

## 2025-03-07 ASSESSMENT — PAIN DESCRIPTION - ORIENTATION
ORIENTATION: RIGHT
ORIENTATION: RIGHT;MID;POSTERIOR
ORIENTATION: RIGHT;MID
ORIENTATION: RIGHT;MID;POSTERIOR

## 2025-03-07 ASSESSMENT — PAIN DESCRIPTION - DESCRIPTORS
DESCRIPTORS: ACHING;DISCOMFORT
DESCRIPTORS: DISCOMFORT
DESCRIPTORS: ACHING;DISCOMFORT
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
## 2025-03-07 NOTE — CARE COORDINATION
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]  Acute lower limb ischemia [I99.8]                       GMLOS: 6 days  Length of Stay: 12  days    Anticipated Discharge Date: Discharge pending    Readmission Risk (Low < 19, Mod (19-27), High > 27): Readmission Risk Score: 18.6      Patient Transitional Goal: Home    Current Transitional Plan    [x] Home Independently    [] Home with HC    [] Skilled Nursing Facility    [] Acute Rehabilitation    [] Long Term Acute Care (LTAC)    [] Other:     Plan for the Stay (Medical Management) : Heparin drip, transitioning to Oral eliquis          Workflow Continuation (Additional Notes) : plan remains DC to [facility] Spoke with Yady from [facility], tomyert pending states she is on over the weekend, will call with any updates, number given.      Aracelis Barker RN  March 7, 2025

## 2025-03-08 LAB
ANION GAP SERPL CALCULATED.3IONS-SCNC: 11 MMOL/L (ref 9–16)
BASOPHILS # BLD: 0 K/UL (ref 0–0.2)
BASOPHILS NFR BLD: 0 % (ref 0–2)
BUN SERPL-MCNC: 10 MG/DL (ref 6–20)
CALCIUM SERPL-MCNC: 8.3 MG/DL (ref 8.6–10.4)
CHLORIDE SERPL-SCNC: 101 MMOL/L (ref 98–107)
CO2 SERPL-SCNC: 22 MMOL/L (ref 20–31)
CREAT SERPL-MCNC: 0.6 MG/DL (ref 0.7–1.2)
EOSINOPHIL # BLD: 0.14 K/UL (ref 0–0.4)
EOSINOPHILS RELATIVE PERCENT: 1 % (ref 1–4)
ERYTHROCYTE [DISTWIDTH] IN BLOOD BY AUTOMATED COUNT: 15.9 % (ref 11.8–14.4)
GFR, ESTIMATED: >90 ML/MIN/1.73M2
GLUCOSE SERPL-MCNC: 106 MG/DL (ref 74–99)
HCT VFR BLD AUTO: 29.8 % (ref 40.7–50.3)
HGB BLD-MCNC: 9.2 G/DL (ref 13–17)
IMM GRANULOCYTES # BLD AUTO: 0.28 K/UL (ref 0–0.3)
IMM GRANULOCYTES NFR BLD: 2 %
LYMPHOCYTES NFR BLD: 1.28 K/UL (ref 1–4.8)
LYMPHOCYTES RELATIVE PERCENT: 9 % (ref 24–44)
MCH RBC QN AUTO: 26.3 PG (ref 25.2–33.5)
MCHC RBC AUTO-ENTMCNC: 30.9 G/DL (ref 28.4–34.8)
MCV RBC AUTO: 85.1 FL (ref 82.6–102.9)
MICROORGANISM SPEC CULT: NORMAL
MICROORGANISM SPEC CULT: NORMAL
MONOCYTES NFR BLD: 1.14 K/UL (ref 0.1–0.8)
MONOCYTES NFR BLD: 8 % (ref 1–7)
MORPHOLOGY: ABNORMAL
MORPHOLOGY: ABNORMAL
NEUTROPHILS NFR BLD: 80 % (ref 36–66)
NEUTS SEG NFR BLD: 11.36 K/UL (ref 1.8–7.7)
NRBC BLD-RTO: 0 PER 100 WBC
PLATELET # BLD AUTO: ABNORMAL K/UL (ref 138–453)
PLATELET, FLUORESCENCE: 1214 K/UL (ref 138–453)
PLATELETS.RETICULATED NFR BLD AUTO: 0.8 % (ref 1.1–10.3)
POTASSIUM SERPL-SCNC: 4.5 MMOL/L (ref 3.7–5.3)
RBC # BLD AUTO: 3.5 M/UL (ref 4.21–5.77)
SERVICE CMNT-IMP: NORMAL
SERVICE CMNT-IMP: NORMAL
SODIUM SERPL-SCNC: 134 MMOL/L (ref 136–145)
SPECIMEN DESCRIPTION: NORMAL
SPECIMEN DESCRIPTION: NORMAL
WBC OTHER # BLD: 14.2 K/UL (ref 3.5–11.3)

## 2025-03-08 PROCEDURE — 2500000003 HC RX 250 WO HCPCS

## 2025-03-08 PROCEDURE — 6370000000 HC RX 637 (ALT 250 FOR IP)

## 2025-03-08 PROCEDURE — 6360000002 HC RX W HCPCS

## 2025-03-08 PROCEDURE — 2060000000 HC ICU INTERMEDIATE R&B

## 2025-03-08 PROCEDURE — 85025 COMPLETE CBC W/AUTO DIFF WBC: CPT

## 2025-03-08 PROCEDURE — 80048 BASIC METABOLIC PNL TOTAL CA: CPT

## 2025-03-08 PROCEDURE — 36415 COLL VENOUS BLD VENIPUNCTURE: CPT

## 2025-03-08 PROCEDURE — 97530 THERAPEUTIC ACTIVITIES: CPT

## 2025-03-08 PROCEDURE — 99233 SBSQ HOSP IP/OBS HIGH 50: CPT | Performed by: INTERNAL MEDICINE

## 2025-03-08 PROCEDURE — 2580000003 HC RX 258

## 2025-03-08 PROCEDURE — 6370000000 HC RX 637 (ALT 250 FOR IP): Performed by: STUDENT IN AN ORGANIZED HEALTH CARE EDUCATION/TRAINING PROGRAM

## 2025-03-08 PROCEDURE — 99232 SBSQ HOSP IP/OBS MODERATE 35: CPT | Performed by: STUDENT IN AN ORGANIZED HEALTH CARE EDUCATION/TRAINING PROGRAM

## 2025-03-08 PROCEDURE — 85055 RETICULATED PLATELET ASSAY: CPT

## 2025-03-08 RX ORDER — FERROUS SULFATE 325(65) MG
325 TABLET, DELAYED RELEASE (ENTERIC COATED) ORAL
Status: DISCONTINUED | OUTPATIENT
Start: 2025-03-08 | End: 2025-03-08

## 2025-03-08 RX ADMIN — METRONIDAZOLE 500 MG: 500 INJECTION, SOLUTION INTRAVENOUS at 16:36

## 2025-03-08 RX ADMIN — Medication 6 MG: at 03:15

## 2025-03-08 RX ADMIN — AMLODIPINE BESYLATE 5 MG: 5 TABLET ORAL at 07:53

## 2025-03-08 RX ADMIN — METRONIDAZOLE 500 MG: 500 INJECTION, SOLUTION INTRAVENOUS at 00:46

## 2025-03-08 RX ADMIN — GABAPENTIN 400 MG: 400 CAPSULE ORAL at 21:46

## 2025-03-08 RX ADMIN — HYDROMORPHONE HYDROCHLORIDE 0.5 MG: 1 INJECTION, SOLUTION INTRAMUSCULAR; INTRAVENOUS; SUBCUTANEOUS at 04:16

## 2025-03-08 RX ADMIN — FAMOTIDINE 20 MG: 20 TABLET, FILM COATED ORAL at 21:46

## 2025-03-08 RX ADMIN — FERROUS SULFATE TAB EC 325 MG (65 MG FE EQUIVALENT) 325 MG: 325 (65 FE) TABLET DELAYED RESPONSE at 09:22

## 2025-03-08 RX ADMIN — SODIUM CHLORIDE 300 MG: 0.9 INJECTION, SOLUTION INTRAVENOUS at 11:32

## 2025-03-08 RX ADMIN — DOCUSATE SODIUM 100 MG: 100 CAPSULE, LIQUID FILLED ORAL at 07:53

## 2025-03-08 RX ADMIN — HYDROMORPHONE HYDROCHLORIDE 0.5 MG: 1 INJECTION, SOLUTION INTRAMUSCULAR; INTRAVENOUS; SUBCUTANEOUS at 23:03

## 2025-03-08 RX ADMIN — METHOCARBAMOL TABLETS 750 MG: 750 TABLET, COATED ORAL at 05:42

## 2025-03-08 RX ADMIN — SODIUM CHLORIDE, PRESERVATIVE FREE 10 ML: 5 INJECTION INTRAVENOUS at 07:54

## 2025-03-08 RX ADMIN — CIPROFLOXACIN 400 MG: 2 INJECTION, SOLUTION INTRAVENOUS at 21:50

## 2025-03-08 RX ADMIN — ATORVASTATIN CALCIUM 40 MG: 40 TABLET, FILM COATED ORAL at 21:46

## 2025-03-08 RX ADMIN — OXYCODONE HYDROCHLORIDE AND ACETAMINOPHEN 2 TABLET: 5; 325 TABLET ORAL at 17:32

## 2025-03-08 RX ADMIN — APIXABAN 5 MG: 5 TABLET, FILM COATED ORAL at 07:53

## 2025-03-08 RX ADMIN — CIPROFLOXACIN 400 MG: 2 INJECTION, SOLUTION INTRAVENOUS at 10:07

## 2025-03-08 RX ADMIN — HYDROMORPHONE HYDROCHLORIDE 0.5 MG: 1 INJECTION, SOLUTION INTRAMUSCULAR; INTRAVENOUS; SUBCUTANEOUS at 16:23

## 2025-03-08 RX ADMIN — OXYCODONE HYDROCHLORIDE AND ACETAMINOPHEN 2 TABLET: 5; 325 TABLET ORAL at 23:34

## 2025-03-08 RX ADMIN — METHOCARBAMOL TABLETS 750 MG: 750 TABLET, COATED ORAL at 17:33

## 2025-03-08 RX ADMIN — SODIUM CHLORIDE, PRESERVATIVE FREE 10 ML: 5 INJECTION INTRAVENOUS at 21:46

## 2025-03-08 RX ADMIN — METHOCARBAMOL TABLETS 750 MG: 750 TABLET, COATED ORAL at 11:29

## 2025-03-08 RX ADMIN — GABAPENTIN 400 MG: 400 CAPSULE ORAL at 13:34

## 2025-03-08 RX ADMIN — METHOCARBAMOL TABLETS 750 MG: 750 TABLET, COATED ORAL at 23:34

## 2025-03-08 RX ADMIN — GABAPENTIN 400 MG: 400 CAPSULE ORAL at 05:42

## 2025-03-08 RX ADMIN — OXYCODONE HYDROCHLORIDE AND ACETAMINOPHEN 2 TABLET: 5; 325 TABLET ORAL at 11:29

## 2025-03-08 RX ADMIN — FAMOTIDINE 20 MG: 20 TABLET, FILM COATED ORAL at 07:53

## 2025-03-08 RX ADMIN — APIXABAN 5 MG: 5 TABLET, FILM COATED ORAL at 21:46

## 2025-03-08 RX ADMIN — OXYCODONE HYDROCHLORIDE AND ACETAMINOPHEN 2 TABLET: 5; 325 TABLET ORAL at 05:42

## 2025-03-08 RX ADMIN — METRONIDAZOLE 500 MG: 500 INJECTION, SOLUTION INTRAVENOUS at 07:59

## 2025-03-08 RX ADMIN — HYDROMORPHONE HYDROCHLORIDE 0.5 MG: 1 INJECTION, SOLUTION INTRAMUSCULAR; INTRAVENOUS; SUBCUTANEOUS at 10:10

## 2025-03-08 ASSESSMENT — PAIN SCALES - WONG BAKER
WONGBAKER_NUMERICALRESPONSE: NO HURT

## 2025-03-08 ASSESSMENT — PAIN DESCRIPTION - DESCRIPTORS
DESCRIPTORS: ACHING;DISCOMFORT

## 2025-03-08 ASSESSMENT — PAIN SCALES - GENERAL
PAINLEVEL_OUTOF10: 5
PAINLEVEL_OUTOF10: 5
PAINLEVEL_OUTOF10: 6
PAINLEVEL_OUTOF10: 6
PAINLEVEL_OUTOF10: 7
PAINLEVEL_OUTOF10: 3
PAINLEVEL_OUTOF10: 6
PAINLEVEL_OUTOF10: 6
PAINLEVEL_OUTOF10: 7
PAINLEVEL_OUTOF10: 6
PAINLEVEL_OUTOF10: 6
PAINLEVEL_OUTOF10: 7

## 2025-03-08 ASSESSMENT — PAIN DESCRIPTION - LOCATION
LOCATION: ABDOMEN;BACK
LOCATION: ABDOMEN
LOCATION: ABDOMEN;BACK
LOCATION: ABDOMEN;BACK

## 2025-03-08 ASSESSMENT — PAIN DESCRIPTION - ORIENTATION
ORIENTATION: RIGHT;MID
ORIENTATION: RIGHT;POSTERIOR;MID
ORIENTATION: RIGHT
ORIENTATION: RIGHT;MID
ORIENTATION: RIGHT;MID

## 2025-03-08 ASSESSMENT — PAIN DESCRIPTION - ONSET: ONSET: ON-GOING

## 2025-03-08 ASSESSMENT — PAIN DESCRIPTION - FREQUENCY: FREQUENCY: CONTINUOUS

## 2025-03-08 ASSESSMENT — PAIN - FUNCTIONAL ASSESSMENT: PAIN_FUNCTIONAL_ASSESSMENT: PREVENTS OR INTERFERES SOME ACTIVE ACTIVITIES AND ADLS

## 2025-03-08 ASSESSMENT — PAIN DESCRIPTION - PAIN TYPE: TYPE: CHRONIC PAIN;SURGICAL PAIN

## 2025-03-09 LAB
ANION GAP SERPL CALCULATED.3IONS-SCNC: 12 MMOL/L (ref 9–16)
BASOPHILS # BLD: 0 K/UL (ref 0–0.2)
BASOPHILS NFR BLD: 0 % (ref 0–2)
BUN SERPL-MCNC: 11 MG/DL (ref 6–20)
CALCIUM SERPL-MCNC: 8.9 MG/DL (ref 8.6–10.4)
CHLORIDE SERPL-SCNC: 99 MMOL/L (ref 98–107)
CO2 SERPL-SCNC: 23 MMOL/L (ref 20–31)
CREAT SERPL-MCNC: 0.7 MG/DL (ref 0.7–1.2)
EOSINOPHIL # BLD: 0.14 K/UL (ref 0–0.44)
EOSINOPHILS RELATIVE PERCENT: 1 % (ref 1–4)
ERYTHROCYTE [DISTWIDTH] IN BLOOD BY AUTOMATED COUNT: 15.9 % (ref 11.8–14.4)
GFR, ESTIMATED: >90 ML/MIN/1.73M2
GLUCOSE SERPL-MCNC: 124 MG/DL (ref 74–99)
HCT VFR BLD AUTO: 29.1 % (ref 40.7–50.3)
HGB BLD-MCNC: 9 G/DL (ref 13–17)
IMM GRANULOCYTES # BLD AUTO: 0.14 K/UL (ref 0–0.3)
IMM GRANULOCYTES NFR BLD: 1 %
LYMPHOCYTES NFR BLD: 2.56 K/UL (ref 1.1–3.7)
LYMPHOCYTES RELATIVE PERCENT: 18 % (ref 24–43)
MCH RBC QN AUTO: 26.3 PG (ref 25.2–33.5)
MCHC RBC AUTO-ENTMCNC: 30.9 G/DL (ref 28.4–34.8)
MCV RBC AUTO: 85.1 FL (ref 82.6–102.9)
MONOCYTES NFR BLD: 1.85 K/UL (ref 0.1–1.2)
MONOCYTES NFR BLD: 13 % (ref 3–12)
MORPHOLOGY: ABNORMAL
MORPHOLOGY: ABNORMAL
NEUTROPHILS NFR BLD: 67 % (ref 36–65)
NEUTS SEG NFR BLD: 9.51 K/UL (ref 1.5–8.1)
NRBC BLD-RTO: 0 PER 100 WBC
PLATELET # BLD AUTO: ABNORMAL K/UL (ref 138–453)
PLATELET, FLUORESCENCE: 1304 K/UL (ref 138–453)
PLATELETS.RETICULATED NFR BLD AUTO: 0.7 % (ref 1.1–10.3)
POTASSIUM SERPL-SCNC: 4.7 MMOL/L (ref 3.7–5.3)
RBC # BLD AUTO: 3.42 M/UL (ref 4.21–5.77)
SODIUM SERPL-SCNC: 134 MMOL/L (ref 136–145)
WBC OTHER # BLD: 14.2 K/UL (ref 3.5–11.3)

## 2025-03-09 PROCEDURE — 6370000000 HC RX 637 (ALT 250 FOR IP): Performed by: STUDENT IN AN ORGANIZED HEALTH CARE EDUCATION/TRAINING PROGRAM

## 2025-03-09 PROCEDURE — 85025 COMPLETE CBC W/AUTO DIFF WBC: CPT

## 2025-03-09 PROCEDURE — 36415 COLL VENOUS BLD VENIPUNCTURE: CPT

## 2025-03-09 PROCEDURE — 2580000003 HC RX 258

## 2025-03-09 PROCEDURE — 6360000002 HC RX W HCPCS

## 2025-03-09 PROCEDURE — 6370000000 HC RX 637 (ALT 250 FOR IP)

## 2025-03-09 PROCEDURE — 99232 SBSQ HOSP IP/OBS MODERATE 35: CPT | Performed by: STUDENT IN AN ORGANIZED HEALTH CARE EDUCATION/TRAINING PROGRAM

## 2025-03-09 PROCEDURE — 2060000000 HC ICU INTERMEDIATE R&B

## 2025-03-09 PROCEDURE — 85055 RETICULATED PLATELET ASSAY: CPT

## 2025-03-09 PROCEDURE — 2500000003 HC RX 250 WO HCPCS

## 2025-03-09 PROCEDURE — 6370000000 HC RX 637 (ALT 250 FOR IP): Performed by: INTERNAL MEDICINE

## 2025-03-09 PROCEDURE — 99232 SBSQ HOSP IP/OBS MODERATE 35: CPT | Performed by: INTERNAL MEDICINE

## 2025-03-09 PROCEDURE — 80048 BASIC METABOLIC PNL TOTAL CA: CPT

## 2025-03-09 RX ORDER — AMLODIPINE BESYLATE 5 MG/1
5 TABLET ORAL DAILY
Qty: 30 TABLET | Refills: 0 | Status: SHIPPED | OUTPATIENT
Start: 2025-03-10 | End: 2025-03-11

## 2025-03-09 RX ORDER — OXYCODONE HYDROCHLORIDE 10 MG/1
10 TABLET ORAL EVERY 6 HOURS PRN
Qty: 21 TABLET | Refills: 0 | Status: CANCELLED | OUTPATIENT
Start: 2025-03-09 | End: 2025-03-16

## 2025-03-09 RX ORDER — POLYETHYLENE GLYCOL 3350 17 G/17G
17 POWDER, FOR SOLUTION ORAL DAILY PRN
Qty: 30 PACKET | Refills: 0 | Status: SHIPPED | OUTPATIENT
Start: 2025-03-09 | End: 2025-03-11 | Stop reason: HOSPADM

## 2025-03-09 RX ORDER — LIDOCAINE 4 G/G
1 PATCH TOPICAL DAILY
Qty: 30 PATCH | Refills: 0 | Status: CANCELLED | OUTPATIENT
Start: 2025-03-10

## 2025-03-09 RX ORDER — OXYCODONE AND ACETAMINOPHEN 5; 325 MG/1; MG/1
1 TABLET ORAL 2 TIMES DAILY PRN
Qty: 20 TABLET | Refills: 0 | Status: SHIPPED | OUTPATIENT
Start: 2025-03-09 | End: 2025-03-19

## 2025-03-09 RX ORDER — PSEUDOEPHEDRINE HCL 30 MG
100 TABLET ORAL DAILY
Qty: 30 CAPSULE | Refills: 0 | Status: SHIPPED | OUTPATIENT
Start: 2025-03-10 | End: 2025-03-11

## 2025-03-09 RX ORDER — GABAPENTIN 300 MG/1
300 CAPSULE ORAL EVERY 8 HOURS SCHEDULED
Qty: 45 CAPSULE | Refills: 0 | Status: SHIPPED | OUTPATIENT
Start: 2025-03-09 | End: 2025-03-11

## 2025-03-09 RX ORDER — SIMETHICONE 80 MG
80 TABLET,CHEWABLE ORAL EVERY 6 HOURS PRN
Qty: 60 TABLET | Refills: 0 | Status: SHIPPED | OUTPATIENT
Start: 2025-03-09 | End: 2025-03-11

## 2025-03-09 RX ORDER — ONDANSETRON 4 MG/1
4 TABLET, ORALLY DISINTEGRATING ORAL EVERY 8 HOURS PRN
Qty: 60 TABLET | Refills: 0 | Status: SHIPPED | OUTPATIENT
Start: 2025-03-09 | End: 2025-03-11 | Stop reason: HOSPADM

## 2025-03-09 RX ORDER — METHOCARBAMOL 750 MG/1
750 TABLET, FILM COATED ORAL EVERY 6 HOURS
Qty: 120 TABLET | Refills: 0 | Status: SHIPPED | OUTPATIENT
Start: 2025-03-09 | End: 2025-03-11

## 2025-03-09 RX ORDER — ATORVASTATIN CALCIUM 40 MG/1
40 TABLET, FILM COATED ORAL NIGHTLY
Qty: 30 TABLET | Refills: 0 | Status: SHIPPED | OUTPATIENT
Start: 2025-03-09 | End: 2025-03-11

## 2025-03-09 RX ORDER — FAMOTIDINE 20 MG/1
20 TABLET, FILM COATED ORAL 2 TIMES DAILY
Qty: 60 TABLET | Refills: 0 | Status: SHIPPED | OUTPATIENT
Start: 2025-03-09 | End: 2025-03-11

## 2025-03-09 RX ORDER — GABAPENTIN 300 MG/1
300 CAPSULE ORAL EVERY 8 HOURS SCHEDULED
Status: DISCONTINUED | OUTPATIENT
Start: 2025-03-09 | End: 2025-03-12 | Stop reason: HOSPADM

## 2025-03-09 RX ORDER — OXYCODONE HYDROCHLORIDE 5 MG/1
10 TABLET ORAL EVERY 6 HOURS PRN
Refills: 0 | Status: DISCONTINUED | OUTPATIENT
Start: 2025-03-09 | End: 2025-03-12 | Stop reason: HOSPADM

## 2025-03-09 RX ORDER — LIDOCAINE 4 G/G
1 PATCH TOPICAL DAILY
Qty: 15 EACH | Refills: 0 | Status: SHIPPED | OUTPATIENT
Start: 2025-03-10 | End: 2025-03-11 | Stop reason: HOSPADM

## 2025-03-09 RX ORDER — LIDOCAINE 4 G/G
1 PATCH TOPICAL DAILY
Qty: 15 EACH | Refills: 0 | Status: SHIPPED | OUTPATIENT
Start: 2025-03-10 | End: 2025-03-11

## 2025-03-09 RX ORDER — OXYCODONE AND ACETAMINOPHEN 5; 325 MG/1; MG/1
2 TABLET ORAL EVERY 6 HOURS
Qty: 56 TABLET | Refills: 0 | Status: CANCELLED | OUTPATIENT
Start: 2025-03-09 | End: 2025-03-16

## 2025-03-09 RX ADMIN — HYDROMORPHONE HYDROCHLORIDE 0.5 MG: 1 INJECTION, SOLUTION INTRAMUSCULAR; INTRAVENOUS; SUBCUTANEOUS at 20:25

## 2025-03-09 RX ADMIN — HYDROMORPHONE HYDROCHLORIDE 0.5 MG: 1 INJECTION, SOLUTION INTRAMUSCULAR; INTRAVENOUS; SUBCUTANEOUS at 08:23

## 2025-03-09 RX ADMIN — METHOCARBAMOL TABLETS 750 MG: 750 TABLET, COATED ORAL at 05:28

## 2025-03-09 RX ADMIN — IBUPROFEN 400 MG: 400 TABLET, FILM COATED ORAL at 13:46

## 2025-03-09 RX ADMIN — GABAPENTIN 300 MG: 300 CAPSULE ORAL at 13:46

## 2025-03-09 RX ADMIN — OXYCODONE HYDROCHLORIDE AND ACETAMINOPHEN 2 TABLET: 5; 325 TABLET ORAL at 18:01

## 2025-03-09 RX ADMIN — OXYCODONE HYDROCHLORIDE AND ACETAMINOPHEN 2 TABLET: 5; 325 TABLET ORAL at 23:13

## 2025-03-09 RX ADMIN — OXYCODONE HYDROCHLORIDE AND ACETAMINOPHEN 2 TABLET: 5; 325 TABLET ORAL at 05:28

## 2025-03-09 RX ADMIN — SODIUM CHLORIDE 300 MG: 0.9 INJECTION, SOLUTION INTRAVENOUS at 10:44

## 2025-03-09 RX ADMIN — FAMOTIDINE 20 MG: 20 TABLET, FILM COATED ORAL at 08:19

## 2025-03-09 RX ADMIN — OXYCODONE HYDROCHLORIDE AND ACETAMINOPHEN 2 TABLET: 5; 325 TABLET ORAL at 11:54

## 2025-03-09 RX ADMIN — METRONIDAZOLE 500 MG: 500 INJECTION, SOLUTION INTRAVENOUS at 01:13

## 2025-03-09 RX ADMIN — GABAPENTIN 300 MG: 300 CAPSULE ORAL at 20:25

## 2025-03-09 RX ADMIN — GABAPENTIN 400 MG: 400 CAPSULE ORAL at 05:28

## 2025-03-09 RX ADMIN — AMLODIPINE BESYLATE 5 MG: 5 TABLET ORAL at 08:19

## 2025-03-09 RX ADMIN — SODIUM CHLORIDE, PRESERVATIVE FREE 10 ML: 5 INJECTION INTRAVENOUS at 08:19

## 2025-03-09 RX ADMIN — METHOCARBAMOL TABLETS 750 MG: 750 TABLET, COATED ORAL at 23:13

## 2025-03-09 RX ADMIN — METHOCARBAMOL TABLETS 750 MG: 750 TABLET, COATED ORAL at 18:01

## 2025-03-09 RX ADMIN — APIXABAN 5 MG: 5 TABLET, FILM COATED ORAL at 20:25

## 2025-03-09 RX ADMIN — DOCUSATE SODIUM 100 MG: 100 CAPSULE, LIQUID FILLED ORAL at 08:19

## 2025-03-09 RX ADMIN — APIXABAN 5 MG: 5 TABLET, FILM COATED ORAL at 08:19

## 2025-03-09 RX ADMIN — FAMOTIDINE 20 MG: 20 TABLET, FILM COATED ORAL at 20:25

## 2025-03-09 RX ADMIN — ATORVASTATIN CALCIUM 40 MG: 40 TABLET, FILM COATED ORAL at 20:25

## 2025-03-09 RX ADMIN — METHOCARBAMOL TABLETS 750 MG: 750 TABLET, COATED ORAL at 11:54

## 2025-03-09 RX ADMIN — SODIUM CHLORIDE, PRESERVATIVE FREE 10 ML: 5 INJECTION INTRAVENOUS at 20:25

## 2025-03-09 ASSESSMENT — PAIN DESCRIPTION - DESCRIPTORS
DESCRIPTORS: ACHING;SHARP
DESCRIPTORS: PRESSURE;CRAMPING

## 2025-03-09 ASSESSMENT — PAIN DESCRIPTION - LOCATION
LOCATION: BACK;LEG
LOCATION: ABDOMEN;BACK

## 2025-03-09 ASSESSMENT — PAIN SCALES - GENERAL
PAINLEVEL_OUTOF10: 7
PAINLEVEL_OUTOF10: 5
PAINLEVEL_OUTOF10: 5
PAINLEVEL_OUTOF10: 7
PAINLEVEL_OUTOF10: 7
PAINLEVEL_OUTOF10: 5

## 2025-03-09 NOTE — DISCHARGE INSTRUCTIONS
-you were seen in the hospital for perforated diverticulitis and arterial blood clot in the left leg  -you underwent sigmoid colectomy and colostomy creation with general surgery  -you had a blood clot that was removed by vascular surgery, and you underwent fasciotomy of your left leg  -you completed a course of IV antibiotics for the diverticulitis  -you will need close follow-up with general surgery and vascular surgery  -you will be discharged with pain medications.  Please take the lowest dose possible to control your pain  -you will be discharged with a blood thinner, Eliquis.  Please take it twice a day.  -If you begin to experience any symptoms such as chest pain, shortness of breath, nausea, vomiting, dizziness, drowsiness, abdominal pain, loss of consciousness, or any other symptoms you find concerning please return to the ED for follow-up evaluation.  -If you have been given medication please take them as prescribed. Do not take more medication than recommended at any given time.  -Please follow-up with your primary care provider within 7 days for continued care.   -Please feel free return to the hospital if your symptoms worsen or any new concerning symptoms develop.  Follow-up with your primary care physician as needed for all other the concerns.

## 2025-03-09 NOTE — CARE COORDINATION
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]  Acute lower limb ischemia [I99.8]                       GMLOS: 6 days  Length of Stay: 14  days    Anticipated Discharge Date: Ready for discharge    Readmission Risk (Low < 19, Mod (19-27), High > 27): Readmission Risk Score: 17.8      Patient Transitional Goal: Skilled nursing facility    Current Transitional Plan    [] Home Independently    [] Home with HC    [x] Skilled Nursing Facility    [] Acute Rehabilitation    [] Long Term Acute Care (LTAC)    [] Other:     Plan for the Stay (Medical Management) :          Workflow Continuation (Additional Notes) :    Called [facility] [phone]/Yen-no answer, unable to leave a message. Called [phone] and spoke to Tiffani-transferred to [facility]. On hold for 5 minutes with no answer.     1103 called back and spoke to Amy. Pre cert is still pending. If patient needs a wound vac , she will need to order one.     1107 PS to Dr Vega asking if patient will need wound vac when d/c to SNF.    1109 PS response is patient will need wound vac at discharge.     1132 CM called Amy [phone] and told her patient needs wound vac at discharge. She also needs to know what dressings are used with the wound vac dressing.     1156 CM does not see anything documented in wound ostomy notes about dressing for wound vac. General surgery notes says Wound Ostomy for midline vac changes.     1259 Spoke to patient and he says he would like to go home with home care. He lives in [facility] in [place]. Patient also has a wound vac and will need one to go home with, too.     1309 Gave patient list of HC agencies printed from [employer] website    1311 PS to Dr Vega

## 2025-03-10 LAB
ANION GAP SERPL CALCULATED.3IONS-SCNC: 11 MMOL/L (ref 9–16)
BASOPHILS # BLD: 0.07 K/UL (ref 0–0.2)
BASOPHILS NFR BLD: 1 % (ref 0–2)
BUN SERPL-MCNC: 13 MG/DL (ref 6–20)
CALCIUM SERPL-MCNC: 8.5 MG/DL (ref 8.6–10.4)
CHLORIDE SERPL-SCNC: 101 MMOL/L (ref 98–107)
CO2 SERPL-SCNC: 25 MMOL/L (ref 20–31)
CREAT SERPL-MCNC: 0.6 MG/DL (ref 0.7–1.2)
EOSINOPHIL # BLD: 0.18 K/UL (ref 0–0.44)
EOSINOPHILS RELATIVE PERCENT: 2 % (ref 1–4)
ERYTHROCYTE [DISTWIDTH] IN BLOOD BY AUTOMATED COUNT: 15.9 % (ref 11.8–14.4)
GFR, ESTIMATED: >90 ML/MIN/1.73M2
GLUCOSE SERPL-MCNC: 106 MG/DL (ref 74–99)
HCT VFR BLD AUTO: 28 % (ref 40.7–50.3)
HGB BLD-MCNC: 8.6 G/DL (ref 13–17)
IMM GRANULOCYTES # BLD AUTO: 0.17 K/UL (ref 0–0.3)
IMM GRANULOCYTES NFR BLD: 2 %
LYMPHOCYTES NFR BLD: 1.81 K/UL (ref 1.1–3.7)
LYMPHOCYTES RELATIVE PERCENT: 17 % (ref 24–43)
MCH RBC QN AUTO: 26.4 PG (ref 25.2–33.5)
MCHC RBC AUTO-ENTMCNC: 30.7 G/DL (ref 28.4–34.8)
MCV RBC AUTO: 85.9 FL (ref 82.6–102.9)
MONOCYTES NFR BLD: 1.31 K/UL (ref 0.1–1.2)
MONOCYTES NFR BLD: 12 % (ref 3–12)
NEUTROPHILS NFR BLD: 67 % (ref 36–65)
NEUTS SEG NFR BLD: 7.06 K/UL (ref 1.5–8.1)
NRBC BLD-RTO: 0 PER 100 WBC
PATH REV BLD -IMP: NORMAL
PLATELET # BLD AUTO: ABNORMAL K/UL (ref 138–453)
PLATELET, FLUORESCENCE: 1273 K/UL (ref 138–453)
PLATELETS.RETICULATED NFR BLD AUTO: 0.6 % (ref 1.1–10.3)
POTASSIUM SERPL-SCNC: 4.3 MMOL/L (ref 3.7–5.3)
RBC # BLD AUTO: 3.26 M/UL (ref 4.21–5.77)
RBC # BLD: ABNORMAL 10*6/UL
SODIUM SERPL-SCNC: 137 MMOL/L (ref 136–145)
SURGICAL PATHOLOGY REPORT: NORMAL
WBC OTHER # BLD: 10.6 K/UL (ref 3.5–11.3)

## 2025-03-10 PROCEDURE — 97606 NEG PRS WND THER DME>50 SQCM: CPT

## 2025-03-10 PROCEDURE — 85055 RETICULATED PLATELET ASSAY: CPT

## 2025-03-10 PROCEDURE — 2580000003 HC RX 258

## 2025-03-10 PROCEDURE — 80048 BASIC METABOLIC PNL TOTAL CA: CPT

## 2025-03-10 PROCEDURE — 2060000000 HC ICU INTERMEDIATE R&B

## 2025-03-10 PROCEDURE — 99232 SBSQ HOSP IP/OBS MODERATE 35: CPT | Performed by: INTERNAL MEDICINE

## 2025-03-10 PROCEDURE — 6370000000 HC RX 637 (ALT 250 FOR IP)

## 2025-03-10 PROCEDURE — 6370000000 HC RX 637 (ALT 250 FOR IP): Performed by: STUDENT IN AN ORGANIZED HEALTH CARE EDUCATION/TRAINING PROGRAM

## 2025-03-10 PROCEDURE — 85025 COMPLETE CBC W/AUTO DIFF WBC: CPT

## 2025-03-10 PROCEDURE — 2500000003 HC RX 250 WO HCPCS

## 2025-03-10 PROCEDURE — 97530 THERAPEUTIC ACTIVITIES: CPT

## 2025-03-10 PROCEDURE — 6360000002 HC RX W HCPCS

## 2025-03-10 PROCEDURE — 36415 COLL VENOUS BLD VENIPUNCTURE: CPT

## 2025-03-10 RX ORDER — BACLOFEN 10 MG/1
10 TABLET ORAL 3 TIMES DAILY PRN
Status: DISCONTINUED | OUTPATIENT
Start: 2025-03-10 | End: 2025-03-12 | Stop reason: HOSPADM

## 2025-03-10 RX ORDER — DICYCLOMINE HYDROCHLORIDE 10 MG/1
20 CAPSULE ORAL 3 TIMES DAILY PRN
Qty: 60 CAPSULE | Refills: 0 | Status: SHIPPED | OUTPATIENT
Start: 2025-03-10 | End: 2025-03-11

## 2025-03-10 RX ORDER — DICYCLOMINE HYDROCHLORIDE 10 MG/1
20 CAPSULE ORAL
Status: DISCONTINUED | OUTPATIENT
Start: 2025-03-10 | End: 2025-03-12 | Stop reason: HOSPADM

## 2025-03-10 RX ADMIN — OXYCODONE 10 MG: 5 TABLET ORAL at 23:03

## 2025-03-10 RX ADMIN — APIXABAN 5 MG: 5 TABLET, FILM COATED ORAL at 22:19

## 2025-03-10 RX ADMIN — DOCUSATE SODIUM 100 MG: 100 CAPSULE, LIQUID FILLED ORAL at 09:19

## 2025-03-10 RX ADMIN — APIXABAN 5 MG: 5 TABLET, FILM COATED ORAL at 09:19

## 2025-03-10 RX ADMIN — BACLOFEN 10 MG: 10 TABLET ORAL at 17:50

## 2025-03-10 RX ADMIN — OXYCODONE 10 MG: 5 TABLET ORAL at 09:19

## 2025-03-10 RX ADMIN — METHOCARBAMOL TABLETS 750 MG: 750 TABLET, COATED ORAL at 17:50

## 2025-03-10 RX ADMIN — GABAPENTIN 300 MG: 300 CAPSULE ORAL at 22:19

## 2025-03-10 RX ADMIN — GABAPENTIN 300 MG: 300 CAPSULE ORAL at 13:21

## 2025-03-10 RX ADMIN — OXYCODONE HYDROCHLORIDE AND ACETAMINOPHEN 2 TABLET: 5; 325 TABLET ORAL at 10:59

## 2025-03-10 RX ADMIN — DICYCLOMINE HYDROCHLORIDE 20 MG: 10 CAPSULE ORAL at 17:50

## 2025-03-10 RX ADMIN — METHOCARBAMOL TABLETS 750 MG: 750 TABLET, COATED ORAL at 10:59

## 2025-03-10 RX ADMIN — SODIUM CHLORIDE 300 MG: 0.9 INJECTION, SOLUTION INTRAVENOUS at 11:04

## 2025-03-10 RX ADMIN — METHOCARBAMOL TABLETS 750 MG: 750 TABLET, COATED ORAL at 22:19

## 2025-03-10 RX ADMIN — FAMOTIDINE 20 MG: 20 TABLET, FILM COATED ORAL at 09:19

## 2025-03-10 RX ADMIN — OXYCODONE HYDROCHLORIDE AND ACETAMINOPHEN 2 TABLET: 5; 325 TABLET ORAL at 22:19

## 2025-03-10 RX ADMIN — SODIUM CHLORIDE, PRESERVATIVE FREE 10 ML: 5 INJECTION INTRAVENOUS at 09:21

## 2025-03-10 RX ADMIN — OXYCODONE HYDROCHLORIDE AND ACETAMINOPHEN 2 TABLET: 5; 325 TABLET ORAL at 17:50

## 2025-03-10 RX ADMIN — METHOCARBAMOL TABLETS 750 MG: 750 TABLET, COATED ORAL at 05:25

## 2025-03-10 RX ADMIN — GABAPENTIN 300 MG: 300 CAPSULE ORAL at 05:25

## 2025-03-10 RX ADMIN — AMLODIPINE BESYLATE 5 MG: 5 TABLET ORAL at 09:19

## 2025-03-10 RX ADMIN — DICYCLOMINE HYDROCHLORIDE 20 MG: 10 CAPSULE ORAL at 22:18

## 2025-03-10 RX ADMIN — SODIUM CHLORIDE, PRESERVATIVE FREE 10 ML: 5 INJECTION INTRAVENOUS at 22:32

## 2025-03-10 RX ADMIN — OXYCODONE 10 MG: 5 TABLET ORAL at 14:57

## 2025-03-10 RX ADMIN — ATORVASTATIN CALCIUM 40 MG: 40 TABLET, FILM COATED ORAL at 22:19

## 2025-03-10 RX ADMIN — OXYCODONE HYDROCHLORIDE AND ACETAMINOPHEN 2 TABLET: 5; 325 TABLET ORAL at 05:25

## 2025-03-10 RX ADMIN — FAMOTIDINE 20 MG: 20 TABLET, FILM COATED ORAL at 22:19

## 2025-03-10 ASSESSMENT — PAIN SCALES - GENERAL
PAINLEVEL_OUTOF10: 7
PAINLEVEL_OUTOF10: 4
PAINLEVEL_OUTOF10: 6
PAINLEVEL_OUTOF10: 10
PAINLEVEL_OUTOF10: 7
PAINLEVEL_OUTOF10: 4
PAINLEVEL_OUTOF10: 10
PAINLEVEL_OUTOF10: 8
PAINLEVEL_OUTOF10: 6

## 2025-03-10 ASSESSMENT — PAIN DESCRIPTION - DESCRIPTORS: DESCRIPTORS: CRAMPING

## 2025-03-10 ASSESSMENT — PAIN DESCRIPTION - ORIENTATION
ORIENTATION: MID

## 2025-03-10 ASSESSMENT — PAIN DESCRIPTION - LOCATION
LOCATION: ABDOMEN
LOCATION: ABDOMEN;BACK
LOCATION: ABDOMEN
LOCATION: ABDOMEN;BACK
LOCATION: ABDOMEN;BACK

## 2025-03-10 ASSESSMENT — PAIN SCALES - WONG BAKER: WONGBAKER_NUMERICALRESPONSE: NO HURT

## 2025-03-10 NOTE — CARE COORDINATION
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]  Acute lower limb ischemia [I99.8]                       GMLOS: 6 days  Length of Stay: 15  days    Anticipated Discharge Date: Ready for discharge    Readmission Risk (Low < 19, Mod (19-27), High > 27): Readmission Risk Score: 22.2      Patient Transitional Goal: Home    Current Transitional Plan    [] Home Independently    [x] Home with HC    [x] Skilled Nursing Facility    [] Acute Rehabilitation    [] Long Term Acute Care (LTAC)    [] Other:     Plan for the Stay (Medical Management) :awaiting precert for [facility]. Also working on home care and wound vac order for home      Workflow Continuation (Additional Notes) received voicemail from Yady from [facility]. Insurance is requesting updated notes-faxed to Yady. Referral sent to [facility]. They are unable to accept d/t insurance    1149 received call from Yady from [facility]. Updated wound note faxed as well    1445 met with patient to provide update. His goal is to go home. Notified him that it will be difficult to arrange home care. He relates that he is agreeable to go to wound clinic if it will help him get home. Wound vac order faxed to [facility]. Referral sent to [facility]. They are not able to accept d/t insurance. Call to [facility]. They do not service [place] area. Call to [facility]. They do not have nursing in [place] area. Call to [facility]. They do not service [place] area. Voicemail left for case management at [facility] requesting return call to assist with home care arrangements. Spoke to patient's PCP office. They usually use [facility] or [facility]. She does not

## 2025-03-10 NOTE — ANESTHESIA POSTPROCEDURE EVALUATION
Department of Anesthesiology  Postprocedure Note    Patient: Melvin Sierra  MRN: [id number]  YOB: 1975  Date of evaluation: 3/10/2025    Procedure Summary       Date: 02/26/25 Room / Location: [place] [id number] / [facility]    Anesthesia Start: 1727 Anesthesia Stop: 1849    Procedure: LOWER EXTREMITY FASCIOTOMY, WASHOUT, CLOSURE (Left) Diagnosis:       Leg wound, left      (Leg wound, left [S81.802A])    Surgeons: Kyle Samson MD Responsible Provider: Aaliyah Douglas MD    Anesthesia Type: general ASA Status: 3            Anesthesia Type: No value filed.    Scotty Phase I: Scotty Score: 7    Scotty Phase II:      Anesthesia Post Evaluation    Patient location during evaluation: bedside  Patient participation: complete - patient participated  Level of consciousness: awake and awake and alert  Pain score: 3  Airway patency: patent  Nausea & Vomiting: no nausea and no vomiting  Cardiovascular status: blood pressure returned to baseline and hemodynamically stable  Respiratory status: acceptable  Hydration status: euvolemic  Pain management: adequate        No notable events documented.

## 2025-03-11 VITALS
WEIGHT: 277.78 LBS | DIASTOLIC BLOOD PRESSURE: 85 MMHG | HEART RATE: 108 BPM | RESPIRATION RATE: 18 BRPM | HEIGHT: 72 IN | BODY MASS INDEX: 37.62 KG/M2 | SYSTOLIC BLOOD PRESSURE: 133 MMHG | TEMPERATURE: 99.1 F | OXYGEN SATURATION: 94 %

## 2025-03-11 LAB
BASOPHILS # BLD: 0.11 K/UL (ref 0–0.2)
BASOPHILS NFR BLD: 1 % (ref 0–2)
EOSINOPHIL # BLD: 0.13 K/UL (ref 0–0.44)
EOSINOPHILS RELATIVE PERCENT: 1 % (ref 1–4)
ERYTHROCYTE [DISTWIDTH] IN BLOOD BY AUTOMATED COUNT: 16.2 % (ref 11.8–14.4)
HCT VFR BLD AUTO: 30.4 % (ref 40.7–50.3)
HGB BLD-MCNC: 9.2 G/DL (ref 13–17)
IMM GRANULOCYTES # BLD AUTO: 0.19 K/UL (ref 0–0.3)
IMM GRANULOCYTES NFR BLD: 2 %
LYMPHOCYTES NFR BLD: 1.7 K/UL (ref 1.1–3.7)
LYMPHOCYTES RELATIVE PERCENT: 15 % (ref 24–43)
MCH RBC QN AUTO: 26.5 PG (ref 25.2–33.5)
MCHC RBC AUTO-ENTMCNC: 30.3 G/DL (ref 28.4–34.8)
MCV RBC AUTO: 87.6 FL (ref 82.6–102.9)
MICROORGANISM SPEC CULT: NORMAL
MICROORGANISM/AGENT SPEC: NORMAL
MONOCYTES NFR BLD: 1.3 K/UL (ref 0.1–1.2)
MONOCYTES NFR BLD: 11 % (ref 3–12)
NEUTROPHILS NFR BLD: 70 % (ref 36–65)
NEUTS SEG NFR BLD: 8.05 K/UL (ref 1.5–8.1)
NRBC BLD-RTO: 0.2 PER 100 WBC
PLATELET # BLD AUTO: ABNORMAL K/UL (ref 138–453)
PLATELET, FLUORESCENCE: 1191 K/UL (ref 138–453)
PLATELETS.RETICULATED NFR BLD AUTO: 0.5 % (ref 1.1–10.3)
RBC # BLD AUTO: 3.47 M/UL (ref 4.21–5.77)
RBC # BLD: ABNORMAL 10*6/UL
SERVICE CMNT-IMP: NORMAL
SPECIMEN DESCRIPTION: NORMAL
WBC OTHER # BLD: 11.5 K/UL (ref 3.5–11.3)

## 2025-03-11 PROCEDURE — 6370000000 HC RX 637 (ALT 250 FOR IP)

## 2025-03-11 PROCEDURE — 85025 COMPLETE CBC W/AUTO DIFF WBC: CPT

## 2025-03-11 PROCEDURE — 2500000003 HC RX 250 WO HCPCS

## 2025-03-11 PROCEDURE — 6370000000 HC RX 637 (ALT 250 FOR IP): Performed by: STUDENT IN AN ORGANIZED HEALTH CARE EDUCATION/TRAINING PROGRAM

## 2025-03-11 PROCEDURE — 97535 SELF CARE MNGMENT TRAINING: CPT

## 2025-03-11 PROCEDURE — 85055 RETICULATED PLATELET ASSAY: CPT

## 2025-03-11 PROCEDURE — 99232 SBSQ HOSP IP/OBS MODERATE 35: CPT | Performed by: INTERNAL MEDICINE

## 2025-03-11 PROCEDURE — 36415 COLL VENOUS BLD VENIPUNCTURE: CPT

## 2025-03-11 RX ORDER — ATORVASTATIN CALCIUM 40 MG/1
40 TABLET, FILM COATED ORAL NIGHTLY
Qty: 30 TABLET | Refills: 0 | Status: SHIPPED | OUTPATIENT
Start: 2025-03-11

## 2025-03-11 RX ORDER — GABAPENTIN 300 MG/1
300 CAPSULE ORAL EVERY 8 HOURS SCHEDULED
Qty: 45 CAPSULE | Refills: 0 | Status: SHIPPED | OUTPATIENT
Start: 2025-03-11 | End: 2025-03-26

## 2025-03-11 RX ORDER — SIMETHICONE 80 MG
80 TABLET,CHEWABLE ORAL EVERY 6 HOURS PRN
Qty: 60 TABLET | Refills: 0 | Status: SHIPPED | OUTPATIENT
Start: 2025-03-11

## 2025-03-11 RX ORDER — FAMOTIDINE 20 MG/1
20 TABLET, FILM COATED ORAL 2 TIMES DAILY
Qty: 60 TABLET | Refills: 0 | Status: SHIPPED | OUTPATIENT
Start: 2025-03-11

## 2025-03-11 RX ORDER — DICYCLOMINE HYDROCHLORIDE 10 MG/1
20 CAPSULE ORAL 3 TIMES DAILY PRN
Qty: 60 CAPSULE | Refills: 0 | Status: SHIPPED | OUTPATIENT
Start: 2025-03-11 | End: 2025-03-21

## 2025-03-11 RX ORDER — PSEUDOEPHEDRINE HCL 30 MG
100 TABLET ORAL DAILY
Qty: 30 CAPSULE | Refills: 0 | Status: SHIPPED | OUTPATIENT
Start: 2025-03-11

## 2025-03-11 RX ORDER — METHOCARBAMOL 750 MG/1
750 TABLET, FILM COATED ORAL EVERY 6 HOURS
Qty: 120 TABLET | Refills: 0 | Status: SHIPPED | OUTPATIENT
Start: 2025-03-11 | End: 2025-04-10

## 2025-03-11 RX ORDER — AMLODIPINE BESYLATE 5 MG/1
5 TABLET ORAL DAILY
Qty: 30 TABLET | Refills: 0 | Status: SHIPPED | OUTPATIENT
Start: 2025-03-11

## 2025-03-11 RX ORDER — LIDOCAINE 4 G/G
1 PATCH TOPICAL DAILY
Qty: 15 EACH | Refills: 0 | Status: SHIPPED | OUTPATIENT
Start: 2025-03-11 | End: 2025-03-26

## 2025-03-11 RX ADMIN — GABAPENTIN 300 MG: 300 CAPSULE ORAL at 21:20

## 2025-03-11 RX ADMIN — ATORVASTATIN CALCIUM 40 MG: 40 TABLET, FILM COATED ORAL at 19:56

## 2025-03-11 RX ADMIN — OXYCODONE HYDROCHLORIDE AND ACETAMINOPHEN 2 TABLET: 5; 325 TABLET ORAL at 11:24

## 2025-03-11 RX ADMIN — APIXABAN 5 MG: 5 TABLET, FILM COATED ORAL at 08:16

## 2025-03-11 RX ADMIN — OXYCODONE HYDROCHLORIDE AND ACETAMINOPHEN 2 TABLET: 5; 325 TABLET ORAL at 17:04

## 2025-03-11 RX ADMIN — FAMOTIDINE 20 MG: 20 TABLET, FILM COATED ORAL at 08:16

## 2025-03-11 RX ADMIN — APIXABAN 5 MG: 5 TABLET, FILM COATED ORAL at 19:56

## 2025-03-11 RX ADMIN — DICYCLOMINE HYDROCHLORIDE 20 MG: 10 CAPSULE ORAL at 05:09

## 2025-03-11 RX ADMIN — METHOCARBAMOL TABLETS 750 MG: 750 TABLET, COATED ORAL at 11:24

## 2025-03-11 RX ADMIN — METHOCARBAMOL TABLETS 750 MG: 750 TABLET, COATED ORAL at 17:04

## 2025-03-11 RX ADMIN — SODIUM CHLORIDE, PRESERVATIVE FREE 10 ML: 5 INJECTION INTRAVENOUS at 08:18

## 2025-03-11 RX ADMIN — OXYCODONE HYDROCHLORIDE AND ACETAMINOPHEN 2 TABLET: 5; 325 TABLET ORAL at 05:08

## 2025-03-11 RX ADMIN — AMLODIPINE BESYLATE 5 MG: 5 TABLET ORAL at 08:16

## 2025-03-11 RX ADMIN — DICYCLOMINE HYDROCHLORIDE 20 MG: 10 CAPSULE ORAL at 19:55

## 2025-03-11 RX ADMIN — DICYCLOMINE HYDROCHLORIDE 20 MG: 10 CAPSULE ORAL at 11:24

## 2025-03-11 RX ADMIN — FAMOTIDINE 20 MG: 20 TABLET, FILM COATED ORAL at 19:55

## 2025-03-11 RX ADMIN — DOCUSATE SODIUM 100 MG: 100 CAPSULE, LIQUID FILLED ORAL at 08:16

## 2025-03-11 RX ADMIN — METHOCARBAMOL TABLETS 750 MG: 750 TABLET, COATED ORAL at 05:08

## 2025-03-11 RX ADMIN — METHOCARBAMOL TABLETS 750 MG: 750 TABLET, COATED ORAL at 22:01

## 2025-03-11 RX ADMIN — OXYCODONE 10 MG: 5 TABLET ORAL at 08:16

## 2025-03-11 RX ADMIN — OXYCODONE 10 MG: 5 TABLET ORAL at 13:51

## 2025-03-11 RX ADMIN — GABAPENTIN 300 MG: 300 CAPSULE ORAL at 05:08

## 2025-03-11 RX ADMIN — SODIUM CHLORIDE, PRESERVATIVE FREE 10 ML: 5 INJECTION INTRAVENOUS at 20:00

## 2025-03-11 RX ADMIN — Medication 6 MG: at 03:05

## 2025-03-11 RX ADMIN — OXYCODONE HYDROCHLORIDE AND ACETAMINOPHEN 2 TABLET: 5; 325 TABLET ORAL at 21:32

## 2025-03-11 RX ADMIN — DICYCLOMINE HYDROCHLORIDE 20 MG: 10 CAPSULE ORAL at 17:04

## 2025-03-11 RX ADMIN — GABAPENTIN 300 MG: 300 CAPSULE ORAL at 13:44

## 2025-03-11 RX ADMIN — OXYCODONE 10 MG: 5 TABLET ORAL at 19:55

## 2025-03-11 ASSESSMENT — PAIN DESCRIPTION - ORIENTATION
ORIENTATION: MID

## 2025-03-11 ASSESSMENT — PAIN SCALES - GENERAL
PAINLEVEL_OUTOF10: 8
PAINLEVEL_OUTOF10: 5
PAINLEVEL_OUTOF10: 7
PAINLEVEL_OUTOF10: 4
PAINLEVEL_OUTOF10: 0
PAINLEVEL_OUTOF10: 2
PAINLEVEL_OUTOF10: 7
PAINLEVEL_OUTOF10: 6

## 2025-03-11 ASSESSMENT — PAIN DESCRIPTION - LOCATION
LOCATION: ABDOMEN

## 2025-03-11 ASSESSMENT — PAIN DESCRIPTION - DESCRIPTORS: DESCRIPTORS: ACHING;CRAMPING

## 2025-03-11 ASSESSMENT — PAIN SCALES - WONG BAKER: WONGBAKER_NUMERICALRESPONSE: NO HURT

## 2025-03-11 NOTE — PLAN OF CARE
Problem: Chronic Conditions and Co-morbidities  Goal: Patient's chronic conditions and co-morbidity symptoms are monitored and maintained or improved  Recent Flowsheet Documentation  Taken 3/8/2025 0800 by Lorie Centeno, RN  Care Plan - Patient's Chronic Conditions and Co-Morbidity Symptoms are Monitored and Maintained or Improved: Monitor and assess patient's chronic conditions and comorbid symptoms for stability, deterioration, or improvement     
  Problem: Pain  Goal: Verbalizes/displays adequate comfort level or baseline comfort level  Flowsheets  Taken 3/7/2025 1841  Verbalizes/displays adequate comfort level or baseline comfort level:   Encourage patient to monitor pain and request assistance   Assess pain using appropriate pain scale  Taken 3/7/2025 0844  Verbalizes/displays adequate comfort level or baseline comfort level: Encourage patient to monitor pain and request assistance     Problem: Chronic Conditions and Co-morbidities  Goal: Patient's chronic conditions and co-morbidity symptoms are monitored and maintained or improved  Recent Flowsheet Documentation  Taken 3/7/2025 0801 by Lorie Centeno, RN  Care Plan - Patient's Chronic Conditions and Co-Morbidity Symptoms are Monitored and Maintained or Improved: Monitor and assess patient's chronic conditions and comorbid symptoms for stability, deterioration, or improvement     
  Problem: Respiratory - Adult  Goal: Achieves optimal ventilation and oxygenation  3/4/2025 0802 by Ashley Ferguson RCP  Outcome: Progressing  Flowsheets (Taken 3/4/2025 0802)  Achieves optimal ventilation and oxygenation:   Assess for changes in respiratory status   Respiratory therapy support as indicated  3/3/2025 2343 by Josselin Manning, RN  Outcome: Progressing  3/3/2025 2031 by Karen Dickinson RCLUCILA  Outcome: Progressing     
  Problem: Respiratory - Adult  Goal: Achieves optimal ventilation and oxygenation  Outcome: Progressing   BRONCHOSPASM/BRONCHOCONSTRICTION     [x]         IMPROVE AERATION/BREATH SOUNDS  [x]   ADMINISTER BRONCHODILATOR THERAPY AS APPROPRIATE  [x]   ASSESS BREATH SOUNDS  []   IMPLEMENT AEROSOL/MDI PROTOCOL  [x]   PATIENT EDUCATION AS NEEDED    
  Problem: Safety - Adult  Goal: Free from fall injury  2/24/2025 0730 by Ivania Felton RN  Outcome: Progressing  2/23/2025 2255 by Ofe Pereira RN  Outcome: Progressing     Problem: Discharge Planning  Goal: Discharge to home or other facility with appropriate resources  Outcome: Progressing     Problem: Pain  Goal: Verbalizes/displays adequate comfort level or baseline comfort level  2/24/2025 0730 by Ivania Felton RN  Outcome: Progressing  2/23/2025 2255 by Ofe Pereira RN  Outcome: Progressing     Problem: Chronic Conditions and Co-morbidities  Goal: Patient's chronic conditions and co-morbidity symptoms are monitored and maintained or improved  2/24/2025 0730 by Ivania Felton RN  Outcome: Progressing  2/23/2025 2255 by Ofe Pereira RN  Outcome: Progressing     Problem: Skin/Tissue Integrity  Goal: Skin integrity remains intact  Description: 1.  Monitor for areas of redness and/or skin breakdown  2.  Assess vascular access sites hourly  3.  Every 4-6 hours minimum:  Change oxygen saturation probe site  4.  Every 4-6 hours:  If on nasal continuous positive airway pressure, respiratory therapy assess nares and determine need for appliance change or resting period  2/24/2025 0730 by Ivania Felton RN  Outcome: Progressing  2/23/2025 2255 by Ofe Pereira RN  Outcome: Progressing     
  Problem: Safety - Adult  Goal: Free from fall injury  2/28/2025 2229 by Lynda Conn RN  Outcome: Progressing  2/28/2025 1726 by Vivienne Bates RN  Outcome: Progressing     Problem: Discharge Planning  Goal: Discharge to home or other facility with appropriate resources  2/28/2025 2229 by Lynda Conn RN  Outcome: Progressing  2/28/2025 1726 by Vivienne Bates RN  Outcome: Progressing     Problem: Pain  Goal: Verbalizes/displays adequate comfort level or baseline comfort level  2/28/2025 2229 by Lynda Conn RN  Outcome: Progressing  2/28/2025 1726 by Vivienne Bates RN  Outcome: Progressing     
  Problem: Safety - Adult  Goal: Free from fall injury  3/10/2025 0950 by Leatha Shearer RN  Outcome: Progressing  3/9/2025 2136 by Lucinda Purvis RN  Outcome: Progressing     Problem: Discharge Planning  Goal: Discharge to home or other facility with appropriate resources  3/10/2025 0950 by Leatha Shearer RN  Outcome: Progressing  3/9/2025 2136 by Lucinda Purvis RN  Outcome: Progressing     Problem: Pain  Goal: Verbalizes/displays adequate comfort level or baseline comfort level  3/10/2025 0950 by Leatha Shearer RN  Outcome: Progressing  3/9/2025 2136 by Lucinda Purvis RN  Outcome: Progressing     Problem: Chronic Conditions and Co-morbidities  Goal: Patient's chronic conditions and co-morbidity symptoms are monitored and maintained or improved  3/10/2025 0950 by Leatha Shearer RN  Outcome: Progressing  3/9/2025 2136 by Lucinda Purvis RN  Outcome: Progressing     Problem: Skin/Tissue Integrity  Goal: Skin integrity remains intact  Description: 1.  Monitor for areas of redness and/or skin breakdown  2.  Assess vascular access sites hourly  3.  Every 4-6 hours minimum:  Change oxygen saturation probe site  4.  Every 4-6 hours:  If on nasal continuous positive airway pressure, respiratory therapy assess nares and determine need for appliance change or resting period  3/10/2025 0950 by Leatha Shearer RN  Outcome: Progressing  3/9/2025 2136 by Lucinda Purvis RN  Outcome: Progressing     Problem: ABCDS Injury Assessment  Goal: Absence of physical injury  3/10/2025 0950 by Leatha Shearer RN  Outcome: Progressing  3/9/2025 2136 by Lucinda Purvis RN  Outcome: Progressing     Problem: Nutrition Deficit:  Goal: Optimize nutritional status  3/10/2025 0950 by Leatha Shearer RN  Outcome: Progressing  3/9/2025 2136 by Lucinda Purvis RN  Outcome: Progressing     Problem: Respiratory - Adult  Goal: Achieves optimal ventilation and oxygenation  3/10/2025 0950 by Leatha Shearer RN  Outcome: 
  Problem: Safety - Adult  Goal: Free from fall injury  3/10/2025 2256 by Trey Burt RN  Outcome: Progressing  3/10/2025 0950 by Leatha Shearer RN  Outcome: Progressing     Problem: Discharge Planning  Goal: Discharge to home or other facility with appropriate resources  3/10/2025 2256 by Trey Burt RN  Outcome: Progressing  3/10/2025 0950 by Leatha Shearer RN  Outcome: Progressing     Problem: Pain  Goal: Verbalizes/displays adequate comfort level or baseline comfort level  3/10/2025 2256 by Trey Burt RN  Outcome: Progressing  3/10/2025 0950 by Leatha Shearer RN  Outcome: Progressing     Problem: Chronic Conditions and Co-morbidities  Goal: Patient's chronic conditions and co-morbidity symptoms are monitored and maintained or improved  3/10/2025 2256 by Trey Burt RN  Outcome: Progressing  3/10/2025 0950 by Leatha Shearer RN  Outcome: Progressing     Problem: Skin/Tissue Integrity  Goal: Skin integrity remains intact  Description: 1.  Monitor for areas of redness and/or skin breakdown  2.  Assess vascular access sites hourly  3.  Every 4-6 hours minimum:  Change oxygen saturation probe site  4.  Every 4-6 hours:  If on nasal continuous positive airway pressure, respiratory therapy assess nares and determine need for appliance change or resting period  3/10/2025 2256 by Trey Burt RN  Outcome: Progressing  3/10/2025 0950 by Leatha Shearer RN  Outcome: Progressing     Problem: ABCDS Injury Assessment  Goal: Absence of physical injury  3/10/2025 2256 by Trey Burt RN  Outcome: Progressing  3/10/2025 0950 by Leatha Shearer RN  Outcome: Progressing     Problem: Nutrition Deficit:  Goal: Optimize nutritional status  3/10/2025 2256 by Trey Burt RN  Outcome: Progressing  3/10/2025 1534 by Tomasa Canela, BLESSING  Outcome: Progressing  Flowsheets (Taken 3/10/2025 1528)  Nutrient intake appropriate for improving, restoring, or maintaining nutritional needs: Monitor oral 
  Problem: Safety - Adult  Goal: Free from fall injury  3/11/2025 0912 by Leatha Shearer RN  Outcome: Progressing  3/10/2025 2256 by Trey Burt RN  Outcome: Progressing     Problem: Discharge Planning  Goal: Discharge to home or other facility with appropriate resources  3/11/2025 0912 by Leatha Shearer RN  Outcome: Progressing  3/10/2025 2256 by Trey Burt RN  Outcome: Progressing     Problem: Pain  Goal: Verbalizes/displays adequate comfort level or baseline comfort level  3/11/2025 0912 by Leatha Shearer RN  Outcome: Progressing  3/10/2025 2256 by Trey Burt RN  Outcome: Progressing     Problem: Chronic Conditions and Co-morbidities  Goal: Patient's chronic conditions and co-morbidity symptoms are monitored and maintained or improved  3/11/2025 0912 by Leatha Shearer RN  Outcome: Progressing  3/10/2025 2256 by Trey Burt RN  Outcome: Progressing     Problem: Skin/Tissue Integrity  Goal: Skin integrity remains intact  Description: 1.  Monitor for areas of redness and/or skin breakdown  2.  Assess vascular access sites hourly  3.  Every 4-6 hours minimum:  Change oxygen saturation probe site  4.  Every 4-6 hours:  If on nasal continuous positive airway pressure, respiratory therapy assess nares and determine need for appliance change or resting period  3/11/2025 0912 by Leatha Shearer RN  Outcome: Progressing  3/10/2025 2256 by Trey Burt RN  Outcome: Progressing     Problem: ABCDS Injury Assessment  Goal: Absence of physical injury  3/11/2025 0912 by Leatha Shearer RN  Outcome: Progressing  3/10/2025 2256 by Trey Burt RN  Outcome: Progressing     Problem: Nutrition Deficit:  Goal: Optimize nutritional status  3/11/2025 0912 by Leatha Shearer RN  Outcome: Progressing  3/10/2025 2256 by Trey Burt RN  Outcome: Progressing     Problem: Respiratory - Adult  Goal: Achieves optimal ventilation and oxygenation  3/11/2025 0912 by Leatha Shearer RN  Outcome: 
  Problem: Safety - Adult  Goal: Free from fall injury  3/3/2025 0140 by Pham Hu RN  Outcome: Progressing  3/2/2025 1815 by Aga Ruiz RN  Outcome: Progressing     Problem: Discharge Planning  Goal: Discharge to home or other facility with appropriate resources  3/3/2025 0140 by Pham Hu RN  Outcome: Progressing  Flowsheets (Taken 3/2/2025 2045)  Discharge to home or other facility with appropriate resources:   Identify barriers to discharge with patient and caregiver   Arrange for needed discharge resources and transportation as appropriate   Identify discharge learning needs (meds, wound care, etc)  3/2/2025 1815 by Aga Ruiz RN  Outcome: Progressing  Flowsheets (Taken 3/2/2025 1630)  Discharge to home or other facility with appropriate resources: Identify barriers to discharge with patient and caregiver     Problem: Pain  Goal: Verbalizes/displays adequate comfort level or baseline comfort level  3/3/2025 0140 by Pham Hu RN  Outcome: Progressing  3/2/2025 1815 by Aga Ruiz RN  Outcome: Progressing     Problem: Chronic Conditions and Co-morbidities  Goal: Patient's chronic conditions and co-morbidity symptoms are monitored and maintained or improved  3/3/2025 0140 by Pham Hu RN  Outcome: Progressing  Flowsheets (Taken 3/2/2025 2045)  Care Plan - Patient's Chronic Conditions and Co-Morbidity Symptoms are Monitored and Maintained or Improved:   Monitor and assess patient's chronic conditions and comorbid symptoms for stability, deterioration, or improvement   Collaborate with multidisciplinary team to address chronic and comorbid conditions and prevent exacerbation or deterioration   Update acute care plan with appropriate goals if chronic or comorbid symptoms are exacerbated and prevent overall improvement and discharge  3/2/2025 1815 by Aga Ruiz, RN  Outcome: Progressing  Flowsheets (Taken 3/2/2025 1630)  Care Plan - Patient's Chronic Conditions and 
  Problem: Safety - Adult  Goal: Free from fall injury  3/3/2025 2343 by Josselin Manning RN  Outcome: Progressing  3/3/2025 1622 by Aga Ruiz RN  Outcome: Progressing     Problem: Discharge Planning  Goal: Discharge to home or other facility with appropriate resources  3/3/2025 2343 by Josselin Manning RN  Outcome: Progressing  3/3/2025 1622 by Aga Ruiz RN  Outcome: Progressing  Flowsheets (Taken 3/3/2025 0800)  Discharge to home or other facility with appropriate resources: Identify barriers to discharge with patient and caregiver     Problem: Pain  Goal: Verbalizes/displays adequate comfort level or baseline comfort level  3/3/2025 2343 by Josselin Manning RN  Outcome: Progressing  3/3/2025 1622 by Aga Ruiz RN  Outcome: Progressing     Problem: Chronic Conditions and Co-morbidities  Goal: Patient's chronic conditions and co-morbidity symptoms are monitored and maintained or improved  3/3/2025 2343 by Josselin Manning RN  Outcome: Progressing  3/3/2025 1622 by Aga Ruiz RN  Outcome: Progressing  Flowsheets (Taken 3/3/2025 0800)  Care Plan - Patient's Chronic Conditions and Co-Morbidity Symptoms are Monitored and Maintained or Improved: Monitor and assess patient's chronic conditions and comorbid symptoms for stability, deterioration, or improvement     Problem: Skin/Tissue Integrity  Goal: Skin integrity remains intact  Description: 1.  Monitor for areas of redness and/or skin breakdown  2.  Assess vascular access sites hourly  3.  Every 4-6 hours minimum:  Change oxygen saturation probe site  4.  Every 4-6 hours:  If on nasal continuous positive airway pressure, respiratory therapy assess nares and determine need for appliance change or resting period  3/3/2025 2343 by Josselin Manning RN  Outcome: Progressing  3/3/2025 1622 by Aga Ruiz RN  Outcome: Progressing  Flowsheets (Taken 3/3/2025 0800)  Skin Integrity Remains Intact: Monitor for areas of redness and/or skin breakdown   
  Problem: Safety - Adult  Goal: Free from fall injury  3/5/2025 1356 by Aga Ruiz RN  Outcome: Progressing  3/5/2025 0013 by Josselin Manning RN  Outcome: Progressing     Problem: Discharge Planning  Goal: Discharge to home or other facility with appropriate resources  3/5/2025 1356 by Aga Ruiz RN  Outcome: Progressing  Flowsheets (Taken 3/5/2025 0800)  Discharge to home or other facility with appropriate resources: Identify barriers to discharge with patient and caregiver  3/5/2025 0013 by Josselin Manning RN  Outcome: Progressing     Problem: Pain  Goal: Verbalizes/displays adequate comfort level or baseline comfort level  3/5/2025 1356 by Aga Ruiz RN  Outcome: Progressing  3/5/2025 0013 by Josselin Manning RN  Outcome: Progressing     Problem: Chronic Conditions and Co-morbidities  Goal: Patient's chronic conditions and co-morbidity symptoms are monitored and maintained or improved  3/5/2025 1356 by Aga Ruiz RN  Outcome: Progressing  Flowsheets (Taken 3/5/2025 0800)  Care Plan - Patient's Chronic Conditions and Co-Morbidity Symptoms are Monitored and Maintained or Improved: Monitor and assess patient's chronic conditions and comorbid symptoms for stability, deterioration, or improvement  3/5/2025 0013 by Josselin Manning RN  Outcome: Progressing     Problem: Skin/Tissue Integrity  Goal: Skin integrity remains intact  3/5/2025 1356 by Aga Ruiz RN  Outcome: Progressing  Flowsheets (Taken 3/5/2025 0800)  Skin Integrity Remains Intact: Monitor for areas of redness and/or skin breakdown  3/5/2025 0013 by Josselin Manning RN  Outcome: Progressing     Problem: ABCDS Injury Assessment  Goal: Absence of physical injury  3/5/2025 1356 by Aga Ruiz RN  Outcome: Progressing  3/5/2025 0013 by Josselin Manning RN  Outcome: Progressing     Problem: Nutrition Deficit:  Goal: Optimize nutritional status  3/5/2025 1356 by Aga Ruiz RN  Outcome: Progressing  3/5/2025 0013 by Josselin Manning 
  Problem: Safety - Adult  Goal: Free from fall injury  3/6/2025 0153 by Josselin Manning RN  Outcome: Progressing  3/5/2025 1356 by Aga Ruiz RN  Outcome: Progressing     Problem: Discharge Planning  Goal: Discharge to home or other facility with appropriate resources  3/6/2025 0153 by Josselin Manning RN  Outcome: Progressing  3/5/2025 1356 by Aga Ruiz RN  Outcome: Progressing  Flowsheets (Taken 3/5/2025 0800)  Discharge to home or other facility with appropriate resources: Identify barriers to discharge with patient and caregiver     Problem: Pain  Goal: Verbalizes/displays adequate comfort level or baseline comfort level  3/6/2025 0153 by Josselin Manning RN  Outcome: Progressing  3/5/2025 1356 by Aga Ruiz RN  Outcome: Progressing     Problem: Chronic Conditions and Co-morbidities  Goal: Patient's chronic conditions and co-morbidity symptoms are monitored and maintained or improved  3/6/2025 0153 by Josselin Manning RN  Outcome: Progressing  3/5/2025 1356 by Aga Ruiz RN  Outcome: Progressing  Flowsheets (Taken 3/5/2025 0800)  Care Plan - Patient's Chronic Conditions and Co-Morbidity Symptoms are Monitored and Maintained or Improved: Monitor and assess patient's chronic conditions and comorbid symptoms for stability, deterioration, or improvement     Problem: Skin/Tissue Integrity  Goal: Skin integrity remains intact  Description: 1.  Monitor for areas of redness and/or skin breakdown  2.  Assess vascular access sites hourly  3.  Every 4-6 hours minimum:  Change oxygen saturation probe site  4.  Every 4-6 hours:  If on nasal continuous positive airway pressure, respiratory therapy assess nares and determine need for appliance change or resting period  3/6/2025 0153 by Josselin Manning RN  Outcome: Progressing  3/5/2025 1356 by Aga Ruiz RN  Outcome: Progressing  Flowsheets (Taken 3/5/2025 0800)  Skin Integrity Remains Intact: Monitor for areas of redness and/or skin breakdown   
  Problem: Safety - Adult  Goal: Free from fall injury  Outcome: Progressing     Problem: Discharge Planning  Goal: Discharge to home or other facility with appropriate resources  Outcome: Progressing     Problem: Pain  Goal: Verbalizes/displays adequate comfort level or baseline comfort level  3/8/2025 0050 by Pretty Noyola RN  Outcome: Progressing  3/7/2025 1841 by Lorie Centeno RN  Flowsheets  Taken 3/7/2025 1841  Verbalizes/displays adequate comfort level or baseline comfort level:   Encourage patient to monitor pain and request assistance   Assess pain using appropriate pain scale  Taken 3/7/2025 0844  Verbalizes/displays adequate comfort level or baseline comfort level: Encourage patient to monitor pain and request assistance     Problem: Chronic Conditions and Co-morbidities  Goal: Patient's chronic conditions and co-morbidity symptoms are monitored and maintained or improved  Outcome: Progressing     Problem: Skin/Tissue Integrity  Goal: Skin integrity remains intact  Description: 1.  Monitor for areas of redness and/or skin breakdown  2.  Assess vascular access sites hourly  3.  Every 4-6 hours minimum:  Change oxygen saturation probe site  4.  Every 4-6 hours:  If on nasal continuous positive airway pressure, respiratory therapy assess nares and determine need for appliance change or resting period  Outcome: Progressing     Problem: ABCDS Injury Assessment  Goal: Absence of physical injury  Outcome: Progressing     Problem: Nutrition Deficit:  Goal: Optimize nutritional status  Outcome: Progressing     Problem: Respiratory - Adult  Goal: Achieves optimal ventilation and oxygenation  Outcome: Progressing     
  Problem: Safety - Adult  Goal: Free from fall injury  Outcome: Progressing     Problem: Discharge Planning  Goal: Discharge to home or other facility with appropriate resources  Outcome: Progressing     Problem: Pain  Goal: Verbalizes/displays adequate comfort level or baseline comfort level  3/8/2025 2216 by Lucinda Purvis, RN  Outcome: Progressing  3/8/2025 1826 by Lorie Centeno, RN  Flowsheets (Taken 3/8/2025 1826)  Verbalizes/displays adequate comfort level or baseline comfort level: Implement non-pharmacological measures as appropriate and evaluate response     Problem: Chronic Conditions and Co-morbidities  Goal: Patient's chronic conditions and co-morbidity symptoms are monitored and maintained or improved  Outcome: Progressing     Problem: Skin/Tissue Integrity  Goal: Skin integrity remains intact  Description: 1.  Monitor for areas of redness and/or skin breakdown  2.  Assess vascular access sites hourly  3.  Every 4-6 hours minimum:  Change oxygen saturation probe site  4.  Every 4-6 hours:  If on nasal continuous positive airway pressure, respiratory therapy assess nares and determine need for appliance change or resting period  Outcome: Progressing     Problem: ABCDS Injury Assessment  Goal: Absence of physical injury  Outcome: Progressing     Problem: Nutrition Deficit:  Goal: Optimize nutritional status  Outcome: Progressing     Problem: Respiratory - Adult  Goal: Achieves optimal ventilation and oxygenation  Outcome: Progressing     
  Problem: Safety - Adult  Goal: Free from fall injury  Outcome: Progressing     Problem: Discharge Planning  Goal: Discharge to home or other facility with appropriate resources  Outcome: Progressing     Problem: Pain  Goal: Verbalizes/displays adequate comfort level or baseline comfort level  Outcome: Progressing     Problem: Chronic Conditions and Co-morbidities  Goal: Patient's chronic conditions and co-morbidity symptoms are monitored and maintained or improved  Outcome: Progressing     Problem: Skin/Tissue Integrity  Goal: Skin integrity remains intact  Description: 1.  Monitor for areas of redness and/or skin breakdown  2.  Assess vascular access sites hourly  3.  Every 4-6 hours minimum:  Change oxygen saturation probe site  4.  Every 4-6 hours:  If on nasal continuous positive airway pressure, respiratory therapy assess nares and determine need for appliance change or resting period  Outcome: Progressing     Problem: ABCDS Injury Assessment  Goal: Absence of physical injury  Outcome: Progressing     
  Problem: Safety - Adult  Goal: Free from fall injury  Outcome: Progressing     Problem: Discharge Planning  Goal: Discharge to home or other facility with appropriate resources  Outcome: Progressing     Problem: Pain  Goal: Verbalizes/displays adequate comfort level or baseline comfort level  Outcome: Progressing     Problem: Chronic Conditions and Co-morbidities  Goal: Patient's chronic conditions and co-morbidity symptoms are monitored and maintained or improved  Outcome: Progressing     Problem: Skin/Tissue Integrity  Goal: Skin integrity remains intact  Description: 1.  Monitor for areas of redness and/or skin breakdown  2.  Assess vascular access sites hourly  3.  Every 4-6 hours minimum:  Change oxygen saturation probe site  4.  Every 4-6 hours:  If on nasal continuous positive airway pressure, respiratory therapy assess nares and determine need for appliance change or resting period  Outcome: Progressing     Problem: ABCDS Injury Assessment  Goal: Absence of physical injury  Outcome: Progressing     Problem: Nutrition Deficit:  Goal: Optimize nutritional status  Outcome: Progressing     Problem: Respiratory - Adult  Goal: Achieves optimal ventilation and oxygenation  Outcome: Progressing     
  Problem: Safety - Adult  Goal: Free from fall injury  Outcome: Progressing     Problem: Discharge Planning  Goal: Discharge to home or other facility with appropriate resources  Outcome: Progressing  Flowsheets (Taken 2/26/2025 1845 by Ellyn Case RN)  Discharge to home or other facility with appropriate resources: Identify barriers to discharge with patient and caregiver     Problem: Pain  Goal: Verbalizes/displays adequate comfort level or baseline comfort level  Outcome: Progressing     Problem: Chronic Conditions and Co-morbidities  Goal: Patient's chronic conditions and co-morbidity symptoms are monitored and maintained or improved  Outcome: Progressing  Flowsheets (Taken 2/26/2025 1845 by Ellyn Case RN)  Care Plan - Patient's Chronic Conditions and Co-Morbidity Symptoms are Monitored and Maintained or Improved: Monitor and assess patient's chronic conditions and comorbid symptoms for stability, deterioration, or improvement     Problem: Skin/Tissue Integrity  Goal: Skin integrity remains intact  Description: 1.  Monitor for areas of redness and/or skin breakdown  2.  Assess vascular access sites hourly  3.  Every 4-6 hours minimum:  Change oxygen saturation probe site  4.  Every 4-6 hours:  If on nasal continuous positive airway pressure, respiratory therapy assess nares and determine need for appliance change or resting period  Outcome: Progressing  Flowsheets (Taken 2/26/2025 1845 by Ellyn Case, RN)  Skin Integrity Remains Intact: Monitor for areas of redness and/or skin breakdown     Problem: ABCDS Injury Assessment  Goal: Absence of physical injury  Outcome: Progressing     
  Problem: Safety - Adult  Goal: Free from fall injury  Outcome: Progressing     Problem: Discharge Planning  Goal: Discharge to home or other facility with appropriate resources  Outcome: Progressing  Flowsheets (Taken 3/2/2025 1630)  Discharge to home or other facility with appropriate resources: Identify barriers to discharge with patient and caregiver     Problem: Pain  Goal: Verbalizes/displays adequate comfort level or baseline comfort level  Outcome: Progressing     Problem: Chronic Conditions and Co-morbidities  Goal: Patient's chronic conditions and co-morbidity symptoms are monitored and maintained or improved  Outcome: Progressing  Flowsheets (Taken 3/2/2025 1630)  Care Plan - Patient's Chronic Conditions and Co-Morbidity Symptoms are Monitored and Maintained or Improved: Monitor and assess patient's chronic conditions and comorbid symptoms for stability, deterioration, or improvement     Problem: Skin/Tissue Integrity  Goal: Skin integrity remains intact  Outcome: Progressing  Flowsheets (Taken 3/2/2025 1630)  Skin Integrity Remains Intact: Monitor for areas of redness and/or skin breakdown     Problem: ABCDS Injury Assessment  Goal: Absence of physical injury  Outcome: Progressing     
  Problem: Safety - Adult  Goal: Free from fall injury  Outcome: Progressing     Problem: Discharge Planning  Goal: Discharge to home or other facility with appropriate resources  Outcome: Progressing  Flowsheets (Taken 3/3/2025 0800)  Discharge to home or other facility with appropriate resources: Identify barriers to discharge with patient and caregiver     Problem: Pain  Goal: Verbalizes/displays adequate comfort level or baseline comfort level  Outcome: Progressing     Problem: Chronic Conditions and Co-morbidities  Goal: Patient's chronic conditions and co-morbidity symptoms are monitored and maintained or improved  Outcome: Progressing  Flowsheets (Taken 3/3/2025 0800)  Care Plan - Patient's Chronic Conditions and Co-Morbidity Symptoms are Monitored and Maintained or Improved: Monitor and assess patient's chronic conditions and comorbid symptoms for stability, deterioration, or improvement     Problem: Skin/Tissue Integrity  Goal: Skin integrity remains intact  Outcome: Progressing  Flowsheets (Taken 3/3/2025 0800)  Skin Integrity Remains Intact: Monitor for areas of redness and/or skin breakdown     Problem: ABCDS Injury Assessment  Goal: Absence of physical injury  Outcome: Progressing     Problem: Nutrition Deficit:  Goal: Optimize nutritional status  Outcome: Progressing     
  Problem: Safety - Adult  Goal: Free from fall injury  Outcome: Progressing     Problem: Pain  Goal: Verbalizes/displays adequate comfort level or baseline comfort level  Outcome: Progressing     Problem: Chronic Conditions and Co-morbidities  Goal: Patient's chronic conditions and co-morbidity symptoms are monitored and maintained or improved  Outcome: Progressing     Problem: Skin/Tissue Integrity  Goal: Skin integrity remains intact  Description: 1.  Monitor for areas of redness and/or skin breakdown  2.  Assess vascular access sites hourly  3.  Every 4-6 hours minimum:  Change oxygen saturation probe site  4.  Every 4-6 hours:  If on nasal continuous positive airway pressure, respiratory therapy assess nares and determine need for appliance change or resting period  Outcome: Progressing     
No additional plans for surgical intervention or drain placement with IR. Okay to resume Eliquis and stop heparin gtt.     Chiquita Up MD  PGY-5 General Surgery  1:54 PM 03/04/25     
pain and evaluate response  2/24/2025 0730 by Ivania Felton RN  Outcome: Progressing     Problem: Chronic Conditions and Co-morbidities  Goal: Patient's chronic conditions and co-morbidity symptoms are monitored and maintained or improved  2/24/2025 2126 by Ofe Pereira RN  Outcome: Progressing  Flowsheets (Taken 2/24/2025 0800 by Ivania Felton RN)  Care Plan - Patient's Chronic Conditions and Co-Morbidity Symptoms are Monitored and Maintained or Improved:   Monitor and assess patient's chronic conditions and comorbid symptoms for stability, deterioration, or improvement   Collaborate with multidisciplinary team to address chronic and comorbid conditions and prevent exacerbation or deterioration   Update acute care plan with appropriate goals if chronic or comorbid symptoms are exacerbated and prevent overall improvement and discharge  2/24/2025 0730 by Ivania Felton RN  Outcome: Progressing     Problem: Skin/Tissue Integrity  Goal: Skin integrity remains intact  Description: 1.  Monitor for areas of redness and/or skin breakdown  2.  Assess vascular access sites hourly  3.  Every 4-6 hours minimum:  Change oxygen saturation probe site  4.  Every 4-6 hours:  If on nasal continuous positive airway pressure, respiratory therapy assess nares and determine need for appliance change or resting period  2/24/2025 2126 by Ofe Pereira RN  Outcome: Progressing  Flowsheets  Taken 2/24/2025 0800 by Ivania Felton RN  Skin Integrity Remains Intact:   Monitor for areas of redness and/or skin breakdown   Assess vascular access sites hourly   Every 4-6 hours minimum: Change oxygen saturation probe site   Every 4-6 hours: If on nasal continuous positive airway pressure, respiratory therapy assesses nares and determine need for appliance change or resting period  Taken 2/24/2025 0731 by Ivania Felton RN  Skin Integrity Remains Intact:   Monitor for areas of redness and/or skin breakdown   Assess vascular access sites

## 2025-03-11 NOTE — CARE COORDINATION
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]  Acute lower limb ischemia [I99.8]                       GMLOS: 6 days  Length of Stay: 16  days    Anticipated Discharge Date: Discharge pending    Readmission Risk (Low < 19, Mod (19-27), High > 27): Readmission Risk Score: 23.4      Patient Transitional Goal: Home    Current Transitional Plan    [x] Home Independently    [] Home with HC    [x] Skilled Nursing Facility    [] Acute Rehabilitation    [] Long Term Acute Care (LTAC)    [] Other:     Plan for the Stay (Medical Management) :awaiting wound vac delivery, outpatient arrangements for wound vac changes      Workflow Continuation (Additional Notes) :received call from [facility] wound clinic. They are only able to change wound vac once a week in clinic. [facility] wound clinic is the same. He suggested to try [facility] for home care. Referral sent    1029 voicemail left for Cecy from [facility] requesting return call to follow up on wound vac order    1245 spoke to Aure from [facility]. They are not able to accept. Referral faxed to [facility] wound clinic. Received call from Yady from [employer]. Precert has been approved    1300 patient updated on precert approval. He is adamant that he will not go to SNF. Yady from [facility] updated. Message left for intake at [facility] requesting return call to follow up on referral    1400 received call from Cecy from [facility]. Insurance will not approve wound vac d/t two signatures on physician line. PS sent to Dr Beckett to notify and request signature by attending. Voicemail left for [facility] wound clinic [phone] requesting return to follow up on referral    1432 updated wound vac order

## 2025-03-12 ASSESSMENT — ENCOUNTER SYMPTOMS
SHORTNESS OF BREATH: 0
COUGH: 0
ABDOMINAL PAIN: 1
NAUSEA: 0
VOMITING: 0

## 2025-03-12 NOTE — DISCHARGE SUMMARY
[place] [address]  [address], go to registration  [phone]  Go on 3/13/2025  Appointment time 8 am  please arrive 15 minutes early      Patient Instructions: Follow-up with PCP in 1 week, wound care in 1 week  Follow up labs: None  Follow up imaging: None    Note that over 30 minutes was spent in preparing discharge papers, discussing discharge with patient, medication review, etc.      Pollo Posey MD  Internal Medicine Resident, PGY-1  [facility]; [place], [place]  3/11/2025, 2:36 PM

## 2025-03-12 NOTE — PROGRESS NOTES
Division of Vascular Surgery             Progress Note      Name: Melvin Sierra  MRN: [id number]         Overnight Events:     Patient was tachycardic (120 through 130s) and had a MAP less than 60 overnight.  1.5 L of lactated Ringer's and 500 mL of albumin given      Subjective:     Patient was seen and evaluated at bedside.  Patient states he had a difficult time sleeping due to pain in his abdomen and left lower extremity.    Physical Exam:     Vitals:  /72   Pulse (!) 112   Temp 98.1 °F (36.7 °C) (Oral)   Resp 24   SpO2 95%       General appearance - alert, well appearing and in no acute distress  Mental status - oriented to person, place and time with normal affect  Head - normocephalic and atraumatic  Neck - supple, no carotid bruits, thyroid not palpable, no JVD  Chest -productive cough, clear to auscultation, normal effort  Heart - normal rate, regular rhythm, no murmurs  Abdomen - soft, non-tender, non-distended, bowel sounds present all four quadrants, no masses  Neurological - normal speech, no focal findings or movement disorder noted, cranial nerves II through XII grossly intact  Extremities - peripheral pulses palpable, no pedal edema or calf pain with palpation  Skin - no gross lesions, rashes, or induration noted  Foot Exam -ankle dorsiflexion and plantarflexion intact to the left ankle.  Motor intact to digits 1 through 5.  Sensation intact to the distal tuft of digits 1 through 5.  Vascular Exam -DP PT has Doppler signal bilateral.  Bilateral femoral pulses palpable.      Imaging:   CTA ABDOMINAL AORTA W BILAT RUNOFF W CONTRAST (2/23/2025):  IMPRESSION:  1.  Development of significant floating thrombus in the infrarenal aorta off  the posterior wall since the 02/18/2025 study.  Filling defects in the  lateral left femoral profunda branch and sharp loss of contrast at the left  popliteal artery indicating areas of embolism.  Limited collateral  enhancement into the left lower leg.   
     Division of Vascular Surgery             Progress Note      Name: Melvin Sierra  MRN: [id number]         Overnight Events:     No acute events overnight    Subjective:     Patient was seen and examined at bedside this morning.  He remains afebrile, persistent tachycardia.  Worsening chest pain that radiates to his right side into his back, as well as right upper quadrant tenderness.  He is short of breath due to increased pain with deep breathing.  Has been tolerating a diet, ostomy still functioning, voiding appropriately.      Physical Exam:     Vitals:  /78   Pulse (!) 103   Temp 98.9 °F (37.2 °C) (Oral)   Resp 19   Ht 1.829 m (6' 0.01\")   Wt 128 kg (282 lb 3 oz)   SpO2 94%   BMI 38.26 kg/m²       General appearance - alert, well appearing and in no acute distress  Mental status - oriented to person, place and time with normal affect  Head - normocephalic and atraumatic  Chest -Short of breath  Abdomen  -soft, mild distention, right upper quadrant tenderness, nonperitoneal  Heart -tachycardic, regular rhythm  Neurological - normal speech, no focal findings or movement disorder noted, cranial nerves II through XII grossly intact  Extremities - peripheral pulses palpable.  Skin - no gross lesions, rashes, or induration noted  Foot Exam - -ankle dorsiflexion and plantarflexion intact to the left ankle. Motor and sensation intact  Vascular Exam -DP and PT palpable.  Bilateral femoral pulses palpable.  Dressing and packing changed by RN today.              Imaging:   No results found.      Assessment:     49 years old male has had ex lap with Guido procedure for perforated ductulitis, later on developed acute left lower extremity ischemia.  Status post Guido 2/22 for feculent peritonitis  Patient is status post 2/23  PERCUTANEOUS THROMBECTOMY OF AORTA USING PENUMBRA,   OPEN MECHANICAL THROMBECTOMY OF POPLITEAL ARTERY WITH JAKE EMBOLECTOMY CATHETER,   POPLITEAL ARTERY PATCH WITH XENOSURE PATCH, 
     Division of Vascular Surgery             Progress Note      Name: Melvin Sierra  MRN: [id number]         Overnight Events:   No acute events overnight     Subjective:     Patient was seen and examined at bedside this morning.  He remains afebrile, HR is better controlled. Patient continuing to have chest pain radiating to his back. He is s/p IR percutaneous drain of perihepatic fluid collection (03/05/25). He repots having muscle spasms IR for over 8 hours. He denies any pain at present to left lower extremity. Dressing changes as ordered. Has been tolerating a diet, ostomy still functioning, voiding appropriately.     Physical Exam:     Vitals:  BP (!) 140/85   Pulse 99   Temp 98.7 °F (37.1 °C) (Oral)   Resp 22   Ht 1.829 m (6' 0.01\")   Wt 126.8 kg (279 lb 8.7 oz)   SpO2 98%   BMI 37.90 kg/m²       General appearance - alert, well appearing and in no acute distress  Mental status - oriented to person, place and time with normal affect  Head - normocephalic and atraumatic  Chest -Short of breath  Abdomen  -soft, mild distention, right upper quadrant tenderness, nonperitoneal  Heart -tachycardic, regular rhythm  Neurological - normal speech, no focal findings or movement disorder noted, cranial nerves II through XII grossly intact  Extremities - peripheral pulses palpable.  Skin - no gross lesions, rashes, or induration noted  Foot Exam - -ankle dorsiflexion and plantarflexion intact to the left ankle. Motor and sensation intact  Vascular Exam -DP and PT palpable.  Bilateral femoral pulses palpable.  Dressing changed during today's evaluation, suture intact, no acute signs of infection.               Imaging:   No results found.      Assess   Media Information      Document Information    Wound Care Image: Wound      03/06/2025 06:50   Attached To:   Hospital Encounter on 2/23/25   Source Information    Ofe Stahl, ESHA - CNP  [facility]- Stepdown   Document History    ment:        Media 
     Division of Vascular Surgery             Progress Note      Name: Melvin Sierra  MRN: [id number]         Overnight Events:     Patient continuing to have chest pain radiating to his back, EKG sinus tachycardia with possible inferior infarct which was read on previous EKG, troponin mildly elevated at 28. Plan for TTE today. Plan for IR percutaneous drain of perihepatic fluid collection tomorrow (03/04/25).    Subjective:     Patient was seen and examined at bedside this morning.  He remains afebrile, persistent tachycardia.  Worsening chest pain that radiates to his right side into his back, as well as right upper quadrant tenderness.  He is short of breath due to increased pain with deep breathing.  Has been tolerating a diet, ostomy still functioning, voiding appropriately.  He denied working with PT yesterday and stated he would today however patient now has worsening pain.  He has been working with I-S and has been pulling 2 L until today.    Physical Exam:     Vitals:  /80   Pulse (!) 101   Temp 99 °F (37.2 °C) (Oral)   Resp 26   Wt 129.7 kg (285 lb 15 oz)   SpO2 98%   BMI 38.78 kg/m²       General appearance - alert, well appearing and in no acute distress  Mental status - oriented to person, place and time with normal affect  Head - normocephalic and atraumatic  Chest -Short of breath  Abdomen  -soft, mild distention, right upper quadrant tenderness, nonperitoneal  Heart -tachycardic, regular rhythm  Neurological - normal speech, no focal findings or movement disorder noted, cranial nerves II through XII grossly intact  Extremities - peripheral pulses palpable.  Skin - no gross lesions, rashes, or induration noted  Foot Exam - -ankle dorsiflexion and plantarflexion intact to the left ankle. Motor and sensation intact  Vascular Exam -DP and PT palpable.  Bilateral femoral pulses palpable.  Dressing changed during today's evaluation, suture intact, no acute signs of infection. 
     Division of Vascular Surgery             Progress Note      Name: Melvin Sierra  MRN: [id number]         Overnight Events:   No acute event overnight      Subjective:     Patient was seen and evaluated at bedside.  No acute event overnight.  Patient was doing better overnight, hypertensive episode, start medication amlodipine.  Otherwise, no bleeding through the dressing, patient was able to ambulate despite with pain.    Physical Exam:     Vitals:  BP (!) 181/73   Pulse (!) 103   Temp 98.3 °F (36.8 °C) (Oral)   Resp 21   Wt 134.4 kg (296 lb 4.8 oz)   SpO2 98%   BMI 40.19 kg/m²       General appearance - alert, well appearing and in no acute distress  Mental status - oriented to person, place and time with normal affect  Head - normocephalic and atraumatic  Neck - supple, no carotid bruits, thyroid not palpable, no JVD  Chest -productive cough, clear to auscultation, normal effort  Heart - normal rate, regular rhythm, no murmurs  Abdomen - soft, non-tender, non-distended, bowel sounds present all four quadrants, no masses  Neurological - normal speech, no focal findings or movement disorder noted, cranial nerves II through XII grossly intact  Extremities - peripheral pulses palpable, no pedal edema or calf pain with palpation  Skin - no gross lesions, rashes, or induration noted  Foot Exam -ankle dorsiflexion and plantarflexion intact to the left ankle.  Motor intact to digits 1 through 5.  Sensation intact to the distal tuft of digits 1 through 5.  Vascular Exam -DP PT has Doppler signal bilateral and palpable.  Bilateral femoral pulses palpable.      Imaging:   CTA ABDOMINAL AORTA W BILAT RUNOFF W CONTRAST (2/23/2025):  IMPRESSION:  1.  Development of significant floating thrombus in the infrarenal aorta off  the posterior wall since the 02/18/2025 study.  Filling defects in the  lateral left femoral profunda branch and sharp loss of contrast at the left  popliteal artery indicating areas of embolism.  
     Division of Vascular Surgery             Progress Note      Name: Melvin Sierra  MRN: [id number]         Overnight Events:      No acute events overnight.       Subjective:     Patient was seen and examined at bedside this morning. No acute events overnight per patient, Patient blood pressure averaged in 140s/70s last night. Patient stated that he is going much better and started having some ostomy output last night, patient did complain of any other sxs overnight and did not saturate through dressing.     Physical Exam:     Vitals:  BP (!) 156/91   Pulse (!) 105   Temp 98.4 °F (36.9 °C) (Oral)   Resp 23   Wt 134.2 kg (295 lb 13.7 oz)   SpO2 96%   BMI 40.13 kg/m²       General appearance - alert, well appearing and in no acute distress  Mental status - oriented to person, place and time with normal affect  Head - normocephalic and atraumatic  Neck - supple, no carotid bruits, thyroid not palpable, no JVD  Chest - clear to auscultation, normal effort  Heart - normal rate, regular rhythm, no murmurs  Abdomen - soft, non-tender, non-distended, bowel sounds present all four quadrants, no masses  Neurological - normal speech, no focal findings or movement disorder noted, cranial nerves II through XII grossly intact  Extremities - peripheral pulses palpable.  Skin - no gross lesions, rashes, or induration noted  Foot Exam - -ankle dorsiflexion and plantarflexion intact to the left ankle. Motor intact to digits 1 through 5. Sensation intact to the distal tuft of digits 1 through 5   Vascular Exam -DP PT has Doppler signal bilateral and palpable.  Bilateral femoral pulses palpable.       Imaging:   CTA ABDOMINAL AORTA W BILAT RUNOFF W CONTRAST    Result Date: 2/23/2025  EXAMINATION: CTA OF THE AORTA WITH LOWER EXTREMITY RUNOFF 2/23/2025 4:07 am TECHNIQUE: CTA of the pelvis and bilateral lower extremities was performed after the administration of intravenous contrast.   Multiplanar reformatted images are provided 
     Division of Vascular Surgery             Progress Note      Name: Melvin Sierra  MRN: [id number]         Overnight Events:     Early this morning developed severe Right sided chest pain, EKG sinus tachycardia with possible inferior infarct which was read on previous EKG, troponin mildly elevated at 25      Subjective:     Patient was seen and examined at bedside this morning.  He remains afebrile, persistent tachycardia.  Worsening chest pain that radiates to his right side into his back, as well as right upper quadrant tenderness.  He is short of breath due to increased pain with deep breathing.  Has been tolerating a diet, ostomy still functioning, voiding appropriately.  He denied working with PT yesterday and stated he would today however patient now has worsening pain.  He has been working with I-S and has been pulling 2 L until today.    Physical Exam:     Vitals:  BP (!) 167/108   Pulse (!) 113   Temp 99.3 °F (37.4 °C) (Oral)   Resp 27   Wt 129.7 kg (285 lb 15 oz)   SpO2 95%   BMI 38.78 kg/m²       General appearance - alert, well appearing and in no acute distress  Mental status - oriented to person, place and time with normal affect  Head - normocephalic and atraumatic  Chest -Short of breath  Abdomen  -soft, mild distention, right upper quadrant tenderness, nonperitoneal  Heart -tachycardic, regular rhythm  Neurological - normal speech, no focal findings or movement disorder noted, cranial nerves II through XII grossly intact  Extremities - peripheral pulses palpable.  Skin - no gross lesions, rashes, or induration noted  Foot Exam - -ankle dorsiflexion and plantarflexion intact to the left ankle. Motor and sensation intact  Vascular Exam -DP PT palpable.  Bilateral femoral pulses palpable.  Dressing clean dry and intact      Imaging:   No results found.      Assessment:     49 years old male has had ex lap with Guido procedure for perforated ductulitis, later on developed acute left lower 
     Division of Vascular Surgery             Progress Note      Name: Melvin Sierra  MRN: [id number]         Overnight Events:      No acute events overnight.       Subjective:     Patient was seen and examined at bedside this morning.  He has remained afebrile, intermittent episodes of tachycardia to low 100s.  Requiring 4 L nasal cannula.  Abdominal wound VAC in place with good seal with 130 cc of output.  Ostomy with 100 cc output last 24.  He has palpable pulses bilaterally, compartments soft.  Tolerated diet voiding appropriately.    Physical Exam:     Vitals:  BP (!) 163/89   Pulse (!) 106   Temp 97.2 °F (36.2 °C) (Oral)   Resp 21   Wt 129.7 kg (285 lb 15 oz)   SpO2 96%   BMI 38.78 kg/m²       General appearance - alert, well appearing and in no acute distress  Mental status - oriented to person, place and time with normal affect  Head - normocephalic and atraumatic  Chest - normal effort  Heart - normal rate, regular rhythm  Neurological - normal speech, no focal findings or movement disorder noted, cranial nerves II through XII grossly intact  Extremities - peripheral pulses palpable.  Skin - no gross lesions, rashes, or induration noted  Foot Exam - -ankle dorsiflexion and plantarflexion intact to the left ankle. Motor and sensation intact  Vascular Exam -DP PT has Doppler signal bilateral and palpable.  Bilateral femoral pulses palpable.       Imaging:   No results found.      Assessment:     49 years old male has had ex lap with Guido procedure for perforated ductulitis, later on developed acute left lower extremity ischemia.  Status post Guido 2/22 for feculent peritonitis  Patient is status post 2/23  PERCUTANEOUS THROMBECTOMY OF AORTA USING PENUMBRA,   OPEN MECHANICAL THROMBECTOMY OF POPLITEAL ARTERY WITH JAKE EMBOLECTOMY CATHETER,   POPLITEAL ARTERY PATCH WITH XENOSURE PATCH,   LEFT LOWER EXTREMITY FASCIOTOMY   Status post fasciotomy closure 2/26      Plan:     Dressing intact. Daily 
     [facility]  Nurse  Follow up  Note       NAME:  Melvin Sierra  MEDICAL RECORD NUMBER:  [id number]  AGE: 49 y.o.   GENDER: male  : 1975  TODAY'S DATE:  3/7/2025    Subjective   Reason for WOC Nurse Evaluation and Assessment: NPWT dressing change to midline surgical wound      Objective    /78   Pulse (!) 104   Temp 98.4 °F (36.9 °C) (Oral)   Resp 18   Ht 1.829 m (6' 0.01\")   Wt 126.8 kg (279 lb 8.7 oz)   SpO2 96%   BMI 37.90 kg/m²     LABS:  WBC:    Lab Results   Component Value Date/Time    WBC 16.0 2025 04:17 AM     H/H:    Lab Results   Component Value Date/Time    HGB 8.6 2025 04:17 AM    HCT 27.0 2025 04:17 AM     PTT:    Lab Results   Component Value Date/Time    APTT 35.5 2025 06:40 PM   [APTT}  PT/INR:    Lab Results   Component Value Date/Time    PROTIME 16.7 2025 06:08 AM    INR 1.3 2025 06:08 AM     HgBA1c:    Lab Results   Component Value Date/Time    LABA1C 5.4 2023 08:38 AM       Assessment   Hermilo Risk Score: Hermilo Scale Score: 21    Patient Active Problem List   Diagnosis Code    Acute appendicitis K35.80    Major depressive disorder F32.9    Obesity with body mass index 30 or greater E66.9    Acute deep vein thrombosis (DVT) of femoral vein of left lower extremity (HCC) I82.412    Sigmoid diverticulosis K57.30    Spondylosis of lumbosacral spine without myelopathy M47.817    Anxiety F41.9    Low vitamin D level R79.89    Right lumbar radiculitis M54.16    Hx of lumbosacral spine surgery Z98.890    Lumbar radiculopathy M54.16    Abdominal pain R10.9    Incarcerated ventral hernia K43.6    HTN (hypertension) I10    Diverticulitis K57.92    Pneumoperitoneum K66.8    Ischemic leg I99.8    Leg wound, left S81.802A    Pericardial effusion I31.39    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 
    [facility]  Internal Medicine Teaching Residency Program  Inpatient Daily Progress Note  ______________________________________________________________________________    Patient: Melvin Sierra  YOB: 1975   MRN:[id number]    Acct: [id number]     Room: 2011/2011-01  Admit date: 2/23/2025  Today's date: 03/09/25  Number of days in the hospital: 14    SUBJECTIVE   Admitting Diagnosis: Ischemic leg  CC: Abdominal pain    Patient seen and evaluated at bedside.  Afebrile, mildly tachycardic overnight.  Up in chair eating breakfast on evaluation this morning.  Pain well controlled.  Will attempt to transition off IV opioids today in preparation for discharge.  Completing antibiotic course today.  Discharge planning in progress.    BRIEF HISTORY     The patient is a 49 y.o. male with PMH HTN, HLD, depression, anxiety presents with a chief complaint of abdominal pain radiating to back.  Admitted to [facility] on 2/23 with complaints of diffuse abdominal pain. Noted to have evidence of perforated diverticulitis. The patient was recently discharged from the hospital with an episode of diverticulitis. The patient was taken emergently to the operating room for an exploratory laparotomy with Guido's procedure. The patient underwent an exploratory laparotomy with extensive lysis of adhesions, explantation of prior abdominal mesh, sigmoid colectomy, drainage of an abdominal abscess, irrigation of fecal peritonitis, creation of an end colostomy, and wound VAC application. The patient acutely developed pain in his left foot. A subsequent escalation by nursing prompted physician evaluation. 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 Vascular surgery was consulted. Urgent transfer to [facility] was arranged. Patient was started on heparin 
    [facility]  Internal Medicine Teaching Residency Program  Inpatient Daily Progress Note  ______________________________________________________________________________    Patient: Melvin Sierra  YOB: 1975   MRN:[id number]    Acct: [id number]     Room: 2011/2011-01  Admit date: 2/23/2025  Today's date: 03/11/25  Number of days in the hospital: 16    SUBJECTIVE   Admitting Diagnosis: Ischemic leg  CC: Abdominal pain    Patient seen and evaluated at bedside.  Afebrile, remained mildly tachycardic overnight.  Plan for discharge home with [facility] once arranged, possibly today.    BRIEF HISTORY     The patient is a 49 y.o. male with PMH HTN, HLD, depression, anxiety presents with a chief complaint of abdominal pain radiating to back.  Admitted to [facility] on 2/23 with complaints of diffuse abdominal pain. Noted to have evidence of perforated diverticulitis. The patient was recently discharged from the hospital with an episode of diverticulitis. The patient was taken emergently to the operating room for an exploratory laparotomy with Guido's procedure. The patient underwent an exploratory laparotomy with extensive lysis of adhesions, explantation of prior abdominal mesh, sigmoid colectomy, drainage of an abdominal abscess, irrigation of fecal peritonitis, creation of an end colostomy, and wound VAC application. The patient acutely developed pain in his left foot. A subsequent escalation by nursing prompted physician evaluation. 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 Vascular surgery was consulted. Urgent transfer to [facility] was arranged. Patient was started on heparin infusion.  2/23 patient underwent percutaneous mechanical thrombectomy of the aorta, opening mechanical thrombectomy of popliteal artery, popliteal artery 
    [facility]  Internal Medicine Teaching Residency Program  Inpatient Daily Progress Note  ______________________________________________________________________________    Patient: Melvin Sierra  YOB: 1975   MRN:[id number]    Acct: [id number]     Room: 2011/2011-01  Admit date: 2/23/2025  Today's date: 03/08/25  Number of days in the hospital: 13    SUBJECTIVE   Admitting Diagnosis: Ischemic leg  CC: Abdominal pain    Patient seen and evaluated at bedside.  Febrile 100.5 F, mildly tachycardic overnight.  Pain regimen stable.  Seen by hematology yesterday, thrombocytosis likely reactive.  Iron studies low, may also be contributing. Will plan for iron supplementation.      BRIEF HISTORY     The patient is a 49 y.o. male with PMH HTN, HLD, depression, anxiety presents with a chief complaint of abdominal pain radiating to back.  Admitted to [facility] on 2/23 with complaints of diffuse abdominal pain. Noted to have evidence of perforated diverticulitis. The patient was recently discharged from the hospital with an episode of diverticulitis. The patient was taken emergently to the operating room for an exploratory laparotomy with Guido's procedure. The patient underwent an exploratory laparotomy with extensive lysis of adhesions, explantation of prior abdominal mesh, sigmoid colectomy, drainage of an abdominal abscess, irrigation of fecal peritonitis, creation of an end colostomy, and wound VAC application. The patient acutely developed pain in his left foot. A subsequent escalation by nursing prompted physician evaluation. 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 Vascular surgery was consulted. Urgent transfer to [facility] was arranged. Patient was started on heparin infusion.  2/23 patient underwent percutaneous mechanical 
    [facility]  Internal Medicine Teaching Residency Program  Inpatient Daily Progress Note  ______________________________________________________________________________    Patient: Melvin Sierra  YOB: 1975   MRN:[id number]    Acct: [id number]     Room: 2011/2011-01  Admit date: 2/23/2025  Today's date: 03/10/25  Number of days in the hospital: 15    SUBJECTIVE   Admitting Diagnosis: Ischemic leg  CC: Abdominal pain    Patient seen and evaluated at bedside.  Afebrile, remained mildly tachycardic overnight.  Plan for discharge home with [facility].    BRIEF HISTORY     The patient is a 49 y.o. male with PMH HTN, HLD, depression, anxiety presents with a chief complaint of abdominal pain radiating to back.  Admitted to [facility] on 2/23 with complaints of diffuse abdominal pain. Noted to have evidence of perforated diverticulitis. The patient was recently discharged from the hospital with an episode of diverticulitis. The patient was taken emergently to the operating room for an exploratory laparotomy with Guido's procedure. The patient underwent an exploratory laparotomy with extensive lysis of adhesions, explantation of prior abdominal mesh, sigmoid colectomy, drainage of an abdominal abscess, irrigation of fecal peritonitis, creation of an end colostomy, and wound VAC application. The patient acutely developed pain in his left foot. A subsequent escalation by nursing prompted physician evaluation. 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 Vascular surgery was consulted. Urgent transfer to [facility] was arranged. Patient was started on heparin infusion.  2/23 patient underwent percutaneous mechanical thrombectomy of the aorta, opening mechanical thrombectomy of popliteal artery, popliteal artery patch, left lower extremity 4 
    [facility]  Internal Medicine Teaching Residency Program  Inpatient Daily Progress Note  ______________________________________________________________________________    Patient: Melvin Sierra  YOB: 1975   MRN:[id number]    Acct: [id number]     Room: 2011/2011-01  Admit date: 2/23/2025  Today's date: 03/07/25  Number of days in the hospital: 12    SUBJECTIVE   Admitting Diagnosis: Ischemic leg  CC: Abdominal pain    Patient seen and evaluated at bedside.  No acute events overnight.  Patient still having abdominal pain and pain regimen adjusted.  Patient has thrombocytosis which has been uptrending.  Likely reactive secondary to surgery, pain, however platelet count 1154 today.  Will consult heme-onc while patient remains in hospital.  Will transition to Eliquis today.  Pre-CERT to SNF pending.    BRIEF HISTORY     The patient is a 49 y.o. male with PMH HTN, HLD, depression, anxiety presents with a chief complaint of abdominal pain radiating to back.  Admitted to [facility] on 2/23 with complaints of diffuse abdominal pain. Noted to have evidence of perforated diverticulitis. The patient was recently discharged from the hospital with an episode of diverticulitis. The patient was taken emergently to the operating room for an exploratory laparotomy with Guido's procedure. The patient underwent an exploratory laparotomy with extensive lysis of adhesions, explantation of prior abdominal mesh, sigmoid colectomy, drainage of an abdominal abscess, irrigation of fecal peritonitis, creation of an end colostomy, and wound VAC application. The patient acutely developed pain in his left foot. A subsequent escalation by nursing prompted physician evaluation. 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 Vascular surgery was consulted. Urgent transfer 
    [facility]  Internal Medicine Teaching Residency Program  Inpatient Daily Progress Note  ______________________________________________________________________________    Patient: Melvin Sierra  YOB: 1975   MRN:[id number]    Acct: [id number]     Room: 2011/2011-01  Admit date: 2/23/2025  Today's date: 03/05/25  Number of days in the hospital: 10    SUBJECTIVE   Admitting Diagnosis: Ischemic leg  CC: Abdominal pain    Patient seen and evaluated at bedside.  No acute events overnight.  Patient reports pain is greatly improved today.  Patient states he has some mild pain with deep breath, however otherwise pain is much better, tachypnea resolved.  Patient still intermittently tachycardic.  Yesterday IR drainage of perihepatic fluid collection was not done, on ultrasound there were very small pockets of fluid, after discussion with patient, IR decided not to do procedure as patient had already symptomatically improved from prior.  Discussed with general surgery today, general surgery still recommending IR drainage of perihepatic fluid due to moderately worsening leukocytosis.  Will discuss with patient today.  3/4 CXR showing slight interval decrease in size of right pleural effusion, will defer thoracentesis at the moment.    WBC 18.9 today, moderately uptrending.  Cultures negative.  Pro-Stewart downtrending.    BRIEF HISTORY     The patient is a 49 y.o. male with PMH HTN, HLD, depression, anxiety presents with a chief complaint of abdominal pain radiating to back.  Admitted to [facility] on 2/23 with complaints of diffuse abdominal pain. Noted to have evidence of perforated diverticulitis. The patient was recently discharged from the hospital with an episode of diverticulitis. The patient was taken emergently to the operating room for an exploratory laparotomy with Guido's procedure. The patient underwent an exploratory laparotomy with extensive lysis of 
    PROGRESS NOTE      PATIENT NAME: Melvin Sierra  MEDICAL RECORD NO. [id number]  DATE: 3/3/2025  PRIMARY CARE PHYSICIAN: Ed Palafox, APRN - CNP    HD: # 8    ASSESSMENT    Patient Active Problem List   Diagnosis    Acute appendicitis    Major depressive disorder    Obesity with body mass index 30 or greater    Acute deep vein thrombosis (DVT) of femoral vein of left lower extremity (HCC)    Sigmoid diverticulosis    Spondylosis of lumbosacral spine without myelopathy    Anxiety    Low vitamin D level    Right lumbar radiculitis    Hx of lumbosacral spine surgery    Lumbar radiculopathy    Abdominal pain    Incarcerated ventral hernia    HTN (hypertension)    Diverticulitis    Pneumoperitoneum    Ischemic leg    Leg wound, left    Pericardial effusion       MEDICAL DECISION MAKING AND PLAN    49M admitted [facility] perforated diverticulitis s/p Guido's procedure . Course complicated by aortic thrombus which progogated to LLE s/p thrombectomy and fasciotomy  and subsequent closure . Noted chest pain 3/2 prompting CT scan concerning for cholelithiasis and perihepatic fluid collection  IR consulted for perihepatic fluid collection  HIDA pending to evaluate for cholecystitis  Continue IV antibiotics per primary  Monitor ELENA and Vac output, wound care consulted for dressing changes MWF   General surgery will continue to follow      Chief Complaint: \"feeling shitty\"    SUBJECTIVE    Has some pain in his abdomen and chest, unchanged from prior. States he never had RUQ pain previously and was tolerating solids but has been NPO the past few days. No N/V.       OBJECTIVE  VITALS: Temp: Temp: 99 °F (37.2 °C)Temp  Av.8 °F (37.7 °C)  Min: 99 °F (37.2 °C)  Max: 100.9 °F (38.3 °C) BP Systolic (24hrs), Av , Min:135 , Max:166   Diastolic (24hrs), Av, Min:80, Max:95   Pulse Pulse  Av.9  Min: 99  Max: 119 Resp Resp  Av.5  Min: 17  Max: 33 Pulse ox SpO2  Av.5 %  Min: 94 %  Max: 100 
    PROGRESS NOTE      PATIENT NAME: Melvin Sierra  MEDICAL RECORD NO. [id number]  DATE: 3/3/2025  PRIMARY CARE PHYSICIAN: Ed Palafox, APRN - CNP    HD: # 8    ASSESSMENT    Patient Active Problem List   Diagnosis    Acute appendicitis    Major depressive disorder    Obesity with body mass index 30 or greater    Acute deep vein thrombosis (DVT) of femoral vein of left lower extremity (HCC)    Sigmoid diverticulosis    Spondylosis of lumbosacral spine without myelopathy    Anxiety    Low vitamin D level    Right lumbar radiculitis    Hx of lumbosacral spine surgery    Lumbar radiculopathy    Abdominal pain    Incarcerated ventral hernia    HTN (hypertension)    Diverticulitis    Pneumoperitoneum    Ischemic leg    Leg wound, left    Pericardial effusion       MEDICAL DECISION MAKING AND PLAN    ***   DVT Prophylaxis-***      Chief Complaint: \"***\"    SUBJECTIVE    ***      OBJECTIVE  VITALS: Temp: Temp: 99 °F (37.2 °C)Temp  Av.6 °F (37.6 °C)  Min: 99 °F (37.2 °C)  Max: 100.9 °F (38.3 °C) BP Systolic (24hrs), Av , Min:119 , Max:166   Diastolic (24hrs), Av, Min:71, Max:105   Pulse Pulse  Av.8  Min: 99  Max: 119 Resp Resp  Av.8  Min: 17  Max: 34 Pulse ox SpO2  Av.2 %  Min: 85 %  Max: 100 %  GENERAL: {GENERAL APPEARANCE:::\"alert\",\"no distress\"}  NEURO: ***  HEENT: ***  : {exam; [id number]}  LUNGS: {GENERAL PULMONARY/CHEST EXAM:::\"clear to ausculation, without wheezes, rales or rhonci\"}  HEART: {GENERAL CARDIOVASCULAR EXAM:::\"normal rate\",\"regular rhythm\"}  ABDOMEN: {GENERAL ABDOMEN EXAM:::\"bowel sounds present in all 4 quadrants\",\"no guarding or peritoneal signs present\",\"soft, non-tender\",\"non-distended\"}  EXTREMITY: {GENERAL EXTREMITY EXAM:::\"no cyanosis, clubbing or edema\"}    I/O last 3 completed shifts:  In: 350 [P.O.:350]  Out: 3255 [Urine:3080; Drains:75; Stool:100]    Drain/tube output:  In: 350 [P.O.:350]  Out: 2605 [Urine:2530; 
    PROGRESS NOTE      PATIENT NAME: Melvin Sierra  MEDICAL RECORD NO. [id number]  DATE: 3/4/2025  PRIMARY CARE PHYSICIAN: Ed Palafox, APRN - CNP    HD: # 9    ASSESSMENT    Patient Active Problem List   Diagnosis    Acute appendicitis    Major depressive disorder    Obesity with body mass index 30 or greater    Acute deep vein thrombosis (DVT) of femoral vein of left lower extremity (HCC)    Sigmoid diverticulosis    Spondylosis of lumbosacral spine without myelopathy    Anxiety    Low vitamin D level    Right lumbar radiculitis    Hx of lumbosacral spine surgery    Lumbar radiculopathy    Abdominal pain    Incarcerated ventral hernia    HTN (hypertension)    Diverticulitis    Pneumoperitoneum    Ischemic leg    Leg wound, left    Pericardial effusion    Status post Vianney procedure (HCC)       MEDICAL DECISION MAKING AND PLAN    49M admitted [facility] perforated diverticulitis s/p Guido's procedure . Course complicated by aortic thrombus which progogated to LLE s/p thrombectomy and fasciotomy  and subsequent closure . Noted chest pain 3/2 prompting CT scan concerning for cholelithiasis and perihepatic fluid collection  IR consulted for perihepatic fluid collection  RUQ US with only small amount of sludge. Low suspicion for cholecystitis, HIDA cancelled at this time. Once back from procedure recommend advancing diet as tolerated  Continue IV antibiotics per primary  Monitor ELENA and Vac output, wound care consulted for dressing changes MWF   General surgery will continue to follow      Chief Complaint: \"feeling better\"    SUBJECTIVE    States he feels much better. Slept with hi maury onv now breathing comfortably on room air. Still had some soreness upper abdomen that wraps around right side and back that is unchanged, no n/v.       OBJECTIVE  VITALS: Temp: Temp: 98.8 °F (37.1 °C)Temp  Av.8 °F (37.1 °C)  Min: 98.4 °F (36.9 °C)  Max: 99.4 °F (37.4 °C) BP Systolic (24hrs), Av , Min:135 , 
    PROGRESS NOTE      PATIENT NAME: Melvin iSerra  MEDICAL RECORD NO. [id number]  DATE: 3/5/2025  PRIMARY CARE PHYSICIAN: Ed Palafox, APRN - CNP    HD: # 10    ASSESSMENT    Patient Active Problem List   Diagnosis    Acute appendicitis    Major depressive disorder    Obesity with body mass index 30 or greater    Acute deep vein thrombosis (DVT) of femoral vein of left lower extremity (HCC)    Sigmoid diverticulosis    Spondylosis of lumbosacral spine without myelopathy    Anxiety    Low vitamin D level    Right lumbar radiculitis    Hx of lumbosacral spine surgery    Lumbar radiculopathy    Abdominal pain    Incarcerated ventral hernia    HTN (hypertension)    Diverticulitis    Pneumoperitoneum    Ischemic leg    Leg wound, left    Pericardial effusion    Status post Vianney procedure (HCC)    Perihepatic fluid collection    Pleural effusion on right    Acute respiratory failure with hypoxia (HCC)       MEDICAL DECISION MAKING AND PLAN    49M admitted [facility] perforated diverticulitis s/p Guido's procedure 2/23. Course complicated by aortic thrombus which progogated to LLE s/p thrombectomy and fasciotomy 2/23 and subsequent closure 2/26.     Continue to recommend IR drainage of intraabdominal fluid collection due to increasing WBC count.   Continue IV antibiotics  Continue ELENA drain until patient can follow up with primary surgeon.   No acute General surgery intervention.   Wound ostomy for midline vac changes.     General Surgery service will sign off at this time.  Thank you for the consult.  Please call/page us if you have any questions/concerns.  We appreciate being involved in the care of your patient.    Chief Complaint: \"feeling better\"    SUBJECTIVE    Patient was seen and evaluated at bedside.  Sitting up and eating breakfast.  Patient states he is feeling much better.  Patient's white count continues to increase to 18.  He did not undergo drainage of intra-abdominal fluid collection yesterday with 
    [facility]  Internal Medicine Teaching Residency Program  Inpatient Daily Progress Note  ______________________________________________________________________________    Patient: Melvin Sierra  YOB: 1975   MRN:[id number]    Acct: [id number]     Room: 2011/2011-01  Admit date: 2/23/2025  Today's date: 03/06/25  Number of days in the hospital: 11    SUBJECTIVE   Admitting Diagnosis: Ischemic leg  CC: Abdominal pain    Patient seen and evaluated at bedside.  No acute events overnight.  Pain is improved form prior.  S/p right thoracentesis with 700 cc reddish fluid out.  Meets exudative criteria.  General surgery recommending IR guided perihepatic fluid drainage.  Patient defers at this time as he feels abdominal pain is improved.     BRIEF HISTORY     The patient is a 49 y.o. male with PMH HTN, HLD, depression, anxiety presents with a chief complaint of abdominal pain radiating to back.  Admitted to [facility] on 2/23 with complaints of diffuse abdominal pain. Noted to have evidence of perforated diverticulitis. The patient was recently discharged from the hospital with an episode of diverticulitis. The patient was taken emergently to the operating room for an exploratory laparotomy with Guido's procedure. The patient underwent an exploratory laparotomy with extensive lysis of adhesions, explantation of prior abdominal mesh, sigmoid colectomy, drainage of an abdominal abscess, irrigation of fecal peritonitis, creation of an end colostomy, and wound VAC application. The patient acutely developed pain in his left foot. A subsequent escalation by nursing prompted physician evaluation. 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 Vascular surgery was consulted. Urgent transfer to [facility] was arranged. Patient was started on 
    [facility]  Internal Medicine Teaching Residency Program  Inpatient Daily Progress Note  ______________________________________________________________________________    Patient: Melvin Sierra  YOB: 1975   MRN:[id number]    Acct: [id number]     Room: 2011/2011-01  Admit date: 2/23/2025  Today's date: 03/03/25  Number of days in the hospital: 8    SUBJECTIVE   Admitting Diagnosis: Ischemic leg  CC: Abdominal pain    Patient seen and evaluated at bedside.  Febrile to 100.9 F overnight, remained tachycardic, tachypneic.  Started on HFNC overnight.  Patient still appears uncomfortable today, states pain has mildly improved from yesterday.  Imaging negative for any pancreatitis or cholecystitis.  HIDA scan for acalculous cholecystitis unable to be completed on inpatient basis.  Ileus versus partial SBO seen on CT.  Patient's abdominal pain likely multifactorial from multiple recent surgeries, ileus likely secondary to surgeries, high doses of opioid pain medications.  With recent bowel surgery, distention may be compressing pancreas causing increased lipase and pain with deep respiration.  Echo pending today.      BRIEF HISTORY     The patient is a 49 y.o. male with PMH HTN, HLD, depression, anxiety presents with a chief complaint of abdominal pain radiating to back.  Admitted to [facility] on 2/23 with complaints of diffuse abdominal pain. Noted to have evidence of perforated diverticulitis. The patient was recently discharged from the hospital with an episode of diverticulitis. The patient was taken emergently to the operating room for an exploratory laparotomy with Guido's procedure. The patient underwent an exploratory laparotomy with extensive lysis of adhesions, explantation of prior abdominal mesh, sigmoid colectomy, drainage of an abdominal abscess, irrigation of fecal peritonitis, creation of an end colostomy, and wound VAC application. The patient 
    [facility]  Internal Medicine Teaching Residency Program  Inpatient Daily Progress Note  ______________________________________________________________________________    Patient: Melvin Sierra  YOB: 1975   MRN:[id number]    Acct: [id number]     Room: 2011/2011-01  Admit date: 2/23/2025  Today's date: 03/11/25  Number of days in the hospital: 16    SUBJECTIVE   Admitting Diagnosis: Ischemic leg  CC: Abdominal pain    Patient seen and evaluated at bedside.  Afebrile, remained mildly tachycardic overnight.  Plan for discharge home with [facility] once arranged, possibly today.    BRIEF HISTORY     The patient is a 49 y.o. male with PMH HTN, HLD, depression, anxiety presents with a chief complaint of abdominal pain radiating to back.  Admitted to [facility] on 2/23 with complaints of diffuse abdominal pain. Noted to have evidence of perforated diverticulitis. The patient was recently discharged from the hospital with an episode of diverticulitis. The patient was taken emergently to the operating room for an exploratory laparotomy with Guido's procedure. The patient underwent an exploratory laparotomy with extensive lysis of adhesions, explantation of prior abdominal mesh, sigmoid colectomy, drainage of an abdominal abscess, irrigation of fecal peritonitis, creation of an end colostomy, and wound VAC application. The patient acutely developed pain in his left foot. A subsequent escalation by nursing prompted physician evaluation. 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 Vascular surgery was consulted. Urgent transfer to [facility] was arranged. Patient was started on heparin infusion.  2/23 patient underwent percutaneous mechanical thrombectomy of the aorta, opening mechanical thrombectomy of popliteal artery, popliteal artery 
    VASCULAR SURGERY POST OP NOTE    SUBJECTIVE  Pt s/p PERCUTANEOUS THROMBECTOMY OF AORTA USING PENUMBRA, OPEN MECHANICAL THROMBECTOMY OF POPLITEAL ARTERY WITH JAKE EMBOLECTOMY CATHETER, POPLITEAL ARTERY PATCH WITH XENOSURE PATCH, LEFT LOWER EXTREMITY FASCIOTOMY. Resting comfortably on my exam, pain well controlled.      OBJECTIVE  VITALS:  BP (!) 104/57   Pulse (!) 134   Temp 98.6 °F (37 °C) (Oral)   Resp 16   SpO2 96%         GENERAL:  awake and alert.  No acute distress  CARDIOVASCULAR:  Sinus tachycardia  LUNGS:  No respiratory distress, no accessory muscle use   ABDOMEN:   Midline wound vac   INCISION: Surgical dressing intact LLE, right groin without hematoma  CV: Bilateral DP and PT signals     ASSESSMENT  1. POD# 0 s/p PERCUTANEOUS THROMBECTOMY OF AORTA USING PENUMBRA, OPEN MECHANICAL THROMBECTOMY OF POPLITEAL ARTERY WITH JAKE EMBOLECTOMY CATHETER, POPLITEAL ARTERY PATCH WITH XENOSURE PATCH, LEFT LOWER EXTREMITY FASCIOTOMY     PLAN  1. Pain management- as ordered  2. Hep gtt to restart at 9   3. Bedrest tonight   4. Will follow up post op labs       Alfreda Alvarez DO 02/23/25  9:02 PM   General Surgery PGY 1         
  ICU PROGRESS NOTE    PATIENT NAME: Melvin Sierra  MEDICAL RECORD NO. [id number]  DATE: 2/25/2025    PRIMARY CARE PHYSICIAN: Ed Palafox, APRN - CNP    HD: # 2    ASSESSMENT    Patient Active Problem List   Diagnosis    Acute appendicitis    Major depressive disorder    Obesity with body mass index 30 or greater    Acute deep vein thrombosis (DVT) of femoral vein of left lower extremity (HCC)    Sigmoid diverticulosis    Spondylosis of lumbosacral spine without myelopathy    Anxiety    Low vitamin D level    Right lumbar radiculitis    Hx of lumbosacral spine surgery    Lumbar radiculopathy    Abdominal pain    Incarcerated ventral hernia    HTN (hypertension)    Diverticulitis    Pneumoperitoneum    Ischemic leg    Leg wound, left       MEDICAL DECISION MAKING AND PLAN  Neuro:  GCS 15  MMPT  CV  MAP: 70-100s  HR: 90-100s  Continue amlodipine 5 mg  Labetalol prn  Pulm  4L NC, attempt to wean  IS: 1500  GI/Nutrition  S/p exlap with extensive ALEM, sidmoid colectomy w/ end colostomy, drainage of pelvic abscess, explantation of abdominal wall mesh, wound vac application 2/22  Diet: CLD, ADAT  RLQ ELENA drain: 385 cc/24 hours  Wound vac: no documented output  Ostomy: 5 cc/24 hours  Renal/lytes  UOP: 0.5 cc/kg/hr  Net +7.5 L  Change fluids to total fluid goal of 115 cc/hr  Heme  S/p LLE ischemia s/p perc thrombectomy of aorta, open mechanical thrombectomy of left pop artery, pop artery patch, LLE fasciotomy 2/23  DVT prophylaxis- hep gtt  Vascular surgery following  7.   Endocrine        1. No insulin required  Musculoskeletal  Up as tolerated  PT/OT  Skin  Infraumbilical incision with wound vac in place  Change MWF  Wound/ostomy to manage  LLE medial and lateral fasciotomy sites  Vascular surgery following  Micro  Cipro and flagyl  Family/dispo  Transfer to [place]  Plan for LLE fasciotomy washout and closure with vascular surgery 2/26  Lines  PIC x2  L radial a line  Drain  Marie  Colostomy    Chief Complaint: \"Doing 
  ICU PROGRESS NOTE    PATIENT NAME: Melvin Sierra  MEDICAL RECORD NO. [id number]  DATE: 2/26/2025    PRIMARY CARE PHYSICIAN: Ed Palafox, APRN - CNP    HD: # 3    ASSESSMENT    Patient Active Problem List   Diagnosis    Acute appendicitis    Major depressive disorder    Obesity with body mass index 30 or greater    Acute deep vein thrombosis (DVT) of femoral vein of left lower extremity (HCC)    Sigmoid diverticulosis    Spondylosis of lumbosacral spine without myelopathy    Anxiety    Low vitamin D level    Right lumbar radiculitis    Hx of lumbosacral spine surgery    Lumbar radiculopathy    Abdominal pain    Incarcerated ventral hernia    HTN (hypertension)    Diverticulitis    Pneumoperitoneum    Ischemic leg    Leg wound, left       MEDICAL DECISION MAKING AND PLAN  Neuro:  GCS 15  MMPT  CV  MAP: 70-100s  HR: 90-100s  Continue amlodipine 5 mg  Labetalol prn, added hydalazine  Pulm  4L NC, attempt to wean  IS: 1500  GI/Nutrition  S/p exlap with extensive ALEM, sidmoid colectomy w/ end colostomy, drainage of pelvic abscess, explantation of abdominal wall mesh, wound vac application 2/22  Diet: FLD, ADAT  Currently NPO for vascular surgery today  RLQ ELENA drain: 530 cc/24 hours  Wound vac: 0 cc/24 hours  Ostomy: 10 cc/24 hours  Renal/lytes  UOP: 0.4 cc/kg/hr  Net +8.7 L  Change fluids to total fluid goal of 115 cc/hr  Heme  S/p LLE ischemia s/p perc thrombectomy of aorta, open mechanical thrombectomy of left pop artery, pop artery patch, LLE fasciotomy 2/23  DVT prophylaxis- hep gtt  Vascular surgery following  7.   Endocrine        1. No insulin required  Musculoskeletal  Up as tolerated  PT/OT  Skin  Infraumbilical incision with wound vac in place  Change MWF  Wound/ostomy to manage  LLE medial and lateral fasciotomy sites  Vascular surgery following  Micro  Cipro and flagyl  Family/dispo  Transfer to [place]  Plan for LLE fasciotomy washout and closure with vascular surgery today  Dispo pending OR 
  ICU PROGRESS NOTE    PATIENT NAME: Melvin Sierra  MEDICAL RECORD NO. [id number]  DATE: 2/24/2025    PRIMARY CARE PHYSICIAN: Ed Palafox, APRN - CNP    HD: # 1    ASSESSMENT    Patient Active Problem List   Diagnosis    Acute appendicitis    Major depressive disorder    Obesity with body mass index 30 or greater    Acute deep vein thrombosis (DVT) of femoral vein of left lower extremity (HCC)    Sigmoid diverticulosis    Spondylosis of lumbosacral spine without myelopathy    Anxiety    Low vitamin D level    Right lumbar radiculitis    Hx of lumbosacral spine surgery    Lumbar radiculopathy    Abdominal pain    Incarcerated ventral hernia    HTN (hypertension)    Diverticulitis    Pneumoperitoneum    Ischemic leg       MEDICAL DECISION MAKING AND PLAN  Neuro:  GCS 15  CV  Sinus tachycardia: 100-140s  Pulm  4L NC  GI/Nutrition  POD 2 s/p exlap with extensive ALEM, sidmoid colectomy w/ end colostomy, drainage of pelvic abscess, explantation of abdominal wall mesh, wound vac application  Diet: NPO    Wound vac: 310 cc  Ostomy: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 Peritoneum/Retroperitoneum: The abdominal aorta and iliac arteries are normal in caliber.There is no pathologic adenopathy. Bones/Soft Tissues: There is a complex midline anterior abdominal wall hernia containing fat.  There are tiny non dependent gas bubbles within the hernia likely from micro perforated diverticulitis.  There are no fluid collections. There is no acute osseous abnormality     1. Acute sigmoid diverticulitis with micro perforation. No focal fluid collections. 2. Complex midline anterior abdominal wall hernia containing fat. There are tiny non dependent gas bubbles within the hernia likely from micro perforated diverticulitis. 3. Cholelithiasis.          Assessment:     49 years old male has had ex lap with Guido procedure for perforated ductulitis, later on developed acute left lower extremity ischemia.  Patient is POD #1 status post   PERCUTANEOUS THROMBECTOMY OF AORTA USING PENUMBRA,   OPEN MECHANICAL THROMBECTOMY OF POPLITEAL ARTERY WITH JAKE EMBOLECTOMY CATHETER,   POPLITEAL ARTERY PATCH WITH XENOSURE PATCH,   LEFT LOWER EXTREMITY FASCIOTOMY       Plan:     S/p lower extremity fasciotomy washout and closure. Dressing intact. Daily dressing changes by nursing staff after vascular to change dressing today.   Heparin gtt off. Eliquis started,   Femoral, DP, PT neurovascular checks daily  Pain control as ordered.   General surgery as signed off and recommending pt to follow up with primary surgeon for further concerns   Vascular will continue to follow.   D/c tomorrow        [facility] [place]    Electronically signed by ESHA Paredes CNP on 2/27/2025 at 9:41 AM            
Metabolic Panel w/ Reflex to MG   Result Value Ref Range    Sodium 138 136 - 145 mmol/L    Potassium 3.8 3.7 - 5.3 mmol/L    Chloride 103 98 - 107 mmol/L    CO2 26 20 - 31 mmol/L    Anion Gap 9 9 - 16 mmol/L    Glucose 109 (H) 74 - 99 mg/dL    BUN 10 6 - 20 mg/dL    Creatinine 0.6 (L) 0.7 - 1.2 mg/dL    Est, Glom Filt Rate >90 >60 mL/min/1.73m2    Calcium 8.0 (L) 8.6 - 10.4 mg/dL   CBC with Auto Differential   Result Value Ref Range    WBC 16.4 (H) 3.5 - 11.3 k/uL    RBC 3.45 (L) 4.21 - 5.77 m/uL    Hemoglobin 9.5 (L) 13.0 - 17.0 g/dL    Hematocrit 30.1 (L) 40.7 - 50.3 %    MCV 87.2 82.6 - 102.9 fL    MCH 27.5 25.2 - 33.5 pg    MCHC 31.6 28.4 - 34.8 g/dL    RDW 16.0 (H) 11.8 - 14.4 %    Platelets 541 (H) 138 - 453 k/uL    MPV 8.7 8.1 - 13.5 fL    NRBC Automated 1.2 (H) 0.0 per 100 WBC    Immature Granulocytes % 13 (H) 0 %    Neutrophils % 71 (H) 36 - 66 %    Lymphocytes % 10 (L) 24 - 44 %    Monocytes % 5 1 - 7 %    Eosinophils % 1 1 - 4 %    Basophils % 0 0 - 2 %    nRBC 2 (H) 0 per 100 WBC    Immature Granulocytes Absolute 2.13 (H) 0.00 - 0.30 k/uL    Neutrophils Absolute 11.65 (H) 1.8 - 7.7 k/uL    Lymphocytes Absolute 1.64 1.0 - 4.8 k/uL    Monocytes Absolute 0.82 (H) 0.1 - 0.8 k/uL    Eosinophils Absolute 0.16 0.0 - 0.4 k/uL    Basophils Absolute 0.00 0.0 - 0.2 k/uL    Morphology ANISOCYTOSIS PRESENT    Urinalysis   Result Value Ref Range    Color, UA Yellow Yellow    Turbidity UA Clear Clear    Glucose, Ur NEGATIVE NEGATIVE mg/dL    Bilirubin, Urine NEGATIVE NEGATIVE    Ketones, Urine NEGATIVE NEGATIVE mg/dL    Specific Gravity, UA 1.017 1.005 - 1.030    Urine Hgb NEGATIVE NEGATIVE    pH, Urine 7.0 5.0 - 8.0    Protein, UA NEGATIVE NEGATIVE mg/dL    Urobilinogen, Urine Normal 0.0 - 1.0 EU/dL    Nitrite, Urine NEGATIVE NEGATIVE    Leukocyte Esterase, Urine NEGATIVE NEGATIVE    Comment       Microscopic exam not performed based on chemical results unless requested in original order.   CBC with Auto Differential 
Monocytes Absolute 1.52 (H) 0.1 - 0.8 k/uL    Eosinophils Absolute 0.19 0.0 - 0.4 k/uL    Basophils Absolute 0.00 0.0 - 0.2 k/uL    Morphology ANISOCYTOSIS PRESENT     Morphology 1+ POLYCHROMASIA    Protime-INR   Result Value Ref Range    Protime 16.7 (H) 11.7 - 14.9 sec    INR 1.3    Troponin   Result Value Ref Range    Troponin, High Sensitivity 28 (H) 0 - 22 ng/L   Troponin   Result Value Ref Range    Troponin, High Sensitivity 39 (H) 0 - 22 ng/L   BLOOD GAS, VENOUS   Result Value Ref Range    pH, Ellis 7.435 (H) 7.320 - 7.420    pCO2, Ellis 40.7 39 - 55 mm Hg    PO2, Ellis 67.0 (H) 30 - 50 mm Hg    HCO3, Venous 26.7 24 - 30 mmol/L    Positive Base Excess, Ellis 2.3 (H) 0.0 - 2.0 mmol/L    O2 Sat, Ellis 93.9 (H) 60.0 - 85.0 %    Carboxyhemoglobin 0.2 0 - 5 %    Pt Temp 37.0     FIO2 INFORMATION NOT PROVIDED    APTT   Result Value Ref Range    APTT 57.5 (H) 23.0 - 36.5 sec   APTT   Result Value Ref Range    APTT 44.4 (H) 23.0 - 36.5 sec   CBC with Auto Differential   Result Value Ref Range    WBC 16.7 (H) 3.5 - 11.3 k/uL    RBC 3.05 (L) 4.21 - 5.77 m/uL    Hemoglobin 8.2 (L) 13.0 - 17.0 g/dL    Hematocrit 27.0 (L) 40.7 - 50.3 %    MCV 88.5 82.6 - 102.9 fL    MCH 26.9 25.2 - 33.5 pg    MCHC 30.4 28.4 - 34.8 g/dL    RDW 16.3 (H) 11.8 - 14.4 %    Platelets 689 (H) 138 - 453 k/uL    MPV 9.2 8.1 - 13.5 fL    NRBC Automated 0.0 0.0 per 100 WBC    Immature Granulocytes % 1 (H) 0 %    Neutrophils % 88 (H) 36 - 66 %    Lymphocytes % 5 (L) 24 - 44 %    Monocytes % 6 1 - 7 %    Eosinophils % 0 (L) 1 - 4 %    Basophils % 0 0 - 2 %    Immature Granulocytes Absolute 0.17 0.00 - 0.30 k/uL    Neutrophils Absolute 14.69 (H) 1.8 - 7.7 k/uL    Lymphocytes Absolute 0.84 (L) 1.0 - 4.8 k/uL    Monocytes Absolute 1.00 (H) 0.1 - 0.8 k/uL    Eosinophils Absolute 0.00 0.0 - 0.4 k/uL    Basophils Absolute 0.00 0.0 - 0.2 k/uL    Morphology ANISOCYTOSIS PRESENT    Comprehensive Metabolic Panel   Result Value Ref Range    Sodium 136 136 - 145 mmol/L    
Glucose 108 (H) 74 - 99 mg/dL    BUN 8 6 - 20 mg/dL    Creatinine 0.5 (L) 0.7 - 1.2 mg/dL    Est, Glom Filt Rate >90 >60 mL/min/1.73m2    Calcium 8.0 (L) 8.6 - 10.4 mg/dL    Total Protein 5.6 (L) 6.6 - 8.7 g/dL    Albumin 2.5 (L) 3.5 - 5.2 g/dL    Albumin/Globulin Ratio 0.8 (L) 1.0 - 2.5    Total Bilirubin 0.3 0.0 - 1.2 mg/dL    Alkaline Phosphatase 31 (L) 40 - 129 U/L    ALT 43 10 - 50 U/L    AST 47 10 - 50 U/L   APTT   Result Value Ref Range    APTT 35.5 23.0 - 36.5 sec   Basic Metabolic Panel w/ Reflex to MG   Result Value Ref Range    Sodium 136 136 - 145 mmol/L    Potassium 4.0 3.7 - 5.3 mmol/L    Chloride 104 98 - 107 mmol/L    CO2 24 20 - 31 mmol/L    Anion Gap 8 (L) 9 - 16 mmol/L    Glucose 134 (H) 74 - 99 mg/dL    BUN 8 6 - 20 mg/dL    Creatinine 0.6 (L) 0.7 - 1.2 mg/dL    Est, Glom Filt Rate >90 >60 mL/min/1.73m2    Calcium 8.0 (L) 8.6 - 10.4 mg/dL   CBC with Auto Differential   Result Value Ref Range    WBC 18.9 (H) 3.5 - 11.3 k/uL    RBC 3.07 (L) 4.21 - 5.77 m/uL    Hemoglobin 8.3 (L) 13.0 - 17.0 g/dL    Hematocrit 26.7 (L) 40.7 - 50.3 %    MCV 87.0 82.6 - 102.9 fL    MCH 27.0 25.2 - 33.5 pg    MCHC 31.1 28.4 - 34.8 g/dL    RDW 15.9 (H) 11.8 - 14.4 %    Platelets 772 (H) 138 - 453 k/uL    MPV 9.0 8.1 - 13.5 fL    NRBC Automated 0.0 0.0 per 100 WBC    Immature Granulocytes % 2 (H) 0 %    Neutrophils % 79 (H) 36 - 65 %    Lymphocytes % 10 (L) 24 - 43 %    Monocytes % 8 3 - 12 %    Eosinophils % 1 1 - 4 %    Basophils % 0 0 - 2 %    Immature Granulocytes Absolute 0.38 (H) 0.00 - 0.30 k/uL    Neutrophils Absolute 14.93 (H) 1.50 - 8.10 k/uL    Lymphocytes Absolute 1.89 1.10 - 3.70 k/uL    Monocytes Absolute 1.51 (H) 0.10 - 1.20 k/uL    Eosinophils Absolute 0.19 0.00 - 0.44 k/uL    Basophils Absolute 0.00 0.00 - 0.20 k/uL    Morphology ANISOCYTOSIS PRESENT    Anti-XA, Heparin   Result Value Ref Range    Anti-XA Unfrac Heparin 0.30 IU/L   Anti-XA, Heparin   Result Value Ref Range    Anti-XA Unfrac Heparin 0.17 
complicated hospital course.  Patient cell count was within normal range at admission.  Doubt underlying MPN  Platelet count likely reactive in nature   peripheral smear with Marked thrombocytosis. Platelets show normal morphology.   Iron panel compatible with iron sequestration(high ferritin and low saturation) however could be element of iron deficiency> iron supplement  Okay to continue anticoagulation.  Monitor for signs of bleeding due to risk of acquired von Willebrand disease from extreme thrombocytosis.  At this point okay to continue anticoagulation  Will follow        Discussed with patient and Nurse.      Thank you for asking us to see this patient.      Danish Evangelista MD  Internal Medicine Resident, PGY-3  [facility]; [place], [place]  3/11/2025, 11:15 AM                            I have seen and examined the patient independently.  I reviewed all laboratory and imaging studies that are relevant.  I agree with the resident note with the addendum.    Electronically signed by Ekaterina Olmedo MD on 3/11/2025 at 10:01 PM

## 2025-03-13 ENCOUNTER — TELEPHONE (OUTPATIENT)
Dept: FAMILY MEDICINE CLINIC | Age: 50
End: 2025-03-13

## 2025-03-13 DIAGNOSIS — I82.412 ACUTE DEEP VEIN THROMBOSIS (DVT) OF FEMORAL VEIN OF LEFT LOWER EXTREMITY (HCC): Primary | ICD-10-CM
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
## 2025-03-13 NOTE — TELEPHONE ENCOUNTER
Patient called the office and stated that he has been in the hospital for the last 16 days and was put in eliquis due to his platelet levels being 2x the amount they should be and [facility] will not fill the prescription because the provider from the hospital is out of his network and he is asking if you will fill the medication for him.

## 2025-03-18 ENCOUNTER — OFFICE VISIT (OUTPATIENT)
Dept: VASCULAR SURGERY | Age: 50
End: 2025-03-18

## 2025-03-18 VITALS
BODY MASS INDEX: 37.52 KG/M2 | OXYGEN SATURATION: 94 % | HEART RATE: 142 BPM | DIASTOLIC BLOOD PRESSURE: 73 MMHG | SYSTOLIC BLOOD PRESSURE: 102 MMHG | RESPIRATION RATE: 18 BRPM | HEIGHT: 72 IN | WEIGHT: 277 LBS

## 2025-03-18 DIAGNOSIS — I74.10 AORTIC THROMBUS (HCC): Primary | ICD-10-CM

## 2025-03-18 DIAGNOSIS — I99.8 ACUTE LOWER LIMB ISCHEMIA: ICD-10-CM

## 2025-03-18 PROCEDURE — 99024 POSTOP FOLLOW-UP VISIT: CPT | Performed by: STUDENT IN AN ORGANIZED HEALTH CARE EDUCATION/TRAINING PROGRAM

## 2025-03-18 NOTE — PROGRESS NOTES
UMBILICAL HERNIA REPAIR N/A 1/10/2024    HERNIA UMBILICAL REPAIR LAPAROSCOPIC ROBOTIC XI performed by Martinez Santacruz IV,  at [facility] OR    VASCULAR SURGERY Left 2/23/2025    PERCUTANEOUS THROMBECTOMY OF AORTA USING PENUMBRA, OPEN MECHANICAL THROMBECTOMY OF POPLITEAL ARTERY WITH JAKE EMBOLECTOMY CATHETER, POPLITEAL ARTERY PATCH WITH XENOSURE PATCH, LEFT LOWER EXTREMITY FASCIOTOMY performed by Kyle Samson MD at [facility]      Review of Systems   Constitutional:  Negative for activity change, fever and unexpected weight change.   HENT:  Negative for trouble swallowing and voice change.    Eyes:  Negative for pain and visual disturbance.   Respiratory:  Negative for cough and chest tightness.    Cardiovascular:  Negative for chest pain and palpitations.   Gastrointestinal:  Negative for abdominal distention, abdominal pain and vomiting.   Endocrine: Negative for cold intolerance and heat intolerance.   Genitourinary:  Negative for dysuria, flank pain and hematuria.   Musculoskeletal:  Negative for joint swelling and neck pain.   Skin:  Negative for color change and rash.   Allergic/Immunologic: Negative for immunocompromised state.   Neurological:  Negative for syncope, speech difficulty, weakness, numbness and headaches.   Hematological:  Negative for adenopathy.   Psychiatric/Behavioral:  Negative for behavioral problems and suicidal ideas.        Vitals:    03/18/25 1511   BP: 102/73   BP Site: Left Upper Arm   Patient Position: Sitting   BP Cuff Size: Large Adult   Pulse: (!) 138  Comment: 142 retake   Resp: 18   SpO2: 94%   Weight: 125.6 kg (277 lb)   Height: 1.829 m (6')          Physical Exam  Constitutional:       General: He is not in acute distress.  HENT:      Mouth/Throat:      Mouth: Mucous membranes are moist.      Pharynx: Oropharynx is clear.   Eyes:      General: No scleral icterus.     Extraocular Movements: Extraocular movements intact.      Conjunctiva/sclera: Conjunctivae

## 2025-05-12 ENCOUNTER — OFFICE VISIT (OUTPATIENT)
Dept: FAMILY MEDICINE CLINIC | Age: 50
End: 2025-05-12
Payer: COMMERCIAL

## 2025-05-12 VITALS
TEMPERATURE: 97.8 F | SYSTOLIC BLOOD PRESSURE: 120 MMHG | WEIGHT: 253.2 LBS | BODY MASS INDEX: 34.29 KG/M2 | HEIGHT: 72 IN | HEART RATE: 111 BPM | DIASTOLIC BLOOD PRESSURE: 80 MMHG | OXYGEN SATURATION: 97 %

## 2025-05-12 DIAGNOSIS — Z13.29 THYROID DISORDER SCREENING: ICD-10-CM

## 2025-05-12 DIAGNOSIS — D75.839 THROMBOCYTOSIS: ICD-10-CM

## 2025-05-12 DIAGNOSIS — I99.8 ISCHEMIC LEG: ICD-10-CM

## 2025-05-12 DIAGNOSIS — I10 ESSENTIAL HYPERTENSION: ICD-10-CM

## 2025-05-12 DIAGNOSIS — R73.01 IMPAIRED FASTING GLUCOSE: ICD-10-CM

## 2025-05-12 DIAGNOSIS — I82.412 ACUTE DEEP VEIN THROMBOSIS (DVT) OF FEMORAL VEIN OF LEFT LOWER EXTREMITY (HCC): Primary | ICD-10-CM

## 2025-05-12 DIAGNOSIS — Z12.5 PROSTATE CANCER SCREENING: ICD-10-CM

## 2025-05-12 DIAGNOSIS — E78.5 HYPERLIPIDEMIA, UNSPECIFIED HYPERLIPIDEMIA TYPE: ICD-10-CM

## 2025-05-12 PROCEDURE — 3079F DIAST BP 80-89 MM HG: CPT | Performed by: NURSE PRACTITIONER

## 2025-05-12 PROCEDURE — 99214 OFFICE O/P EST MOD 30 MIN: CPT | Performed by: NURSE PRACTITIONER

## 2025-05-12 PROCEDURE — 3074F SYST BP LT 130 MM HG: CPT | Performed by: NURSE PRACTITIONER

## 2025-05-12 ASSESSMENT — PATIENT HEALTH QUESTIONNAIRE - PHQ9
5. POOR APPETITE OR OVEREATING: NEARLY EVERY DAY
SUM OF ALL RESPONSES TO PHQ QUESTIONS 1-9: 7
8. MOVING OR SPEAKING SO SLOWLY THAT OTHER PEOPLE COULD HAVE NOTICED. OR THE OPPOSITE, BEING SO FIGETY OR RESTLESS THAT YOU HAVE BEEN MOVING AROUND A LOT MORE THAN USUAL: SEVERAL DAYS
3. TROUBLE FALLING OR STAYING ASLEEP: NOT AT ALL
6. FEELING BAD ABOUT YOURSELF - OR THAT YOU ARE A FAILURE OR HAVE LET YOURSELF OR YOUR FAMILY DOWN: SEVERAL DAYS
SUM OF ALL RESPONSES TO PHQ QUESTIONS 1-9: 7
9. THOUGHTS THAT YOU WOULD BE BETTER OFF DEAD, OR OF HURTING YOURSELF: NOT AT ALL
SUM OF ALL RESPONSES TO PHQ QUESTIONS 1-9: 7
1. LITTLE INTEREST OR PLEASURE IN DOING THINGS: NOT AT ALL
10. IF YOU CHECKED OFF ANY PROBLEMS, HOW DIFFICULT HAVE THESE PROBLEMS MADE IT FOR YOU TO DO YOUR WORK, TAKE CARE OF THINGS AT HOME, OR GET ALONG WITH OTHER PEOPLE: NOT DIFFICULT AT ALL
SUM OF ALL RESPONSES TO PHQ QUESTIONS 1-9: 7
7. TROUBLE CONCENTRATING ON THINGS, SUCH AS READING THE NEWSPAPER OR WATCHING TELEVISION: NOT AT ALL
4. FEELING TIRED OR HAVING LITTLE ENERGY: MORE THAN HALF THE DAYS
2. FEELING DOWN, DEPRESSED OR HOPELESS: NOT AT ALL

## 2025-05-12 ASSESSMENT — ENCOUNTER SYMPTOMS
COUGH: 0
SHORTNESS OF BREATH: 0
BACK PAIN: 0
DIARRHEA: 0
VOMITING: 0
NAUSEA: 0
EYE PAIN: 0
SORE THROAT: 0
SINUS PAIN: 0
ABDOMINAL PAIN: 0

## 2025-05-12 NOTE — PROGRESS NOTES
misinterpreted.    Patient assumes risks associated with failure to complete recommended testing and treatments in a timely manner    Electronically signed by ESHA Payne CNP on 5/12/2025at 10:13 AM

## 2025-05-19 ENCOUNTER — HOSPITAL ENCOUNTER (OUTPATIENT)
Age: 50
Discharge: HOME | End: 2025-05-19
Payer: COMMERCIAL

## 2025-05-19 DIAGNOSIS — Z12.5 PROSTATE CANCER SCREENING: ICD-10-CM

## 2025-05-19 DIAGNOSIS — I99.8 ISCHEMIC LEG: ICD-10-CM

## 2025-05-19 DIAGNOSIS — D75.839 THROMBOCYTOSIS: ICD-10-CM

## 2025-05-19 DIAGNOSIS — Z13.29 THYROID DISORDER SCREENING: ICD-10-CM

## 2025-05-19 DIAGNOSIS — E78.5 HYPERLIPIDEMIA, UNSPECIFIED HYPERLIPIDEMIA TYPE: ICD-10-CM

## 2025-05-19 DIAGNOSIS — I82.412 ACUTE DEEP VEIN THROMBOSIS (DVT) OF FEMORAL VEIN OF LEFT LOWER EXTREMITY (HCC): ICD-10-CM

## 2025-05-19 DIAGNOSIS — I10 ESSENTIAL HYPERTENSION: ICD-10-CM

## 2025-05-19 DIAGNOSIS — R73.01 IMPAIRED FASTING GLUCOSE: ICD-10-CM

## 2025-05-19 LAB
ALBUMIN SERPL-MCNC: 4.3 G/DL (ref 3.5–5.2)
ALBUMIN/GLOB SERPL: 1.3 {RATIO} (ref 1–2.5)
ALP SERPL-CCNC: 47 U/L (ref 40–129)
ALT SERPL-CCNC: 29 U/L (ref 10–50)
ANION GAP SERPL CALCULATED.3IONS-SCNC: 11 MMOL/L (ref 9–16)
AST SERPL-CCNC: 22 U/L (ref 10–50)
BILIRUB SERPL-MCNC: 0.2 MG/DL (ref 0–1.2)
BUN SERPL-MCNC: 15 MG/DL (ref 6–20)
CALCIUM SERPL-MCNC: 9.6 MG/DL (ref 8.6–10.4)
CHLORIDE SERPL-SCNC: 104 MMOL/L (ref 98–107)
CHOLEST SERPL-MCNC: 231 MG/DL (ref 0–199)
CHOLESTEROL/HDL RATIO: 5.8
CO2 SERPL-SCNC: 24 MMOL/L (ref 20–31)
CREAT SERPL-MCNC: 1 MG/DL (ref 0.7–1.2)
ERYTHROCYTE [DISTWIDTH] IN BLOOD BY AUTOMATED COUNT: 15.2 % (ref 11.8–14.4)
EST. AVERAGE GLUCOSE BLD GHB EST-MCNC: 94 MG/DL
GFR, ESTIMATED: >90 ML/MIN/1.73M2
GLUCOSE SERPL-MCNC: 99 MG/DL (ref 74–99)
HBA1C MFR BLD: 4.9 % (ref 4–6)
HCT VFR BLD AUTO: 44.1 % (ref 40.7–50.3)
HDLC SERPL-MCNC: 40 MG/DL
HGB BLD-MCNC: 14.3 G/DL (ref 13–17)
LDLC SERPL CALC-MCNC: 169 MG/DL (ref 0–100)
MCH RBC QN AUTO: 26.5 PG (ref 25.2–33.5)
MCHC RBC AUTO-ENTMCNC: 32.4 G/DL (ref 28.4–34.8)
MCV RBC AUTO: 81.7 FL (ref 82.6–102.9)
NRBC BLD-RTO: 0 PER 100 WBC
PLATELET # BLD AUTO: 395 K/UL (ref 138–453)
PMV BLD AUTO: 9.5 FL (ref 8.1–13.5)
POTASSIUM SERPL-SCNC: 4.6 MMOL/L (ref 3.7–5.3)
PROT SERPL-MCNC: 7.5 G/DL (ref 6.6–8.7)
PSA SERPL-MCNC: 1.2 NG/ML (ref 0–4)
RBC # BLD AUTO: 5.4 M/UL (ref 4.21–5.77)
SODIUM SERPL-SCNC: 139 MMOL/L (ref 136–145)
TRIGL SERPL-MCNC: 108 MG/DL
TSH SERPL DL<=0.05 MIU/L-ACNC: 1.02 UIU/ML (ref 0.27–4.2)
VLDLC SERPL CALC-MCNC: 22 MG/DL (ref 1–30)
WBC OTHER # BLD: 6.9 K/UL (ref 3.5–11.3)

## 2025-05-19 PROCEDURE — 80061 LIPID PANEL: CPT

## 2025-05-19 PROCEDURE — 84443 ASSAY THYROID STIM HORMONE: CPT

## 2025-05-19 PROCEDURE — 36415 COLL VENOUS BLD VENIPUNCTURE: CPT

## 2025-05-19 PROCEDURE — 83036 HEMOGLOBIN GLYCOSYLATED A1C: CPT

## 2025-05-19 PROCEDURE — 85027 COMPLETE CBC AUTOMATED: CPT

## 2025-05-19 PROCEDURE — G0103 PSA SCREENING: HCPCS

## 2025-05-19 PROCEDURE — 80053 COMPREHEN METABOLIC PANEL: CPT

## 2025-05-20 ENCOUNTER — RESULTS FOLLOW-UP (OUTPATIENT)
Dept: FAMILY MEDICINE CLINIC | Age: 50
End: 2025-05-20

## 2025-06-17 ENCOUNTER — TELEPHONE (OUTPATIENT)
Dept: VASCULAR SURGERY | Age: 50
End: 2025-06-17

## 2025-06-30 ENCOUNTER — TELEPHONE (OUTPATIENT)
Age: 50
End: 2025-06-30

## 2025-06-30 ENCOUNTER — HOSPITAL ENCOUNTER (OUTPATIENT)
Age: 50
Setting detail: SPECIMEN
Discharge: HOME | End: 2025-06-30
Payer: COMMERCIAL

## 2025-06-30 ENCOUNTER — INITIAL CONSULT (OUTPATIENT)
Age: 50
End: 2025-06-30
Payer: COMMERCIAL

## 2025-06-30 VITALS
TEMPERATURE: 98.2 F | HEIGHT: 70 IN | WEIGHT: 256.4 LBS | BODY MASS INDEX: 36.71 KG/M2 | HEART RATE: 78 BPM | RESPIRATION RATE: 18 BRPM | SYSTOLIC BLOOD PRESSURE: 145 MMHG | DIASTOLIC BLOOD PRESSURE: 100 MMHG

## 2025-06-30 DIAGNOSIS — I74.9 ARTERIAL THROMBOSIS (HCC): ICD-10-CM

## 2025-06-30 DIAGNOSIS — Z79.01 ANTICOAGULATED: ICD-10-CM

## 2025-06-30 DIAGNOSIS — I82.402 RECURRENT ACUTE DEEP VEIN THROMBOSIS (DVT) OF LEFT LOWER EXTREMITY (HCC): ICD-10-CM

## 2025-06-30 DIAGNOSIS — I82.402 RECURRENT ACUTE DEEP VEIN THROMBOSIS (DVT) OF LEFT LOWER EXTREMITY (HCC): Primary | ICD-10-CM

## 2025-06-30 LAB — HCYS SERPL-SCNC: 11.3 UMOL/L (ref 0–15)

## 2025-06-30 PROCEDURE — 85613 RUSSELL VIPER VENOM DILUTED: CPT

## 2025-06-30 PROCEDURE — 85610 PROTHROMBIN TIME: CPT

## 2025-06-30 PROCEDURE — 85730 THROMBOPLASTIN TIME PARTIAL: CPT

## 2025-06-30 PROCEDURE — 99213 OFFICE O/P EST LOW 20 MIN: CPT | Performed by: INTERNAL MEDICINE

## 2025-06-30 PROCEDURE — 86147 CARDIOLIPIN ANTIBODY EA IG: CPT

## 2025-06-30 PROCEDURE — 83090 ASSAY OF HOMOCYSTEINE: CPT

## 2025-06-30 PROCEDURE — 81241 F5 GENE: CPT

## 2025-06-30 PROCEDURE — 86146 BETA-2 GLYCOPROTEIN ANTIBODY: CPT

## 2025-06-30 PROCEDURE — 81240 F2 GENE: CPT

## 2025-06-30 PROCEDURE — 3077F SYST BP >= 140 MM HG: CPT | Performed by: INTERNAL MEDICINE

## 2025-06-30 PROCEDURE — 99214 OFFICE O/P EST MOD 30 MIN: CPT | Performed by: INTERNAL MEDICINE

## 2025-06-30 PROCEDURE — 36415 COLL VENOUS BLD VENIPUNCTURE: CPT

## 2025-06-30 PROCEDURE — 3080F DIAST BP >= 90 MM HG: CPT | Performed by: INTERNAL MEDICINE

## 2025-06-30 RX ORDER — FERROUS SULFATE 325(65) MG
325 TABLET ORAL
Qty: 90 TABLET | Refills: 1 | Status: SHIPPED | OUTPATIENT
Start: 2025-06-30

## 2025-06-30 NOTE — TELEPHONE ENCOUNTER
MADELEINE HERE FOR CONSULTATION  Labs now   Rv 7/21   LABS DONE ON EXIT  MD VISIT 7/21/25 @ 9:15AM  AVS PRINTED W/ INSTRUCTIONS AND GIVEN TO PT ON EXIT

## 2025-06-30 NOTE — PROGRESS NOTES
Screening   Result Value Ref Range    PSA 1.20 0.00 - 4.00 ng/mL   Lipid Panel   Result Value Ref Range    Cholesterol, Total 231 (H) 0 - 199 mg/dL    HDL 40 (L) >40 mg/dL    LDL Cholesterol 169 (H) 0 - 100 mg/dL    Chol/HDL Ratio 5.8 (H) <5.0    Triglycerides 108 <150 mg/dL    VLDL 22 1 - 30 mg/dL   Comprehensive Metabolic Panel   Result Value Ref Range    Sodium 139 136 - 145 mmol/L    Potassium 4.6 3.7 - 5.3 mmol/L    Chloride 104 98 - 107 mmol/L    CO2 24 20 - 31 mmol/L    Anion Gap 11 9 - 16 mmol/L    Glucose 99 74 - 99 mg/dL    BUN 15 6 - 20 mg/dL    Creatinine 1.0 0.7 - 1.2 mg/dL    Est, Glom Filt Rate >90 >60 mL/min/1.73m2    Calcium 9.6 8.6 - 10.4 mg/dL    Total Protein 7.5 6.6 - 8.7 g/dL    Albumin 4.3 3.5 - 5.2 g/dL    Albumin/Globulin Ratio 1.3 1.0 - 2.5    Total Bilirubin 0.2 0.0 - 1.2 mg/dL    Alkaline Phosphatase 47 40 - 129 U/L    ALT 29 10 - 50 U/L    AST 22 10 - 50 U/L   CBC   Result Value Ref Range    WBC 6.9 3.5 - 11.3 k/uL    RBC 5.40 4.21 - 5.77 m/uL    Hemoglobin 14.3 13.0 - 17.0 g/dL    Hematocrit 44.1 40.7 - 50.3 %    MCV 81.7 (L) 82.6 - 102.9 fL    MCH 26.5 25.2 - 33.5 pg    MCHC 32.4 28.4 - 34.8 g/dL    RDW 15.2 (H) 11.8 - 14.4 %    Platelets 395 138 - 453 k/uL    MPV 9.5 8.1 - 13.5 fL    NRBC Automated 0.0 0.0 per 100 WBC   TSH reflex to FT4   Result Value Ref Range    TSH 1.02 0.27 - 4.20 uIU/mL     Vascular duplex lower extremity arteries left  Result Date: 7/8/2025    Patent arteries of the left lower extremity with no significant arterial occlusive disease.     Vascular lower arterial complete physiologic Doppler at rest  Result Date: 7/8/2025    Right: EH of 1.06 is within the normal range.   Left: EH of 0.97 is within the normal range.     Impression:  Acute ischemic limb, left lower extremity  -Status post percutaneous mechanical thrombectomy of aorta, popliteal artery and compartment fasciotomy  Thrombocytosis, now resolved  Diverticulitis    Plan:  I had a detailed discussion

## 2025-07-02 LAB
B2 GLYCOPROT1 IGA SERPL IA-ACNC: 1.2 ELISA U/ML (ref 0–7)
B2 GLYCOPROT1 IGG SERPL IA-ACNC: 6.9 ELISA U/ML (ref 0–7)
B2 GLYCOPROT1 IGM SERPL IA-ACNC: <0.9 ELISA U/ML (ref 0–7)
CARDIOLIPIN IGA SER IA-ACNC: 2.2 APL (ref 0–14)
CARDIOLIPIN IGG SER IA-ACNC: 6.4 GPL (ref 0–10)
DILUTE RUSSELL VIPER VENOM TIME: ABNORMAL
INR PPP: 1.1
LUPUS ANTICOAG: ABNORMAL
PARTIAL THROMBOPLASTIN TIME: 28.4 SEC (ref 23.9–33.8)
PROTHROMBIN TIME: 14.3 SEC (ref 11.5–14.2)

## 2025-07-05 LAB
F5 P.R506Q BLD/T QL: NEGATIVE
SPECIMEN SOURCE: NORMAL

## 2025-07-07 LAB
CARDIOLIPIN IGA SER IA-ACNC: 2.2 APL (ref 0–14)
CARDIOLIPIN IGG SER IA-ACNC: 6.4 GPL (ref 0–10)
CARDIOLIPIN IGM SER IA-ACNC: 2.9 MPL (ref 0–10)
DILUTE RUSSELL VIPER VENOM TIME: ABNORMAL
F2 C.20210G>A GENO BLD/T: ABNORMAL
INR PPP: 1.1
PARTIAL THROMBOPLASTIN TIME: 28.4 SEC (ref 23.9–33.8)
PROTHROMBIN TIME: 14.3 SEC (ref 11.5–14.2)
SPECIMEN SOURCE: ABNORMAL

## 2025-07-08 ENCOUNTER — HOSPITAL ENCOUNTER (OUTPATIENT)
Dept: CT IMAGING | Age: 50
Discharge: HOME | End: 2025-07-10
Attending: STUDENT IN AN ORGANIZED HEALTH CARE EDUCATION/TRAINING PROGRAM
Payer: COMMERCIAL

## 2025-07-08 ENCOUNTER — HOSPITAL ENCOUNTER (OUTPATIENT)
Dept: VASCULAR LAB | Age: 50
End: 2025-07-08
Attending: STUDENT IN AN ORGANIZED HEALTH CARE EDUCATION/TRAINING PROGRAM
Payer: COMMERCIAL

## 2025-07-08 ENCOUNTER — HOSPITAL ENCOUNTER (OUTPATIENT)
Dept: VASCULAR LAB | Age: 50
Discharge: HOME | End: 2025-07-10
Attending: STUDENT IN AN ORGANIZED HEALTH CARE EDUCATION/TRAINING PROGRAM
Payer: COMMERCIAL

## 2025-07-08 DIAGNOSIS — I99.8 ACUTE LOWER LIMB ISCHEMIA: ICD-10-CM

## 2025-07-08 DIAGNOSIS — I74.10 AORTIC THROMBUS (HCC): ICD-10-CM

## 2025-07-08 LAB
CREAT SERPL-MCNC: 1 MG/DL (ref 0.7–1.2)
GFR, ESTIMATED: >90 ML/MIN/1.73M2
VAS LEFT ABI: 0.97
VAS LEFT ARM BP: 158 MMHG
VAS LEFT ATA MID PSV: 91.1 CM/S
VAS LEFT CFA MID PSV: 101 CM/S
VAS LEFT CFA VEL RATIO: 0.92
VAS LEFT DORSALIS PEDIS BP: 152 MMHG
VAS LEFT EXT ILIAC DIST PSV: 110 CM/S
VAS LEFT PERONEAL MID PSV: 16.5 CM/S
VAS LEFT PFA PROX PSV: 70.8 CM/S
VAS LEFT POP A DIST PSV: 91.3 CM/S
VAS LEFT POP A PROX PSV: 76.4 CM/S
VAS LEFT POP A PROX VEL RATIO: 1.11
VAS LEFT PTA BP: 153 MMHG
VAS LEFT PTA MID PSV: 20 CM/S
VAS LEFT SFA DIST PSV: 69 CM/S
VAS LEFT SFA DIST VEL RATIO: 0.82
VAS LEFT SFA MID PSV: 84.5 CM/S
VAS LEFT SFA MID VEL RATIO: 0.73
VAS LEFT SFA PROX PSV: 116 CM/S
VAS LEFT SFA PROX VEL RATIO: 1.15
VAS LEFT TBI: 0.56
VAS LEFT TOE PRESSURE: 89 MMHG
VAS RIGHT ABI: 1.06
VAS RIGHT ARM BP: 158 MMHG
VAS RIGHT ATA MID PSV: 91.1 CM/S
VAS RIGHT CFA PROX PSV: 101 CM/S
VAS RIGHT DORSALIS PEDIS BP: 165 MMHG
VAS RIGHT PERONEAL MID PSV: 16.5 CM/S
VAS RIGHT PFA PROX PSV: 70.8 CM/S
VAS RIGHT POP A DIST PSV: 91.3 CM/S
VAS RIGHT POP A PROX PSV: 76.4 CM/S
VAS RIGHT POP A PROX VEL RATIO: 1.11
VAS RIGHT PTA BP: 168 MMHG
VAS RIGHT PTA MID PSV: 20 CM/S
VAS RIGHT SFA DIST PSV: 69 CM/S
VAS RIGHT SFA DIST VEL RATIO: 0.82
VAS RIGHT SFA MID PSV: 84.5 CM/S
VAS RIGHT SFA MID VEL RATIO: 0.7
VAS RIGHT SFA PROX PSV: 116 CM/S
VAS RIGHT SFA PROX VEL RATIO: 1.1
VAS RIGHT TBI: 0.89
VAS RIGHT TOE PRESSURE: 141 MMHG

## 2025-07-08 PROCEDURE — 93923 UPR/LXTR ART STDY 3+ LVLS: CPT | Performed by: SURGERY

## 2025-07-08 PROCEDURE — 74174 CTA ABD&PLVS W/CONTRAST: CPT

## 2025-07-08 PROCEDURE — 93923 UPR/LXTR ART STDY 3+ LVLS: CPT

## 2025-07-08 PROCEDURE — 2500000003 HC RX 250 WO HCPCS: Performed by: STUDENT IN AN ORGANIZED HEALTH CARE EDUCATION/TRAINING PROGRAM

## 2025-07-08 PROCEDURE — 93926 LOWER EXTREMITY STUDY: CPT

## 2025-07-08 PROCEDURE — 36415 COLL VENOUS BLD VENIPUNCTURE: CPT

## 2025-07-08 PROCEDURE — 6360000004 HC RX CONTRAST MEDICATION: Performed by: STUDENT IN AN ORGANIZED HEALTH CARE EDUCATION/TRAINING PROGRAM

## 2025-07-08 PROCEDURE — 93926 LOWER EXTREMITY STUDY: CPT | Performed by: SURGERY

## 2025-07-08 PROCEDURE — 82565 ASSAY OF CREATININE: CPT

## 2025-07-08 PROCEDURE — 2580000003 HC RX 258: Performed by: STUDENT IN AN ORGANIZED HEALTH CARE EDUCATION/TRAINING PROGRAM

## 2025-07-08 RX ORDER — 0.9 % SODIUM CHLORIDE 0.9 %
80 INTRAVENOUS SOLUTION INTRAVENOUS ONCE
Status: COMPLETED | OUTPATIENT
Start: 2025-07-08 | End: 2025-07-08

## 2025-07-08 RX ORDER — IOPAMIDOL 755 MG/ML
75 INJECTION, SOLUTION INTRAVASCULAR
Status: COMPLETED | OUTPATIENT
Start: 2025-07-08 | End: 2025-07-08

## 2025-07-08 RX ORDER — SODIUM CHLORIDE 0.9 % (FLUSH) 0.9 %
10 SYRINGE (ML) INJECTION PRN
Status: DISCONTINUED | OUTPATIENT
Start: 2025-07-08 | End: 2025-07-11 | Stop reason: HOSPADM

## 2025-07-08 RX ADMIN — SODIUM CHLORIDE, PRESERVATIVE FREE 10 ML: 5 INJECTION INTRAVENOUS at 08:22

## 2025-07-08 RX ADMIN — SODIUM CHLORIDE 80 ML: 9 INJECTION, SOLUTION INTRAVENOUS at 08:16

## 2025-07-08 RX ADMIN — SODIUM CHLORIDE, PRESERVATIVE FREE 10 ML: 5 INJECTION INTRAVENOUS at 08:16

## 2025-07-08 RX ADMIN — IOPAMIDOL 75 ML: 755 INJECTION, SOLUTION INTRAVENOUS at 08:11

## 2025-07-15 ENCOUNTER — OFFICE VISIT (OUTPATIENT)
Dept: VASCULAR SURGERY | Age: 50
End: 2025-07-15
Payer: COMMERCIAL

## 2025-07-15 VITALS
WEIGHT: 256 LBS | SYSTOLIC BLOOD PRESSURE: 157 MMHG | DIASTOLIC BLOOD PRESSURE: 94 MMHG | HEIGHT: 70 IN | TEMPERATURE: 98.6 F | HEART RATE: 85 BPM | OXYGEN SATURATION: 98 % | BODY MASS INDEX: 36.65 KG/M2 | RESPIRATION RATE: 18 BRPM

## 2025-07-15 DIAGNOSIS — K57.30 SIGMOID DIVERTICULOSIS: ICD-10-CM

## 2025-07-15 DIAGNOSIS — I72.8 CELIAC ARTERY ANEURYSM: ICD-10-CM

## 2025-07-15 DIAGNOSIS — I99.8 ACUTE LOWER LIMB ISCHEMIA: Primary | ICD-10-CM

## 2025-07-15 PROCEDURE — 3080F DIAST BP >= 90 MM HG: CPT | Performed by: STUDENT IN AN ORGANIZED HEALTH CARE EDUCATION/TRAINING PROGRAM

## 2025-07-15 PROCEDURE — 3077F SYST BP >= 140 MM HG: CPT | Performed by: STUDENT IN AN ORGANIZED HEALTH CARE EDUCATION/TRAINING PROGRAM

## 2025-07-15 PROCEDURE — 99213 OFFICE O/P EST LOW 20 MIN: CPT | Performed by: STUDENT IN AN ORGANIZED HEALTH CARE EDUCATION/TRAINING PROGRAM

## 2025-07-15 ASSESSMENT — ENCOUNTER SYMPTOMS
EYE PAIN: 0
COLOR CHANGE: 0
ABDOMINAL PAIN: 0
COUGH: 0
CHEST TIGHTNESS: 0
ABDOMINAL DISTENTION: 0
VOICE CHANGE: 0
TROUBLE SWALLOWING: 0
VOMITING: 0
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
## 2025-07-21 ENCOUNTER — HOSPITAL ENCOUNTER (OUTPATIENT)
Facility: MEDICAL CENTER | Age: 50
End: 2025-07-21

## 2025-07-24 ENCOUNTER — HOSPITAL ENCOUNTER (OUTPATIENT)
Facility: MEDICAL CENTER | Age: 50
End: 2025-07-24

## 2025-07-28 ENCOUNTER — OFFICE VISIT (OUTPATIENT)
Age: 50
End: 2025-07-28
Payer: COMMERCIAL

## 2025-07-28 ENCOUNTER — TELEPHONE (OUTPATIENT)
Age: 50
End: 2025-07-28

## 2025-07-28 VITALS
HEART RATE: 85 BPM | RESPIRATION RATE: 16 BRPM | HEIGHT: 70 IN | WEIGHT: 262 LBS | TEMPERATURE: 98 F | BODY MASS INDEX: 37.51 KG/M2 | DIASTOLIC BLOOD PRESSURE: 92 MMHG | SYSTOLIC BLOOD PRESSURE: 154 MMHG | OXYGEN SATURATION: 100 %

## 2025-07-28 DIAGNOSIS — I74.9 ARTERIAL THROMBOSIS (HCC): ICD-10-CM

## 2025-07-28 DIAGNOSIS — Z79.01 ANTICOAGULATED: ICD-10-CM

## 2025-07-28 DIAGNOSIS — D68.52 PROTHROMBIN GENE MUTATION: ICD-10-CM

## 2025-07-28 DIAGNOSIS — I82.402 RECURRENT ACUTE DEEP VEIN THROMBOSIS (DVT) OF LEFT LOWER EXTREMITY (HCC): Primary | ICD-10-CM

## 2025-07-28 PROCEDURE — 3077F SYST BP >= 140 MM HG: CPT | Performed by: INTERNAL MEDICINE

## 2025-07-28 PROCEDURE — 99213 OFFICE O/P EST LOW 20 MIN: CPT | Performed by: INTERNAL MEDICINE

## 2025-07-28 PROCEDURE — 99214 OFFICE O/P EST MOD 30 MIN: CPT | Performed by: INTERNAL MEDICINE

## 2025-07-28 PROCEDURE — 3080F DIAST BP >= 90 MM HG: CPT | Performed by: INTERNAL MEDICINE

## 2025-07-28 NOTE — TELEPHONE ENCOUNTER
MADELEINE HERE FOR MD VISIT  Rv in 1 yr with cbc   LABS CBC 8/3/26  MD VISIT 8/3/26 @ 1PM  AVS PRINTED W/ INSTRUCTIONS AND GIVEN TO PT ON EXIT

## 2025-08-21 ENCOUNTER — OFFICE VISIT (OUTPATIENT)
Age: 50
End: 2025-08-21
Payer: COMMERCIAL

## 2025-08-21 VITALS
DIASTOLIC BLOOD PRESSURE: 102 MMHG | SYSTOLIC BLOOD PRESSURE: 150 MMHG | HEART RATE: 94 BPM | WEIGHT: 262 LBS | HEIGHT: 70 IN | BODY MASS INDEX: 37.51 KG/M2

## 2025-08-21 DIAGNOSIS — K57.30 SIGMOID DIVERTICULOSIS: Primary | ICD-10-CM

## 2025-08-21 PROCEDURE — 99202 OFFICE O/P NEW SF 15 MIN: CPT | Performed by: SURGERY

## 2025-08-21 ASSESSMENT — ENCOUNTER SYMPTOMS
RESPIRATORY NEGATIVE: 1
GASTROINTESTINAL NEGATIVE: 1

## 2025-08-22 ENCOUNTER — OFFICE VISIT (OUTPATIENT)
Dept: GASTROENTEROLOGY | Age: 50
End: 2025-08-22
Payer: COMMERCIAL

## 2025-08-22 VITALS
HEART RATE: 83 BPM | WEIGHT: 267 LBS | DIASTOLIC BLOOD PRESSURE: 100 MMHG | BODY MASS INDEX: 38.31 KG/M2 | SYSTOLIC BLOOD PRESSURE: 151 MMHG

## 2025-08-22 DIAGNOSIS — I31.39 PERICARDIAL EFFUSION: ICD-10-CM

## 2025-08-22 DIAGNOSIS — E66.9 OBESITY WITH BODY MASS INDEX 30 OR GREATER: ICD-10-CM

## 2025-08-22 DIAGNOSIS — K63.1 PERFORATED SIGMOID COLON (HCC): Primary | ICD-10-CM

## 2025-08-22 DIAGNOSIS — K57.30 SIGMOID DIVERTICULOSIS: ICD-10-CM

## 2025-08-22 DIAGNOSIS — Z93.3 COLOSTOMY IN PLACE (HCC): ICD-10-CM

## 2025-08-22 DIAGNOSIS — F41.9 ANXIETY: ICD-10-CM

## 2025-08-22 PROCEDURE — 3080F DIAST BP >= 90 MM HG: CPT | Performed by: INTERNAL MEDICINE

## 2025-08-22 PROCEDURE — 99204 OFFICE O/P NEW MOD 45 MIN: CPT | Performed by: INTERNAL MEDICINE

## 2025-08-22 PROCEDURE — 3077F SYST BP >= 140 MM HG: CPT | Performed by: INTERNAL MEDICINE

## 2025-08-22 ASSESSMENT — ENCOUNTER SYMPTOMS
ABDOMINAL PAIN: 1
CONSTIPATION: 0
SHORTNESS OF BREATH: 0
RECTAL PAIN: 0
DIARRHEA: 0
WHEEZING: 0
TROUBLE SWALLOWING: 0
CHOKING: 0
VOMITING: 0
COUGH: 0
SORE THROAT: 0
ANAL BLEEDING: 0
ABDOMINAL DISTENTION: 0
COLOR CHANGE: 0
BLOOD IN STOOL: 0
NAUSEA: 1

## 2025-08-25 DIAGNOSIS — Z12.11 COLON CANCER SCREENING: Primary | ICD-10-CM

## 2025-08-25 RX ORDER — POLYETHYLENE GLYCOL 3350 17 G/17G
POWDER, FOR SOLUTION ORAL
Qty: 238 G | Refills: 0 | Status: SHIPPED | OUTPATIENT
Start: 2025-08-25

## 2025-08-25 RX ORDER — BISACODYL 5 MG
TABLET, DELAYED RELEASE (ENTERIC COATED) ORAL
Qty: 4 TABLET | Refills: 0 | Status: SHIPPED | OUTPATIENT
Start: 2025-08-25

## (undated) DEVICE — MEDI-VAC NON-CONDUCTIVE SUCTION TUBING: Brand: CARDINAL HEALTH

## (undated) DEVICE — Device

## (undated) DEVICE — FLEXOR, TUOHY-BORST SIDE-ARM INTRODUCER SHUTTLE, - SL: Brand: FLEXOR

## (undated) DEVICE — BINDER ABD 2XL W9IN CIRC 62 73IN 3 PNL E HK AND LOOP CLSR W

## (undated) DEVICE — SUTURE VICRYL SZ 3-0 L18IN ABSRB UD L26MM SH 1/2 CIR J864D

## (undated) DEVICE — SPONGE LAP W18XL18IN WHT COT 4 PLY FLD STRUNG RADPQ DISP ST 2 PER PACK

## (undated) DEVICE — DISCONTINUED USE 394504 SYRINGE LUER LOCK TIP 12 ML

## (undated) DEVICE — AGENT HEMOSTATIC SURGIFLOW MATRIX KIT W/THROMBIN

## (undated) DEVICE — Z DISCONTINUED BY MEDLINE USE 2271199 TRAY PREP WET 4% CHG SCRB SOL

## (undated) DEVICE — TROCAR: Brand: KII SHIELDED BLADED ACCESS SYSTEM

## (undated) DEVICE — STAPLER INT CUT LN 40MM STPL 51MM GRN CRV HD B FRM

## (undated) DEVICE — SUTURE ETHILON SZ 3-0 L18IN NONABSORBABLE BLK PS-2 L19MM 3/8 1669H

## (undated) DEVICE — SUTURE PERMAHAND SZ 3-0 L30IN NONABSORBABLE BLK SH L26MM K832H

## (undated) DEVICE — SURGICAL SUCTION CONNECTING TUBE WITH MALE CONNECTOR AND SUCTION CLAMP, 2 FT. LONG (.6 M), 5 MM I.D.: Brand: CONMED

## (undated) DEVICE — FORCEPS BX L240CM WRK CHN 2.8MM STD CAP W/ NDL MIC MESH

## (undated) DEVICE — BNDG,ELSTC,MATRIX,STRL,6"X5YD,LF,HOOK&LP: Brand: MEDLINE

## (undated) DEVICE — SOLUTION IRRIG 1000ML STRL H2O USP PLAS POUR BTL

## (undated) DEVICE — TRAY SURG CUST CRD CATH TOLEDO

## (undated) DEVICE — DRAPE C ARM W/ POLY STRP W42XL72IN FOR MOB XR

## (undated) DEVICE — BLADE,CARBON-STEEL,15,STRL,DISPOSABLE,TB: Brand: MEDLINE

## (undated) DEVICE — GLOVE SURG SZ 75 CRM LTX FREE POLYISOPRENE POLYMER BEAD ANTI

## (undated) DEVICE — SUTURE MCRYL SZ 4-0 L27IN ABSRB UD L19MM PS-2 1/2 CIR PRIM Y426H

## (undated) DEVICE — SHEET, T, LAPAROTOMY, STERILE: Brand: MEDLINE

## (undated) DEVICE — TROCARS: Brand: KII® BALLOON BLUNT TIP SYSTEM

## (undated) DEVICE — SYRINGE MED 10ML LUERLOCK TIP W/O SFTY DISP

## (undated) DEVICE — LIGASURE REPROC BLUNT TP 5MM

## (undated) DEVICE — APPLICATOR MEDICATED 26 CC SOLUTION HI LT ORNG CHLORAPREP

## (undated) DEVICE — STRAP POS W5XL72IN FOAM KNEE AND BODY HK LOOP CLSR DISP

## (undated) DEVICE — SCISSOR REPROCESSD TP LAPSCP ENDOSCP CRV

## (undated) DEVICE — INSUFFLATION NEEDLE TO ESTABLISH PNEUMOPERITONEUM.: Brand: INSUFFLATION NEEDLE

## (undated) DEVICE — DRAPE C ARM UNIV W41XL74IN CLR PLAS XR VELC CLSR POLY STRP

## (undated) DEVICE — SUTURE SZ 0 27IN 5/8 CIR UR-6  TAPER PT VIOLET ABSRB VICRYL J603H

## (undated) DEVICE — GAUZE,SPONGE,FLUFF,6"X6.75",STRL,5/TRAY: Brand: MEDLINE

## (undated) DEVICE — MARKER,SKIN,WI/RULER AND LABELS: Brand: MEDLINE

## (undated) DEVICE — TROCAR: Brand: KII FIOS FIRST ENTRY

## (undated) DEVICE — ANCHOR TISSUE RETRIEVAL SYSTEM, BAG SIZE 125 ML, PORT SIZE 8 MM: Brand: ANCHOR TISSUE RETRIEVAL SYSTEM

## (undated) DEVICE — CANISTER SUCTION VAC PORTABLE 1000 CC FOR INDIGO SYS ENGINE

## (undated) DEVICE — DUAL LUMEN STOMACH TUBE: Brand: SALEM SUMP

## (undated) DEVICE — ELECTRODE PT RET AD L9FT HI MOIST COND ADH HYDRGEL CORDED

## (undated) DEVICE — SURGICAL PROCEDURE TRAY DRSG CHNG DISP

## (undated) DEVICE — COVER,LIGHT HANDLE,FLX,2/PK: Brand: MEDLINE INDUSTRIES, INC.

## (undated) DEVICE — CATHETER 7 INDIGO 130CM XTORQ + LIGHT BOLT ASPIRATION TUBING

## (undated) DEVICE — PERCLOSE PROGLIDE™ SUTURE-MEDIATED CLOSURE SYSTEM: Brand: PERCLOSE PROGLIDE™

## (undated) DEVICE — CANNULA SEAL

## (undated) DEVICE — MEDI-VAC SUCTION HANDLE REGULAR CAPACITY: Brand: CARDINAL HEALTH

## (undated) DEVICE — 1LYRTR 16FR10ML100%SIL UMS SNP: Brand: MEDLINE INDUSTRIES, INC.

## (undated) DEVICE — SWABSTICK MEDICATED 10% POVIDONE IOD PVP SGL ANTISEP SAT

## (undated) DEVICE — RESERVOIR,SUCTION,100CC,SILICONE: Brand: MEDLINE

## (undated) DEVICE — SUTURE V-LOC 180 SZ 0 L9IN ABSRB GRN GS-21 L37MM 1/2 CIR VLOCL0346

## (undated) DEVICE — THE STERILE LIGHT HANDLE COVER IS USED WITH STERIS SURGICAL LIGHTING AND VISUALIZATION SYSTEMS.

## (undated) DEVICE — NEEDLE HYPO 25GA L1.5IN BLU POLYPR HUB S STL REG BVL STR

## (undated) DEVICE — 3M™ WARMING BLANKET, UPPER BODY, 10 PER CASE, 42268: Brand: BAIR HUGGER™

## (undated) DEVICE — GOWN,REINFORCE,POLY,SIRUS,XLNG/XLG: Brand: MEDLINE

## (undated) DEVICE — RELOAD STPL 40MM H1.5-4.7MM WIRE 0.2MM REG TISS GRN CRV

## (undated) DEVICE — CANISTER NEG PRSS 1000ML W/ GEL INFOVAC

## (undated) DEVICE — DEVICE TRCR 12X9X3IN WHT CLSR DISP OMNICLOSE

## (undated) DEVICE — KIT MICRO INTRO 4FR STIFF 40CM NIGHTENALL TUNGSTEN 7SMT

## (undated) DEVICE — TRAY URIN CATH 16FR DRNGE BG STATLOK STBL DEV F SURSTP

## (undated) DEVICE — CATHETER THROMCTMY H1 100 CM 6 FRX135 CM XTORQ TBNG SL

## (undated) DEVICE — STAZ ROBOT: Brand: MEDLINE INDUSTRIES, INC.

## (undated) DEVICE — SUTURE PERMA HND 2-0 L18IN NONABSORBABLE BLK CT-1 L36MM 1/2 C022D

## (undated) DEVICE — GOWN,SIRUS,NONRNF,SETINSLV,2XL,18/CS: Brand: MEDLINE

## (undated) DEVICE — TOWEL,OR,DSP,ST,NATURAL,DLX,4/PK,20PK/CS: Brand: MEDLINE

## (undated) DEVICE — SUTURE PDS + SZ 0 L27IN ABSRB VLT L26MM CT 2 1 2 CIR PDP334H

## (undated) DEVICE — 3M™ IOBAN™ 2 ANTIMICROBIAL INCISE DRAPE 6651EZ: Brand: IOBAN™ 2

## (undated) DEVICE — SKIN PREP TRAY W/CHG: Brand: MEDLINE INDUSTRIES, INC.

## (undated) DEVICE — 4-PORT MANIFOLD: Brand: NEPTUNE 2

## (undated) DEVICE — BANDAGE,GAUZE,BULKEE II,4.5"X4.1YD,STRL: Brand: MEDLINE

## (undated) DEVICE — STRAP,POSITIONING,KNEE/BODY,FOAM,4X60": Brand: MEDLINE

## (undated) DEVICE — LIQUIBAND RAPID ADHESIVE 36/CS 0.8ML: Brand: MEDLINE

## (undated) DEVICE — DRAPE,REIN 53X77,STERILE: Brand: MEDLINE

## (undated) DEVICE — APPLIER CLP M/L SHFT DIA5MM 15 LIG LIGAMAX 5

## (undated) DEVICE — NAVICROSS SUPPORT CATHETER: Brand: NAVICROSS

## (undated) DEVICE — PROTECTOR ULN NRV PUR FOAM HK LOOP STRP ANATOMICALLY

## (undated) DEVICE — SUTURE PERMAHAND SZ 4-0 L18IN NONABSORBABLE BLK SILK BRAID A183H

## (undated) DEVICE — STAZ MAJOR BASIN: Brand: MEDLINE INDUSTRIES, INC.

## (undated) DEVICE — BAG SPEC LAP H6IN DIA3IN 250ML 10 12MM CANN ATTCH MEM WIRE

## (undated) DEVICE — DEVICE CTRL FLOWSWITCH 24 PER BX

## (undated) DEVICE — SHEET,DRAPE,70X100,STERILE: Brand: MEDLINE

## (undated) DEVICE — SUTURE PROL SZ 1 L30IN NONABSORBABLE BLU CTX L48MM 1/2 CIR 8455H

## (undated) DEVICE — PAD,ABDOMINAL,5"X9",ST,LF,25/BX: Brand: MEDLINE INDUSTRIES, INC.

## (undated) DEVICE — BLADELESS OBTURATOR: Brand: WECK VISTA

## (undated) DEVICE — DEVICE INFL 20ML 30ATM DGT FLD DISPNS SYR W ACCESSPLUS BLU

## (undated) DEVICE — SEALER ENDOSCP NANO COAT OPN DIV CRV L JAW LIGASURE IMPACT

## (undated) DEVICE — DRESSING NEG PRSS L W15XH3.3XL26CM BLK POLYUR DRP PD

## (undated) DEVICE — SCISSOR SURG CRV ENDOCUT TIP FOR LAP DISP

## (undated) DEVICE — CONVERTED USE 338908 SPONGES LAP 18X18 ST

## (undated) DEVICE — BLADE,CARBON-STEEL,15,STRL,DISPOSABLE: Brand: MEDLINE

## (undated) DEVICE — GUIDEWIRE WITH ICE™ HYDROPHILIC COATING: Brand: V-18™ CONTROL WIRE™

## (undated) DEVICE — SET EXTN IV L30IN TBNG DIA0.1IN PRIMING 4ML MACBOR FEM ADPT

## (undated) DEVICE — 3F 80 CM LEMAITRE EMBOLECTOMY CATHETER, EIFU: Brand: LEMAITRE EMBOLECTOMY CATHETER

## (undated) DEVICE — GARMENT COMPR STD FOR 17IN CALF UNIF THER FLOTRN

## (undated) DEVICE — GLOVE SURG SZ 8 L12IN FNGR THK79MIL GRN LTX FREE

## (undated) DEVICE — 450 ML BOTTLE OF 0.05% CHLORHEXIDINE GLUCONATE IN 99.95% STERILE WATER FOR IRRIGATION, USP AND APPLICATOR.: Brand: IRRISEPT ANTIMICROBIAL WOUND LAVAGE

## (undated) DEVICE — SUTURE VICRYL + SZ 4-0 L18IN ABSRB VLT L26MM SH 1/2 CIR VCP773D

## (undated) DEVICE — SYRINGE MEDICAL 3ML CLEAR PLASTIC STANDARD NON CONTROL LUERLOCK TIP DISPOSABLE

## (undated) DEVICE — SHEET, ORTHO, SPLIT, STERILE: Brand: MEDLINE

## (undated) DEVICE — 2F 40 CM LEMAITRE EMBOLECTOMY CATHETER, EIFU: Brand: LEMAITRE EMBOLECTOMY CATHETER

## (undated) DEVICE — PROVE COVER: Brand: UNBRANDED

## (undated) DEVICE — SUTURE PROL SZ 6-0 L24IN NONABSORBABLE BLU L9.3MM BV-1 3/8 8805H

## (undated) DEVICE — TROCAR: Brand: KII® SLEEVE

## (undated) DEVICE — DRAIN SURG 19FR 100% SIL RADPQ RND CHN FULL FLUT

## (undated) DEVICE — SYRINGE MED 1ML POLYCARB LUERLOCK HUB

## (undated) DEVICE — ARM DRAPE

## (undated) DEVICE — TUBE IRRIG HNDPC HI FLO TP INTRPULS W/SUCTION TUBE

## (undated) DEVICE — ADHESIVE SKIN CLSR 0.7ML TOP DERMBND ADV

## (undated) DEVICE — BASIN EMSIS 700ML GRAPHITE PLAS KID SHP GRAD

## (undated) DEVICE — HYPODERMIC SAFETY NEEDLE: Brand: MAGELLAN

## (undated) DEVICE — 3M™ IOBAN™ 2 ANTIMICROBIAL INCISE DRAPE 6650EZ: Brand: IOBAN™ 2

## (undated) DEVICE — RADIFOCUS GLIDEWIRE ADVANTAGE GUIDEWIRE: Brand: GLIDEWIRE ADVANTAGE

## (undated) DEVICE — SYRINGE MED 20ML STD CLR PLAS LUERLOCK TIP N CTRL DISP

## (undated) DEVICE — STRAP ARMBRD W1.5XL32IN FOAM STR YET SFT W/ HK AND LOOP

## (undated) DEVICE — C-ARMOR C-ARM EQUIPMENT COVERS CLEAR STERILE UNIVERSAL FIT 12 PER CASE: Brand: C-ARMOR

## (undated) DEVICE — SUTURE ETHILON SZ 3-0 L18IN NONABSORBABLE BLK L24MM PS-1 3/8 1663G

## (undated) DEVICE — BLADE CLIPPER GEN PURP NS

## (undated) DEVICE — GEL US 20GM NONIRRITATING OVERWRAPPED FILE PCH TRNSMIT

## (undated) DEVICE — PINNACLE INTRODUCER SHEATH: Brand: PINNACLE

## (undated) DEVICE — FILTER CLP DISP FOR 5513E CLIPVAC

## (undated) DEVICE — DECANTER BAG 9": Brand: MEDLINE INDUSTRIES, INC.

## (undated) DEVICE — BLADE,CARBON-STEEL,11,STRL,DISPOSABLE,TB: Brand: MEDLINE

## (undated) DEVICE — LOOP VES W13MM THK09MM MINI RED SIL FLD REPELLENT

## (undated) DEVICE — SUTURE PDS II SZ 0 L36IN ABSRB VLT L36MM CT-1 1/2 CIR Z346H

## (undated) DEVICE — LOOP LIG SUT SZ 0 L18IN ABSRB POLYDIOXANONE MFIL PDS II

## (undated) DEVICE — SUTURE MONOCRYL + SZ 4 0 L18IN ABSRB UD PC 3 L16MM 3 8 CIR PRIM MCP845G

## (undated) DEVICE — STANDARD HYPODERMIC NEEDLE,POLYPROPYLENE HUB: Brand: MONOJECT

## (undated) DEVICE — 4F 80 CM LEMAITRE EMBOLECTOMY CATHETER, EIFU: Brand: LEMAITRE EMBOLECTOMY CATHETER